# Patient Record
Sex: MALE | Race: WHITE | NOT HISPANIC OR LATINO | Employment: FULL TIME | URBAN - METROPOLITAN AREA
[De-identification: names, ages, dates, MRNs, and addresses within clinical notes are randomized per-mention and may not be internally consistent; named-entity substitution may affect disease eponyms.]

---

## 2017-02-13 ENCOUNTER — GENERIC CONVERSION - ENCOUNTER (OUTPATIENT)
Dept: OTHER | Facility: OTHER | Age: 67
End: 2017-02-13

## 2017-02-20 ENCOUNTER — ALLSCRIPTS OFFICE VISIT (OUTPATIENT)
Dept: OTHER | Facility: OTHER | Age: 67
End: 2017-02-20

## 2017-03-09 ENCOUNTER — GENERIC CONVERSION - ENCOUNTER (OUTPATIENT)
Dept: OTHER | Facility: OTHER | Age: 67
End: 2017-03-09

## 2017-04-04 ENCOUNTER — ALLSCRIPTS OFFICE VISIT (OUTPATIENT)
Dept: OTHER | Facility: OTHER | Age: 67
End: 2017-04-04

## 2017-05-08 ENCOUNTER — GENERIC CONVERSION - ENCOUNTER (OUTPATIENT)
Dept: OTHER | Facility: OTHER | Age: 67
End: 2017-05-08

## 2017-09-11 ENCOUNTER — GENERIC CONVERSION - ENCOUNTER (OUTPATIENT)
Dept: OTHER | Facility: OTHER | Age: 67
End: 2017-09-11

## 2017-09-11 ENCOUNTER — ALLSCRIPTS OFFICE VISIT (OUTPATIENT)
Dept: OTHER | Facility: OTHER | Age: 67
End: 2017-09-11

## 2017-09-12 ENCOUNTER — GENERIC CONVERSION - ENCOUNTER (OUTPATIENT)
Dept: OTHER | Facility: OTHER | Age: 67
End: 2017-09-12

## 2017-09-12 LAB
A/G RATIO (HISTORICAL): 1.6 (ref 1.2–2.2)
ALBUMIN SERPL BCP-MCNC: 4.4 G/DL (ref 3.6–4.8)
ALP SERPL-CCNC: 71 IU/L (ref 39–117)
ALT SERPL W P-5'-P-CCNC: 20 IU/L (ref 0–44)
AST SERPL W P-5'-P-CCNC: 24 IU/L (ref 0–40)
BASOPHILS # BLD AUTO: 0 %
BASOPHILS # BLD AUTO: 0 X10E3/UL (ref 0–0.2)
BILIRUB SERPL-MCNC: 0.8 MG/DL (ref 0–1.2)
BUN SERPL-MCNC: 24 MG/DL (ref 8–27)
BUN/CREA RATIO (HISTORICAL): 21 (ref 10–24)
CALCIUM SERPL-MCNC: 9.8 MG/DL (ref 8.6–10.2)
CHLORIDE SERPL-SCNC: 102 MMOL/L (ref 96–106)
CHOLEST SERPL-MCNC: 191 MG/DL (ref 100–199)
CHOLEST/HDLC SERPL: 4 RATIO UNITS (ref 0–5)
CO2 SERPL-SCNC: 22 MMOL/L (ref 18–29)
CREAT SERPL-MCNC: 1.13 MG/DL (ref 0.76–1.27)
DEPRECATED RDW RBC AUTO: 14.3 % (ref 12.3–15.4)
EGFR AFRICAN AMERICAN (HISTORICAL): 78 ML/MIN/1.73
EGFR-AMERICAN CALC (HISTORICAL): 68 ML/MIN/1.73
EOSINOPHIL # BLD AUTO: 0.2 X10E3/UL (ref 0–0.4)
EOSINOPHIL # BLD AUTO: 4 %
GLUCOSE SERPL-MCNC: 102 MG/DL (ref 65–99)
HCT VFR BLD AUTO: 47 % (ref 37.5–51)
HDLC SERPL-MCNC: 48 MG/DL
HGB BLD-MCNC: 16.3 G/DL (ref 12.6–17.7)
IMM.GRANULOCYTES (CD4/8) (HISTORICAL): 0 %
IMM.GRANULOCYTES (CD4/8) (HISTORICAL): 0 X10E3/UL (ref 0–0.1)
LDLC SERPL CALC-MCNC: 121 MG/DL (ref 0–99)
LYME 18 KD IGG (HISTORICAL): PRESENT
LYME 23 KD IGG (HISTORICAL): PRESENT
LYME 23 KD IGM (HISTORICAL): PRESENT
LYME 28 KD IGG (HISTORICAL): ABNORMAL
LYME 30 KD IGG (HISTORICAL): ABNORMAL
LYME 39 KD IGG (HISTORICAL): ABNORMAL
LYME 39 KD IGM (HISTORICAL): ABNORMAL
LYME 41 KD IGG (HISTORICAL): PRESENT
LYME 41 KD IGM (HISTORICAL): ABNORMAL
LYME 45 KD IGG (HISTORICAL): PRESENT
LYME 58 KD IGG (HISTORICAL): PRESENT
LYME 66 KD IGG (HISTORICAL): ABNORMAL
LYME 93 KD IGG (HISTORICAL): ABNORMAL
LYME IGG WB INTERP. (HISTORICAL): POSITIVE
LYME IGG/IGM AB (HISTORICAL): 1.25 ISR (ref 0–0.9)
LYME IGM (HISTORICAL): 0.82 INDEX (ref 0–0.79)
LYME IGM WB INTERP. (HISTORICAL): NEGATIVE
LYMPHOCYTES # BLD AUTO: 2 X10E3/UL (ref 0.7–3.1)
LYMPHOCYTES # BLD AUTO: 31 %
MCH RBC QN AUTO: 33.4 PG (ref 26.6–33)
MCHC RBC AUTO-ENTMCNC: 34.7 G/DL (ref 31.5–35.7)
MCV RBC AUTO: 96 FL (ref 79–97)
MONOCYTES # BLD AUTO: 0.5 X10E3/UL (ref 0.1–0.9)
MONOCYTES (HISTORICAL): 8 %
NEUTROPHILS # BLD AUTO: 3.6 X10E3/UL (ref 1.4–7)
NEUTROPHILS # BLD AUTO: 57 %
PLATELET # BLD AUTO: 220 X10E3/UL (ref 150–379)
POTASSIUM SERPL-SCNC: 4.6 MMOL/L (ref 3.5–5.2)
PROSTATE SPECIFIC ANTIGEN (HISTORICAL): 0.6 NG/ML (ref 0–4)
PROSTATE SPECIFIC ANTIGEN FREE (HISTORICAL): 0.34 NG/ML
PROSTATE SPECIFIC ANTIGEN PERCENT FREE (HISTORICAL): 56.7 %
RBC (HISTORICAL): 4.88 X10E6/UL (ref 4.14–5.8)
SODIUM SERPL-SCNC: 140 MMOL/L (ref 134–144)
TOT. GLOBULIN, SERUM (HISTORICAL): 2.7 G/DL (ref 1.5–4.5)
TOTAL PROTEIN (HISTORICAL): 7.1 G/DL (ref 6–8.5)
TRIGL SERPL-MCNC: 110 MG/DL (ref 0–149)
TSH SERPL DL<=0.05 MIU/L-ACNC: 1.3 UIU/ML (ref 0.45–4.5)
VLDLC SERPL CALC-MCNC: 22 MG/DL (ref 5–40)
WBC # BLD AUTO: 6.3 X10E3/UL (ref 3.4–10.8)

## 2017-09-13 ENCOUNTER — GENERIC CONVERSION - ENCOUNTER (OUTPATIENT)
Dept: OTHER | Facility: OTHER | Age: 67
End: 2017-09-13

## 2017-09-28 ENCOUNTER — GENERIC CONVERSION - ENCOUNTER (OUTPATIENT)
Dept: OTHER | Facility: OTHER | Age: 67
End: 2017-09-28

## 2017-10-11 ENCOUNTER — ALLSCRIPTS OFFICE VISIT (OUTPATIENT)
Dept: OTHER | Facility: OTHER | Age: 67
End: 2017-10-11

## 2018-01-12 VITALS
HEART RATE: 84 BPM | OXYGEN SATURATION: 97 % | SYSTOLIC BLOOD PRESSURE: 122 MMHG | TEMPERATURE: 98.3 F | HEIGHT: 73 IN | BODY MASS INDEX: 34.06 KG/M2 | WEIGHT: 257 LBS | DIASTOLIC BLOOD PRESSURE: 82 MMHG

## 2018-01-13 VITALS
DIASTOLIC BLOOD PRESSURE: 78 MMHG | SYSTOLIC BLOOD PRESSURE: 120 MMHG | OXYGEN SATURATION: 98 % | BODY MASS INDEX: 31.47 KG/M2 | WEIGHT: 238.5 LBS | HEART RATE: 65 BPM | TEMPERATURE: 97.7 F

## 2018-01-13 VITALS
DIASTOLIC BLOOD PRESSURE: 62 MMHG | SYSTOLIC BLOOD PRESSURE: 130 MMHG | BODY MASS INDEX: 31.48 KG/M2 | HEART RATE: 92 BPM | TEMPERATURE: 98.9 F | WEIGHT: 237.5 LBS | HEIGHT: 73 IN | OXYGEN SATURATION: 98 %

## 2018-01-13 VITALS
OXYGEN SATURATION: 97 % | BODY MASS INDEX: 33.5 KG/M2 | TEMPERATURE: 98.7 F | SYSTOLIC BLOOD PRESSURE: 130 MMHG | DIASTOLIC BLOOD PRESSURE: 68 MMHG | WEIGHT: 252.75 LBS | HEIGHT: 73 IN | HEART RATE: 87 BPM

## 2018-01-13 NOTE — RESULT NOTES
Discussion/Summary   labs so far are stable  Lyme still pending     Verified Results  (1) CBC/PLT/DIFF [id number] 10:30AM Krunal Roberts     Test Name Result Flag Reference   WBC 6 3 x10E3/uL  3 4-10 8   RBC 4 88 x10E6/uL  4 14-5 80   Hemoglobin 16 3 g/dL  12 6-17 7   Hematocrit 47 0 %  37 5-51 0   MCV 96 fL  79-97   MCH 33 4 pg H 26 6-33 0   MCHC 34 7 g/dL  31 5-35 7   RDW 14 3 %  12 3-15 4   Platelets 596 [id number]  150-379   Neutrophils 57 %     Lymphs 31 %     Monocytes 8 %     Eos 4 %     Basos 0 %     Neutrophils (Absolute) 3 6 x10E3/uL  1 4-7 0   Lymphs (Absolute) 2 0 x10E3/uL  0 7-3 1   Monocytes(Absolute) 0 5 x10E3/uL  0 1-0 9   Eos (Absolute) 0 2 x10E3/uL  0 0-0 4   Baso (Absolute) 0 0 x10E3/uL  0 0-0 2   Immature Granulocytes 0 %     Immature Grans (Abs) 0 0 x10E3/uL  0 0-0 1     (1) COMPREHENSIVE METABOLIC PANEL [id number] 34:27BZ Krunal Roberts     Test Name Result Flag Reference   Glucose, Serum 102 mg/dL H 65-99   BUN 24 mg/dL  8-27   Creatinine, Serum 1 13 mg/dL  0 76-1 27   BUN/Creatinine Ratio 21  10-24   Sodium, Serum 140 mmol/L  134-144   Potassium, Serum 4 6 mmol/L  3 5-5 2   Chloride, Serum 102 mmol/L     Carbon Dioxide, Total 22 mmol/L  18-29   Calcium, Serum 9 8 mg/dL  8 6-10 2   Protein, Total, Serum 7 1 g/dL  6 0-8 5   Albumin, Serum 4 4 g/dL  3 6-4 8   Globulin, Total 2 7 g/dL  1 5-4 5   A/G Ratio 1 6  1 2-2 2   Bilirubin, Total 0 8 mg/dL  0 0-1 2   Alkaline Phosphatase, S 71 IU/L     AST (SGOT) 24 IU/L  0-40   ALT (SGPT) 20 IU/L  0-44   eGFR If NonAfricn Am 68 mL/min/1 73  >59   eGFR If Africn Am 78 mL/min/1 73  >59     (1) LIPID PANEL, FASTING [id number] 10:30AM Krunal Roberts     Test Name Result Flag Reference   Cholesterol, Total 191 mg/dL  100-199   Triglycerides 110 mg/dL  0-149   HDL Cholesterol 48 mg/dL  >39   VLDL Cholesterol Remigio 22 mg/dL  5-40   LDL Cholesterol Calc 121 mg/dL H 0-99   T  Chol/HDL Ratio 4 0 ratio units  0 0-5 0   T   Chol/HDL Ratio Men  Women                                               1/2 Avg  Risk  3 4    3 3                                                   Avg Risk  5 0    4 4                                                2X Avg  Risk  9 6    7 1                                                3X Avg  Risk 23 4   11 0     (1) TSH [id number] 10:30AM Kayode Greco     Test Name Result Flag Reference   TSH 1 300 uIU/mL  0 450-4 500     [facility]) PSA Total+% Free [id number] 10:30AM Kayode Greco     Test Name Result Flag Reference   Prostate Specific Ag, Serum 0 6 ng/mL  0 0-4 0   Roche ECLIA methodology  According to the American Urological Association, Serum PSA should  decrease and remain at undetectable levels after radical  prostatectomy  The AUA defines biochemical recurrence as an initial  PSA value 0 2 ng/mL or greater followed by a subsequent confirmatory  PSA value 0 2 ng/mL or greater  Values obtained with different assay methods or kits cannot be used  interchangeably  Results cannot be interpreted as absolute evidence  of the presence or absence of malignant disease  PSA, Free 0 34 ng/mL  N/A   Roche ECLIA methodology  % Free PSA 56 7 %     The table below lists the probability of prostate cancer for  men with non-suspicious ANSELMO results and total PSA between  4 and 10 ng/mL, by patient age Harvey Cord[place], [address] [facility],  [id number])  % Free PSA       50-64 yr        65-75 yr                    0 00-10 00%        56%             55%                   10 01-15 00%        24%             35%                   15 01-20 00%        17%             23%                   20 01-25 00%        10%             20%                        >25 00%         5%              9%  Please note:  Victorina et al did not make specific                recommendations regarding the use of                percent free PSA for any other population                of men

## 2018-01-16 NOTE — RESULT NOTES
Discussion/Summary   lyme antibodies are positive  I will send in an antibiotic  Verified Results  ([facility] [facility]) Lyme Ab/Western Blot Reflex [id number] 10:30AM Vince Rolling     Test Name Result Flag Reference   Lyme IgG/IgM Ab 1 25 ISR H 0 00-0 90   Negative         <0 91                                                 Equivocal  0 91 - 1 09                                                 Positive         >1 09   Lyme Disease Ab, Quant, IgM 0 82 index H 0 00-0 79   Negative         <0 80                                                 Equivocal  0 80 - 1 19                                                 Positive         >1 19                  IgM levels may peak at 3-6 weeks post infection, then                  gradually decline  IgG P93 Ab  Absent     IgG P66 Ab  Absent     IgG P58 Ab  Present A    IgG P45 Ab  Present A    IgG P41 Ab  Present A    IgG P39 Ab  Absent     IgG P30 Ab  Absent     IgG P28 Ab  Absent     IgG P23 Ab  Present A    IgG P18 Ab  Present A    Lyme IgG WB Interp  Positive A    Positive: 5 of the following                                                Borrelia-specific bands:                                                18,23,28,30,39,41,45,58,                                                66, and 93  Negative: No bands or banding                                                patterns which do not                                                meet positive criteria  IgM P41 Ab  Absent     IgM P39 Ab  Absent     IgM P23 Ab  Present A    Lyme IgM WB Interp  Negative     Note: An equivocal or positive EIA result followed by a negative  Western Blot result is considered NEGATIVE  An equivocal or positive  EIA result followed by a positive Western Blot is considered POSITIVE  by the CDC  Positive: 2 of the following bands: 23,39 or 41  Negative: No bands or banding patterns which do not meet positive  criteria    Criteria for positivity are those recommended by CDC/ASTPHLD   p23=Osp C, z98=doibvgjdw  Note:  Sera from individuals with the following may cross react in the  Lyme Western Blot assays: other spirochetal diseases (periodontal  disease, leptospirosis, relapsing fever, yaws, and pinta);  connective autoimmune (Rheumatoid Arthritis and Systemic Lupus  Erythematosus and also individuals with Antinuclear Antibody);  other infections COFFEE [facility] Spotted Fever; Areli-Barr Virus,  and Cytomegalovirus)  Plan  Lyme disease    · Doxycycline Hyclate 100 MG Oral Tablet;  Take 1 tablet twice daily

## 2018-01-18 NOTE — RESULT NOTES
Verified Results  (1) CBC/PLT/DIFF [id number] 12:00AM [facility]     Test Name Result Flag Reference   WBC 5 4 x10E3/uL  3 4-10 8   RBC 4 72 x10E6/uL  4 14-5 80   Hemoglobin 15 4 g/dL  12 6-17 7   Hematocrit 46 0 %  37 5-51 0   MCV 98 fL H 79-97   MCH 32 6 pg  26 6-33 0   MCHC 33 5 g/dL  31 5-35 7   RDW 14 0 %  12 3-15 4   Platelets 152 [id number]  150-379   Neutrophils 48 %     Lymphs 38 %     Monocytes 9 %     Eos 5 %     Basos 0 %     Neutrophils (Absolute) 2 6 x10E3/uL  1 4-7 0   Lymphs (Absolute) 2 0 x10E3/uL  0 7-3 1   Monocytes(Absolute) 0 5 x10E3/uL  0 1-0 9   Eos (Absolute) 0 3 x10E3/uL  0 0-0 4   Baso (Absolute) 0 0 x10E3/uL  0 0-0 2   Immature Granulocytes 0 %     Immature Grans (Abs) 0 0 x10E3/uL  0 0-0 1     (1) COMPREHENSIVE METABOLIC PANEL [id number] 86:13TX [facility]     Test Name Result Flag Reference   Glucose, Serum 103 mg/dL H 65-99   BUN 26 mg/dL  8-27   Creatinine, Serum 0 98 mg/dL  0 76-1 27   eGFR If NonAfricn Am 81 mL/min/1 73  >59   eGFR If Africn Am 94 mL/min/1 73  >59   BUN/Creatinine Ratio 27 H 10-22   Sodium, Serum 141 mmol/L  134-144   Potassium, Serum 4 3 mmol/L  3 5-5 2   Chloride, Serum 104 mmol/L     Carbon Dioxide, Total 23 mmol/L  18-29   Calcium, Serum 9 7 mg/dL  8 6-10 2   Protein, Total, Serum 6 7 g/dL  6 0-8 5   Albumin, Serum 4 4 g/dL  3 6-4 8   Globulin, Total 2 3 g/dL  1 5-4 5   A/G Ratio 1 9  1 1-2 5   Bilirubin, Total 0 8 mg/dL  0 0-1 2   Alkaline Phosphatase, S 66 IU/L     AST (SGOT) 20 IU/L  0-40   ALT (SGPT) 19 IU/L  0-44     (1) LIPID PANEL, FASTING [id number] 12:00AM Buck Montes De Oca     Test Name Result Flag Reference   Cholesterol, Total 234 mg/dL H 100-199   Triglycerides 92 mg/dL  0-149   HDL Cholesterol 45 mg/dL  >39   According to ATP-III Guidelines, HDL-C >59 mg/dL is considered a  negative risk factor for CHD  VLDL Cholesterol Remigio 18 mg/dL  5-40   LDL Cholesterol Calc 171 mg/dL H 0-99   T   Chol/HDL Ratio 5 2 ratio units H 0 0-5 0   T  Chol/HDL Ratio                                                             Men  Women                                               1/2 Avg  Risk  3 4    3 3                                                   Avg Risk  5 0    4 4                                                2X Avg  Risk  9 6    7 1                                                3X Avg  Risk 23 4   11 0     (1) PSA (SCREEN) (Dx V76 44 Screen for Prostate Cancer) [id number] 12:00AM [facility]     Test Name Result Flag Reference   Prostate Specific Ag, Serum 0 5 ng/mL  0 0-4 0   Roche ECLIA methodology  According to the American Urological Association, Serum PSA should  decrease and remain at undetectable levels after radical  prostatectomy  The AUA defines biochemical recurrence as an initial  PSA value 0 2 ng/mL or greater followed by a subsequent confirmatory  PSA value 0 2 ng/mL or greater  Values obtained with different assay methods or kits cannot be used  interchangeably  Results cannot be interpreted as absolute evidence  of the presence or absence of malignant disease  (1) TSH [id number] 12:00AM [facility]     Test Name Result Flag Reference   TSH 1 320 uIU/mL  0 450-4 500       Discussion/Summary   sugar slightly elevated  lipids are way too high   we will discuss treatment at next ov

## 2018-02-11 DIAGNOSIS — E78.2 MIXED HYPERLIPIDEMIA: Primary | ICD-10-CM

## 2018-02-11 RX ORDER — PRAVASTATIN SODIUM 80 MG/1
TABLET ORAL
Qty: 30 TABLET | Refills: 0 | Status: SHIPPED | OUTPATIENT
Start: 2018-02-11 | End: 2018-02-13 | Stop reason: SDUPTHER

## 2018-02-13 ENCOUNTER — TELEPHONE (OUTPATIENT)
Dept: FAMILY MEDICINE CLINIC | Facility: CLINIC | Age: 68
End: 2018-02-13

## 2018-02-13 DIAGNOSIS — R10.9 ABDOMINAL CRAMPING: ICD-10-CM

## 2018-02-13 DIAGNOSIS — E78.2 MIXED HYPERLIPIDEMIA: ICD-10-CM

## 2018-02-13 DIAGNOSIS — M62.838 MUSCLE SPASM: Primary | ICD-10-CM

## 2018-02-13 RX ORDER — HYOSCYAMINE SULFATE 0.12 MG/1
0.12 TABLET SUBLINGUAL 3 TIMES DAILY PRN
Qty: 60 EACH | Refills: 1 | Status: SHIPPED | OUTPATIENT
Start: 2018-02-13 | End: 2019-11-01 | Stop reason: SDUPTHER

## 2018-02-13 RX ORDER — METAXALONE 800 MG/1
1 TABLET ORAL 3 TIMES DAILY PRN
COMMUNITY
Start: 2015-06-24 | End: 2018-02-13 | Stop reason: SDUPTHER

## 2018-02-13 RX ORDER — METAXALONE 800 MG/1
800 TABLET ORAL EVERY 6 HOURS PRN
Qty: 60 TABLET | Refills: 1 | Status: SHIPPED | OUTPATIENT
Start: 2018-02-13 | End: 2019-03-27 | Stop reason: SDUPTHER

## 2018-02-13 RX ORDER — ALPRAZOLAM 0.25 MG/1
1 TABLET ORAL DAILY PRN
COMMUNITY
Start: 2013-08-07 | End: 2018-10-02 | Stop reason: SDUPTHER

## 2018-02-13 RX ORDER — UBIDECARENONE 30 MG
CAPSULE ORAL
COMMUNITY

## 2018-02-13 RX ORDER — PRAVASTATIN SODIUM 80 MG/1
80 TABLET ORAL
Qty: 30 TABLET | Refills: 5 | Status: SHIPPED | OUTPATIENT
Start: 2018-02-13 | End: 2018-09-18 | Stop reason: SDUPTHER

## 2018-02-13 RX ORDER — VENLAFAXINE 75 MG/1
TABLET ORAL
COMMUNITY
Start: 2017-10-11 | End: 2018-02-20

## 2018-02-13 RX ORDER — OMEPRAZOLE 20 MG/1
1 CAPSULE, DELAYED RELEASE ORAL DAILY
COMMUNITY
Start: 2016-09-06 | End: 2019-09-24

## 2018-02-13 RX ORDER — HYOSCYAMINE SULFATE 0.12 MG/1
TABLET SUBLINGUAL
COMMUNITY
Start: 2016-01-25 | End: 2018-02-13 | Stop reason: SDUPTHER

## 2018-02-20 ENCOUNTER — OFFICE VISIT (OUTPATIENT)
Dept: FAMILY MEDICINE CLINIC | Facility: CLINIC | Age: 68
End: 2018-02-20
Payer: COMMERCIAL

## 2018-02-20 VITALS
TEMPERATURE: 98.1 F | WEIGHT: 255.5 LBS | DIASTOLIC BLOOD PRESSURE: 82 MMHG | BODY MASS INDEX: 33.86 KG/M2 | SYSTOLIC BLOOD PRESSURE: 116 MMHG | HEART RATE: 72 BPM | HEIGHT: 73 IN | OXYGEN SATURATION: 95 %

## 2018-02-20 DIAGNOSIS — M54.12 CERVICAL RADICULOPATHY: Primary | ICD-10-CM

## 2018-02-20 DIAGNOSIS — F33.8 SEASONAL AFFECTIVE DISORDER (HCC): ICD-10-CM

## 2018-02-20 DIAGNOSIS — G47.33 OBSTRUCTIVE SLEEP APNEA: ICD-10-CM

## 2018-02-20 PROBLEM — M75.42 IMPINGEMENT SYNDROME OF LEFT SHOULDER: Status: ACTIVE | Noted: 2017-09-11

## 2018-02-20 PROBLEM — C44.41 BASAL CELL CARCINOMA OF SCALP: Status: ACTIVE | Noted: 2017-02-20

## 2018-02-20 PROCEDURE — 99214 OFFICE O/P EST MOD 30 MIN: CPT | Performed by: FAMILY MEDICINE

## 2018-02-20 PROCEDURE — 1101F PT FALLS ASSESS-DOCD LE1/YR: CPT | Performed by: FAMILY MEDICINE

## 2018-02-20 RX ORDER — HYDROCODONE BITARTRATE AND ACETAMINOPHEN 5; 325 MG/1; MG/1
TABLET ORAL
COMMUNITY
Start: 2016-11-29 | End: 2018-02-20 | Stop reason: SDUPTHER

## 2018-02-20 RX ORDER — HYDROCODONE BITARTRATE AND ACETAMINOPHEN 5; 325 MG/1; MG/1
1 TABLET ORAL EVERY 4 HOURS PRN
Qty: 30 TABLET | Refills: 0 | Status: SHIPPED | OUTPATIENT
Start: 2018-02-20 | End: 2018-03-13 | Stop reason: SDUPTHER

## 2018-02-20 RX ORDER — MELATONIN
1000 DAILY
COMMUNITY

## 2018-02-20 RX ORDER — PRAVASTATIN SODIUM 80 MG/1
TABLET ORAL
COMMUNITY
Start: 2017-01-02 | End: 2018-02-20

## 2018-02-20 RX ORDER — ALPRAZOLAM 0.25 MG/1
TABLET ORAL
COMMUNITY
Start: 2016-12-28 | End: 2018-02-20

## 2018-02-20 RX ORDER — CHLORAL HYDRATE 500 MG
1000 CAPSULE ORAL DAILY
COMMUNITY

## 2018-02-20 RX ORDER — PREDNISONE 20 MG/1
60 TABLET ORAL DAILY
Qty: 15 TABLET | Refills: 0 | Status: SHIPPED | OUTPATIENT
Start: 2018-02-20 | End: 2018-04-23 | Stop reason: ALTCHOICE

## 2018-02-20 NOTE — PATIENT INSTRUCTIONS
Cervical Radiculopathy   WHAT YOU NEED TO KNOW:   What is cervical radiculopathy? Cervical radiculopathy is a painful condition that happens when a spinal nerve in your neck is pinched or irritated  What causes cervical radiculopathy? Changes in the vertebrae (bones) and discs in your neck can put pressure on a spinal nerve  Discs are natural, spongy cushions between your vertebrae that allow your spine to move  The following can cause a pinched nerve:  · Disc damage  may occur if a disc flattens, bulges, or moves over time  An injury can also cause disc damage  · Cervical spondylosis  is when the vertebrae in your neck break down  This normally occurs as you age  · Growths , such as tumors or cysts (fluid-filled lumps), may grow and press on the nerve  What are the signs and symptoms of cervical radiculopathy? The most common symptom is sharp pain that travels from your neck all the way down your arm  You may have pain in your shoulder, chest, and hand  The pain may get worse with movement or when you cough or sneeze  You may also have any of the following:  · Burning or tingling sensations in your neck or arm     · Numbness or weakness in your arm or hand that makes it hard for you to  objects    · Headaches  How is cervical radiculopathy diagnosed? Your healthcare provider will ask when and how your symptoms began  He will gently press on your neck to check for tenderness and areas that are not shaped correctly  He will also check your arms and hands for numbness or weakness  You may have any of the following:  · Provocative tests  are done to check your response to certain movements  He will ask you to move your neck, shoulder, and arm in different ways  Some movements will increase your symptoms, while others will make you feel better  · An x-ray  is a picture of the bones and tissues in your neck  · An MRI or a CT scan  may be used to take pictures of your neck   The pictures can show problems and changes in your nerves, discs, and vertebrae  You may be given dye to help the pictures show up better  Tell the healthcare provider if you have ever had an allergic reaction to contrast dye  Do not enter the MRI room with anything metal  Metal can cause serious injury  Tell the healthcare provider if you have any metal in or on your body  · An electromyography  is also called an EMG  An EMG is done to test the function of your muscles and the nerves that control them  Electrodes (wires) are placed on the muscle being tested  Needles may be attached to the electrodes and placed in your skin  The electrical activity of your muscles and nerves is measured by a machine attached to the electrodes  Your muscles are tested at rest and during movement  How is cervical radiculopathy treated? · NSAIDs  decrease swelling and pain  This medicine can be bought without a doctor's order  This medicine can cause stomach bleeding or kidney problems in certain people  If you take blood thinner medicine, always ask your healthcare provider if NSAIDs are safe for you  Always read the medicine label and follow the directions on it before using this medicine  · Prescription pain medicine  may be given to decrease pain  Do not wait until the pain is severe before you take this medicine  · Steroids  help decrease pain and swelling  These may be given as a pill or as an injection in your neck  You may need more than 1 injection if your symptoms do not improve after the first treatment  · Physical therapy  helps stretch and strengthen your muscles  Your physical therapist can teach you how to improve your posture and the way you hold your neck  He may also teach you how to be safely active and avoid further injury  He can also help you develop an exercise program that is safe for your back and neck  · Surgery  may be used to treat a pinched nerve if other treatments have not helped after 6 to 12 weeks    How can I manage my symptoms? · Ice  helps decrease swelling and pain  Ice may also help prevent tissue damage  Use an ice pack, or put crushed ice in a plastic bag  Cover it with a towel and place it on your neck for 15 to 20 minutes every hour or as directed  · Rest  when you feel it is needed  Slowly start to do more each day  Return to your daily activities as directed  · Wear a soft collar  You may be given a soft collar to support your neck while you sleep  Wear the soft collar only as directed  · Do light stretches and regular exercise  Your healthcare provider may suggest light stretches to help decrease stiffness in your neck and arm as you recover  After your pain is controlled, you may benefit from regular exercise  Ask what type of exercise is safe for your back and neck  · Review your work area  A comfortable work area can help prevent neck strain  Ask your employer for an ergonomic review to check the position of your desk, chair, phone, and computer  Make any necessary adjustments for your comfort  When should I contact my healthcare provider? · You are losing weight without trying  · Your pain is worse, even with medicine  · One or both hands feel more numb than before, or you cannot move your fingers well  · You have questions or concerns about your condition or care  CARE AGREEMENT:   You have the right to help plan your care  Learn about your health condition and how it may be treated  Discuss treatment options with your caregivers to decide what care you want to receive  You always have the right to refuse treatment  The above information is an  only  It is not intended as medical advice for individual conditions or treatments  Talk to your doctor, nurse or pharmacist before following any medical regimen to see if it is safe and effective for you    © 2017 [employer] Information is for End User's use only and may not be sold, redistributed or otherwise used for commercial purposes  All illustrations and images included in CareNotes® are the copyrighted property of A D A M , Inc  or Nicholas Gasca

## 2018-02-20 NOTE — ASSESSMENT & PLAN NOTE
This has been extremely well controlled for many years but has now returned  He would like to go back to see pain management to consider injections again since they worked in the past   I advised that he may not get in within the next few days so I am going to start him on an oral burst of 60 mg of prednisone for 5 days  This should help him get through the long car drive he has to do after which she can follow up at pain management  I did provide him with her phone number but advised they may also be calling him as I put the referral in the computer

## 2018-02-20 NOTE — ASSESSMENT & PLAN NOTE
Uncontrolled and untreated  The patient will need a repeat sleep study and he would like to do this at home -I did provide him with a prescription for a home sleep study but advised that since he has a pre established relationship with a provider at [place], Dr Nevin Adkins,  That he call their office and let them know he has an order for the home sleep study  They should be able to set this up for him and to his follow-up

## 2018-02-20 NOTE — PROGRESS NOTES
Assessment/Plan:    Obstructive sleep apnea    Uncontrolled and untreated  The patient will need a repeat sleep study and he would like to do this at home -I did provide him with a prescription for a home sleep study but advised that since he has a pre established relationship with a provider at [place], Dr Brianne Ruano,  That he call their office and let them know he has an order for the home sleep study  They should be able to set this up for him and to his follow-up  Seasonal affective disorder ([address] )    Well controlled on sertraline and now improving with the weather change  We discussed tapering the sertraline by cutting it in half and taking 25 mg daily for a week or more  After that he can take 25 mg every other day for a week or more  This should prevent the serotonin withdrawal syndrome  Cervical radiculopathy    This has been extremely well controlled for many years but has now returned  He would like to go back to see pain management to consider injections again since they worked in the past   I advised that he may not get in within the next few days so I am going to start him on an oral burst of 60 mg of prednisone for 5 days  This should help him get through the long car drive he has to do after which she can follow up at pain management  I did provide him with her phone number but advised they may also be calling him as I put the referral in the computer  Diagnoses and all orders for this visit:    Cervical radiculopathy  -     Ambulatory referral to Pain Management; Future  -     predniSONE 20 mg tablet; Take 3 tablets (60 mg total) by mouth daily  -     HYDROcodone-acetaminophen (NORCO) 5-325 mg per tablet; Take 1 tablet by mouth every 4 (four) hours as needed for pain Max Daily Amount: 6 tablets    Seasonal affective disorder (HCC)    Obstructive sleep apnea  -     Home Study; Future  -     Ambulatory referral to Sleep Medicine;  Future              Subjective:      Patient ID: Yadira Childers is a 78 y o  male  The pt is here today with pain in the left side of the neck  It radiates down the arm and he does get numbness and pain  He has had this before, about 5 years ago, with the same exact sx  Saw Dr Anjana Estrada and had 6 injections over a 2 year period ([facility])  He did have a CT at that time that showed disc disease at C6, he thinks  These finally worked and he's been fine until 2 weeks ago  No known injuries  No known reason why this restarted now  But it has been getting worse and he has a long drive this week  Wants to get this taken care of asap and before it gets worse  He has not had any repeat imaging since the initial episode 5 years ago    He also is now taking sertraline 50mg for his SAD  He had tried cymbalta with Dr Alex Byrne but it gave him side effects  He gets this every year, generally takes meds until March or so  He now wants to stop the sertraline and is asking how he should taper it    Finally he has a dx of JAY  He has not had a sleep study in years  He did not tolerate the CPAP years ago which is why he's not on it   But his wife has d/w him that now the machines are smaller and quieter and he wants to try again  He was seeing a sleep specialist in [place] whom he would like to go back to             The following portions of the patient's history were reviewed and updated as appropriate: allergies, current medications, past family history, past medical history, past social history, past surgical history and problem list     Review of Systems      Objective:  Vitals:    02/20/18 0852   BP: 116/82   Pulse: 72   Temp: 98 1 °F (36 7 °C)   SpO2: 95%      Physical Exam   Constitutional: He is oriented to person, place, and time  He appears well-developed and well-nourished  HENT:   Head: Normocephalic and atraumatic  Eyes: Conjunctivae and EOM are normal    Neck: Muscular tenderness (on the left trapezius) present   No spinous process tenderness present  No neck rigidity  No edema present  + spurlings to the left   Cardiovascular: Normal rate  Pulmonary/Chest: Effort normal  No respiratory distress  Musculoskeletal: Normal range of motion  Other than in his neck   Neurological: He is alert and oriented to person, place, and time  Skin: Skin is warm, dry and intact  Psychiatric: He has a normal mood and affect  His behavior is normal  Judgment and thought content normal    Nursing note and vitals reviewed

## 2018-02-20 NOTE — ASSESSMENT & PLAN NOTE
Well controlled on sertraline and now improving with the weather change  We discussed tapering the sertraline by cutting it in half and taking 25 mg daily for a week or more  After that he can take 25 mg every other day for a week or more  This should prevent the serotonin withdrawal syndrome

## 2018-02-23 ENCOUNTER — TELEPHONE (OUTPATIENT)
Dept: PAIN MEDICINE | Facility: CLINIC | Age: 68
End: 2018-02-23

## 2018-03-13 ENCOUNTER — CONSULT (OUTPATIENT)
Dept: PAIN MEDICINE | Facility: CLINIC | Age: 68
End: 2018-03-13
Payer: COMMERCIAL

## 2018-03-13 VITALS
HEART RATE: 86 BPM | HEIGHT: 73 IN | DIASTOLIC BLOOD PRESSURE: 62 MMHG | TEMPERATURE: 97.6 F | WEIGHT: 262 LBS | BODY MASS INDEX: 34.72 KG/M2 | SYSTOLIC BLOOD PRESSURE: 118 MMHG

## 2018-03-13 DIAGNOSIS — M54.12 CERVICAL RADICULOPATHY: ICD-10-CM

## 2018-03-13 DIAGNOSIS — M48.02 CERVICAL SPINAL STENOSIS: Primary | ICD-10-CM

## 2018-03-13 PROCEDURE — 99244 OFF/OP CNSLTJ NEW/EST MOD 40: CPT | Performed by: ANESTHESIOLOGY

## 2018-03-13 RX ORDER — HYDROCODONE BITARTRATE AND ACETAMINOPHEN 5; 325 MG/1; MG/1
1 TABLET ORAL EVERY 4 HOURS PRN
Qty: 30 TABLET | Refills: 0 | Status: SHIPPED | OUTPATIENT
Start: 2018-03-13 | End: 2018-05-29 | Stop reason: SDUPTHER

## 2018-03-13 NOTE — PROGRESS NOTES
Assessment:  1  Cervical spinal stenosis    2  Cervical radiculopathy        Plan:  The patient's pain persists despite time, relative rest, activity modification and oral steroids  I believe that he would benefit from cervical epidural steroid injection to directly diminish any inflammatory component of his pain as it has helped him in the past niece had short-term relief with oral steroids     I will initially use an translaminar approach  If the patient does not receive significant pain relief following the initial injection, I may need to repeat using a transforaminal approach that may allow for better concentrate of the steroid along the affected nerve root  I did write him a one time refill of his hydrocodone as he is in severe pain  While the patient was in the office today, I did review the patient's report on the [address] [place] [address] web site and found it to be appropriate for what is being prescribed and I reviewed it for any inconsistencies or evidence of multiple prescribers/drug diversion  There was no report under him on the web site  The risks of opioid medications, including dependence, addiction and tolerance were explained to the patient  The patient understands and agrees to use these medications only as prescribed  I have fully discussed the potential side effects of the medication with the patient, which include, but are not limited to, constipation, drowsiness, addiction, impaired judgment and risk of fatal overdose if not taken as prescribed  I warned against driving while taking sedating medications  At this point and time, the patient is showing no signs of addiction, abuse, diversion or suicidal ideation  In the office today, we reviewed the nature of the patient's pathology in depth using  diagrams and models  I discussed the approach I would use for the epidural steroid injection and provided literature for home review    The patient understands the risks associated with the procedure including but not limited to bleeding, infection, tissue injury, exacerbation of symptoms, allergic reaction, spinal headache, and paralysis and provided written and verbal consent  today  This document utilized voice recognition software and errors may have occurred  My impressions and treatment recommendations were discussed in detail with the patient who verbalized understanding and had no further questions  Discharge instructions were provided  I personally saw and examined the patient and I agree with the above discussed plan of care  Orders Placed This Encounter   Procedures    XR spine cervical complete 4 or 5 vw non injury     Standing Status:   Future     Standing Expiration Date:   3/13/2022     Scheduling Instructions:      Bring along any outside films relating to this procedure  Order Specific Question:   Reason for Exam:     Answer:   left neck pain    FL spine and pain procedure     Standing Status:   Future     Standing Expiration Date:   3/13/2022     Order Specific Question:   Reason for Exam:     Answer:   FRAN to the left #1     Order Specific Question:   Anticoagulant hold needed? Answer:   no    FL spine and pain procedure     Standing Status:   Future     Standing Expiration Date:   3/13/2022     Order Specific Question:   Reason for Exam:     Answer:   FRAN to the left #2     Order Specific Question:   Anticoagulant hold needed? Answer:   no     New Medications Ordered This Visit   Medications    HYDROcodone-acetaminophen (NORCO) 5-325 mg per tablet     Sig: Take 1 tablet by mouth every 4 (four) hours as needed for pain Max Daily Amount: 6 tablets     Dispense:  30 tablet     Refill:  0       History of Present Illness:    Sloane Gregory is a 78 y o  male and who was seen back in 2011 for cervical epidural steroid injections by Dr Vinny Mcmillan    He states his pain is similar nature in character his left side of his neck radiating to his scapula at with numbness and tingling down into his left hand  He rates it as moderate to severe and significantly interfering with his daily living activities  His pain is nearly constant worse in the afternoon he has undergone physician supervised home exercises describing the pain is achy with a numbing sensation  Lying down and relaxation decreases symptoms while exercise aggravates it  Traction provides no relief ice provides short-term relief  I have personally reviewed and/or updated the patient's past medical history, past surgical history, family history, social history, current medications, allergies, and vital signs today  Review of Systems:    Review of Systems   Constitutional: Negative for fever and unexpected weight change  HENT: Negative for trouble swallowing  Eyes: Negative for visual disturbance  Respiratory: Negative for shortness of breath and wheezing  Cardiovascular: Negative for chest pain and palpitations  Gastrointestinal: Negative for constipation, diarrhea, nausea and vomiting  Endocrine: Negative for cold intolerance, heat intolerance and polydipsia  Genitourinary: Negative for difficulty urinating and frequency  Musculoskeletal: Negative for arthralgias, gait problem, joint swelling and myalgias  Skin: Negative for rash  Neurological: Negative for dizziness, seizures, syncope, weakness and headaches  Hematological: Does not bruise/bleed easily  Psychiatric/Behavioral: Negative for dysphoric mood  All other systems reviewed and are negative        Patient Active Problem List   Diagnosis    Basal cell carcinoma of scalp    Esophageal motility disorder    GERD (gastroesophageal reflux disease)    Hyperlipidemia    Impingement syndrome of left shoulder    Obstructive sleep apnea    [facility]    Vitamin D deficiency    Cervical radiculopathy       Past Medical History:   Diagnosis Date    Acute medial meniscus tear     Allergic rhinitis     Anxiety     Appendicitis     Biceps tendon tear     Colon polyps     Depression with anxiety     Dyskinesia of esophagus     Fatigue     GERD (gastroesophageal reflux disease)     Herpes     Hyperlipidemia     Joint pain     Kidney stones     Lyme disease     Nephrolithiasis        Past Surgical History:   Procedure Laterality Date    APPENDECTOMY      BICEPS TENDON REPAIR      KNEE CARTILAGE SURGERY      VASECTOMY         Family History   Problem Relation Age of Onset    Diabetes Mother     Coronary artery disease Father     Kidney disease Father        Social History     Occupational History    Not on file       Social History Main Topics    Smoking status: Never Smoker    Smokeless tobacco: Never Used    Alcohol use No    Drug use: No    Sexual activity: Not on file       Current Outpatient Prescriptions on File Prior to Visit   Medication Sig    ALPRAZolam (XANAX) 0 25 mg tablet Take 1 tablet by mouth daily as needed    cholecalciferol (VITAMIN D3) 1,000 units tablet Take 1,000 Units by mouth daily    Coenzyme Q10 (COQ10 PO) Take by mouth daily    Glucosamine-Chondroit-Vit C-Mn (GLUCOSAMINE 1500 COMPLEX PO) Take by mouth    Hyoscyamine Sulfate SL 0 125 MG SUBL Place 0 125 mg under the tongue 3 (three) times a day as needed (as needed)    metaxalone (SKELAXIN) 800 mg tablet Take 1 tablet (800 mg total) by mouth every 6 (six) hours as needed for muscle spasms    Multiple Vitamins-Minerals (MULTI FOR HIM 50+) TABS Take by mouth    Omega-3 Fatty Acids (FISH OIL) 1,000 mg Take 1,000 mg by mouth daily    omeprazole (PriLOSEC) 20 mg delayed release capsule Take 1 capsule by mouth daily    pravastatin (PRAVACHOL) 80 mg tablet Take 1 tablet (80 mg total) by mouth daily at bedtime    predniSONE 20 mg tablet Take 3 tablets (60 mg total) by mouth daily    [DISCONTINUED] HYDROcodone-acetaminophen (NORCO) 5-325 mg per tablet Take 1 tablet by mouth every 4 (four) hours as needed for pain Max Daily Amount: 6 tablets     No current facility-administered medications on file prior to visit  Allergies   Allergen Reactions    Fluoxetine Other (See Comments)     Reaction Date: 16Apr2011;     Pseudoephedrine Hives     Reaction Date: 16Apr2011;     Sulfa Antibiotics Hives       Physical Exam:    /62   Pulse 86   Temp 97 6 °F (36 4 °C) (Oral)   Ht 6' 1" (1 854 m)   Wt 119 kg (262 lb)   BMI 34 57 kg/m²     Constitutional: normal, well developed, well nourished, alert, in no distress and non-toxic and no overt pain behavior  Obese  Eyes: anicteric  HEENT: grossly intact  Neck: supple, symmetric, trachea midline and no masses   Pulmonary:even and unlabored  Cardiovascular:No edema or pitting edema present  Skin:Normal without rashes or lesions and well hydrated  Psychiatric:Mood and affect appropriate  Neurologic:Cranial Nerves II-XII grossly intact  Musculoskeletal:normal   Inspection:  Normal station and gait  Normal cervical curves and head posture  Skin intact without erythema  No sensory loss  There is no atrophy  Palpation:  There is no tenderness to palpation overlying the bilateral cervical paraspinals, cervical facet joints  There is no tenderness over the upper trapezius muscles bilateral  No shoulder tenderness  Motor/Strength:  5/5 strength in the bilateral upper extremities  Reflexes:  equal and symmetric in the upper limbs  Sensation:   Sensation intact to light touch and pinprick in the upper limbs  Maneuvers:  Positive Spurling maneuver on the    Imaging  MRI CERVICAL SPINE 09/18/09  Diffuse changes of degenerative disc disease as described in detail above  Question of herniation and or spurring on the left at C6-7 causing narrowing of the left C7 foramen  Evaluation on axial images is somewhat limited because of motion  If further imaging evaluation is desired, a CT myelogram may be usefule     Normal cervical spinal cord signal intensity  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

## 2018-03-16 ENCOUNTER — APPOINTMENT (OUTPATIENT)
Dept: RADIOLOGY | Facility: CLINIC | Age: 68
End: 2018-03-16
Payer: COMMERCIAL

## 2018-03-16 DIAGNOSIS — M48.02 CERVICAL SPINAL STENOSIS: ICD-10-CM

## 2018-03-16 PROCEDURE — 72050 X-RAY EXAM NECK SPINE 4/5VWS: CPT

## 2018-03-19 ENCOUNTER — HOSPITAL ENCOUNTER (OUTPATIENT)
Dept: RADIOLOGY | Facility: CLINIC | Age: 68
Discharge: HOME | End: 2018-03-19
Attending: ANESTHESIOLOGY
Payer: COMMERCIAL

## 2018-03-19 VITALS
SYSTOLIC BLOOD PRESSURE: 138 MMHG | DIASTOLIC BLOOD PRESSURE: 83 MMHG | TEMPERATURE: 98.2 F | HEART RATE: 73 BPM | RESPIRATION RATE: 18 BRPM | OXYGEN SATURATION: 98 %

## 2018-03-19 DIAGNOSIS — M48.02 CERVICAL SPINAL STENOSIS: ICD-10-CM

## 2018-03-19 PROCEDURE — 62321 NJX INTERLAMINAR CRV/THRC: CPT | Performed by: ANESTHESIOLOGY

## 2018-03-19 RX ORDER — LIDOCAINE HYDROCHLORIDE 10 MG/ML
5 INJECTION, SOLUTION EPIDURAL; INFILTRATION; INTRACAUDAL; PERINEURAL ONCE
Status: COMPLETED | OUTPATIENT
Start: 2018-03-19 | End: 2018-03-19

## 2018-03-19 RX ORDER — METHYLPREDNISOLONE ACETATE 80 MG/ML
80 INJECTION, SUSPENSION INTRA-ARTICULAR; INTRALESIONAL; INTRAMUSCULAR; PARENTERAL; SOFT TISSUE ONCE
Status: COMPLETED | OUTPATIENT
Start: 2018-03-19 | End: 2018-03-19

## 2018-03-19 RX ADMIN — IOHEXOL 1 ML: 300 INJECTION, SOLUTION INTRAVENOUS at 09:30

## 2018-03-19 RX ADMIN — LIDOCAINE HYDROCHLORIDE 3 ML: 10 INJECTION, SOLUTION EPIDURAL; INFILTRATION; INTRACAUDAL; PERINEURAL at 09:18

## 2018-03-19 RX ADMIN — METHYLPREDNISOLONE ACETATE 160 MG: 80 INJECTION, SUSPENSION INTRA-ARTICULAR; INTRALESIONAL; INTRAMUSCULAR; SOFT TISSUE at 09:17

## 2018-03-19 NOTE — H&P
History of Present Illness: The patient is a 78 y o  male who presents with complaints of neck and left arm pain      Patient Active Problem List   Diagnosis    Basal cell carcinoma of scalp    Esophageal motility disorder    GERD (gastroesophageal reflux disease)    Hyperlipidemia    Impingement syndrome of left shoulder    Obstructive sleep apnea    [facility]    Vitamin D deficiency    Cervical radiculopathy       Past Medical History:   Diagnosis Date    Acute medial meniscus tear     Allergic rhinitis     Anxiety     Appendicitis     Biceps tendon tear     Colon polyps     Depression with anxiety     Dyskinesia of esophagus     Fatigue     GERD (gastroesophageal reflux disease)     Herpes     Hyperlipidemia     Joint pain     Kidney stones     Lyme disease     Nephrolithiasis        Past Surgical History:   Procedure Laterality Date    APPENDECTOMY      BICEPS TENDON REPAIR      KNEE CARTILAGE SURGERY      VASECTOMY           Current Outpatient Prescriptions:     ALPRAZolam (XANAX) 0 25 mg tablet, Take 1 tablet by mouth daily as needed, Disp: , Rfl:     cholecalciferol (VITAMIN D3) 1,000 units tablet, Take 1,000 Units by mouth daily, Disp: , Rfl:     Coenzyme Q10 (COQ10 PO), Take by mouth daily, Disp: , Rfl:     Glucosamine-Chondroit-Vit C-Mn (GLUCOSAMINE 1500 COMPLEX PO), Take by mouth, Disp: , Rfl:     HYDROcodone-acetaminophen (NORCO) 5-325 mg per tablet, Take 1 tablet by mouth every 4 (four) hours as needed for pain Max Daily Amount: 6 tablets, Disp: 30 tablet, Rfl: 0    Hyoscyamine Sulfate SL 0 125 MG SUBL, Place 0 125 mg under the tongue 3 (three) times a day as needed (as needed), Disp: 60 each, Rfl: 1    metaxalone (SKELAXIN) 800 mg tablet, Take 1 tablet (800 mg total) by mouth every 6 (six) hours as needed for muscle spasms, Disp: 60 tablet, Rfl: 1    Multiple Vitamins-Minerals (MULTI FOR HIM 50+) TABS, Take by mouth, Disp: , Rfl:     Omega-3 Fatty Acids (FISH OIL) 1,000 mg, Take 1,000 mg by mouth daily, Disp: , Rfl:     omeprazole (PriLOSEC) 20 mg delayed release capsule, Take 1 capsule by mouth daily, Disp: , Rfl:     pravastatin (PRAVACHOL) 80 mg tablet, Take 1 tablet (80 mg total) by mouth daily at bedtime, Disp: 30 tablet, Rfl: 5    predniSONE 20 mg tablet, Take 3 tablets (60 mg total) by mouth daily, Disp: 15 tablet, Rfl: 0    Allergies   Allergen Reactions    Fluoxetine Other (See Comments)     Reaction Date: 16Apr2011;     Pseudoephedrine Hives     Reaction Date: 16Apr2011;     Sulfa Antibiotics Hives       Physical Exam:   Vitals:    03/19/18 0908   BP: 146/84   Pulse: 98   Resp: 18   Temp: 98 2 °F (36 8 °C)   SpO2: 98%     General: Awake, Alert, Oriented x 3, Mood and affect appropriate  Respiratory: Respirations even and unlabored  Cardiovascular: Peripheral pulses intact; no edema  Musculoskeletal Exam:  Decreased range of motion cervical spine    ASA Score: II    Assessment:   1   Cervical spinal stenosis        Plan: FRAN to the left #1

## 2018-03-19 NOTE — DISCHARGE INSTRUCTIONS
Epidural Steroid Injection   WHAT YOU NEED TO KNOW:   An epidural steroid injection (JUAN ANTONIO) is a procedure to inject steroid medicine into the epidural space  The epidural space is between your spinal cord and vertebrae  Steroids reduce inflammation and fluid buildup in your spine that may be causing pain  You may be given pain medicine along with the steroids  ACTIVITY  · Do not drive or operate machinery today  · No strenuous activity today - bending, lifting, etc   · You may resume normal activites starting tomorrow - start slowly and as tolerated  · You may shower today, but no tub baths or hot tubs  · You may have numbness for several hours from the local anesthetic  Please use caution and common sense, especially with weight-bearing activities  CARE OF THE INJECTION SITE  · If you have soreness or pain, apply ice to the area today (20 minutes on/20 minutes off)  · Starting tomorrow, you may use warm, moist heat or ice if needed  · You may have an increase or change in your discomfort for 36-48 hours after your treatment  · Apply ice and continue with any pain medication you have been prescribed  · Notify the [facility] if you have any of the following: redness, drainage, swelling, headache, stiff neck or fever above 100°F     SPECIAL INSTRUCTIONS  · Our office will contact you in approximately 7 days for a progress report  MEDICATIONS  · Continue to take all routine medications  · Our office may have instructed you to hold some medications  If you have a problem specifically related to your procedure, please call our office at [phone]  Problems not related to your procedure should be directed to your primary care physician

## 2018-03-26 ENCOUNTER — TELEPHONE (OUTPATIENT)
Dept: PAIN MEDICINE | Facility: CLINIC | Age: 68
End: 2018-03-26

## 2018-03-26 NOTE — TELEPHONE ENCOUNTER
Patient states he got 85% relief and his pain level is a 2/10  Conf next injection         S/P FRAN TO LT #1 on 03/19/18 w/SL [place]  FRAN TO LT #2 scheduled on 04/02/18

## 2018-03-28 ENCOUNTER — OFFICE VISIT (OUTPATIENT)
Dept: FAMILY MEDICINE CLINIC | Facility: CLINIC | Age: 68
End: 2018-03-28
Payer: COMMERCIAL

## 2018-03-28 VITALS
HEART RATE: 86 BPM | DIASTOLIC BLOOD PRESSURE: 76 MMHG | TEMPERATURE: 97.2 F | OXYGEN SATURATION: 95 % | WEIGHT: 253 LBS | HEIGHT: 73 IN | BODY MASS INDEX: 33.53 KG/M2 | SYSTOLIC BLOOD PRESSURE: 104 MMHG

## 2018-03-28 DIAGNOSIS — J01.00 ACUTE NON-RECURRENT MAXILLARY SINUSITIS: Primary | ICD-10-CM

## 2018-03-28 PROCEDURE — 99213 OFFICE O/P EST LOW 20 MIN: CPT | Performed by: FAMILY MEDICINE

## 2018-03-28 RX ORDER — AMOXICILLIN AND CLAVULANATE POTASSIUM 875; 125 MG/1; MG/1
1 TABLET, FILM COATED ORAL EVERY 12 HOURS SCHEDULED
Qty: 20 TABLET | Refills: 0 | Status: SHIPPED | OUTPATIENT
Start: 2018-03-28 | End: 2018-04-07

## 2018-03-28 RX ORDER — PROMETHAZINE HYDROCHLORIDE AND CODEINE PHOSPHATE 6.25; 1 MG/5ML; MG/5ML
5 SYRUP ORAL EVERY 4 HOURS PRN
Qty: 180 ML | Refills: 0 | Status: SHIPPED | OUTPATIENT
Start: 2018-03-28 | End: 2018-04-23 | Stop reason: ALTCHOICE

## 2018-03-28 NOTE — PROGRESS NOTES
Assessment/Plan:      Diagnoses and all orders for this visit:    Acute non-recurrent maxillary sinusitis  -     amoxicillin-clavulanate (AUGMENTIN) 875-125 mg per tablet; Take 1 tablet by mouth every 12 (twelve) hours for 10 days  -     promethazine-codeine (PHENERGAN WITH CODEINE) 6 25-10 mg/5 mL syrup; Take 5 mL by mouth every 4 (four) hours as needed for cough        I suspect that the patient has a bacterial sinusitis  I prescribed antibiotics and encouraged medication for sx relief  Rest and fluids encouraged as well  Subjective:     Patient ID: Cailin Jett is a 78 y o  male  The pt is here because he thinks he has a sinus infection  he has been sick for 2 days, this came on very suddenly yesterday evening  + sinus pain/pressure mostly on the left  Feels pain in his upper teeth and gums across his cheek  Pain is so bad it kept him up at night  no cough  + nasal congestion, which also came out of the blue and started yesterday  + headaches  + PND  No fevers          Review of Systems      The following portions of the patient's history were reviewed and updated as appropriate: allergies, current medications, past family history, past medical history, past social history, past surgical history and problem list     Objective:  Vitals:    03/28/18 1052   BP: 104/76   Pulse: 86   Temp: (!) 97 2 °F (36 2 °C)   SpO2: 95%   Weight: 115 kg (253 lb)   Height: 6' 1" (1 854 m)      Physical Exam   Constitutional: He is oriented to person, place, and time  Vital signs are normal  He appears well-developed and well-nourished  He appears ill  HENT:   Head: Normocephalic and atraumatic  Right Ear: Tympanic membrane and external ear normal    Left Ear: Tympanic membrane and external ear normal    Nose: Mucosal edema, rhinorrhea and sinus tenderness present  Right sinus exhibits no maxillary sinus tenderness and no frontal sinus tenderness   Left sinus exhibits maxillary sinus tenderness and frontal sinus tenderness  Mouth/Throat: Mucous membranes are normal  Posterior oropharyngeal erythema present  No oropharyngeal exudate, posterior oropharyngeal edema or tonsillar abscesses  Large inflamed turbinate on the left   Eyes: Conjunctivae and lids are normal    Pulmonary/Chest: Effort normal and breath sounds normal    Lymphadenopathy:     He has cervical adenopathy  Neurological: He is alert and oriented to person, place, and time  Skin: Skin is warm, dry and intact  Psychiatric: He has a normal mood and affect   Thought content normal

## 2018-03-28 NOTE — PATIENT INSTRUCTIONS
Rhinosinusitis   WHAT YOU NEED TO KNOW:   Rhinosinusitis (RS) is inflammation of your nose and sinuses  It commonly begins as a virus, often as a common cold  Viruses usually last 7 to 10 days and do not need treatment  When the virus does not get better on its own, you may have bacterial RS  This means that bacteria have begun to grow inside your sinuses  Acute RS lasts less than 4 weeks  Chronic RS lasts 12 weeks or more  Recurrent RS is when you have 4 or more episodes of RS in one year  DISCHARGE INSTRUCTIONS:   Return to the emergency department if:   · Your eye and eyelid are red, swollen, and painful  · You cannot open your eye  · You have double vision or you cannot see  · Your eyeball bulges out or you cannot move your eye  · You are more sleepy than normal or you notice changes in your ability to think, move, or talk  · You have a stiff neck, a fever, or a bad headache  · You have swelling of your forehead or scalp  Contact your healthcare provider if:   · Your symptoms are worse or do not improve after 3 to 5 days of treatment  · You have questions or concerns about your condition or care  Medicines: You may need any of the following:  · Acetaminophen  decreases pain and fever  It is available without a doctor's order  Ask how much to take and how often to take it  Follow directions  Acetaminophen can cause liver damage if not taken correctly  · NSAIDs , such as ibuprofen, help decrease swelling, pain, and fever  This medicine is available with or without a doctor's order  NSAIDs can cause stomach bleeding or kidney problems in certain people  If you take blood thinner medicine, always ask your healthcare provider if NSAIDs are safe for you  Always read the medicine label and follow directions  · Nasal steroid sprays  decrease inflammation in your nose and sinuses  · Decongestants  reduce swelling and drain mucus in the nose and sinuses   They may help you breathe easier  · Antihistamines  dry mucus in the nose and relieve sneezing  · Antibiotics  treat a bacterial infection and may be needed if your symptoms do not improve or they get worse  · Take your medicine as directed  Contact your healthcare provider if you think your medicine is not helping or if you have side effects  Tell him or her if you are allergic to any medicine  Keep a list of the medicines, vitamins, and herbs you take  Include the amounts, and when and why you take them  Bring the list or the pill bottles to follow-up visits  Carry your medicine list with you in case of an emergency  Self-care:   · Rinse your sinuses  Use a sinus rinse device to rinse your nasal passages with a saline (salt water) solution  This will help thin the mucus in your nose and rinse away pollen and dirt  It will also help reduce swelling so you can breathe normally  Ask your healthcare provider how often to do this  · Breathe in steam   Heat a bowl of water until you see steam  Lean over the bowl and make a tent over your head with a large towel  Breathe deeply for about 20 minutes  Be careful not to get too close to the steam or burn yourself  Do this 3 times a day  You can also breathe deeply when you take a hot shower  · Sleep with your head elevated  Place an extra pillow under your head before you go to sleep to help your sinuses drain  · Drink liquids as directed  Ask your healthcare provider how much liquid to drink each day and which liquids are best for you  Liquids will thin the mucus in your nose and help it drain  Avoid drinks that contain alcohol or caffeine  · Do not smoke, and avoid secondhand smoke  Nicotine and other chemicals in cigarettes and cigars can make your symptoms worse  Ask your healthcare provider for information if you currently smoke and need help to quit  E-cigarettes or smokeless tobacco still contain nicotine   Talk to your healthcare provider before you use these products  Follow up with your healthcare provider as directed: Follow up if your symptoms are worse or not better after 3 to 5 days of treatment  Write down your questions so you remember to ask them during your visits  © 2017 [employer] Information is for End User's use only and may not be sold, redistributed or otherwise used for commercial purposes  All illustrations and images included in CareNotes® are the copyrighted property of [employer]  or [employer]  The above information is an  only  It is not intended as medical advice for individual conditions or treatments  Talk to your doctor, nurse or pharmacist before following any medical regimen to see if it is safe and effective for you

## 2018-04-13 ENCOUNTER — HOSPITAL ENCOUNTER (OUTPATIENT)
Dept: SLEEP CENTER | Facility: CLINIC | Age: 68
Discharge: HOME | End: 2018-04-13
Payer: COMMERCIAL

## 2018-04-13 DIAGNOSIS — G47.33 OBSTRUCTIVE SLEEP APNEA: ICD-10-CM

## 2018-04-13 PROCEDURE — G0399 HOME SLEEP TEST/TYPE 3 PORTA: HCPCS

## 2018-04-19 ENCOUNTER — TELEPHONE (OUTPATIENT)
Dept: SLEEP CENTER | Facility: CLINIC | Age: 68
End: 2018-04-19

## 2018-04-23 ENCOUNTER — OFFICE VISIT (OUTPATIENT)
Dept: SLEEP CENTER | Facility: CLINIC | Age: 68
End: 2018-04-23

## 2018-04-23 VITALS
HEIGHT: 73 IN | SYSTOLIC BLOOD PRESSURE: 140 MMHG | WEIGHT: 253.6 LBS | BODY MASS INDEX: 33.61 KG/M2 | HEART RATE: 74 BPM | DIASTOLIC BLOOD PRESSURE: 72 MMHG

## 2018-04-23 DIAGNOSIS — G47.10 HYPERSOMNIA: ICD-10-CM

## 2018-04-23 DIAGNOSIS — G47.33 OBSTRUCTIVE SLEEP APNEA: Primary | ICD-10-CM

## 2018-04-23 DIAGNOSIS — F41.9 ANXIETY: ICD-10-CM

## 2018-04-23 DIAGNOSIS — K21.9 GASTROESOPHAGEAL REFLUX DISEASE, ESOPHAGITIS PRESENCE NOT SPECIFIED: ICD-10-CM

## 2018-04-23 DIAGNOSIS — G47.9 SLEEP DISTURBANCE: ICD-10-CM

## 2018-04-23 DIAGNOSIS — E66.9 OBESITY (BMI 30-39.9): ICD-10-CM

## 2018-04-23 NOTE — PROGRESS NOTES
Consultation - [address]  78 y o  male  :1950  [id number]    Physician Requesting Consult: Shaina Esteves MD     Reason for Consult : Bashir Marley your kind request] I saw this patient for initial sleep evaluation today  [Patient] had a home sleep study and is here to review results and further options  The study demonstrated a CK (respiratory event index of) 20 7 /hour  Minimum oxygen saturation was 81 %            Medications, Past, Family and Social Histories were reviewed  He  has a past medical history of Acute medial meniscus tear; Allergic rhinitis; Anxiety; Appendicitis; Biceps tendon tear; Colon polyps; Depression with anxiety; Dyskinesia of esophagus; Fatigue; GERD (gastroesophageal reflux disease); Herpes; Hyperlipidemia; Joint pain; Kidney stones; Lyme disease; and Nephrolithiasis  He has a current medication list which includes the following prescription(s): alprazolam, cholecalciferol, coenzyme q10, glucosamine-chondroit-vit c-mn, hyoscyamine sulfate sl, metaxalone, multi for him 50+, fish oil, omeprazole, pravastatin, and hydrocodone-acetaminophen  HPI:  He was diagnosed with obstructive sleep apnea in the past he had discontinued CPAP because it was uncomfortable  He reports snoring of more than 10 years duration that is loud enough to disturb his wife  He is not aware of breathing difficulties during sleep or modifying factors  He grinds his teeth during sleep and awakens with headaches as a result  He reported no features of movement disorder of parasomnia  Sleep Routine: [He has a regular schedule ]    TIB (time in bed): [Typically between 10 and 11:00 p m  and 6:30 a m ,]     Sleep Latency (time to fall asleep): < 15 minutes  Interruptions of sleep: He reports  [3 times /night because of nocturia ]   He awakens spontaneously and is not always refreshed   He is tired as the day and week progresses but denies excessive daytime drowsiness and rated himself at Total score: 7 /24 on the [place] Sleepiness Scale  He takes naps on weekends  Habits:  He has 20 pack year history of tobacco use and stopped around 30 years ago, [ reports that he does not drink alcohol ], [ reports that he does not use drugs ], [he uses caffeine until around 9:00 p m  He is active but does not have a regular exercise routine ]     Family History: The father was known to snore  ROS: reviewed & as attached  [Significant for intentional weight loss of around 10 lb in the past 3 months  He reports acid reflux  He had recent steroid injection for cervical radicular symptoms  He has anxiety and episodic panic attacks for which she uses Xanax ]     EXAM: Blood pressure 140/72, pulse 74, height 6' 1" (1 854 m), weight 115 kg (253 lb 9 6 oz)  Body mass index is 33 46 kg/m²  General:This is a [well] groomed male, well appearing [at] their stated age, in no distress  Speech is clear and coherent  Psych: Alert, orientated & cooperative  [  ]Mental state[  ] appears normal Judgement & insight [are good]  Extremities:Skin is warm and dry  Color and hydration are good  There is [no] pedal edema  Head and neck: Craniofacial anatomy [is normal]  TMJ & Sinuses are normal  Neck Circumference: 47 cm,  [appears thick] [& there is extra fatty tissue]  There [are no abnormal masses or] Lymhadenopathy  [  ]  Thyroid is [normal]  Trachea is [central]  Nasal airway: [patent]  Septum is [central ] Mucous membranes appeared [normal]  Turbinates are [normal ]  There is [no] rhinorrhea  Oral airway: [is crowded ] Base of tongue is at Modified Mallampati class [IV]  Hard palate [appears normal]  Soft palate [is redundant]  [   [place] is [no] tonsillar hypertrophy  Carr Salines are normal]  CVS: Heart sounds are [regular]  There [are no] murmur[s]  Resp: Effort is [normal]  Air entry is [good] bilaterally  There are [no] wheezes or rales  Abdomen: obese, soft & non-tender  CNS: is intact   Gait is normal  Rombergs is negative  MSK: Muscle bulk, tone and power are [normal]  IMPRESSION:   1  Obstructive sleep apnea  Ambulatory referral to Sleep Medicine   2  Hypersomnia     3  Anxiety     4  Gastroesophageal reflux disease, esophagitis presence not specified     5  Obesity (BMI 30-39  9)          PLAN:   1  I reviewed results of the Sleep study with the patient  2  With respect to above conditions, I counseled on pathophysiology, diagnosis, treatment options, risks and benefits; inter-relationship and effects on symptoms and comorbidities; risks of no treatment; costs and insurance aspects  3  I also advised limiting use of caffeine and on weight reduction  4  Patient elected positive airway pressure therapy and is to be scheduled for a titration study  5  Follow-up to be scheduled after the study to review results and to initiate therapy             Sincerely,    Justin Navarrete MD  Board Certified Specialist

## 2018-04-23 NOTE — PROGRESS NOTES
Review of Systems      Genitourinary need to urinate more than twice a night   Cardiology none   Gastrointestinal frequent heartburn/acid reflux   Neurology headaches   Constitutional fatigue   Integumentary none   Psychiatry anxiety   Musculoskeletal joint pain and sciatica   Pulmonary snoring   ENT none   Endocrine frequent urination   Hematological none

## 2018-05-29 ENCOUNTER — OFFICE VISIT (OUTPATIENT)
Dept: FAMILY MEDICINE CLINIC | Facility: CLINIC | Age: 68
End: 2018-05-29
Payer: COMMERCIAL

## 2018-05-29 VITALS
HEART RATE: 95 BPM | HEIGHT: 73 IN | SYSTOLIC BLOOD PRESSURE: 118 MMHG | WEIGHT: 255 LBS | OXYGEN SATURATION: 97 % | BODY MASS INDEX: 33.8 KG/M2 | TEMPERATURE: 99.1 F | DIASTOLIC BLOOD PRESSURE: 78 MMHG

## 2018-05-29 DIAGNOSIS — M54.12 CERVICAL RADICULOPATHY: ICD-10-CM

## 2018-05-29 DIAGNOSIS — L98.9 SKIN LESION: ICD-10-CM

## 2018-05-29 DIAGNOSIS — M54.17 LUMBOSACRAL NEURITIS: Primary | ICD-10-CM

## 2018-05-29 PROCEDURE — 99213 OFFICE O/P EST LOW 20 MIN: CPT | Performed by: FAMILY MEDICINE

## 2018-05-29 RX ORDER — PREDNISONE 10 MG/1
TABLET ORAL
Qty: 40 TABLET | Refills: 0 | Status: SHIPPED | OUTPATIENT
Start: 2018-05-29 | End: 2018-06-18

## 2018-05-29 RX ORDER — HYDROCODONE BITARTRATE AND ACETAMINOPHEN 5; 325 MG/1; MG/1
1 TABLET ORAL EVERY 4 HOURS PRN
Qty: 30 TABLET | Refills: 0 | Status: SHIPPED | OUTPATIENT
Start: 2018-05-29 | End: 2018-10-23 | Stop reason: SDUPTHER

## 2018-05-29 RX ORDER — TRIAMCINOLONE ACETONIDE 1 MG/G
CREAM TOPICAL 2 TIMES DAILY
Qty: 30 G | Refills: 0 | Status: SHIPPED | OUTPATIENT
Start: 2018-05-29 | End: 2018-10-01 | Stop reason: CLARIF

## 2018-05-29 NOTE — PROGRESS NOTES
Assessment/Plan:    No problem-specific Assessment & Plan notes found for this encounter  Diagnoses and all orders for this visit:    Lumbosacral neuritis  -     predniSONE 10 mg tablet; 5 tabs daily x3 days, 4 tabs daily x2 days, 3 tabs daily x2 days, 2 tabs daily x 2 days, 1 tab daily x2 days  -     HYDROcodone-acetaminophen (NORCO) 5-325 mg per tablet; Take 1 tablet by mouth every 4 (four) hours as needed for pain Max Daily Amount: 6 tablets    I am going to put the patient on a steroid taper as well as prescribed Vicodin for his pain  He has Skelaxin at home which he will also use as needed  He is going to see the chiropractor and will continue with his exercises  If his symptoms do not improve I will refer him to physical therapy  Skin lesion  -     triamcinolone (KENALOG) 0 1 % cream; Apply topically 2 (two) times a day    I did prescribe some steroid cream for him to use on this lesion but advised that if it is growing rapidly he should call the dermatologist and let them know  Subjective:      Patient ID: Alberto Mliler is a 78 y o  male      The pt is here with severe acute back pain  Started Friday night after cutting the grass  Was in the right leg, now in the left leg  Will be going to a chiropractor, Keara Shipley  His wife used to work for her  He did get some exercises from her that he started doing  He has been taking skelaxin but he is not sure if it is helping at all  Icing and heating  Can't sleep because he can't lie down  600mg of ibuprofen did not help  He has had some chronic back pain intermittently with flares but nothing recent  He had 1 flare that was worse than this and this was so many years ago he can barely remember it    Additionally, he has a lesion on the right hand that he thinks is a skin cancer  It is itching and bothering him  He did call his dermatologist who removed a squamous cell carcinoma from his scalp recently  They can not see him until August  He is wondering if there is a cream or something he can put on it to help with the itching            The following portions of the patient's history were reviewed and updated as appropriate: allergies, current medications, past family history, past medical history, past social history, past surgical history and problem list     Review of Systems      Objective:  Vitals:    05/29/18 1405   BP: 118/78   Pulse: 95   Temp: 99 1 °F (37 3 °C)   SpO2: 97%   Weight: 116 kg (255 lb)   Height: 6' 1" (1 854 m)      Physical Exam   Constitutional: He is oriented to person, place, and time  He appears well-developed and well-nourished  He appears distressed (He can't sit down, looks uncomfortable, stand leaning over the exam table)  Musculoskeletal:        Lumbar back: He exhibits decreased range of motion, tenderness, pain and spasm  He exhibits no bony tenderness, no swelling and no edema  Back:    Neurological: He is alert and oriented to person, place, and time  Skin: Skin is warm and dry  Irregularly shaped hyper pigmented and erythematous lesion on the dorsal aspect of the right hand, about 5 mm in length   Psychiatric: He has a normal mood and affect

## 2018-06-15 ENCOUNTER — HOSPITAL ENCOUNTER (OUTPATIENT)
Dept: SLEEP CENTER | Facility: CLINIC | Age: 68
Discharge: HOME | End: 2018-06-15
Payer: COMMERCIAL

## 2018-06-15 DIAGNOSIS — G47.33 OBSTRUCTIVE SLEEP APNEA: ICD-10-CM

## 2018-06-15 PROCEDURE — 95811 POLYSOM 6/>YRS CPAP 4/> PARM: CPT

## 2018-06-16 NOTE — PROGRESS NOTES
Sleep Study Documentation    Pre-Sleep Study       Sleep testing procedure explained to patient:YES    Patient napped prior to study:NO    Caffeine:Dayshift worker after 12PM   Caffeine use:YES- coffee  6 to 18 ounces, soda  12 ounces and ice tea  12 ounces    Alcohol:Dayshift workers after 5PM: Alcohol use:NO    Typical day for patient:YES       Study Documentation  Treatment   Optimal PAP pressure: 9 0  Leak:Small  Snore:Eliminated  REM Obtained:yes  Supplemental O2: no    Minimum SaO2 89%  Baseline SaO2 95%  PAP mask tried (list all)Resmed Air Fit F20 FFM Size Medium; Resmed Air Fit F10 FFM Size Medium; Resmed Mirage Quattro FFM Size Medium  PAP mask choice (final) resmed Air Fit F20 FFM Size Medium  PAP mask type:full face  PAP pressure at which snoring was eliminated 9 0  Minimum SaO2 at final PAP pressure 92%  Mode of Therapy:CPAP  ETCO2:No  CPAP changed to BiPAP:No    Mode of Therapy:CPAP    EKG abnormalities: no     EEG abnormalities: no    Study Terminated:no    Patient classification: employed       Post-Sleep Study    Medication used at bedtime or during sleep study:YES other prescription medications    Patient reports time it took to fall asleep:less than 20 minutes    Patient reports waking up during study:3 or more times  Patient reports returning to sleep without difficulty  Patient reports sleeping 6 to 8 hours with dreaming  Patient reports sleep during study:typical    Patient rated sleepiness: Somewhat sleepy or tired    PAP treatment:yes: Post PAP treatment patient reports feeling unchanged and would wear PAP mask at home

## 2018-06-18 ENCOUNTER — OFFICE VISIT (OUTPATIENT)
Dept: FAMILY MEDICINE CLINIC | Facility: CLINIC | Age: 68
End: 2018-06-18
Payer: COMMERCIAL

## 2018-06-18 VITALS
WEIGHT: 253 LBS | HEIGHT: 73 IN | DIASTOLIC BLOOD PRESSURE: 88 MMHG | SYSTOLIC BLOOD PRESSURE: 120 MMHG | TEMPERATURE: 97.8 F | HEART RATE: 93 BPM | BODY MASS INDEX: 33.53 KG/M2 | OXYGEN SATURATION: 95 %

## 2018-06-18 DIAGNOSIS — H81.13 BENIGN PAROXYSMAL POSITIONAL VERTIGO DUE TO BILATERAL VESTIBULAR DISORDER: ICD-10-CM

## 2018-06-18 DIAGNOSIS — B36.9 FUNGAL DERMATITIS: Primary | ICD-10-CM

## 2018-06-18 PROCEDURE — 99213 OFFICE O/P EST LOW 20 MIN: CPT | Performed by: FAMILY MEDICINE

## 2018-06-18 RX ORDER — FLUCONAZOLE 150 MG/1
150 TABLET ORAL ONCE
Qty: 2 TABLET | Refills: 0 | Status: SHIPPED | OUTPATIENT
Start: 2018-06-18 | End: 2018-06-18

## 2018-06-18 RX ORDER — KETOCONAZOLE 20 MG/G
CREAM TOPICAL DAILY
Qty: 60 G | Refills: 0 | Status: SHIPPED | OUTPATIENT
Start: 2018-06-18 | End: 2019-03-27 | Stop reason: ALTCHOICE

## 2018-06-18 NOTE — ASSESSMENT & PLAN NOTE
This is chronic and intermittent and he is just finishing up with an episode now  He has meclizine at home to use as needed  I did provide him with the information for the vestibular therapist who he has seen in the past and advised that in the future if this happens he can make an appointment with them the same day and they can help with his symptoms

## 2018-06-18 NOTE — PROGRESS NOTES
Assessment/Plan:    Benign paroxysmal positional vertigo due to bilateral vestibular disorder  This is chronic and intermittent and he is just finishing up with an episode now  He has meclizine at home to use as needed  I did provide him with the information for the vestibular therapist who he has seen in the past and advised that in the future if this happens he can make an appointment with them the same day and they can help with his symptoms  Diagnoses and all orders for this visit:    Fungal dermatitis  -     ketoconazole (NIZORAL) 2 % cream; Apply topically daily  -     fluconazole (DIFLUCAN) 150 mg tablet; Take 1 tablet (150 mg total) by mouth once for 1 dose Can repeat in 5 days if needed    I am going to go ahead and give the patient a fluconazole which he can repeat in 5 days if needed  I am also going to prescribe ketoconazole cream   In the future, he can use the over-the-counter clotrimazole cream as well  I did advise he get an over-the-counter antifungal powder to use in his underwear to prevent this in the future  If, for whatever reason, this does not resolve that he would need to come back for a full exam     Benign paroxysmal positional vertigo due to bilateral vestibular disorder          Subjective:      Patient ID: Prasad Villatoro is a 78 y o  male      The patient is here today because he developed a red rash on his penis over the last few weeks  It is only on the penis  It is very red and itchy  It seems like the skin is almost peeling  He has no urinary symptoms  No dysuria or hematuria or frequency  He has tried hydrocortisone which did not help at all  He tried Neosporin which did not help at all  That he tried the steroid cream that I had prescribed for him which actually made it a little worse  He sweats down there a lot  He has not tried any antifungal creams    Additionally, he has vertigo again  He has chronic intermittent vertigo  He generally will get it out of the blue often for no reason at all  It generally last 2-3 days and then resolved on its own as well  He does have meclizine which he takes as needed  Today is the 3rd day and it is going away  He has seen the vestibular therapist in the past and was given exercises to do when this happens          The following portions of the patient's history were reviewed and updated as appropriate: allergies, current medications, past family history, past medical history, past social history, past surgical history and problem list     Review of Systems      Objective:  Vitals:    06/18/18 1053   BP: 120/88   Pulse: 93   Temp: 97 8 °F (36 6 °C)   SpO2: 95%   Weight: 115 kg (253 lb)   Height: 6' 1" (1 854 m)      Physical Exam   Constitutional: He is oriented to person, place, and time  He appears well-developed and well-nourished  HENT:   Head: Normocephalic and atraumatic  Neurological: He is alert and oriented to person, place, and time  No cranial nerve deficit  Skin: Skin is warm and dry     Penis was not examine, the patient was uncomfortable and by history it sounds as if his rashes fungal

## 2018-06-18 NOTE — PATIENT INSTRUCTIONS
[facility] [address]   [address] [facility], [address] [facility] [address], [place]  Tel: [phone]  Fax: [phone]      Jock Itch   WHAT YOU NEED TO KNOW:   Jock itch is a rash on your groin  The groin is the area between your abdomen and legs  Jock itch is usually easy to treat and prevent  It is caused by a fungus  The fungus also causes athlete's foot  DISCHARGE INSTRUCTIONS:   Medicines:   · Fungus medicine:  Jock itch is usually treated with a cream that kills the fungus  Apply the cream to the rash and the skin around it as directed  You may need to apply the cream 1 to 2 times each day for 2 weeks  You may be given this medicine as a pill if the cream does not help  · Take your medicine as directed  Contact your healthcare provider if you think your medicine is not helping or if you have side effects  Tell him or her if you are allergic to any medicine  Keep a list of the medicines, vitamins, and herbs you take  Include the amounts, and when and why you take them  Bring the list or the pill bottles to follow-up visits  Carry your medicine list with you in case of an emergency  Manage and prevent jock itch:   · Keep the area dry  · Wear light, loose clothes  Do not share clothes  · Do not wear wet clothes for long periods  Wash athletic gear after you play sports  · Bathe daily  Dry your skin completely after you bathe  Apply cream or powder after you bathe as directed if you get jock itch often  Wash your hands often to prevent the spread of the fungus  You may want to wear disposable gloves when you clean your feet  The gloves will keep the fungus from moving from your feet to your hands  · Use separate towels to dry each part of your body  Put your socks on before you put on your underwear so you do not spread the fungus from your feet to your groin  · Lose weight if you weigh more than your healthcare provider suggests    Follow up with your healthcare provider or skin specialist as directed: Your healthcare provider will examine your rash to see how well it is healing  If you take medicine as a pill, he may draw blood to check how your liver and kidneys are working  Write down your questions so you remember to ask them during your visits  Contact your healthcare provider or skin specialist if:   · Your signs and symptoms do not get better within 2 weeks of treatment  · Your signs and symptoms get worse or come back after treatment  · You get a rash on a part of your body other than your groin  · You have a fever  · You have questions or concerns about your condition or care  © 2017 [address]  Information is for End User's use only and may not be sold, redistributed or otherwise used for commercial purposes  All illustrations and images included in CareNotes® are the copyrighted property of [employer]  or [address]  The above information is an  only  It is not intended as medical advice for individual conditions or treatments  Talk to your doctor, nurse or pharmacist before following any medical regimen to see if it is safe and effective for you

## 2018-06-20 DIAGNOSIS — G47.33 OSA (OBSTRUCTIVE SLEEP APNEA): Primary | ICD-10-CM

## 2018-06-21 ENCOUNTER — TELEPHONE (OUTPATIENT)
Dept: SLEEP CENTER | Facility: CLINIC | Age: 68
End: 2018-06-21

## 2018-06-21 NOTE — TELEPHONE ENCOUNTER
Left message to keep follow up appointment on 7/9 as scheduled  Instructed to bring mask    Rx and data faxed to [facility]

## 2018-07-05 ENCOUNTER — TRANSCRIBE ORDERS (OUTPATIENT)
Dept: SLEEP CENTER | Facility: CLINIC | Age: 68
End: 2018-07-05

## 2018-07-05 DIAGNOSIS — G47.33 OBSTRUCTIVE SLEEP APNEA: Primary | ICD-10-CM

## 2018-07-09 ENCOUNTER — OFFICE VISIT (OUTPATIENT)
Dept: SLEEP CENTER | Facility: CLINIC | Age: 68
End: 2018-07-09
Payer: COMMERCIAL

## 2018-07-09 VITALS
HEART RATE: 86 BPM | WEIGHT: 260 LBS | BODY MASS INDEX: 34.46 KG/M2 | HEIGHT: 73 IN | SYSTOLIC BLOOD PRESSURE: 131 MMHG | DIASTOLIC BLOOD PRESSURE: 82 MMHG

## 2018-07-09 DIAGNOSIS — K21.9 GASTROESOPHAGEAL REFLUX DISEASE, ESOPHAGITIS PRESENCE NOT SPECIFIED: ICD-10-CM

## 2018-07-09 DIAGNOSIS — G47.61 PLMD (PERIODIC LIMB MOVEMENT DISORDER): ICD-10-CM

## 2018-07-09 DIAGNOSIS — G47.9 SLEEP DISTURBANCE: ICD-10-CM

## 2018-07-09 DIAGNOSIS — F41.9 ANXIETY: ICD-10-CM

## 2018-07-09 DIAGNOSIS — G47.33 OBSTRUCTIVE SLEEP APNEA: Primary | ICD-10-CM

## 2018-07-09 DIAGNOSIS — E66.9 OBESITY (BMI 30-39.9): ICD-10-CM

## 2018-07-09 DIAGNOSIS — G47.10 HYPERSOMNIA: ICD-10-CM

## 2018-07-09 PROCEDURE — 99214 OFFICE O/P EST MOD 30 MIN: CPT | Performed by: INTERNAL MEDICINE

## 2018-07-09 NOTE — PROGRESS NOTES
Follow-up Note - Sleep Center   Ming Brownlee  78 y o  male  :1950  [facility]:[id number]    I saw Rashida Shukla in sleep clinic today for his sleep complaints & medical conditions  He had Therapeutic study [and is here to review results, treatment options [and to initiate therapy]  During the subsequent therapeutic study, sleep disordered breathing was [successfully] remediated with PAP at 9 cm H2O  There were severe periodic limb movements of sleep [ Index of] 52 / hour  PFSH, Problem List, Medications & Allergies were reviewed in EMR  Interval changes: [none reported ]   He  has a past medical history of Acute medial meniscus tear; Allergic rhinitis; Allergic rhinitis; Anxiety; Appendicitis; Benign essential hypertension; Biceps tendon tear; Colon polyps; Depression with anxiety; Dyskinesia of esophagus; Erythema migrans (Lyme disease); Fatigue; GERD (gastroesophageal reflux disease); Herpes; Herpes zoster; Hyperlipidemia; Hyperplastic colon polyp; Joint pain; Kidney stones; Lyme disease; Nephrolithiasis; and Panic disorder with agoraphobia  He has a current medication list which includes the following prescription(s): alprazolam, cholecalciferol, coenzyme q10, glucosamine-chondroit-vit c-mn, hydrocodone-acetaminophen, hyoscyamine sulfate sl, ketoconazole, metaxalone, multi for him 50+, fish oil, omeprazole, pravastatin, and triamcinolone  ROS: reviewed see attached   HPI:  Patient had [no] difficulty tolerating the mask or the pressure on the study night  Reported [no] nasal congestion, [no] mucosal dryness, [no] chest or abdominal discomfort  Sleep was [not] better than usual [and felt more energetic the next day]  He denied restless leg symptoms and is not aware of jerking movements during sleep  Sleep Routine:  He is getting 6-7 hours of interrupted sleep    [He reports occasional excessive drowsiness [and] rated himself at 8 /24 on the [place] sleepiness scale ]        EXAM: There were no vitals taken for this visit     cm  Patient is alert, orientated, cooperative [and in no distress]  Mental state [appears normal]  Physical findings (Head and Neck, Heart, Lungs, Abdomen, CNS and Musculoskeletal systems) are essentially unchanged and as documented during the previous encounter  IMPRESSION:    1  Obstructive sleep apnea  Ambulatory referral to Sleep Medicine   2  Sleep disturbance     3  PLMD (periodic limb movement disorder)     4  Hypersomnia     5  Anxiety     6  Obesity (BMI 30-39 9)     7  Gastroesophageal reflux disease, esophagitis presence not specified         PLAN:    1  I reviewed results of the Sleep studies with the patient  2  With respect to above conditions, I counseled on pathophysiology, diagnosis, treatment options, risks and benefits; inter-relationship and effects on symptoms and comorbidities; risks of no treatment; costs and insurance aspects  3  Patient elected positive airway pressure therapy and [care coordinated with the DME provider for set up in clinic today]  4  Need for compliance with therapy and weight reduction were emphasized  5  Follow-up to be scheduled in 2 months to monitor compliance, progress and to adjust therapy  Thank you for allowing me to participate in the care of this patient  I will keep you apprised of developments      Sincerely,    Authenticated electronically by Kerwin Hernandez MD on 53/41/86   Board Certified Specialist

## 2018-07-09 NOTE — PROGRESS NOTES
Review of Systems      Genitourinary need to urinate more than twice a night   Cardiology none   Gastrointestinal frequent heartburn/acid reflux   Neurology frequent headaches and awaken with headache   Constitutional fatigue   Integumentary none   Psychiatry anxiety   Musculoskeletal joint pain, back pain and sciatica   Pulmonary snoring   ENT none   Endocrine none   Hematological none

## 2018-07-09 NOTE — PROGRESS NOTES
Follow-up Note - Sleep Center   Rosa Alvarado  78 y o  male  :1950  ZLB:[id number]    I saw Hari Camp in sleep clinic today for his sleep complaints & medical conditions  He had Therapeutic study [and is here to review results, treatment options [and to initiate therapy]  During the subsequent therapeutic study, sleep disordered breathing was [successfully] remediated with PAP at 9 cm H2O  There were severe periodic limb movements of sleep [ Index of] 52/ hour  PFSH, Problem List, Medications & Allergies were reviewed in EMR  Interval changes: [none reported ]   He  has a past medical history of Acute medial meniscus tear; Allergic rhinitis; Allergic rhinitis; Anxiety; Appendicitis; Benign essential hypertension; Biceps tendon tear; Colon polyps; Depression with anxiety; Dyskinesia of esophagus; Erythema migrans (Lyme disease); Fatigue; GERD (gastroesophageal reflux disease); Herpes; Herpes zoster; Hyperlipidemia; Hyperplastic colon polyp; Joint pain; Kidney stones; Lyme disease; Nephrolithiasis; and Panic disorder with agoraphobia  He has a current medication list which includes the following prescription(s): alprazolam, cholecalciferol, coenzyme q10, glucosamine-chondroit-vit c-mn, hydrocodone-acetaminophen, hyoscyamine sulfate sl, metaxalone, multi for him 50+, fish oil, omeprazole, pravastatin, ketoconazole, and triamcinolone  ROS: reviewed see attached       HPI:  Patient had [no] difficulty tolerating the mask or the pressure on the study night  Reported [no] nasal congestion, [no] mucosal dryness, [no] chest or abdominal discomfort  He does not report restless leg symptoms and is not aware of jerking movements during sleep      Sleep Routine:  [No interval changes:  He continues to have frequent interruptions of sleep because of nocturia or hip pain ]  [He reports occasional excessive drowsiness [and] rated himself at Total score: 8 /24 on the [place] sleepiness scale ]  [  ]      EXAM: BP 131/82   Pulse 86   Ht 6' 1" (1 854 m)   Wt 118 kg (260 lb)   BMI 34 30 kg/m²   Neck Circumference: 47 cm  Patient is alert, orientated, cooperative [and in no distress]  Mental state [appears normal]  Physical findings (Head and Neck, Heart, Lungs, Abdomen, CNS and Musculoskeletal systems) are essentially unchanged and as documented during the previous encounter  IMPRESSION:    No diagnosis found  PLAN:    1  I reviewed results of the Sleep studies with the patient  2  With respect to above conditions, I counseled on pathophysiology, diagnosis, treatment options, risks and benefits; inter-relationship and effects on symptoms and comorbidities; risks of no treatment; costs and insurance aspects  3  Patient elected positive airway pressure therapy and [care coordinated with the [facility] provider for set up in clinic today]  4  Need for compliance with therapy and weight reduction were emphasized  5  Follow-up to be scheduled in 2 months to monitor compliance, progress and to adjust therapy  Thank you for allowing me to participate in the care of this patient  I will keep you apprised of developments      Sincerely,    Authenticated electronically by Khurram Goldsmith MD on 13/05/14   Board Certified Specialist

## 2018-07-23 ENCOUNTER — OFFICE VISIT (OUTPATIENT)
Dept: FAMILY MEDICINE CLINIC | Facility: CLINIC | Age: 68
End: 2018-07-23
Payer: COMMERCIAL

## 2018-07-23 VITALS
TEMPERATURE: 98.8 F | OXYGEN SATURATION: 96 % | HEART RATE: 88 BPM | HEIGHT: 73 IN | WEIGHT: 258 LBS | SYSTOLIC BLOOD PRESSURE: 110 MMHG | BODY MASS INDEX: 34.19 KG/M2 | DIASTOLIC BLOOD PRESSURE: 76 MMHG

## 2018-07-23 DIAGNOSIS — R51.9 ACUTE INTRACTABLE HEADACHE, UNSPECIFIED HEADACHE TYPE: Primary | ICD-10-CM

## 2018-07-23 PROCEDURE — 99214 OFFICE O/P EST MOD 30 MIN: CPT | Performed by: FAMILY MEDICINE

## 2018-07-23 PROCEDURE — 3008F BODY MASS INDEX DOCD: CPT | Performed by: FAMILY MEDICINE

## 2018-07-23 RX ORDER — PREDNISONE 10 MG/1
TABLET ORAL
Qty: 20 TABLET | Refills: 0 | Status: SHIPPED | OUTPATIENT
Start: 2018-07-23 | End: 2018-10-01 | Stop reason: CLARIF

## 2018-07-23 NOTE — PROGRESS NOTES
Assessment/Plan:    No problem-specific Assessment & Plan notes found for this encounter  Diagnoses and all orders for this visit:    Acute intractable headache, unspecified headache type  -     predniSONE 10 mg tablet; 4 tabs daily x2 days, 3 tabs daily x2 days, 2 tabs daily x 2 days, 1 tab daily x2 days  -     CT head wo contrast; Future    The patient has no clear reason for this headache  She does not seem like a migraine  He does not have sinusitis  His blood pressure is normal   It is not a classic tension headache though my top 2 diagnoses are tension headache or possibly a headache related to his allergies though his allergies are relatively mild  I am going to go ahead and start him on a prednisone taper  If this is not working within 2-3 days I would like to get a CT of his head  I am going to start the precertification process for this and advised that he not call to schedule it until he hears from us with this certification number  Subjective:   Chief Complaint   Patient presents with    Headache     pt c/o headaches for the last week, pt states he tried OTC meds but they didn't work        Patient ID: Patricia Mcgill is a 78 y o  male      The pt is here today because he has had a headache for a week  It is a dull headache most of the time but can get worse at times  He has used exedrine and advil  He even tried a xanax   Nothing has really helped  Today it is there but more in the background  He has been waking in the am with a headache  He feels this is sometimes from clenching but also has sleep apnea and knows that this can be a part of sleep apnea  He did just see the sleep physician and had a new sleep study  He has a new CPAP  He just started using it though the headache started prior to this  He did get 6 hr in with it the other night  Last night he did not do as well, had an on for last time  He started wondering if this was his blood pressure  /82 at home   It is not sinus pressure, he has some mild allergy symptoms but nothing terrible  He denies any fevers or chills  He denies any cough  He does not feel sick  Feels his head is in a vice    Generally has one migraine a year, if that  No nausea, photo or phonosensitivity with this headache    He has never had a headache like this before  It has been present for every moment of the past week or more  It has not gone away at all          The following portions of the patient's history were reviewed and updated as appropriate: allergies, current medications, past family history, past medical history, past social history, past surgical history and problem list     Review of Systems      Objective:  Vitals:    07/23/18 1459   BP: 110/76   Pulse: 88   Temp: 98 8 °F (37 1 °C)   SpO2: 96%   Weight: 117 kg (258 lb)   Height: 6' 1" (1 854 m)      Physical Exam   Constitutional: He is oriented to person, place, and time  He appears well-developed and well-nourished  No distress  HENT:   Head: Normocephalic and atraumatic  Right Ear: Tympanic membrane, external ear and ear canal normal    Left Ear: Tympanic membrane, external ear and ear canal normal    Nose: Right sinus exhibits no maxillary sinus tenderness and no frontal sinus tenderness  Left sinus exhibits no maxillary sinus tenderness and no frontal sinus tenderness  Mouth/Throat: Oropharynx is clear and moist    Mild mucosal edema and congestion on the right but no erythema   Eyes: Conjunctivae are normal    Neck: Normal range of motion  Neck supple  Pulmonary/Chest: Effort normal    Neurological: He is alert and oriented to person, place, and time  No cranial nerve deficit  Skin: Skin is warm and dry  No rash noted  He is not diaphoretic  Psychiatric: He has a normal mood and affect

## 2018-09-18 DIAGNOSIS — E78.2 MIXED HYPERLIPIDEMIA: ICD-10-CM

## 2018-09-18 RX ORDER — PRAVASTATIN SODIUM 80 MG/1
80 TABLET ORAL
Qty: 30 TABLET | Refills: 0 | Status: SHIPPED | OUTPATIENT
Start: 2018-09-18 | End: 2018-10-27 | Stop reason: SDUPTHER

## 2018-10-01 ENCOUNTER — OFFICE VISIT (OUTPATIENT)
Dept: FAMILY MEDICINE CLINIC | Facility: CLINIC | Age: 68
End: 2018-10-01
Payer: COMMERCIAL

## 2018-10-01 VITALS
HEIGHT: 73 IN | SYSTOLIC BLOOD PRESSURE: 130 MMHG | BODY MASS INDEX: 33.93 KG/M2 | TEMPERATURE: 98.5 F | DIASTOLIC BLOOD PRESSURE: 76 MMHG | WEIGHT: 256 LBS | HEART RATE: 79 BPM | OXYGEN SATURATION: 97 %

## 2018-10-01 DIAGNOSIS — R53.81 MALAISE: ICD-10-CM

## 2018-10-01 DIAGNOSIS — M54.16 LUMBAR RADICULOPATHY, CHRONIC: Primary | ICD-10-CM

## 2018-10-01 DIAGNOSIS — R52 GENERALIZED BODY ACHES: ICD-10-CM

## 2018-10-01 DIAGNOSIS — R53.83 OTHER FATIGUE: ICD-10-CM

## 2018-10-01 DIAGNOSIS — F33.8 SEASONAL AFFECTIVE DISORDER (HCC): ICD-10-CM

## 2018-10-01 PROCEDURE — 99214 OFFICE O/P EST MOD 30 MIN: CPT | Performed by: FAMILY MEDICINE

## 2018-10-01 RX ORDER — CITALOPRAM 20 MG/1
10 TABLET ORAL DAILY
Qty: 30 TABLET | Refills: 4 | Status: SHIPPED | OUTPATIENT
Start: 2018-10-01 | End: 2018-10-23

## 2018-10-01 RX ORDER — MELOXICAM 15 MG/1
15 TABLET ORAL DAILY
Qty: 30 TABLET | Refills: 0 | Status: SHIPPED | OUTPATIENT
Start: 2018-10-01 | End: 2019-05-20 | Stop reason: SDUPTHER

## 2018-10-01 NOTE — PATIENT INSTRUCTIONS
Xray lumbar spine  Blood work for lyme   meloxicam 15mg 1 tab daily with food in am as needed for leg pain  Citalopram 10mg 1 tab daily for 1 week , then increase to 20mg

## 2018-10-02 ENCOUNTER — HOSPITAL ENCOUNTER (OUTPATIENT)
Dept: RADIOLOGY | Facility: HOSPITAL | Age: 68
Discharge: HOME | End: 2018-10-02
Payer: COMMERCIAL

## 2018-10-02 DIAGNOSIS — M54.16 LUMBAR RADICULOPATHY, CHRONIC: ICD-10-CM

## 2018-10-02 DIAGNOSIS — F41.9 ANXIETY: Primary | ICD-10-CM

## 2018-10-02 PROCEDURE — 72110 X-RAY EXAM L-2 SPINE 4/>VWS: CPT

## 2018-10-02 RX ORDER — ALPRAZOLAM 0.25 MG/1
0.25 TABLET ORAL 3 TIMES DAILY PRN
Qty: 30 TABLET | Refills: 0 | Status: SHIPPED | OUTPATIENT
Start: 2018-10-02 | End: 2018-11-19 | Stop reason: SDUPTHER

## 2018-10-02 NOTE — PROGRESS NOTES
Subjective:   Chief Complaint   Patient presents with    Cold Like Symptoms     started about 1 month ago  States he has had Lyme 3 times and is having he same symptoms as when he's had it before  C/o fatigue, headache, and multiple joint pain  Right hip is the worst, 10/10 when driving  Has been bitten by ticks multiple times throughout the season   Follow-up     need xanax refilled  interested in flu shot, but at a later time  Patient ID: Vaughn Rainey is a 78 y o  male  HPI    The following portions of the patient's history were reviewed and updated as appropriate: allergies, current medications, past family history, past medical history, past social history, past surgical history and problem list     Review of Systems   Constitutional: Negative for fatigue and fever  HENT: Negative  Respiratory: Negative  Negative for cough  Cardiovascular: Negative  Gastrointestinal: Negative  Genitourinary: Negative  Musculoskeletal: Negative  Skin: Negative  Neurological: Negative  Psychiatric/Behavioral: Negative  Objective:  Vitals:    10/01/18 1557   BP: 130/76   Pulse: 79   Temp: 98 5 °F (36 9 °C)   SpO2: 97%   Weight: 116 kg (256 lb)   Height: 6' 1" (1 854 m)      Physical Exam   Constitutional: He is oriented to person, place, and time  He appears well-developed and well-nourished  HENT:   Head: Normocephalic and atraumatic  Cardiovascular: Normal rate, regular rhythm and normal heart sounds  Pulmonary/Chest: Effort normal and breath sounds normal    Abdominal: Soft  Bowel sounds are normal    Neurological: He is alert and oriented to person, place, and time  Skin: Skin is warm and dry  Psychiatric: He has a normal mood and affect  His behavior is normal  Judgment and thought content normal    Nursing note and vitals reviewed  Assessment/Plan:    No problem-specific Assessment & Plan notes found for this encounter         Diagnoses and all orders for this visit:    Lumbar radiculopathy, chronic  -     XR spine lumbar minimum 4 views non injury; Future  -     meloxicam (MOBIC) 15 mg tablet; Take 1 tablet (15 mg total) by mouth daily  -     Cancel: Lyme Antibody Profile with reflex to WB; Future  -     Cancel: Lyme Antibody Profile with reflex to WB    Seasonal affective disorder (HCC)  -     citalopram (CeleXA) 20 mg tablet;  Take 0 5 tablets (10 mg total) by mouth daily    Other fatigue  -     Lyme Antibody Profile with reflex to WB    Generalized body aches  -     Lyme Antibody Profile with reflex to WB    Malaise  -     Lyme Antibody Profile with reflex to WB

## 2018-10-03 LAB
B BURGDOR IGG PATRN SER IB-IMP: NEGATIVE
B BURGDOR IGG+IGM SER-ACNC: 1.53 ISR (ref 0–0.9)
B BURGDOR IGM PATRN SER IB-IMP: NEGATIVE
B BURGDOR18KD IGG SER QL IB: PRESENT
B BURGDOR23KD IGG SER QL IB: PRESENT
B BURGDOR23KD IGM SER QL IB: PRESENT
B BURGDOR28KD IGG SER QL IB: ABNORMAL
B BURGDOR30KD IGG SER QL IB: ABNORMAL
B BURGDOR39KD IGG SER QL IB: ABNORMAL
B BURGDOR39KD IGM SER QL IB: ABNORMAL
B BURGDOR41KD IGG SER QL IB: PRESENT
B BURGDOR41KD IGM SER QL IB: ABNORMAL
B BURGDOR45KD IGG SER QL IB: ABNORMAL
B BURGDOR58KD IGG SER QL IB: ABNORMAL
B BURGDOR66KD IGG SER QL IB: ABNORMAL
B BURGDOR93KD IGG SER QL IB: ABNORMAL

## 2018-10-08 ENCOUNTER — TELEPHONE (OUTPATIENT)
Dept: FAMILY MEDICINE CLINIC | Facility: CLINIC | Age: 68
End: 2018-10-08

## 2018-10-23 ENCOUNTER — OFFICE VISIT (OUTPATIENT)
Dept: FAMILY MEDICINE CLINIC | Facility: CLINIC | Age: 68
End: 2018-10-23
Payer: COMMERCIAL

## 2018-10-23 VITALS
DIASTOLIC BLOOD PRESSURE: 72 MMHG | BODY MASS INDEX: 35.62 KG/M2 | HEIGHT: 73 IN | OXYGEN SATURATION: 94 % | WEIGHT: 268.75 LBS | TEMPERATURE: 98.7 F | SYSTOLIC BLOOD PRESSURE: 126 MMHG | HEART RATE: 76 BPM

## 2018-10-23 DIAGNOSIS — Z12.5 PROSTATE CANCER SCREENING INFORMATION GIVEN DURING PATIENT ENCOUNTER: ICD-10-CM

## 2018-10-23 DIAGNOSIS — K63.5 MULTIPLE POLYPS OF SIGMOID COLON: ICD-10-CM

## 2018-10-23 DIAGNOSIS — Z12.11 SCREENING FOR COLON CANCER: ICD-10-CM

## 2018-10-23 DIAGNOSIS — Z13.21 SCREENING FOR ENDOCRINE, NUTRITIONAL, METABOLIC AND IMMUNITY DISORDER: ICD-10-CM

## 2018-10-23 DIAGNOSIS — E78.5 HYPERLIPIDEMIA, UNSPECIFIED HYPERLIPIDEMIA TYPE: ICD-10-CM

## 2018-10-23 DIAGNOSIS — F33.8 SEASONAL AFFECTIVE DISORDER (HCC): ICD-10-CM

## 2018-10-23 DIAGNOSIS — Z13.1 SCREENING FOR DIABETES MELLITUS: ICD-10-CM

## 2018-10-23 DIAGNOSIS — Z13.0 SCREENING, ANEMIA, DEFICIENCY, IRON: ICD-10-CM

## 2018-10-23 DIAGNOSIS — M54.16 LUMBAR RADICULOPATHY: Primary | ICD-10-CM

## 2018-10-23 DIAGNOSIS — Z13.29 SCREENING FOR ENDOCRINE, NUTRITIONAL, METABOLIC AND IMMUNITY DISORDER: ICD-10-CM

## 2018-10-23 DIAGNOSIS — Z13.0 SCREENING FOR ENDOCRINE, NUTRITIONAL, METABOLIC AND IMMUNITY DISORDER: ICD-10-CM

## 2018-10-23 DIAGNOSIS — Z23 NEED FOR INFLUENZA VACCINATION: ICD-10-CM

## 2018-10-23 DIAGNOSIS — Z13.228 SCREENING FOR ENDOCRINE, NUTRITIONAL, METABOLIC AND IMMUNITY DISORDER: ICD-10-CM

## 2018-10-23 PROCEDURE — 90471 IMMUNIZATION ADMIN: CPT

## 2018-10-23 PROCEDURE — 99214 OFFICE O/P EST MOD 30 MIN: CPT | Performed by: FAMILY MEDICINE

## 2018-10-23 PROCEDURE — 3008F BODY MASS INDEX DOCD: CPT | Performed by: FAMILY MEDICINE

## 2018-10-23 PROCEDURE — 90662 IIV NO PRSV INCREASED AG IM: CPT

## 2018-10-23 PROCEDURE — 1160F RVW MEDS BY RX/DR IN RCRD: CPT | Performed by: FAMILY MEDICINE

## 2018-10-23 PROCEDURE — 1160F RVW MEDS BY RX/DR IN RCRD: CPT

## 2018-10-23 PROCEDURE — 1036F TOBACCO NON-USER: CPT | Performed by: FAMILY MEDICINE

## 2018-10-23 RX ORDER — BUPROPION HYDROCHLORIDE 150 MG/1
150 TABLET ORAL DAILY
Qty: 30 TABLET | Refills: 0 | Status: SHIPPED | OUTPATIENT
Start: 2018-10-23 | End: 2019-06-14 | Stop reason: ALTCHOICE

## 2018-10-23 RX ORDER — HYDROCODONE BITARTRATE AND ACETAMINOPHEN 5; 325 MG/1; MG/1
1 TABLET ORAL EVERY 4 HOURS PRN
Qty: 30 TABLET | Refills: 0 | Status: SHIPPED | OUTPATIENT
Start: 2018-10-23 | End: 2018-11-23 | Stop reason: SDUPTHER

## 2018-10-23 NOTE — PROGRESS NOTES
Subjective:   Chief Complaint   Patient presents with    Hip Pain     pt is here for on-going right hip pain radiating into sciatica and down leg  discuss alternative medication to celexa  pt was unable to tolerate  Patient ID: Sloane Solomon is a 78 y o  male      The patient is here today to go over a couple different things  His right low back has been bothering him a lot, much more than usual over the past many months  I have treated him with prednisone over the past year for this after which she had some relief but it was not long lasting  It has been worsening ever since  He has pain radiating into the right leg down the buttocks and into his knee  He also feels he is walking differently and now the knee joint itself is starting to hurt  He denies any bowel or bladder incontinence  He denies any saddle anesthesia  He has never had an MRI  He has seen Dr duarte for cervical radiculopathy-he actually had an injection last winter and has done extremely well  He has no further pain in the neck or radiculopathy  He did have an x-ray, ordered by Dr Miryam Kirk, earlier this month which showed degenerative disease in the lumbar spine  He has not done physical therapy for the low back  He does take Vicodin as needed and does need a refill of this as the pain has been severe  It is considerably worse when he drives 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  If he is in the car for an hour he can barely walk when he gets out of the car  He has tried multiple different seat cushions and lumbar support in the car and nothing has made any difference  Having his leg extended to the pedals significantly exacerbates the pain    Additionally, he has seasonal affective disorder  He has been on Zoloft in the past which made him feel dumb down  He has been on Effexor in the past which he did not feel worked well and he did not like how he felt on it  He most recently, a few weeks ago, was prescribed Celexa which he also did not like  He said that he just did not feel right-she took it for a week and finally had to stop it  He has never tried Wellbutrin or Abilify  He denies any suicidal ideation  He denies severe depression symptoms  But he does have the diagnosis of seasonal affective disorder and definitely has noticed a difference as it is starting to get cold and dark        The following portions of the patient's history were reviewed and updated as appropriate: allergies, current medications, past family history, past medical history, past social history, past surgical history and problem list     Review of Systems      Objective:  Vitals:    10/23/18 1124   BP: 126/72   Pulse: 76   Temp: 98 7 °F (37 1 °C)   SpO2: 94%   Weight: 122 kg (268 lb 12 oz)   Height: 6' 1" (1 854 m)      Physical Exam   Constitutional: He is oriented to person, place, and time  He appears well-developed and well-nourished  No distress  Musculoskeletal: Normal range of motion  Neurological: He is alert and oriented to person, place, and time  No cranial nerve deficit  Coordination (Abnormal gait-limping favoring the right side, especially after standing from being seated for some time) abnormal    Skin: Skin is warm and dry  No rash noted  He is not diaphoretic  No erythema  Psychiatric: He has a normal mood and affect  His behavior is normal  Judgment and thought content normal          Assessment/Plan:    Lumbar radiculopathy  I did order an MRI and advised the patient that we will work to get this preauthorized  I did advised that it is possible the insurance company will deny the MRI until he is done physical therapy  I am also prescribing physical therapy-whether the MRI is approved or not I do think this is an important part of his care  If the MRI is approved we will call him with an authorization number after which he can schedule the MRI    He would then schedule an appointment with Dr Sultana Marx for a week after he has had the MRI to review the results and decide on a treatment plan  In the meantime I will refill his Vicodin-he does not necessarily want to be taking this and would prefer for other options to control his pain but he does need it now  Seasonal affective disorder ([address] )  We discussed Wellbutrin and Abilify and are going to start out with a 75 mg dosage of Wellbutrin for the next week  If he is doing well after week I advised he increase it to the 150 mg dosage  If this is either not working or he is having side effects I think the next thing to try would be Abilify-he is definitely on board with trying different things until we find something that works  Multiple polyps of sigmoid colon  Patient was advised he is due for his routine follow-up colonoscopy  Hyperlipidemia  The patient was also advised he is due for routine labs at this time-it has been just over a year  A full panel of routine lab work was ordered and he will have this done after which I advised he should follow up for a routine physical        Diagnoses and all orders for this visit:    Lumbar radiculopathy  -     MRI lumbar spine wo contrast; Future  -     HYDROcodone-acetaminophen (NORCO) 5-325 mg per tablet; Take 1 tablet by mouth every 4 (four) hours as needed for pain Max Daily Amount: 6 tablets  -     Ambulatory referral to Physical Therapy; Future    Seasonal affective disorder (HCC)  -     buPROPion (WELLBUTRIN XL) 150 mg 24 hr tablet; Take 1 tablet (150 mg total) by mouth daily    Multiple polyps of sigmoid colon  -     Ambulatory referral to Gastroenterology; Future    Hyperlipidemia, unspecified hyperlipidemia type  -     Lipid panel    Screening for colon cancer  -     Ambulatory referral to Gastroenterology;  Future    Screening, anemia, deficiency, iron  -     CBC and differential    Screening for diabetes mellitus  -     Comprehensive metabolic panel    Screening for endocrine, nutritional, metabolic and immunity disorder  -     Comprehensive metabolic panel  -     TSH, 3rd generation with Free T4 reflex    Prostate cancer screening information given during patient encounter  -     PSA, total and free

## 2018-10-23 NOTE — ASSESSMENT & PLAN NOTE
I did order an MRI and advised the patient that we will work to get this preauthorized  I did advised that it is possible the insurance company will deny the MRI until he is done physical therapy  I am also prescribing physical therapy-whether the MRI is approved or not I do think this is an important part of his care  If the MRI is approved we will call him with an authorization number after which he can schedule the MRI  He would then schedule an appointment with Dr Tanika Chauhan for a week after he has had the MRI to review the results and decide on a treatment plan  In the meantime I will refill his Vicodin-he does not necessarily want to be taking this and would prefer for other options to control his pain but he does need it now

## 2018-10-23 NOTE — ASSESSMENT & PLAN NOTE
The patient was also advised he is due for routine labs at this time-it has been just over a year    A full panel of routine lab work was ordered and he will have this done after which I advised he should follow up for a routine physical

## 2018-10-23 NOTE — ASSESSMENT & PLAN NOTE
We discussed Wellbutrin and Abilify and are going to start out with a 75 mg dosage of Wellbutrin for the next week  If he is doing well after week I advised he increase it to the 150 mg dosage  If this is either not working or he is having side effects I think the next thing to try would be Abilify-he is definitely on board with trying different things until we find something that works

## 2018-10-24 ENCOUNTER — TELEPHONE (OUTPATIENT)
Dept: FAMILY MEDICINE CLINIC | Facility: CLINIC | Age: 68
End: 2018-10-24

## 2018-10-27 DIAGNOSIS — E78.2 MIXED HYPERLIPIDEMIA: ICD-10-CM

## 2018-10-28 RX ORDER — PRAVASTATIN SODIUM 80 MG/1
80 TABLET ORAL
Qty: 30 TABLET | Refills: 0 | Status: SHIPPED | OUTPATIENT
Start: 2018-10-28 | End: 2019-01-15 | Stop reason: SDUPTHER

## 2018-11-19 DIAGNOSIS — F41.9 ANXIETY: ICD-10-CM

## 2018-11-19 RX ORDER — ALPRAZOLAM 0.25 MG/1
0.25 TABLET ORAL 3 TIMES DAILY PRN
Qty: 30 TABLET | Refills: 0 | Status: SHIPPED | OUTPATIENT
Start: 2018-11-19 | End: 2019-01-07 | Stop reason: SDUPTHER

## 2018-11-23 DIAGNOSIS — M54.16 LUMBAR RADICULOPATHY: ICD-10-CM

## 2018-11-23 RX ORDER — HYDROCODONE BITARTRATE AND ACETAMINOPHEN 5; 325 MG/1; MG/1
1 TABLET ORAL EVERY 4 HOURS PRN
Qty: 30 TABLET | Refills: 0 | Status: SHIPPED | OUTPATIENT
Start: 2018-11-23 | End: 2019-05-20 | Stop reason: SDUPTHER

## 2018-12-04 ENCOUNTER — EVALUATION (OUTPATIENT)
Dept: PHYSICAL THERAPY | Facility: CLINIC | Age: 68
End: 2018-12-04
Payer: COMMERCIAL

## 2018-12-04 DIAGNOSIS — M54.16 LUMBAR RADICULOPATHY: ICD-10-CM

## 2018-12-04 PROCEDURE — 97162 PT EVAL MOD COMPLEX 30 MIN: CPT | Performed by: PHYSICAL THERAPIST

## 2018-12-04 PROCEDURE — G8990 OTHER PT/OT CURRENT STATUS: HCPCS | Performed by: PHYSICAL THERAPIST

## 2018-12-04 PROCEDURE — G8991 OTHER PT/OT GOAL STATUS: HCPCS | Performed by: PHYSICAL THERAPIST

## 2018-12-04 PROCEDURE — 97110 THERAPEUTIC EXERCISES: CPT | Performed by: PHYSICAL THERAPIST

## 2018-12-04 NOTE — PROGRESS NOTES
PT Evaluation     Today's date: 2018  Patient name: Nell Houston  : 1950  MRN: [id number]  Referring provider: Rolan Devries MD  Dx:   Encounter Diagnosis     ICD-10-CM    1  Lumbar radiculopathy M54 16 Ambulatory referral to Physical Therapy                  Assessment  Assessment details: Pt is a 80year old male presenting to outpatient Physical Therapy with primary complaints of low back pain that radiates down to his R knee  Pt presents with a positive repeated flexion test and symptom relief following repeated L/S lateral flexion indicating a likely derangement in the spine causing radicular symptoms  These physical deficits are preventing the patient from participating in usual activities such as 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 indicating return to functional baseline within eight weeks        Plan  Patient would benefit from: skilled physical therapy  Planned modality interventions: thermotherapy: hydrocollator packs, cryotherapy and traction  Planned therapy interventions: therapeutic activities, therapeutic exercise, home exercise program, manual therapy, joint mobilization, balance, patient education, neuromuscular re-education, massage and postural training  Frequency: 2x week  Duration in weeks: 8  Plan of Care beginning date: 2018  Plan of Care expiration date: 2019  Treatment plan discussed with: patient        Subjective Evaluation    History of Present Illness  Mechanism of injury: Pt reports that 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  He notes a shooting pain down his R buttocks to behind his knee  It has since gotten worse, becoming very painful when driving for 30 minutes or more  He has tried ergonomic changes such as a lumbar roll when trying, all without much relief  Pain  Current pain ratin  At best pain ratin  At worst pain rating: 10  Quality: burning, radiating and dull ache  Relieving factors: medications and change in position (resting leg when driving: using cruise control, laying down )  Aggravating factors: sitting (driving )  Progression: worsening    Social Support    Employment status: working ( )  Treatments  Previous treatment: medication  Patient Goals  Patient goals for therapy: decreased pain  Patient goal: to be able to drive for work        Objective     Neurological Testing     Sensation     Lumbar   Left   Intact: light touch    Right   Intact: light touch    Reflexes   Left   Patellar (L4): normal (2+)  Achilles (S1): trace (1+)    Right   Patellar (L4): normal (2+)  Achilles (S1): trace (1+)    Active Range of Motion     Lumbar   Flexion: WFL  Extension: WFL  Left lateral flexion: WFL  Right lateral flexion: WFL  Left rotation: WFL  Right rotation: [facility]    Strength/Myotome Testing     Left Hip   Planes of Motion   Flexion: 5  Extension: 5  Abduction: 5    Right Hip   Planes of Motion   Flexion: 5  Extension: 5  Abduction: 5    Left Knee   Flexion: 5  Extension: 5    Right Knee   Flexion: 5  Extension: 5    Left Ankle/Foot   Dorsiflexion: 5  Plantar flexion: 5    Right Ankle/Foot   Dorsiflexion: 5  Plantar flexion: 5    Tests     Lumbar   Positive repeated flexion             Precautions: Positional vertigo, history of cervical pain, anxiety    Daily Treatment Diary     Manual                                                                                   Exercise Diary              Treadmill             Standing L/S extension             R sided hip glide at wall             Abdominal bracing with TB                          Seated T/S extension             Seated T/S rotation                          Supine sciatic nerve Glide             Bridging             Abdominal bracing with ball             Prone press-ups             Prone alternating UE/LE lifts             SLR             Supine marching                                                                                   Modalities              MHP             L/S mechanical Traction

## 2018-12-14 ENCOUNTER — OFFICE VISIT (OUTPATIENT)
Dept: PHYSICAL THERAPY | Facility: CLINIC | Age: 68
End: 2018-12-14
Payer: COMMERCIAL

## 2018-12-14 DIAGNOSIS — M54.16 LUMBAR RADICULOPATHY: Primary | ICD-10-CM

## 2018-12-14 PROCEDURE — 97112 NEUROMUSCULAR REEDUCATION: CPT | Performed by: PHYSICAL THERAPIST

## 2018-12-14 PROCEDURE — 97110 THERAPEUTIC EXERCISES: CPT | Performed by: PHYSICAL THERAPIST

## 2018-12-14 PROCEDURE — 97140 MANUAL THERAPY 1/> REGIONS: CPT | Performed by: PHYSICAL THERAPIST

## 2018-12-14 NOTE — PROGRESS NOTES
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 but he is unable to take time off right nowl    Objective: See treatment diary below      Assessment: Tolerated treatment well  During manual therapy pt reported symptom exacerbation at the L4-L5 level, which improved following unilateral PA mobilizations  He also demonstrated significant sciatic neural tension during supine sciatic stretch and significant R hamstring tightness  Pt reported symptom relief following standing hamstring stretches  He would benefit from continued therapy to centralize radicular symptoms, increase trunk and low back mobility, and decrease neural tension      Plan:Continue per POC, add in more abdominal strengthening as appropriate      Precautions: Positional vertigo, history of cervical pain, anxiety    Daily Treatment Diary     Manual              L1-L5 central PA grade IV 4'            L1-L5 R nuilateral PA Grade IV 5'                                       9'                Exercise Diary              Treadmill             Calf Stretch with Block 3 x30" each            Hamstring Stretch with Stair 3 x30" each            Standing L/S extension             R sided hip glide at wall x10            Abdominal bracing with TB MTB  2 x15                         Seated T/S extension x10            Seated T/S rotation                          Supine sciatic nerve Glide x10            Bridging             Abdominal bracing with ball             Prone press-ups x10            Prone alternating UE/LE lifts             SLR             Supine marching Supine T/S rotation x10 each                                                                    Modalities  12/14            MHP 10'            L/S mechanical Traction

## 2018-12-21 ENCOUNTER — APPOINTMENT (OUTPATIENT)
Dept: PHYSICAL THERAPY | Facility: CLINIC | Age: 68
End: 2018-12-21
Payer: COMMERCIAL

## 2018-12-27 ENCOUNTER — OFFICE VISIT (OUTPATIENT)
Dept: PHYSICAL THERAPY | Facility: CLINIC | Age: 68
End: 2018-12-27
Payer: COMMERCIAL

## 2018-12-27 DIAGNOSIS — M54.16 LUMBAR RADICULOPATHY: Primary | ICD-10-CM

## 2018-12-27 PROCEDURE — 97112 NEUROMUSCULAR REEDUCATION: CPT

## 2018-12-27 PROCEDURE — 97110 THERAPEUTIC EXERCISES: CPT

## 2018-12-27 NOTE — PROGRESS NOTES
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 [id number]  Objective: See treatment diary below      Assessment: No complain of pain or discomfort, solely tingling sensation that radiated down RLE during mobs      Plan:Continue per POC, add in more abdominal strengthening as appropriate      Precautions: Positional vertigo, history of cervical pain, anxiety    Daily Treatment Diary     Manual             L1-L5 central PA grade IV 4' 5'           L1-L5 R nuilateral PA Grade IV 5' 5'                                      9' 10'               Exercise Diary             Treadmill  5'           Calf Stretch with Block 3 x30" each nv           Hamstring Stretch with Stair 3 x30" each 3x30"           Standing L/S extension  x10           R sided hip glide at wall x10 x10           Abdominal bracing with TB MTB  2 x15 MTB  2 x15                        Seated T/S extension x10 x10           Seated T/S rotation  x10                        Supine sciatic nerve Glide x10 x10  ea           Bridging             Abdominal bracing with ball             Prone press-ups x10 x10           Prone alternating UE/LE lifts             SLR             Supine marching   2x10           S/L  T/S rotation x10 each x10  ea                                                                   Modalities             MHP 10' 10'           L/S mechanical Traction

## 2018-12-28 ENCOUNTER — TELEPHONE (OUTPATIENT)
Dept: FAMILY MEDICINE CLINIC | Facility: CLINIC | Age: 68
End: 2018-12-28

## 2018-12-28 NOTE — TELEPHONE ENCOUNTER
Pt called in need of a referral for an appointment at [facility] this afternoon at 1:30 PM  Completed and faxed     Referral Number: [id number]

## 2019-01-02 ENCOUNTER — OFFICE VISIT (OUTPATIENT)
Dept: PHYSICAL THERAPY | Facility: CLINIC | Age: 69
End: 2019-01-02
Payer: COMMERCIAL

## 2019-01-02 DIAGNOSIS — M54.16 LUMBAR RADICULOPATHY: Primary | ICD-10-CM

## 2019-01-02 PROCEDURE — 97140 MANUAL THERAPY 1/> REGIONS: CPT

## 2019-01-02 PROCEDURE — 97110 THERAPEUTIC EXERCISES: CPT

## 2019-01-07 DIAGNOSIS — F41.9 ANXIETY: ICD-10-CM

## 2019-01-07 RX ORDER — ALPRAZOLAM 0.25 MG/1
0.25 TABLET ORAL 3 TIMES DAILY PRN
Qty: 30 TABLET | Refills: 0 | Status: SHIPPED | OUTPATIENT
Start: 2019-01-07 | End: 2019-01-07 | Stop reason: SDUPTHER

## 2019-01-07 RX ORDER — ALPRAZOLAM 0.25 MG/1
0.25 TABLET ORAL 3 TIMES DAILY PRN
Qty: 30 TABLET | Refills: 0 | Status: SHIPPED | OUTPATIENT
Start: 2019-01-07 | End: 2019-02-21 | Stop reason: SDUPTHER

## 2019-01-14 ENCOUNTER — OFFICE VISIT (OUTPATIENT)
Dept: PHYSICAL THERAPY | Facility: CLINIC | Age: 69
End: 2019-01-14
Payer: COMMERCIAL

## 2019-01-14 DIAGNOSIS — M54.16 LUMBAR RADICULOPATHY: Primary | ICD-10-CM

## 2019-01-14 PROCEDURE — 97110 THERAPEUTIC EXERCISES: CPT | Performed by: PHYSICAL THERAPIST

## 2019-01-14 NOTE — PROGRESS NOTES
Daily Note/Discharge Summary  *Addendum 19: Pt is being discharged at this time as he has not been to physical therapy in over two weeks and has not called to make any more appointments despite PT calling and inquiring  Today's date: 2019  Patient name: Nabor Hirsch  : 1950  MRN: [id number]  Referring provider: Feng Holguin MD  Dx:   Encounter Diagnosis     ICD-10-CM    1  Lumbar radiculopathy M54 [id number]                   Subjective: Pt reports that he has purchased a seat for his car that is specially designed for sciatica based on the recommendations of a friend who is a Chiropractor  He reports that he continues to have symptoms running down his L leg when driving that require him to put on cruise control so he can take his nerve off of stretch  He admits that he has not been performing HEP  Objective: See treatment diary below      Assessment: Pt continues to demonstrate a hypomobile T5-L5 vertebrae which improves following manual mobilizations  Pt demonstrated less symptom exacerbation of his radiculopathy today compared to previous visits during PA mobilizations, suggesting mild resolution of symptoms  He continues to demonstrate significant L sciatic nerve tension during Sciatic Nerve glides  Pt was given updated HEP and educated on the importance of compliance, especially with the nerve glides and the L/S extension in order to resolve symptoms  Plan: Pt will be put on hold for two weeks as he will be out of town           Precautions: Positional vertigo, history of cervical pain, anxiety  EPOC: 19    Daily Treatment Diary     Manual           L1-L5 central PA grade IV 4' 5' 5' 10'         L1-L5 R nuilateral PA Grade IV 5' 5' 5'                                     9' 10' 10'              Exercise Diary           Treadmill  5' 8'          Calf Stretch with Block 3 x30" each nv  3 x30" each HEP        Hamstring Stretch with Stair 3 x30" each 3x30"  3 x30" each HEP        Lat Stretch    3 x30" each HEP        Standing L/S extension  x10  HEP         R sided hip glide at wall x10 x10           Abdominal bracing with TB MTB  2 x15 MTB  2 x15                        Seated T/S extension x10 x10 x10          Seated T/S rotation  x10 x10                       Supine sciatic nerve Glide x10 x10  ea x10  ea x10 each         Bridging   Held   p!           Abdominal bracing with ball   10"x  10          Prone press-ups x10 x10 x10 x15         Prone alternating UE/LE lifts   x10          SLR             Supine marching   2x10 2x10          S/L  T/S rotation x10 each x10  ea x10  ea x15 each         Supine  Overhead ball   7#  x10                                                     Modalities  12/14 12/27 1/2          MHP 10' 10' 10'          L/S mechanical Traction                           HEP: L/S extension, sciatic nerve glides, hamstring stretch, calf stretch, lat stretch

## 2019-01-15 DIAGNOSIS — E78.2 MIXED HYPERLIPIDEMIA: ICD-10-CM

## 2019-01-15 RX ORDER — PRAVASTATIN SODIUM 80 MG/1
TABLET ORAL
Qty: 30 TABLET | Refills: 5 | Status: SHIPPED | OUTPATIENT
Start: 2019-01-15 | End: 2019-06-16 | Stop reason: SDUPTHER

## 2019-02-21 DIAGNOSIS — F41.9 ANXIETY: ICD-10-CM

## 2019-02-21 RX ORDER — ALPRAZOLAM 0.25 MG/1
0.25 TABLET ORAL 3 TIMES DAILY PRN
Qty: 30 TABLET | Refills: 0 | Status: SHIPPED | OUTPATIENT
Start: 2019-02-21 | End: 2019-05-06 | Stop reason: SDUPTHER

## 2019-03-27 ENCOUNTER — OFFICE VISIT (OUTPATIENT)
Dept: FAMILY MEDICINE CLINIC | Facility: CLINIC | Age: 69
End: 2019-03-27
Payer: COMMERCIAL

## 2019-03-27 VITALS
DIASTOLIC BLOOD PRESSURE: 76 MMHG | OXYGEN SATURATION: 95 % | BODY MASS INDEX: 34.26 KG/M2 | WEIGHT: 258.5 LBS | TEMPERATURE: 97.6 F | SYSTOLIC BLOOD PRESSURE: 110 MMHG | HEART RATE: 80 BPM | HEIGHT: 73 IN

## 2019-03-27 DIAGNOSIS — M54.16 LUMBAR RADICULOPATHY: ICD-10-CM

## 2019-03-27 DIAGNOSIS — M62.838 MUSCLE SPASM: ICD-10-CM

## 2019-03-27 DIAGNOSIS — R59.0 LYMPHADENOPATHY OF LEFT CERVICAL REGION: Primary | ICD-10-CM

## 2019-03-27 PROCEDURE — 1160F RVW MEDS BY RX/DR IN RCRD: CPT | Performed by: FAMILY MEDICINE

## 2019-03-27 PROCEDURE — 1036F TOBACCO NON-USER: CPT | Performed by: FAMILY MEDICINE

## 2019-03-27 PROCEDURE — 99214 OFFICE O/P EST MOD 30 MIN: CPT | Performed by: FAMILY MEDICINE

## 2019-03-27 PROCEDURE — 3008F BODY MASS INDEX DOCD: CPT | Performed by: FAMILY MEDICINE

## 2019-03-27 RX ORDER — AMOXICILLIN 500 MG/1
500 CAPSULE ORAL EVERY 8 HOURS SCHEDULED
Qty: 21 CAPSULE | Refills: 0 | Status: SHIPPED | OUTPATIENT
Start: 2019-03-27 | End: 2019-04-03

## 2019-03-27 RX ORDER — METAXALONE 800 MG/1
800 TABLET ORAL EVERY 6 HOURS PRN
Qty: 60 TABLET | Refills: 1 | Status: SHIPPED | OUTPATIENT
Start: 2019-03-27 | End: 2020-03-21

## 2019-03-27 NOTE — PATIENT INSTRUCTIONS
Amoxicillin 1 tab 3 times a day   Prednisone 10mg 4 tabs for 2 days, 3 tabs for 2 days, 2 tabs for 2 days, 1 tabs for 2 days

## 2019-03-28 NOTE — PROGRESS NOTES
Subjective:   Chief Complaint   Patient presents with    Lump on Jaw     pt is here today for a lump on the left side of his jaw  pt states it is not moveable and it is painful to touch  Pt noticed it a few days ago   Medication Refill     pt in need of refill of metaxalone  Pt is due for FBW  Orders in system and pt advised that he can schedule appt here  Pt given colonoscopy order from October  Pt due for BMI: Followup Plan  Patient ID: Ann-Marie Diaz is a 80 y o  male  Pt noticed gland on the left side of his neck over the past 3-4 days that is painful  Had dental work about 2 weeks ago  He broke a tooth  He needs to schedule with a periodontist  He denies a fever  No more tooth pain  Complains of right sided sciatica ongoing  Pt had physical therapy with some relief  Pain worse when driving  The following portions of the patient's history were reviewed and updated as appropriate: allergies, current medications, past family history, past medical history, past social history, past surgical history and problem list     Review of Systems   Constitutional: Negative  Negative for fatigue and fever  HENT: Negative  Eyes: Negative  Respiratory: Negative  Negative for cough  Cardiovascular: Negative  Gastrointestinal: Negative  Endocrine: Negative  Genitourinary: Negative  Musculoskeletal: Negative  Right sided sciatica   Skin: Negative  Allergic/Immunologic: Negative  Neurological: Negative  Hematological: Positive for adenopathy  Psychiatric/Behavioral: Negative  Objective:  Vitals:    03/27/19 1142   BP: 110/76   Pulse: 80   Temp: 97 6 °F (36 4 °C)   SpO2: 95%   Weight: 117 kg (258 lb 8 oz)   Height: 6' 1" (1 854 m)      Physical Exam   Constitutional: He is oriented to person, place, and time  He appears well-developed and well-nourished  HENT:   Head: Normocephalic and atraumatic     Right Ear: External ear normal    Left Ear: External ear normal    Nose: Nose normal    Mouth/Throat: Oropharynx is clear and moist    Neck:   Left anterior cervical lymphadenopathy  tender   Cardiovascular: Normal rate, regular rhythm and normal heart sounds  Pulmonary/Chest: Effort normal and breath sounds normal    Abdominal: Soft  Bowel sounds are normal    Lymphadenopathy:     He has cervical adenopathy  Neurological: He is alert and oriented to person, place, and time  Skin: Skin is warm and dry  Psychiatric: He has a normal mood and affect  His behavior is normal  Judgment and thought content normal    Nursing note and vitals reviewed  Assessment/Plan:    No problem-specific Assessment & Plan notes found for this encounter  Diagnoses and all orders for this visit:    Lymphadenopathy of left cervical region  Comments:  amoxicillin 1 tab 3 times a day, continue with evaluation with peridontist  Orders:  -     amoxicillin (AMOXIL) 500 mg capsule; Take 1 capsule (500 mg total) by mouth every 8 (eight) hours for 7 days    Lumbar radiculopathy  Comments:  right sided sciatica  pred taper dose  consider evaluation by pain management  Muscle spasm  Comments:  renewal of skelaxin as needed  Orders:  -     metaxalone (SKELAXIN) 800 mg tablet;  Take 1 tablet (800 mg total) by mouth every 6 (six) hours as needed for muscle spasms

## 2019-04-02 ENCOUNTER — CLINICAL SUPPORT (OUTPATIENT)
Dept: FAMILY MEDICINE CLINIC | Facility: CLINIC | Age: 69
End: 2019-04-02
Payer: COMMERCIAL

## 2019-04-02 DIAGNOSIS — Z11.59 ENCOUNTER FOR HEPATITIS C SCREENING TEST FOR LOW RISK PATIENT: ICD-10-CM

## 2019-04-02 DIAGNOSIS — Z13.0 SCREENING FOR ENDOCRINE, NUTRITIONAL, METABOLIC AND IMMUNITY DISORDER: Primary | ICD-10-CM

## 2019-04-02 DIAGNOSIS — E78.5 HYPERLIPIDEMIA, UNSPECIFIED HYPERLIPIDEMIA TYPE: ICD-10-CM

## 2019-04-02 DIAGNOSIS — Z13.228 SCREENING FOR ENDOCRINE, NUTRITIONAL, METABOLIC AND IMMUNITY DISORDER: Primary | ICD-10-CM

## 2019-04-02 DIAGNOSIS — Z12.5 PROSTATE CANCER SCREENING INFORMATION GIVEN DURING PATIENT ENCOUNTER: ICD-10-CM

## 2019-04-02 DIAGNOSIS — Z13.21 SCREENING FOR ENDOCRINE, NUTRITIONAL, METABOLIC AND IMMUNITY DISORDER: Primary | ICD-10-CM

## 2019-04-02 DIAGNOSIS — Z13.0 SCREENING, ANEMIA, DEFICIENCY, IRON: ICD-10-CM

## 2019-04-02 DIAGNOSIS — Z13.29 SCREENING FOR ENDOCRINE, NUTRITIONAL, METABOLIC AND IMMUNITY DISORDER: Primary | ICD-10-CM

## 2019-04-02 DIAGNOSIS — Z13.1 SCREENING FOR DIABETES MELLITUS: ICD-10-CM

## 2019-04-02 PROCEDURE — 36415 COLL VENOUS BLD VENIPUNCTURE: CPT

## 2019-04-03 LAB
ALBUMIN SERPL-MCNC: 4.6 G/DL (ref 3.6–4.8)
ALBUMIN/GLOB SERPL: 1.8 {RATIO} (ref 1.2–2.2)
ALP SERPL-CCNC: 63 IU/L (ref 39–117)
ALT SERPL-CCNC: 25 IU/L (ref 0–44)
AST SERPL-CCNC: 24 IU/L (ref 0–40)
BASOPHILS # BLD AUTO: 0 X10E3/UL (ref 0–0.2)
BASOPHILS NFR BLD AUTO: 0 %
BILIRUB SERPL-MCNC: 0.7 MG/DL (ref 0–1.2)
BUN SERPL-MCNC: 23 MG/DL (ref 8–27)
BUN/CREAT SERPL: 23 (ref 10–24)
CALCIUM SERPL-MCNC: 10.3 MG/DL (ref 8.6–10.2)
CHLORIDE SERPL-SCNC: 101 MMOL/L (ref 96–106)
CHOLEST SERPL-MCNC: 187 MG/DL (ref 100–199)
CHOLEST/HDLC SERPL: 3.4 RATIO (ref 0–5)
CO2 SERPL-SCNC: 24 MMOL/L (ref 20–29)
CREAT SERPL-MCNC: 0.99 MG/DL (ref 0.76–1.27)
EOSINOPHIL # BLD AUTO: 0.2 X10E3/UL (ref 0–0.4)
EOSINOPHIL NFR BLD AUTO: 3 %
ERYTHROCYTE [DISTWIDTH] IN BLOOD BY AUTOMATED COUNT: 14 % (ref 12.3–15.4)
GLOBULIN SER-MCNC: 2.5 G/DL (ref 1.5–4.5)
GLUCOSE SERPL-MCNC: 99 MG/DL (ref 65–99)
HCT VFR BLD AUTO: 47.5 % (ref 37.5–51)
HCV AB S/CO SERPL IA: 0.1 S/CO RATIO (ref 0–0.9)
HDLC SERPL-MCNC: 55 MG/DL
HGB BLD-MCNC: 16.1 G/DL (ref 13–17.7)
IMM GRANULOCYTES # BLD: 0 X10E3/UL (ref 0–0.1)
IMM GRANULOCYTES NFR BLD: 0 %
LDLC SERPL CALC-MCNC: 111 MG/DL (ref 0–99)
LYMPHOCYTES # BLD AUTO: 2.8 X10E3/UL (ref 0.7–3.1)
LYMPHOCYTES NFR BLD AUTO: 36 %
MCH RBC QN AUTO: 33 PG (ref 26.6–33)
MCHC RBC AUTO-ENTMCNC: 33.9 G/DL (ref 31.5–35.7)
MCV RBC AUTO: 97 FL (ref 79–97)
MONOCYTES # BLD AUTO: 0.8 X10E3/UL (ref 0.1–0.9)
MONOCYTES NFR BLD AUTO: 11 %
NEUTROPHILS # BLD AUTO: 3.8 X10E3/UL (ref 1.4–7)
NEUTROPHILS NFR BLD AUTO: 50 %
PLATELET # BLD AUTO: 194 X10E3/UL (ref 150–379)
POTASSIUM SERPL-SCNC: 4.7 MMOL/L (ref 3.5–5.2)
PROT SERPL-MCNC: 7.1 G/DL (ref 6–8.5)
PSA FREE MFR SERPL: 50 %
PSA FREE SERPL-MCNC: 0.25 NG/ML
PSA SERPL-MCNC: 0.5 NG/ML (ref 0–4)
RBC # BLD AUTO: 4.88 X10E6/UL (ref 4.14–5.8)
SL AMB EGFR AFRICAN AMERICAN: 91 ML/MIN/1.73
SL AMB EGFR NON AFRICAN AMERICAN: 78 ML/MIN/1.73
SL AMB VLDL CHOLESTEROL CALC: 21 MG/DL (ref 5–40)
SODIUM SERPL-SCNC: 141 MMOL/L (ref 134–144)
TRIGL SERPL-MCNC: 106 MG/DL (ref 0–149)
TSH SERPL DL<=0.005 MIU/L-ACNC: 1.26 UIU/ML (ref 0.45–4.5)
WBC # BLD AUTO: 7.7 X10E3/UL (ref 3.4–10.8)

## 2019-05-06 DIAGNOSIS — F41.9 ANXIETY: ICD-10-CM

## 2019-05-07 RX ORDER — ALPRAZOLAM 0.25 MG/1
0.25 TABLET ORAL 3 TIMES DAILY PRN
Qty: 30 TABLET | Refills: 0 | Status: SHIPPED | OUTPATIENT
Start: 2019-05-07 | End: 2019-06-17 | Stop reason: SDUPTHER

## 2019-05-20 ENCOUNTER — OFFICE VISIT (OUTPATIENT)
Dept: FAMILY MEDICINE CLINIC | Facility: CLINIC | Age: 69
End: 2019-05-20
Payer: COMMERCIAL

## 2019-05-20 VITALS
TEMPERATURE: 98.2 F | SYSTOLIC BLOOD PRESSURE: 118 MMHG | DIASTOLIC BLOOD PRESSURE: 76 MMHG | BODY MASS INDEX: 32.93 KG/M2 | OXYGEN SATURATION: 96 % | HEIGHT: 73 IN | WEIGHT: 248.5 LBS | HEART RATE: 83 BPM

## 2019-05-20 DIAGNOSIS — M25.561 CHRONIC PAIN OF RIGHT KNEE: ICD-10-CM

## 2019-05-20 DIAGNOSIS — E66.9 OBESITY (BMI 30.0-34.9): ICD-10-CM

## 2019-05-20 DIAGNOSIS — M54.16 LUMBAR RADICULOPATHY, CHRONIC: ICD-10-CM

## 2019-05-20 DIAGNOSIS — G89.29 CHRONIC PAIN OF RIGHT KNEE: ICD-10-CM

## 2019-05-20 DIAGNOSIS — M25.551 CHRONIC HIP PAIN, BILATERAL: ICD-10-CM

## 2019-05-20 DIAGNOSIS — M54.12 CERVICAL RADICULOPATHY: Primary | ICD-10-CM

## 2019-05-20 DIAGNOSIS — M54.16 LUMBAR RADICULOPATHY: ICD-10-CM

## 2019-05-20 DIAGNOSIS — G89.29 CHRONIC HIP PAIN, BILATERAL: ICD-10-CM

## 2019-05-20 DIAGNOSIS — M25.552 CHRONIC HIP PAIN, BILATERAL: ICD-10-CM

## 2019-05-20 PROCEDURE — 99214 OFFICE O/P EST MOD 30 MIN: CPT | Performed by: FAMILY MEDICINE

## 2019-05-20 RX ORDER — HYDROCODONE BITARTRATE AND ACETAMINOPHEN 5; 325 MG/1; MG/1
1 TABLET ORAL EVERY 4 HOURS PRN
Qty: 30 TABLET | Refills: 0 | Status: SHIPPED | OUTPATIENT
Start: 2019-05-20 | End: 2019-06-24 | Stop reason: SDUPTHER

## 2019-05-20 RX ORDER — MELOXICAM 15 MG/1
15 TABLET ORAL DAILY
Qty: 30 TABLET | Refills: 0 | Status: SHIPPED | OUTPATIENT
Start: 2019-05-20 | End: 2019-06-14 | Stop reason: ALTCHOICE

## 2019-06-14 ENCOUNTER — OFFICE VISIT (OUTPATIENT)
Dept: FAMILY MEDICINE CLINIC | Facility: CLINIC | Age: 69
End: 2019-06-14
Payer: COMMERCIAL

## 2019-06-14 VITALS
TEMPERATURE: 98.9 F | SYSTOLIC BLOOD PRESSURE: 122 MMHG | WEIGHT: 245 LBS | BODY MASS INDEX: 32.47 KG/M2 | HEIGHT: 73 IN | DIASTOLIC BLOOD PRESSURE: 78 MMHG | OXYGEN SATURATION: 97 % | HEART RATE: 66 BPM

## 2019-06-14 DIAGNOSIS — Z12.11 SCREENING FOR COLON CANCER: ICD-10-CM

## 2019-06-14 DIAGNOSIS — K63.5 MULTIPLE POLYPS OF SIGMOID COLON: ICD-10-CM

## 2019-06-14 DIAGNOSIS — J01.00 ACUTE NON-RECURRENT MAXILLARY SINUSITIS: Primary | ICD-10-CM

## 2019-06-14 PROCEDURE — 99214 OFFICE O/P EST MOD 30 MIN: CPT | Performed by: FAMILY MEDICINE

## 2019-06-14 RX ORDER — PREDNISONE 10 MG/1
TABLET ORAL
Qty: 20 TABLET | Refills: 0
Start: 2019-06-14 | End: 2019-07-05 | Stop reason: ALTCHOICE

## 2019-06-14 RX ORDER — CEFUROXIME AXETIL 500 MG/1
500 TABLET ORAL EVERY 12 HOURS SCHEDULED
Qty: 20 TABLET | Refills: 0 | Status: SHIPPED | OUTPATIENT
Start: 2019-06-14 | End: 2019-06-24

## 2019-06-16 DIAGNOSIS — E78.2 MIXED HYPERLIPIDEMIA: ICD-10-CM

## 2019-06-17 DIAGNOSIS — F41.9 ANXIETY: ICD-10-CM

## 2019-06-17 RX ORDER — PRAVASTATIN SODIUM 80 MG/1
TABLET ORAL
Qty: 30 TABLET | Refills: 5 | Status: SHIPPED | OUTPATIENT
Start: 2019-06-17 | End: 2019-12-20 | Stop reason: SDUPTHER

## 2019-06-18 RX ORDER — ALPRAZOLAM 0.25 MG/1
0.25 TABLET ORAL 3 TIMES DAILY PRN
Qty: 30 TABLET | Refills: 0 | Status: SHIPPED | OUTPATIENT
Start: 2019-06-18 | End: 2019-07-24 | Stop reason: SDUPTHER

## 2019-06-24 ENCOUNTER — HOSPITAL ENCOUNTER (OUTPATIENT)
Dept: RADIOLOGY | Facility: HOSPITAL | Age: 69
Discharge: HOME | End: 2019-06-24
Attending: FAMILY MEDICINE
Payer: COMMERCIAL

## 2019-06-24 DIAGNOSIS — G89.29 CHRONIC HIP PAIN, BILATERAL: ICD-10-CM

## 2019-06-24 DIAGNOSIS — M25.561 CHRONIC PAIN OF RIGHT KNEE: ICD-10-CM

## 2019-06-24 DIAGNOSIS — M25.552 CHRONIC HIP PAIN, BILATERAL: ICD-10-CM

## 2019-06-24 DIAGNOSIS — M25.551 CHRONIC HIP PAIN, BILATERAL: ICD-10-CM

## 2019-06-24 DIAGNOSIS — G89.29 CHRONIC PAIN OF RIGHT KNEE: ICD-10-CM

## 2019-06-24 DIAGNOSIS — M54.16 LUMBAR RADICULOPATHY: ICD-10-CM

## 2019-06-24 PROCEDURE — 73562 X-RAY EXAM OF KNEE 3: CPT

## 2019-06-24 PROCEDURE — 73502 X-RAY EXAM HIP UNI 2-3 VIEWS: CPT

## 2019-06-24 RX ORDER — HYDROCODONE BITARTRATE AND ACETAMINOPHEN 5; 325 MG/1; MG/1
1 TABLET ORAL EVERY 4 HOURS PRN
Qty: 40 TABLET | Refills: 0 | Status: SHIPPED | OUTPATIENT
Start: 2019-06-24 | End: 2019-07-08 | Stop reason: SDUPTHER

## 2019-07-05 ENCOUNTER — OFFICE VISIT (OUTPATIENT)
Dept: FAMILY MEDICINE CLINIC | Facility: CLINIC | Age: 69
End: 2019-07-05
Payer: COMMERCIAL

## 2019-07-05 ENCOUNTER — DOCUMENTATION (OUTPATIENT)
Dept: FAMILY MEDICINE CLINIC | Facility: CLINIC | Age: 69
End: 2019-07-05

## 2019-07-05 VITALS
WEIGHT: 256 LBS | BODY MASS INDEX: 33.93 KG/M2 | TEMPERATURE: 97.4 F | OXYGEN SATURATION: 95 % | HEIGHT: 73 IN | SYSTOLIC BLOOD PRESSURE: 130 MMHG | HEART RATE: 69 BPM | DIASTOLIC BLOOD PRESSURE: 72 MMHG

## 2019-07-05 DIAGNOSIS — M25.561 ACUTE PAIN OF RIGHT KNEE: Primary | ICD-10-CM

## 2019-07-05 DIAGNOSIS — M25.361 INSTABILITY OF RIGHT KNEE JOINT: ICD-10-CM

## 2019-07-05 PROCEDURE — 99214 OFFICE O/P EST MOD 30 MIN: CPT | Performed by: FAMILY MEDICINE

## 2019-07-05 NOTE — PROGRESS NOTES
Subjective:   Chief Complaint   Patient presents with    Follow-up     pt here for f/u on right knee        Patient ID: Nadine Green is a 80 y o  male  The patient is here because the pain in his right knee is getting considerably worse  This has been going on for months if not longer though has been relatively mild until recently  Now it is so severe that he has pain all the time and it is waking him from sleep especially if he moves the knee in any way while sleeping  He is extremely stiff, if he sits still or does things like ride in the car when he gets up he needs to move very slowly to get things to loosen up  He feels like the leg is weak  He does not feel like he is going to fall or that the knee is going to give out on him but it is extremely painful all the time  Can't put his knees together because it hurts on the medial aspect of the right knee  Can't fully extend the leg or he get a shooting pain so bad he is nearly in tears  He has had a meniscal tear on the left and had this surgically repaired and did extremely well  He did have an x-ray of the right knee not long ago that I ordered which does show arthritis  He has Vicodin at home as well as Skelaxin  He has tried taking the Vicodin at night though still wakes up if he moves the leg in a certain way  He has not tried the Skelaxin      The following portions of the patient's history were reviewed and updated as appropriate: allergies, current medications, past family history, past medical history, past social history, past surgical history and problem list     Review of Systems      Objective:  Vitals:    07/05/19 1102   BP: 130/72   Pulse: 69   Temp: (!) 97 4 °F (36 3 °C)   SpO2: 95%   Weight: 116 kg (256 lb)   Height: 6' 1" (1 854 m)      Physical Exam   Constitutional: He is oriented to person, place, and time  He appears well-developed and well-nourished  No distress     Musculoskeletal:        Right knee: He exhibits decreased range of motion, swelling and MCL laxity  He exhibits no effusion, no ecchymosis, no laceration, no erythema, normal alignment and no LCL laxity  Tenderness found  MCL tenderness noted  No medial joint line, no lateral joint line and no LCL tenderness noted  Neurological: He is alert and oriented to person, place, and time  No cranial nerve deficit  Coordination normal    Skin: Skin is warm and dry  No rash noted  He is not diaphoretic  No erythema  Psychiatric: He has a normal mood and affect  His behavior is normal  Judgment and thought content normal          Assessment/Plan:    No problem-specific Assessment & Plan notes found for this encounter  Diagnoses and all orders for this visit:    Acute pain of right knee  Instability of right knee joint  -     MRI knee right  wo contrast; Future  -     Ambulatory referral to Orthopedic Surgery; Future    I advised the patient to continue his Vicodin at night and he can add the Skelaxin to this  He currently has both medications at home and does not need a refill but will call if he does  I am going to order the MRI and refer him to Orthopedic surgery  We discussed that the MRI will need to be preauthorized through his insurance and we will call him when this is done so that he can schedule the MRI  He should see Ortho after the MRI

## 2019-07-08 DIAGNOSIS — M54.16 LUMBAR RADICULOPATHY: ICD-10-CM

## 2019-07-09 RX ORDER — HYDROCODONE BITARTRATE AND ACETAMINOPHEN 5; 325 MG/1; MG/1
1 TABLET ORAL EVERY 4 HOURS PRN
Qty: 40 TABLET | Refills: 0 | Status: SHIPPED | OUTPATIENT
Start: 2019-07-09 | End: 2019-07-24 | Stop reason: SDUPTHER

## 2019-07-19 ENCOUNTER — HOSPITAL ENCOUNTER (OUTPATIENT)
Dept: RADIOLOGY | Facility: HOSPITAL | Age: 69
Discharge: HOME | End: 2019-07-19
Attending: FAMILY MEDICINE
Payer: COMMERCIAL

## 2019-07-19 ENCOUNTER — HOSPITAL ENCOUNTER (OUTPATIENT)
Dept: MRI IMAGING | Facility: HOSPITAL | Age: 69
Discharge: HOME | End: 2019-07-19
Attending: FAMILY MEDICINE
Payer: COMMERCIAL

## 2019-07-19 DIAGNOSIS — M25.561 ACUTE PAIN OF RIGHT KNEE: ICD-10-CM

## 2019-07-19 DIAGNOSIS — M25.361 INSTABILITY OF RIGHT KNEE JOINT: ICD-10-CM

## 2019-07-19 DIAGNOSIS — T15.90XA FB ENTERING EYE: ICD-10-CM

## 2019-07-19 PROCEDURE — 73721 MRI JNT OF LWR EXTRE W/O DYE: CPT

## 2019-07-24 ENCOUNTER — CONSULT (OUTPATIENT)
Dept: OBGYN CLINIC | Facility: CLINIC | Age: 69
End: 2019-07-24
Payer: COMMERCIAL

## 2019-07-24 VITALS
WEIGHT: 253 LBS | DIASTOLIC BLOOD PRESSURE: 82 MMHG | HEIGHT: 73 IN | HEART RATE: 81 BPM | BODY MASS INDEX: 33.53 KG/M2 | SYSTOLIC BLOOD PRESSURE: 143 MMHG

## 2019-07-24 DIAGNOSIS — S83.241A OTHER TEAR OF MEDIAL MENISCUS, CURRENT INJURY, RIGHT KNEE, INITIAL ENCOUNTER: Primary | ICD-10-CM

## 2019-07-24 DIAGNOSIS — M25.361 INSTABILITY OF RIGHT KNEE JOINT: ICD-10-CM

## 2019-07-24 DIAGNOSIS — M54.16 LUMBAR RADICULOPATHY: ICD-10-CM

## 2019-07-24 DIAGNOSIS — M25.561 ACUTE PAIN OF RIGHT KNEE: ICD-10-CM

## 2019-07-24 DIAGNOSIS — F41.9 ANXIETY: ICD-10-CM

## 2019-07-24 PROCEDURE — 99203 OFFICE O/P NEW LOW 30 MIN: CPT | Performed by: ORTHOPAEDIC SURGERY

## 2019-07-24 RX ORDER — HYDROCODONE BITARTRATE AND ACETAMINOPHEN 5; 325 MG/1; MG/1
1 TABLET ORAL EVERY 4 HOURS PRN
Qty: 40 TABLET | Refills: 0 | Status: SHIPPED | OUTPATIENT
Start: 2019-07-24 | End: 2019-08-12 | Stop reason: SDUPTHER

## 2019-07-24 RX ORDER — ACETAMINOPHEN 325 MG/1
650 TABLET ORAL EVERY 6 HOURS PRN
Status: CANCELLED | OUTPATIENT
Start: 2019-07-24

## 2019-07-24 RX ORDER — ALPRAZOLAM 0.25 MG/1
0.25 TABLET ORAL 3 TIMES DAILY PRN
Qty: 30 TABLET | Refills: 0 | Status: SHIPPED | OUTPATIENT
Start: 2019-07-24 | End: 2019-09-11 | Stop reason: SDUPTHER

## 2019-07-24 RX ORDER — TRAMADOL HYDROCHLORIDE 50 MG/1
50 TABLET ORAL EVERY 6 HOURS SCHEDULED
Status: CANCELLED | OUTPATIENT
Start: 2019-07-24

## 2019-07-24 NOTE — H&P (VIEW-ONLY)
Ortho Sports Medicine Knee New Patient Visit     Assesment:     80year old Male Right knee posterior horn and body medial meniscus tear    Plan:    Conservative treatment:    Ice to knee for 20 minutes at least 1-2 times daily  Crutches ordered  Post-op PT written    Imaging: All imaging from today was reviewed by myself and explained to the patient  Injection:    No Injection planned at this time  He declined trying injections and PT at this time  He stated that when he tore the meniscus in the other knee that he tried those options for 6 months and never experienced relief and would just like surgery  Surgery: All of the risks and benefits of operative treatment were explained to the patient, as well as the risks and benefits of any alternative treatment options, including nonoperative care  The risks of surgical treatement include, but are not limited to, infection, bleeding, blood clot, neurovascular damage, need for further surgery, continued pain, cardiovascular risk, and anesthesia risk  The patient understood this and elects to proceed forward with surgical intervention  We will proceed forward with surgical arthroscopy of the knee with medial menisectomy       Follow up:    Return for 1 week post-op  Chief Complaint   Patient presents with    Right Knee - Pain       History of Present Illness: The patient is a 80 y o  male whose occupation is Independent , referred to me by their primary care physician, seen in clinic for consultation of right knee pain  Pain is located medial   The patient rates the pain as a 10/10  The pain has been present for 2 months  The patient sustained an injury about 2 months ago  He is unsure of the exact moment that he torn the meniscus but stated that his daughter was  last month and the next day after dancing he was unable to [facility] due to severe pain in the medial aspect of the knee   After experiencing the pain he followed up with his PCP who obtained an MRI that demonstartaed a torn medial meniscus of the right knee  He stated that he has difficultly sleeping at night having the knees lay against each other  He is unable to walk long periods of time without experiencing severe pain causing him to limp, additionally stairs and squatting  If he extends the knee completely straight he feels catching  Getting up after sitting long periods of time creates pain and him having to work out the knee to loosen it up  The pain is characterized as sharp, stabbing, dull, achy  The pain is present at all times  Pain is improved by rest and ice  Pain is aggravated by stairs, squatting, weight bearing, walking, standing and pivoting on a planted foot  Symptoms include catching and swelling  The patient has tried rest, ice, NSAIDS and bracing            Knee Surgical History:  Medial menisectomy of the Left knee 10 years ago in [employer]     Past Medical, Social and Family History:  Past Medical History:   Diagnosis Date    Acute medial meniscus tear     Allergic rhinitis     Allergic rhinitis     last assessed 5/8/14    Anxiety     Appendicitis     Benign essential hypertension     last assessed 1/6/14    Biceps tendon tear     Colon polyps     Depression with anxiety     Dyskinesia of esophagus     Erythema migrans (Lyme disease)     last assessed 10/16/12    Fatigue     GERD (gastroesophageal reflux disease)     Herpes     Herpes zoster     Hyperlipidemia     Hyperplastic colon polyp     last assessed 11/14/14    Joint pain     Kidney stones     Lyme disease     Nephrolithiasis     Panic disorder with agoraphobia     last assessed 8/7/13     Past Surgical History:   Procedure Laterality Date    APPENDECTOMY  08/01/2003    BICEPS TENDON REPAIR      KNEE ARTHROSCOPY      with medial meniscus repair, resolved 01/01/05    KNEE CARTILAGE SURGERY      OTHER SURGICAL HISTORY      distal reinsertion of ruptured biceps tendon    VASECTOMY       Allergies   Allergen Reactions    Pseudoephedrine Hives     Reaction Date: [id number];     Sulfa Antibiotics Hives     Current Outpatient Medications on File Prior to Visit   Medication Sig Dispense Refill    ALPRAZolam (XANAX) 0 25 mg tablet Take 1 tablet (0 25 mg total) by mouth 3 (three) times a day as needed for anxiety 30 tablet 0    cholecalciferol (VITAMIN D3) 1,000 units tablet Take 1,000 Units by mouth daily      Coenzyme Q10 (COQ10 PO) Take by mouth daily      Glucosamine-Chondroit-Vit C-Mn (GLUCOSAMINE 1500 COMPLEX PO) Take by mouth      HYDROcodone-acetaminophen (NORCO) 5-325 mg per tablet Take 1 tablet by mouth every 4 (four) hours as needed for painMax Daily Amount: 6 tablets 40 tablet 0    Hyoscyamine Sulfate SL 0 125 MG SUBL Place 0 125 mg under the tongue 3 (three) times a day as needed (as needed) 60 each 1    metaxalone (SKELAXIN) 800 mg tablet Take 1 tablet (800 mg total) by mouth every 6 (six) hours as needed for muscle spasms 60 tablet 1    Multiple Vitamins-Minerals (MULTI FOR HIM 50+) TABS Take by mouth      Omega-3 Fatty Acids (FISH OIL) 1,000 mg Take 1,000 mg by mouth daily      omeprazole (PriLOSEC) 20 mg delayed release capsule Take 1 capsule by mouth daily      pravastatin (PRAVACHOL) 80 mg tablet TAKE ONE TABLET BY MOUTH EVERY DAY AT BEDTIME 30 tablet 5     No current facility-administered medications on file prior to visit        Social History     Socioeconomic History    Marital status: /Civil Union     Spouse name: Not on file    Number of children: Not on file    Years of education: Not on file    Highest education level: Not on file   Occupational History    Not on file   Social Needs    Financial resource strain: Not on file    Food insecurity:     Worry: Not on file     Inability: Not on file    Transportation needs:     Medical: Not on file     Non-medical: Not on file   Tobacco Use    Smoking status: Former Smoker     Types: Cigars    Smokeless tobacco: Never Used   Substance and Sexual Activity    Alcohol use: No    Drug use: No    Sexual activity: Not on file   Lifestyle    Physical activity:     Days per week: Not on file     Minutes per session: Not on file    Stress: Not on file   Relationships    Social connections:     Talks on phone: Not on file     Gets together: Not on file     Attends Church service: Not on file     Active member of club or organization: Not on file     Attends meetings of clubs or organizations: Not on file     Relationship status: Not on file    Intimate partner violence:     Fear of current or ex partner: Not on file     Emotionally abused: Not on file     Physically abused: Not on file     Forced sexual activity: Not on file   Other Topics Concern    Not on file   Social History Narrative    Not on file         I have reviewed the past medical, surgical, social and family history, medications and allergies as documented in the EMR  Review of systems: ROS is negative other than that noted in the HPI  Constitutional: Negative for fatigue and fever  HENT: Negative for sore throat  Respiratory: Negative for shortness of breath  Cardiovascular: Negative for chest pain  Gastrointestinal: Negative for abdominal pain  Endocrine: Negative for cold intolerance and heat intolerance  Genitourinary: Negative for flank pain  Musculoskeletal: Negative for back pain  Skin: Negative for rash  Allergic/Immunologic: Negative for immunocompromised state  Neurological: Negative for dizziness  Psychiatric/Behavioral: Negative for agitation  Physical Exam:    Blood pressure 143/82, pulse 81, height 6' 1" (1 854 m), weight 115 kg (253 lb)      General/Constitutional: NAD, well developed, well nourished  HENT: Normocephalic, atraumatic  CV: Intact distal pulses, regular rate  Resp: No respiratory distress or labored breathing  Lymphatic: No lymphadenopathy palpated  Neuro: Alert and Oriented x 3, no focal deficits  Psych: Normal mood, normal affect, normal judgement, normal behavior  Skin: Warm, dry, no rashes, no erythema      Knee Exam (focused):                RIGHT LEFT   ROM:   0-130 0-130   Palpation: Effusion small negative     MJL tenderness Positive Negative     LJL tenderness Negative Negative   Meniscus: Radha Positive medial Negative    Apley's Compression Positive medial Negative   Instability: Varus Stable pain medially with varus stress stable     Valgus Stable  stable   Special Tests: Lachman Negative Negative     Posterior drawer Negative Negative     Anterior drawer Negative Negative     Pivot shift not tested not tested     Dial not tested not tested   Patella: Palpation no tenderness no tenderness     Mobility 1/4 1/4     Apprehension Negative Negative   Other: Single leg 1/4 squat not tested not tested      LE NV Exam: +2 DP/PT pulses bilaterally  Sensation intact to light touch L2-S1 bilaterally     Bilateral hip ROM demonstrates no pain actively or passively    No calf tenderness to palpation bilaterally    Knee Imaging    X-rays of the right knee were reviewed, which demonstrate mild medial and patellofemoral OA  I have reviewed the radiology report and agree with their impression  MRI of the right knee were reviewed, which demonstrate complex tear through the posterior horn and body of the medial meniscus  I have reviewed the radiology report and agree with their impression

## 2019-07-24 NOTE — PROGRESS NOTES
Ortho Sports Medicine Knee New Patient Visit     Assesment:     80year old Male Right knee posterior horn and body medial meniscus tear    Plan:    Conservative treatment:    Ice to knee for 20 minutes at least 1-2 times daily  Crutches ordered  Post-op PT written    Imaging: All imaging from today was reviewed by myself and explained to the patient  Injection:    No Injection planned at this time  He declined trying injections and PT at this time  He stated that when he tore the meniscus in the other knee that he tried those options for 6 months and never experienced relief and would just like surgery  Surgery: All of the risks and benefits of operative treatment were explained to the patient, as well as the risks and benefits of any alternative treatment options, including nonoperative care  The risks of surgical treatement include, but are not limited to, infection, bleeding, blood clot, neurovascular damage, need for further surgery, continued pain, cardiovascular risk, and anesthesia risk  The patient understood this and elects to proceed forward with surgical intervention  We will proceed forward with surgical arthroscopy of the knee with medial menisectomy       Follow up:    Return for 1 week post-op  Chief Complaint   Patient presents with    Right Knee - Pain       History of Present Illness: The patient is a 80 y o  male whose occupation is Independent , referred to me by their primary care physician, seen in clinic for consultation of right knee pain  Pain is located medial   The patient rates the pain as a 10/10  The pain has been present for 2 months  The patient sustained an injury about 2 months ago  He is unsure of the exact moment that he torn the meniscus but stated that his daughter was  last month and the next day after dancing he was unable to TRI [facility] due to severe pain in the medial aspect of the knee   After experiencing the pain he followed up with his PCP who obtained an MRI that demonstartaed a torn medial meniscus of the right knee  He stated that he has difficultly sleeping at night having the knees lay against each other  He is unable to walk long periods of time without experiencing severe pain causing him to limp, additionally stairs and squatting  If he extends the knee completely straight he feels catching  Getting up after sitting long periods of time creates pain and him having to work out the knee to loosen it up  The pain is characterized as sharp, stabbing, dull, achy  The pain is present at all times  Pain is improved by rest and ice  Pain is aggravated by stairs, squatting, weight bearing, walking, standing and pivoting on a planted foot  Symptoms include catching and swelling  The patient has tried rest, ice, NSAIDS and bracing            Knee Surgical History:  Medial menisectomy of the Left knee 10 years ago in [place]     Past Medical, Social and Family History:  Past Medical History:   Diagnosis Date    Acute medial meniscus tear     Allergic rhinitis     Allergic rhinitis     last assessed 5/8/14    Anxiety     Appendicitis     Benign essential hypertension     last assessed 1/6/14    Biceps tendon tear     Colon polyps     Depression with anxiety     Dyskinesia of esophagus     Erythema migrans (Lyme disease)     last assessed 10/16/12    Fatigue     GERD (gastroesophageal reflux disease)     Herpes     Herpes zoster     Hyperlipidemia     Hyperplastic colon polyp     last assessed 11/14/14    Joint pain     Kidney stones     Lyme disease     Nephrolithiasis     Panic disorder with agoraphobia     last assessed 8/7/13     Past Surgical History:   Procedure Laterality Date    APPENDECTOMY  08/01/2003    BICEPS TENDON REPAIR      KNEE ARTHROSCOPY      with medial meniscus repair, resolved 01/01/05    KNEE CARTILAGE SURGERY      OTHER SURGICAL HISTORY      distal reinsertion of ruptured biceps tendon    VASECTOMY       Allergies   Allergen Reactions    Pseudoephedrine Hives     Reaction Date: 16Apr2011;     Sulfa Antibiotics Hives     Current Outpatient Medications on File Prior to Visit   Medication Sig Dispense Refill    ALPRAZolam (XANAX) 0 25 mg tablet Take 1 tablet (0 25 mg total) by mouth 3 (three) times a day as needed for anxiety 30 tablet 0    cholecalciferol (VITAMIN D3) 1,000 units tablet Take 1,000 Units by mouth daily      Coenzyme Q10 (COQ10 PO) Take by mouth daily      Glucosamine-Chondroit-Vit C-Mn (GLUCOSAMINE 1500 COMPLEX PO) Take by mouth      HYDROcodone-acetaminophen (NORCO) 5-325 mg per tablet Take 1 tablet by mouth every 4 (four) hours as needed for painMax Daily Amount: 6 tablets 40 tablet 0    Hyoscyamine Sulfate SL 0 125 MG SUBL Place 0 125 mg under the tongue 3 (three) times a day as needed (as needed) 60 each 1    metaxalone (SKELAXIN) 800 mg tablet Take 1 tablet (800 mg total) by mouth every 6 (six) hours as needed for muscle spasms 60 tablet 1    Multiple Vitamins-Minerals (MULTI FOR HIM 50+) TABS Take by mouth      Omega-3 Fatty Acids (FISH OIL) 1,000 mg Take 1,000 mg by mouth daily      omeprazole (PriLOSEC) 20 mg delayed release capsule Take 1 capsule by mouth daily      pravastatin (PRAVACHOL) 80 mg tablet TAKE ONE TABLET BY MOUTH EVERY DAY AT BEDTIME 30 tablet 5     No current facility-administered medications on file prior to visit        Social History     Socioeconomic History    Marital status: /Civil Union     Spouse name: Not on file    Number of children: Not on file    Years of education: Not on file    Highest education level: Not on file   Occupational History    Not on file   Social Needs    Financial resource strain: Not on file    Food insecurity:     Worry: Not on file     Inability: Not on file    Transportation needs:     Medical: Not on file     Non-medical: Not on file   Tobacco Use    Smoking status: Former Smoker     Types: Cigars    Smokeless tobacco: Never Used   Substance and Sexual Activity    Alcohol use: No    Drug use: No    Sexual activity: Not on file   Lifestyle    Physical activity:     Days per week: Not on file     Minutes per session: Not on file    Stress: Not on file   Relationships    Social connections:     Talks on phone: Not on file     Gets together: Not on file     Attends [employer] service: Not on file     Active member of club or organization: Not on file     Attends meetings of clubs or organizations: Not on file     Relationship status: Not on file    Intimate partner violence:     Fear of current or ex partner: Not on file     Emotionally abused: Not on file     Physically abused: Not on file     Forced sexual activity: Not on file   Other Topics Concern    Not on file   Social History Narrative    Not on file         I have reviewed the past medical, surgical, social and family history, medications and allergies as documented in the EMR  Review of systems: ROS is negative other than that noted in the HPI  Constitutional: Negative for fatigue and fever  HENT: Negative for sore throat  Respiratory: Negative for shortness of breath  Cardiovascular: Negative for chest pain  Gastrointestinal: Negative for abdominal pain  Endocrine: Negative for cold intolerance and heat intolerance  Genitourinary: Negative for flank pain  Musculoskeletal: Negative for back pain  Skin: Negative for rash  Allergic/Immunologic: Negative for immunocompromised state  Neurological: Negative for dizziness  Psychiatric/Behavioral: Negative for agitation  Physical Exam:    Blood pressure 143/82, pulse 81, height 6' 1" (1 854 m), weight 115 kg (253 lb)      General/Constitutional: NAD, well developed, well nourished  HENT: Normocephalic, atraumatic  CV: Intact distal pulses, regular rate  Resp: No respiratory distress or labored breathing  Lymphatic: No lymphadenopathy palpated  Neuro: Alert and Oriented x 3, no focal deficits  Psych: Normal mood, normal affect, normal judgement, normal behavior  Skin: Warm, dry, no rashes, no erythema      Knee Exam (focused):                RIGHT LEFT   ROM:   0-130 0-130   Palpation: Effusion small negative     MJL tenderness Positive Negative     LJL tenderness Negative Negative   Meniscus: Radha Positive medial Negative    Apley's Compression Positive medial Negative   Instability: Varus Stable pain medially with varus stress stable     Valgus Stable  stable   Special Tests: Lachman Negative Negative     Posterior drawer Negative Negative     Anterior drawer Negative Negative     Pivot shift not tested not tested     Dial not tested not tested   Patella: Palpation no tenderness no tenderness     Mobility 1/4 1/4     Apprehension Negative Negative   Other: Single leg 1/4 squat not tested not tested      LE NV Exam: +2 DP/PT pulses bilaterally  Sensation intact to light touch L2-S1 bilaterally     Bilateral hip ROM demonstrates no pain actively or passively    No calf tenderness to palpation bilaterally    Knee Imaging    X-rays of the right knee were reviewed, which demonstrate mild medial and patellofemoral OA  I have reviewed the radiology report and agree with their impression  MRI of the right knee were reviewed, which demonstrate complex tear through the posterior horn and body of the medial meniscus  I have reviewed the radiology report and agree with their impression

## 2019-07-25 ENCOUNTER — APPOINTMENT (OUTPATIENT)
Dept: PREADMISSION TESTING | Facility: HOSPITAL | Age: 69
End: 2019-07-25
Payer: COMMERCIAL

## 2019-07-25 ENCOUNTER — HOSPITAL ENCOUNTER (OUTPATIENT)
Dept: NON INVASIVE DIAGNOSTICS | Facility: HOSPITAL | Age: 69
Discharge: HOME | End: 2019-07-25
Attending: ORTHOPAEDIC SURGERY
Payer: COMMERCIAL

## 2019-07-25 ENCOUNTER — HOSPITAL ENCOUNTER (OUTPATIENT)
Dept: MRI IMAGING | Facility: HOSPITAL | Age: 69
Discharge: HOME | End: 2019-07-25
Attending: FAMILY MEDICINE
Payer: COMMERCIAL

## 2019-07-25 DIAGNOSIS — S83.241A OTHER TEAR OF MEDIAL MENISCUS, CURRENT INJURY, RIGHT KNEE, INITIAL ENCOUNTER: ICD-10-CM

## 2019-07-25 DIAGNOSIS — M54.12 CERVICAL RADICULOPATHY: ICD-10-CM

## 2019-07-25 PROCEDURE — 93005 ELECTROCARDIOGRAM TRACING: CPT

## 2019-07-25 PROCEDURE — 72141 MRI NECK SPINE W/O DYE: CPT

## 2019-07-26 ENCOUNTER — HOSPITAL ENCOUNTER (OUTPATIENT)
Dept: MRI IMAGING | Facility: HOSPITAL | Age: 69
Discharge: HOME | End: 2019-07-26
Attending: FAMILY MEDICINE
Payer: COMMERCIAL

## 2019-07-26 DIAGNOSIS — M54.16 LUMBAR RADICULOPATHY: ICD-10-CM

## 2019-07-26 LAB
ATRIAL RATE: 63 BPM
BASOPHILS # BLD AUTO: 0 X10E3/UL (ref 0–0.2)
BASOPHILS NFR BLD AUTO: 0 %
BUN SERPL-MCNC: 20 MG/DL (ref 8–27)
BUN/CREAT SERPL: 21 (ref 10–24)
CALCIUM SERPL-MCNC: 9.7 MG/DL (ref 8.6–10.2)
CHLORIDE SERPL-SCNC: 102 MMOL/L (ref 96–106)
CO2 SERPL-SCNC: 23 MMOL/L (ref 20–29)
CREAT SERPL-MCNC: 0.95 MG/DL (ref 0.76–1.27)
EOSINOPHIL # BLD AUTO: 0.2 X10E3/UL (ref 0–0.4)
EOSINOPHIL NFR BLD AUTO: 3 %
ERYTHROCYTE [DISTWIDTH] IN BLOOD BY AUTOMATED COUNT: 13.8 % (ref 12.3–15.4)
GLUCOSE SERPL-MCNC: 94 MG/DL (ref 65–99)
HCT VFR BLD AUTO: 42.9 % (ref 37.5–51)
HGB BLD-MCNC: 15 G/DL (ref 13–17.7)
IMM GRANULOCYTES # BLD: 0 X10E3/UL (ref 0–0.1)
IMM GRANULOCYTES NFR BLD: 0 %
LYMPHOCYTES # BLD AUTO: 2 X10E3/UL (ref 0.7–3.1)
LYMPHOCYTES NFR BLD AUTO: 30 %
MCH RBC QN AUTO: 33.7 PG (ref 26.6–33)
MCHC RBC AUTO-ENTMCNC: 35 G/DL (ref 31.5–35.7)
MCV RBC AUTO: 96 FL (ref 79–97)
MONOCYTES # BLD AUTO: 0.7 X10E3/UL (ref 0.1–0.9)
MONOCYTES NFR BLD AUTO: 10 %
NEUTROPHILS # BLD AUTO: 3.8 X10E3/UL (ref 1.4–7)
NEUTROPHILS NFR BLD AUTO: 57 %
P AXIS: 57 DEGREES
PLATELET # BLD AUTO: 185 X10E3/UL (ref 150–450)
POTASSIUM SERPL-SCNC: 4.9 MMOL/L (ref 3.5–5.2)
PR INTERVAL: 182 MS
QRS AXIS: 29 DEGREES
QRSD INTERVAL: 88 MS
QT INTERVAL: 406 MS
QTC INTERVAL: 415 MS
RBC # BLD AUTO: 4.45 X10E6/UL (ref 4.14–5.8)
SL AMB EGFR AFRICAN AMERICAN: 95 ML/MIN/1.73
SL AMB EGFR NON AFRICAN AMERICAN: 82 ML/MIN/1.73
SODIUM SERPL-SCNC: 138 MMOL/L (ref 134–144)
T WAVE AXIS: 37 DEGREES
VENTRICULAR RATE: 63 BPM
WBC # BLD AUTO: 6.6 X10E3/UL (ref 3.4–10.8)

## 2019-07-26 PROCEDURE — 93010 ELECTROCARDIOGRAM REPORT: CPT | Performed by: INTERNAL MEDICINE

## 2019-07-26 PROCEDURE — 72148 MRI LUMBAR SPINE W/O DYE: CPT

## 2019-07-26 NOTE — PRE-PROCEDURE INSTRUCTIONS
Pre-Surgery Instructions:   Medication Instructions    ALPRAZolam (XANAX) 0 25 mg tablet Instructed patient per Anesthesia Guidelines   HYDROcodone-acetaminophen (NORCO) 5-325 mg per tablet Instructed patient per Anesthesia Guidelines   Hyoscyamine Sulfate SL 0 125 MG SUBL Instructed patient per Anesthesia Guidelines   omeprazole (PriLOSEC) 20 mg delayed release capsule Instructed patient per Anesthesia Guidelines  Pre-op Showering Instructions for Surgery Patients    Before your operation, you play an important role in decreasing your risk for infection by washing with special antiseptic soap  This is an effective way to reduce bacteria on the skin which may help to prevent infections at the surgical site  Please read the following directions in advance  1  In the week before your operation, purchase a 4 ounce bottle of antiseptic soap containing chlorhexidine gluconate (CHG)  4%  Some brand names include: Aplicare®, Endure, and Hibiclens®  The cost is usually less than $5 00   For your convenience, the [employer] carries the soap   It may also be available at your doctors office or pre-admission testing center, and at most retail pharmacies   If you are allergic or sensitive to soaps containing CHG, please let your doctor know so another antiseptic can be suggested   CHG antiseptic soap is for external use only  2   The day before your operation, follow these instructions carefully to get ready   Please clean linens (sheets) on your bed; you should sleep on clean sheets after your evening shower   Get clean towels and washcloth ready - you need enough for 2 showers   Set aside clean underwear, pajamas, and clothing to wear after the showers     Reminders:   DO NOT use any other soap or body rinse on your skin during or after the antiseptic showers   DO NOT use lotion, powder, deodorant, or perfume/aftershave of any kind on your skin after your antiseptic shower   DO NOT shave any body parts in the 24 hours/day before your operation   DO NOT get the antiseptic soap in your eyes, ears, nose, mouth, or vaginal area    3  You will need to shower the night before AND the morning of your surgery  Shower 1:   The first evening before the operation, take the first shower   First, shampoo your hair with regular shampoo and rinse it completely before you use the antiseptic soap  After washing and rinsing your hair, rinse your body   Next, use a clean washcloth to apply the antiseptic soap and wash your body from the neck down to your toes using ½ bottle of the antiseptic soap   You will use the other ½ bottle for the second shower   Clean the area where your incision will be; lather this area well for about 2 minutes   If you are having head or neck surgery, wash areas with the antiseptic soap   Rinse yourself completely with running water   Use a clean towel to dry off   Wear clean underwear and clothing/pajamas  Shower 2   The morning of your operation, take the second shower following the same steps as Shower 1 using the second ½ of the bottle of antiseptic soap   Use clean cloths and towels to wash and dry yourself   Wear clean underwear and clothing

## 2019-07-29 ENCOUNTER — ANESTHESIA EVENT (OUTPATIENT)
Dept: PERIOP | Facility: HOSPITAL | Age: 69
End: 2019-07-29
Payer: COMMERCIAL

## 2019-07-29 PROCEDURE — NC001 PR NO CHARGE: Performed by: ORTHOPAEDIC SURGERY

## 2019-07-29 RX ORDER — OXYCODONE HYDROCHLORIDE 5 MG/1
5 TABLET ORAL EVERY 4 HOURS PRN
Qty: 15 TABLET | Refills: 0 | Status: SHIPPED | OUTPATIENT
Start: 2019-07-29 | End: 2019-08-08

## 2019-07-29 NOTE — ANESTHESIA PREPROCEDURE EVALUATION
Review of Systems/Medical History      No history of anesthetic complications     Cardiovascular  Exercise tolerance (METS): >4,  Hyperlipidemia, No hypertension ,    Pulmonary  Negative pulmonary ROS Sleep apnea CPAP,        GI/Hepatic  Negative GI/hepatic ROS   GERD ,        Kidney stones,        Endo/Other  Negative endo/other ROS      GYN       Hematology  Negative hematology ROS      Musculoskeletal  Negative musculoskeletal ROS   Arthritis     Neurology  Negative neurology ROS     Comment: H/o herpes zoster  Psychology   Negative psychology ROS Anxiety, Depression ,              Physical Exam    Airway    Mallampati score: II  TM Distance: >3 FB  Neck ROM: full     Dental       Cardiovascular  Cardiovascular exam normal    Pulmonary  Pulmonary exam normal     Other Findings        Anesthesia Plan  ASA Score- 2     Anesthesia Type- general with ASA Monitors  Additional Monitors:   Airway Plan: ETT  Plan Factors-Patient not instructed to abstain from smoking on day of procedure  Patient did not smoke on day of surgery  Induction- intravenous  Postoperative Plan- Plan for postoperative opioid use  Informed Consent- Anesthetic plan and risks discussed with patient and son  I personally reviewed this patient with the CRNA  Discussed and agreed on the Anesthesia Plan with the CRNA  Deborah Mir           No results found for: HGBA1C    Lab Results   Component Value Date     09/11/2017    K 4 9 07/25/2019     07/25/2019    CO2 23 07/25/2019    BUN 20 07/25/2019    CREATININE 0 95 07/25/2019    GLUCOSE 102 (H) 09/11/2017    CALCIUM 9 8 09/11/2017    AST 24 04/02/2019    ALT 25 04/02/2019    ALKPHOS 71 09/11/2017    PROT 7 1 09/11/2017    BILITOT 0 8 09/11/2017       Lab Results   Component Value Date    WBC 6 6 07/25/2019    HGB 15 0 07/25/2019    HCT 42 9 07/25/2019    MCV 96 07/25/2019     07/25/2019   Normal sinus rhythm with sinus arrhythmia  Normal ECG  No previous ECGs available  Confirmed by Donavan Clarke ([id number]) on 7/26/2019 11:13:03 AM

## 2019-07-30 ENCOUNTER — ANESTHESIA (OUTPATIENT)
Dept: PERIOP | Facility: HOSPITAL | Age: 69
End: 2019-07-30
Payer: COMMERCIAL

## 2019-07-30 ENCOUNTER — HOSPITAL ENCOUNTER (OUTPATIENT)
Facility: HOSPITAL | Age: 69
Setting detail: OUTPATIENT SURGERY
Discharge: HOME | End: 2019-07-30
Attending: ORTHOPAEDIC SURGERY | Admitting: ORTHOPAEDIC SURGERY
Payer: COMMERCIAL

## 2019-07-30 VITALS
OXYGEN SATURATION: 98 % | HEART RATE: 74 BPM | SYSTOLIC BLOOD PRESSURE: 136 MMHG | RESPIRATION RATE: 20 BRPM | DIASTOLIC BLOOD PRESSURE: 75 MMHG | TEMPERATURE: 97.9 F

## 2019-07-30 DIAGNOSIS — S83.241A OTHER TEAR OF MEDIAL MENISCUS, CURRENT INJURY, RIGHT KNEE, INITIAL ENCOUNTER: Primary | ICD-10-CM

## 2019-07-30 PROCEDURE — 29881 ARTHRS KNE SRG MNISECTMY M/L: CPT | Performed by: ORTHOPAEDIC SURGERY

## 2019-07-30 RX ORDER — HYDROMORPHONE HCL/PF 1 MG/ML
0.5 SYRINGE (ML) INJECTION
Status: DISCONTINUED | OUTPATIENT
Start: 2019-07-30 | End: 2019-07-30 | Stop reason: HOSPADM

## 2019-07-30 RX ORDER — MEPERIDINE HYDROCHLORIDE 25 MG/ML
12.5 INJECTION INTRAMUSCULAR; INTRAVENOUS; SUBCUTANEOUS
Status: DISCONTINUED | OUTPATIENT
Start: 2019-07-30 | End: 2019-07-30 | Stop reason: HOSPADM

## 2019-07-30 RX ORDER — MIDAZOLAM HYDROCHLORIDE 1 MG/ML
INJECTION INTRAMUSCULAR; INTRAVENOUS AS NEEDED
Status: DISCONTINUED | OUTPATIENT
Start: 2019-07-30 | End: 2019-07-30 | Stop reason: SURG

## 2019-07-30 RX ORDER — HYDROMORPHONE HYDROCHLORIDE 2 MG/ML
INJECTION, SOLUTION INTRAMUSCULAR; INTRAVENOUS; SUBCUTANEOUS AS NEEDED
Status: DISCONTINUED | OUTPATIENT
Start: 2019-07-30 | End: 2019-07-30 | Stop reason: SURG

## 2019-07-30 RX ORDER — PROMETHAZINE HYDROCHLORIDE 25 MG/ML
12.5 INJECTION, SOLUTION INTRAMUSCULAR; INTRAVENOUS ONCE AS NEEDED
Status: DISCONTINUED | OUTPATIENT
Start: 2019-07-30 | End: 2019-07-30 | Stop reason: HOSPADM

## 2019-07-30 RX ORDER — TRAMADOL HYDROCHLORIDE 50 MG/1
50 TABLET ORAL EVERY 6 HOURS SCHEDULED
Status: DISCONTINUED | OUTPATIENT
Start: 2019-07-30 | End: 2019-07-30 | Stop reason: HOSPADM

## 2019-07-30 RX ORDER — EPHEDRINE SULFATE 50 MG/ML
INJECTION INTRAVENOUS AS NEEDED
Status: DISCONTINUED | OUTPATIENT
Start: 2019-07-30 | End: 2019-07-30 | Stop reason: SURG

## 2019-07-30 RX ORDER — CEFAZOLIN SODIUM 2 G/50ML
2000 SOLUTION INTRAVENOUS ONCE
Status: COMPLETED | OUTPATIENT
Start: 2019-07-30 | End: 2019-07-30

## 2019-07-30 RX ORDER — GLYCOPYRROLATE 0.2 MG/ML
INJECTION INTRAMUSCULAR; INTRAVENOUS AS NEEDED
Status: DISCONTINUED | OUTPATIENT
Start: 2019-07-30 | End: 2019-07-30 | Stop reason: SURG

## 2019-07-30 RX ORDER — MAGNESIUM HYDROXIDE 1200 MG/15ML
LIQUID ORAL AS NEEDED
Status: DISCONTINUED | OUTPATIENT
Start: 2019-07-30 | End: 2019-07-30 | Stop reason: HOSPADM

## 2019-07-30 RX ORDER — SODIUM CHLORIDE, SODIUM LACTATE, POTASSIUM CHLORIDE, CALCIUM CHLORIDE 600; 310; 30; 20 MG/100ML; MG/100ML; MG/100ML; MG/100ML
50 INJECTION, SOLUTION INTRAVENOUS CONTINUOUS
Status: DISCONTINUED | OUTPATIENT
Start: 2019-07-30 | End: 2019-07-30 | Stop reason: HOSPADM

## 2019-07-30 RX ORDER — LIDOCAINE HYDROCHLORIDE 10 MG/ML
INJECTION, SOLUTION INFILTRATION; PERINEURAL AS NEEDED
Status: DISCONTINUED | OUTPATIENT
Start: 2019-07-30 | End: 2019-07-30 | Stop reason: SURG

## 2019-07-30 RX ORDER — ONDANSETRON 2 MG/ML
INJECTION INTRAMUSCULAR; INTRAVENOUS AS NEEDED
Status: DISCONTINUED | OUTPATIENT
Start: 2019-07-30 | End: 2019-07-30 | Stop reason: SURG

## 2019-07-30 RX ORDER — PROPOFOL 10 MG/ML
INJECTION, EMULSION INTRAVENOUS AS NEEDED
Status: DISCONTINUED | OUTPATIENT
Start: 2019-07-30 | End: 2019-07-30 | Stop reason: SURG

## 2019-07-30 RX ORDER — ACETAMINOPHEN 325 MG/1
650 TABLET ORAL EVERY 6 HOURS PRN
Status: DISCONTINUED | OUTPATIENT
Start: 2019-07-30 | End: 2019-07-30 | Stop reason: HOSPADM

## 2019-07-30 RX ORDER — FENTANYL CITRATE 50 UG/ML
INJECTION, SOLUTION INTRAMUSCULAR; INTRAVENOUS AS NEEDED
Status: DISCONTINUED | OUTPATIENT
Start: 2019-07-30 | End: 2019-07-30 | Stop reason: SURG

## 2019-07-30 RX ADMIN — HYDROMORPHONE HYDROCHLORIDE 0.5 MG: 1 INJECTION, SOLUTION INTRAMUSCULAR; INTRAVENOUS; SUBCUTANEOUS at 12:36

## 2019-07-30 RX ADMIN — TRAMADOL HYDROCHLORIDE 50 MG: 50 TABLET, COATED ORAL at 13:23

## 2019-07-30 RX ADMIN — SODIUM CHLORIDE, SODIUM LACTATE, POTASSIUM CHLORIDE, AND CALCIUM CHLORIDE: .6; .31; .03; .02 INJECTION, SOLUTION INTRAVENOUS at 11:15

## 2019-07-30 RX ADMIN — PROPOFOL 200 MG: 10 INJECTION, EMULSION INTRAVENOUS at 11:18

## 2019-07-30 RX ADMIN — HYDROMORPHONE HYDROCHLORIDE 0.5 MG: 1 INJECTION, SOLUTION INTRAMUSCULAR; INTRAVENOUS; SUBCUTANEOUS at 12:47

## 2019-07-30 RX ADMIN — FENTANYL CITRATE 50 MCG: 50 INJECTION INTRAMUSCULAR; INTRAVENOUS at 11:18

## 2019-07-30 RX ADMIN — FENTANYL CITRATE 50 MCG: 50 INJECTION INTRAMUSCULAR; INTRAVENOUS at 11:38

## 2019-07-30 RX ADMIN — EPHEDRINE SULFATE 20 MG: 50 INJECTION, SOLUTION INTRAVENOUS at 11:36

## 2019-07-30 RX ADMIN — HYDROMORPHONE HYDROCHLORIDE 0.5 MG: 2 INJECTION, SOLUTION INTRAMUSCULAR; INTRAVENOUS; SUBCUTANEOUS at 11:44

## 2019-07-30 RX ADMIN — ONDANSETRON HYDROCHLORIDE 4 MG: 2 INJECTION, SOLUTION INTRAMUSCULAR; INTRAVENOUS at 11:34

## 2019-07-30 RX ADMIN — LIDOCAINE HYDROCHLORIDE 50 MG: 10 INJECTION, SOLUTION INFILTRATION; PERINEURAL at 11:18

## 2019-07-30 RX ADMIN — CEFAZOLIN SODIUM 2000 MG: 2 SOLUTION INTRAVENOUS at 11:21

## 2019-07-30 RX ADMIN — MIDAZOLAM HYDROCHLORIDE 2 MG: 1 INJECTION, SOLUTION INTRAMUSCULAR; INTRAVENOUS at 11:16

## 2019-07-30 RX ADMIN — GLYCOPYRROLATE 0.2 MG: 0.2 INJECTION, SOLUTION INTRAMUSCULAR; INTRAVENOUS at 11:26

## 2019-07-30 NOTE — NURSING NOTE
Tramadol 50mg 1 tab given for 1/10 r-knee pain, post surgical   Denies n/v  VSS  Strong pedal pulse  No swelling noted  Dressing dry/intact

## 2019-07-30 NOTE — INTERIM OP NOTE
ARTHROSCOPIC MENISCECTOMY LATERAL /MEDIAL  Postoperative Note  PATIENT NAME: Pillo Cross  : 1950  MRN: [id number]  [facility] OR ROOM 12    Surgery Date: 2019    Preop Diagnosis:  Other tear of medial meniscus, current injury, right knee, initial encounter [S83 921A]    Post-Op Diagnosis Codes:     * Other tear of medial meniscus, current injury, right knee, initial encounter [S83 089C]    Procedure(s) (LRB):  ARTHROSCOPIC MENISCECTOMY LATERAL /MEDIAL (Right) medial meniscectomy    Surgeon(s) and Role:     * Rick Farris,  - Primary     * Florentino Powers PA-C - Assisting    Specimens:  * No specimens in log *    Estimated Blood Loss:   Minimal    Anesthesia Type:   Choice     Findings:    see op note  Complications:   None    SIGNATURE: Niurka Haro DO   DATE: 2019   TIME: 11:02 AM

## 2019-07-30 NOTE — OP NOTE
OPERATIVE REPORT  PATIENT NAME: Nimesh Thompson    :  1950  MRN: [id number]  Pt Location:  OR ROOM 12    SURGERY DATE: 2019    Surgeon(s) and Role:     * Rick Farris,  - Primary     * Vickie Haywood PA-C - Assisting    Preop Diagnosis:  Other tear of medial meniscus, current injury, right knee, initial encounter [S83 241A]    Post-Op Diagnosis Codes:     * Other tear of medial meniscus, current injury, right knee, initial encounter [S83 483A]    Procedure(s) (LRB):  ARTHROSCOPIC MENISCECTOMY LATERAL /MEDIAL (Right)    Specimen(s):  * No specimens in log *    Estimated Blood Loss:   Minimal    Drains:  * No LDAs found *    Anesthesia Type:   Choice    Operative Indications: Other tear of medial meniscus, current injury, right knee, initial encounter [I00 017J]    Complications:   None    Procedure and Technique:       Indications: Mr Sergio Mahoney is a 80 y o  male who had continued medial joint line pain after failing non operative treatment  An MRI was obtained a revealed a tear of the Right medial meniscus  Due to the patient's MRI findings, active lifestyle, and lack of improvement with a conservative approach, it was recommended that they proceed forward with arthroscopic surgical management of this problem  We reviewed risks and benefits of surgery and a decision was made to proceed with surgery to address the torn medial meniscus  Findings:       Examination under anesthesia of the operative Right knee revealed a range of motion of 0-130 degrees  Posterior drawer testing was negative  Lachman testing was negative  Pivot shift testing was negative,  Collateral ligament stability testing revealed no laxity with valgus or varus stresses  With respect to posterolateral corner testing, dial testing at 30 and at 90 degrees was symmetric to the contralateral knee  Arthroscopic evaluation of the knee revealed the following:     Medial meniscus:  There was a complex, multidirectional tear of the medial meniscus      Medial femoral condyle:Grade II chondral defects  Medial tibial plateau: Grade  I chondral defects  Anterior cruciate ligament: Normal appearance  Posterior cruciate ligament: Normal appearance  Lateral meniscus: No tears  Lateral femoral condyle: Grade II chondral defects  Lateral tibial plateau: GradeI chondral defects  Medial and lateral gutters: No loose bodies  Patella: Grade II chondral defects  Trochlea: Grade I chondral defects  Medial plica: No significant plica was present             Procedure: In the pre-operative holding area, the patient identified the correct operative extremity and I marked that extremity with my initials, using a permanent marker  The patient was brought to the operating room and positioned supine  Following satisfactory induction of anesthesia, the Right knee was prepped and draped in the usual sterile fashion for surgical arthroscopy of the Right knee  Before any surgical instrumentation was passed to me by the surgical technician, a formalized time-out occurred, which involves the surgeon, circulating nurse, and anesthesia staff all verifying the correct operative extremity  My initials were visible on the prepped and draped operative field  The anatomic landmarks of the anteromedial and anterolateral portals were marked and these portal sites were injected with 1% lidocaine with epinephrine  Subsequently, 40 mL of 1% lidocaine with epinephrine was injected into the knee through a superolateral puncture  The anterolateral portal was established with a scalpel  The arthroscope was introduced through this portal  Under direct visualization, the anteromedial portal was established with a localizing needle followed by a scalpel   A probe was then introduced into the anteromedial portal  A systematic diagnostic arthroscopy evaluated the following:  medial compartment, notch, lateral compartment, patellofemoral compartment, medial gutter, and lateral gutter  A complex medial meniscus tear was noted  This tear was associated with meniscal fragments that were grossly unstable to probing  The medial meniscus tear was debrided to a stable base, using the arthroscopic biters and motorized shaver  This completed the partial medial meniscectomy  There was no additional pathology  All particulate debris was removed  The knee was copiously rinsed and then drained  The portals were closed with an interrupted 4-0 Vicryl absorbable suture  The skin was cleansed with sterile saline and dried before Steri-Strips were applied  Finally, a sterile dressing was secured by Webril and an Ace wrap        I was present for the entire procedure, A qualified resident physician was not available and A physician assistant was required during the procedure for retraction tissue handling,dissection and suturing    Patient Disposition:  PACU     SIGNATURE: Caity Piedra DO  DATE: July 30, 2019  TIME: 11:02 AM

## 2019-07-30 NOTE — DISCHARGE SUMMARY
[facility] Discharge Note  Boom Laguerre, 80 y o  male  MRN: [id number]      Preoperative diagnosis: right knee medial meniscus tear    Postoperative diagnosis: same    Procedure: surgical arthroscopy of the right knee with partial medial meniscectomy    After procedure, patient was brought to PACU  Pain was controlled with IV and oral pain medication  Patient was discharged to home after cleared by anesthesia and when criteria was met  Condition: good    Discharge medications:   See after visit summary for reconciled discharge medications provided to patient and family  Please refer to discharge instructions for further information

## 2019-07-30 NOTE — NURSING NOTE
Pt states he has crutches in the car  He has adjusted them appropriately for his height and can ambulate with them comfortably  He does not plan on using them on stairs and will be staying downstairs

## 2019-07-30 NOTE — INTERVAL H&P NOTE
H&P reviewed  After examining the patient I find no changes in the patients condition since the H&P had been written      /67   Pulse 59   Temp 98 °F (36 7 °C) (Temporal)   Resp 20   SpO2 98%

## 2019-07-30 NOTE — ANESTHESIA POSTPROCEDURE EVALUATION
Post-Op Assessment Note    CV Status:  Stable  Pain Score: 2    Pain management: adequate     Mental Status:  Alert   Hydration Status:  Stable   PONV Controlled:  Controlled   Airway Patency:  Patent   Post Op Vitals Reviewed: Yes      Staff: CRNA           /67 (07/30/19 1210)    Temp 98 2 °F (36 8 °C) (07/30/19 1210)    Pulse 82 (07/30/19 1210)   Resp 16 (07/30/19 1210)    SpO2 99 % (07/30/19 1210)

## 2019-07-30 NOTE — DISCHARGE INSTRUCTIONS
INSTRUCTIONS FOLLOWING YOUR KNEE ARTHROSCOPY    FIRST FOLLOW-UP VISIT AFTER SURGERY      Call the office the day after your surgery & make an appointment for 1 week to see Dr AGGIE MORFIN [facility]  CANE OR CRUTCHES      You may put as much weight on your leg as tolerated when using the crutches/cane  When you feel that the crutches/cane is not necessary, you may discontinue its use  Use common sense, let pain be your guide for your activities  Climbing stairs, walking and sitting are all permitted as you tolerate them  EXERCISE PROGRAM      At your 1st follow-up visit you will be given a prescription for physical therapy if you have not already been given one  SHOWERING      Keep original bandage on for 4 days after surgery  After 4 days, it is okay to get your incision wet & you may shower  Leave the steri strips in place over the incisions  Do not take any baths or soak in a hot tub or pool until cleared by the physician  INCISION SITE      You may notice a small amount of blood on your bandage, about the size of a quarter, this is normal and there is no need to worry  If you notice uncontrollable or excessive bleeding, oozing, or redness of the wound please call the office  SWELLING AND DISCOMFORT   You will experience some pain and discomfort after surgery  Please take Advil 400 mg orally every 6 hours, as well as Tylenol 650 mg orally every 6 hours  You can alternate these medications, taking either Advil or Tylenol every 3 hours for 4 days  This will give you a baseline level of pain control and lessen the need for narcotics  You have been given a narcotic pain medicine, which can be used in addition to the Advil and Tylenol on an as needed basis  You will notice some swelling of your knee after surgery, this is normal      To help reduce the swelling, elevate your leg as much as possible the first two days  Ice the knee at least 4-5 times a day if possible   Do not keep ice on for more than 15-20 minutes  BLOOD CLOT PREVENTION    Ambulate throughout the day, and perform ankle pumps every hour while awake  Take one aspirin 325 mg orally every day for 3 weeks after surgery to prevent a blood clot  If at any time you have discomfort, swelling, or redness in the calf (behind the leg between the knee and the ankle)  or difficulty breathing or heaviness in the chest, please call the doctor immediately  TEMPERATURE      A temperature of less than 101 degrees is common for the first 1-2 days after surgery  If your temperature is elevated above 101 degrees, call the office  REMEMBER - these are only guidelines for what to expect   If you have any questions or concerns, please do not hesitate to call the office  [phone]

## 2019-07-31 ENCOUNTER — TELEPHONE (OUTPATIENT)
Dept: OBGYN CLINIC | Facility: HOSPITAL | Age: 69
End: 2019-07-31

## 2019-07-31 NOTE — TELEPHONE ENCOUNTER
Called patient back and left him a msg to check in with us with how he is doing with above remedies

## 2019-07-31 NOTE — TELEPHONE ENCOUNTER
Patient had surgery yesterday with Dr Bruno Khan  He is stating that he is very uncomfortable and was not able to sleep last night  He is having swelling as well in the knee  He is asking for a call back from a nurse

## 2019-07-31 NOTE — TELEPHONE ENCOUNTER
Patient doing better  He was able to take a nap  He will continue with above remedies and call office should increased pain return

## 2019-07-31 NOTE — TELEPHONE ENCOUNTER
Dr Justen Pablo patient  - knee A/ yesterday MM    I spoke with patient and advised that he should loosen his outer ace wrap, ice 20 min on 20 m in off, elevation  Okay to take oxycodone 5mg 2 tabs  Every 4 hrs x 2 doses, then go back to the 1 every 4 hrs  Tylenol 1000mg TID, Advil 600mg TID with food  Patient verbalized understanding and will call if above remedies do not help    I will check with patient at 4pm

## 2019-08-08 ENCOUNTER — EVALUATION (OUTPATIENT)
Dept: PHYSICAL THERAPY | Facility: CLINIC | Age: 69
End: 2019-08-08
Payer: COMMERCIAL

## 2019-08-08 DIAGNOSIS — S83.241A OTHER TEAR OF MEDIAL MENISCUS, CURRENT INJURY, RIGHT KNEE, INITIAL ENCOUNTER: ICD-10-CM

## 2019-08-08 PROCEDURE — 97161 PT EVAL LOW COMPLEX 20 MIN: CPT

## 2019-08-08 PROCEDURE — 97110 THERAPEUTIC EXERCISES: CPT

## 2019-08-08 PROCEDURE — 97010 HOT OR COLD PACKS THERAPY: CPT

## 2019-08-08 NOTE — PROGRESS NOTES
PT Evaluation     Today's date: 2019  Patient name: Nimesh Thompson  : 1950  MRN: [id number]  Referring provider: Sneha Schuster  Dx:   Encounter Diagnosis     ICD-10-CM    1  Other tear of medial meniscus, current injury, right knee, initial encounter S83 241A Ambulatory referral to Physical Therapy                  Assessment  Assessment details: Nimesh Thompson is a 80 y o  male who presents s/p R medial menisectomy 19  Pt presents with pain, decreased ROM, decreased strength and functional impairments  Due to these impairments, patient has difficulty performing ADLs/IADLs including strenuous job requirements  Pt would benefit from skilled PT to address the above impairments and to improve functional mobility and overall quality of life  Pt is eager to begin PT secondary to favorable outcomes for conrtallateral knee 10 yrs ago  Pt has favorable prognosis  Impairments: abnormal muscle firing, abnormal or restricted ROM, activity intolerance, impaired physical strength, lacks appropriate home exercise program, pain with function and weight-bearing intolerance    Symptom irritability: moderateUnderstanding of Dx/Px/POC: good   Prognosis: good    Goals  Impairment Goals  - Decrease pain to less than 2/10 at worst in 6 weeks  - Improve R knee ROM by atleast 10 degrees in 6 weeks      Functional Goals  - Pt will demonstrate squat with proper technique as well as ability to kneel pain free in 8-10 weeks    -Return to Prior Level of Function in 8-10 weeks  - Increase Functional Status Measure to atleast 50 in 8-10 weeks  - Patient will be independent with HEP in 8-10 weeks    Plan  Patient would benefit from: skilled PT  Planned modality interventions: thermotherapy: hydrocollator packs and cryotherapy  Planned therapy interventions: joint mobilization, manual therapy, patient education, activity modification, body mechanics training, flexibility, functional ROM exercises, graded exercise, home exercise program, neuromuscular re-education, strengthening, stretching, therapeutic activities, therapeutic exercise and balance  Frequency: 2x week  Duration in weeks: 8  Treatment plan discussed with: patient        Subjective Evaluation    History of Present Illness  Date of onset: 2019  Date of surgery: 2019  Mechanism of injury: Pt presents s/p R medial menisectomy (19)  Pt reports chronic R knee pain for about 3-4 months, however, exacerbated by dancing at   Pt has PMH of sciatica with radiating symptoms to RLE and L menisectomy (~10 yrs ago)  PT completed PT for L menisectomy with reported functional improvement and decreased pain  Pt D/C from PT in February at [facility] location for LBP, states lumbar manipulations decreased back pain  Pt denies catching/buckling  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, requiring prolonged car rides, pt to carry loads up to 20lbs, kneelin, and squatting  Pt states he thought his pain would decline more quickly, however, notices improvements daily            Recurrent probem    Pain  Current pain rating: 3  At best pain ratin  Location: Medial jt line     Patient Goals  Patient goal: Return to work duties pain free        Objective    (*=pain)    A/PROM   R   L  Knee flex sup   125   135  Knee ext   10/8   0    Strength   R   L  Knee flex   *5/5   5/5  knee ext   5/5   5/5  Hip ER    5/5   5/5  Hip IR    5/5   5/5  Hip flex    5/5   5/5  Hip Abd   5/5   5/5  Hip ext    5/5   5/5  Hip add   /5   /5  Ankle PF                                 5/5                                5/5  Ankle DF                                *5/5                              5/5    Flexibility:    Observation:   - Gait (ambulating without AD on noncompliant suface) equal stride and step length, decreased L heel strike, decreased L knee extension mid to terminal stance    Tenderness/Palpation: Medial jt line    Special Tests: N/T  Valgus Stress  Varus Stress  McMurrary's  Anterior Drawer     Balance:   Tandem R posterior: N/T  Tandem L posterior: N/T  SLS R: 30"  SLS L: 30"    Jt mobility:  -Hypomobile medial and inferior L patellar glides      Lumbar AROM limitations:  (*=  Pain)  Lumbar flexion: mod  Lumbar extension: mod  R side glide:  N/T  L side glide:  N/T    Mechanical Asessment: pre-test symtpoms include: no lumbar pain, no radiating pain  Repeated flexion in standing: No effect  Repeated Extension in Lying (REIL): No effect                     Precautions: None      Manual  8/8            A-P Femorotibial mobs, A-P tibiofemoral mobs             PROM knee flex/ext             Patellar mobs all directions             Tibiofemoral distraction + flex                              Exercise Diary  8/8            HEP: L SAQ, SAQ VMO  2x10 each            HEP: L Quad set 10" x5            HEP: L heel slides 10" x5            Ball squeeze             clamshells             bridges             LAQs                          *Progress to loaded closed chain and job requirements                                                                                                                                                                Modalities  8/8            ICE (post treat L knee) x10 min

## 2019-08-12 ENCOUNTER — OFFICE VISIT (OUTPATIENT)
Dept: PHYSICAL THERAPY | Facility: CLINIC | Age: 69
End: 2019-08-12
Payer: COMMERCIAL

## 2019-08-12 DIAGNOSIS — M54.16 LUMBAR RADICULOPATHY: ICD-10-CM

## 2019-08-12 DIAGNOSIS — S83.241A OTHER TEAR OF MEDIAL MENISCUS, CURRENT INJURY, RIGHT KNEE, INITIAL ENCOUNTER: Primary | ICD-10-CM

## 2019-08-12 PROCEDURE — 97010 HOT OR COLD PACKS THERAPY: CPT

## 2019-08-12 PROCEDURE — 97110 THERAPEUTIC EXERCISES: CPT

## 2019-08-12 NOTE — PROGRESS NOTES
Daily Note     Today's date: 2019  Patient name: Carlee Chapman  : 1950  MRN: [id number]  Referring provider: Geneva Levi  Dx:   Encounter Diagnosis     ICD-10-CM    1  Other tear of medial meniscus, current injury, right knee, initial encounter Z69 [id number]                   Subjective: I did a lot over the weekend, yardwork and carrying heavy loads  4/10 pain  Objective: See treatment diary below      Assessment: Tolerated treatment well without exacerbation of symptoms  Pt exhibited good technique throughout program, with cues to avoid valsalva  Pt educated on activity modification as pt is eager to return to prior level of function despite tissue healing time frames  Pt reports decreased pain from initial 10 to 2/10 R medial knee following treatment prior to application of ice  Plan: Continue per plan of care, progress as tolerated        Precautions: None      Manual             A-P Femorotibial mobs, A-P tibiofemoral mobs  BMG    Grade I posterior tibiofemoral d/t pain    Grade III posterior femortibial           PROM knee flex/ext  BMG           Patellar mobs all directions  BMG           Tibiofemoral distraction + flex  BMG             x10min                Exercise Diary             Stationary bike             HEP: R SAQ, SAQ VMO  2x10 each 2 x 10 each           HEP: R Quad set 10" x5 2 sets 10" x 5           HEP: R heel slides 5" x10 2 sets 5" x10           Ball squeeze  2 sets 5" x 10           Clamshells supine orange tband  2x10           bridges  1x10           LAQs             Sciatic n  glides  X20           Piriformis stretch  R 30" x 3                        *Progress to loaded closed chain and job requirements                                                                                                                                      Modalities             ICE (post treat L knee) x10 min x10min

## 2019-08-12 NOTE — TELEPHONE ENCOUNTER
Patient needing refills on his hydrocodone to be sent to [facility]  Patient still in bad pain from his surgery  Please advise, thanks

## 2019-08-13 RX ORDER — HYDROCODONE BITARTRATE AND ACETAMINOPHEN 5; 325 MG/1; MG/1
1 TABLET ORAL EVERY 4 HOURS PRN
Qty: 40 TABLET | Refills: 0 | Status: SHIPPED | OUTPATIENT
Start: 2019-08-13 | End: 2019-08-27 | Stop reason: SDUPTHER

## 2019-08-16 ENCOUNTER — OFFICE VISIT (OUTPATIENT)
Dept: PHYSICAL THERAPY | Facility: CLINIC | Age: 69
End: 2019-08-16
Payer: COMMERCIAL

## 2019-08-16 ENCOUNTER — OFFICE VISIT (OUTPATIENT)
Dept: OBGYN CLINIC | Facility: CLINIC | Age: 69
End: 2019-08-16

## 2019-08-16 VITALS
SYSTOLIC BLOOD PRESSURE: 120 MMHG | DIASTOLIC BLOOD PRESSURE: 70 MMHG | HEIGHT: 73 IN | BODY MASS INDEX: 33.53 KG/M2 | WEIGHT: 253 LBS

## 2019-08-16 DIAGNOSIS — S83.241A OTHER TEAR OF MEDIAL MENISCUS, CURRENT INJURY, RIGHT KNEE, INITIAL ENCOUNTER: Primary | ICD-10-CM

## 2019-08-16 DIAGNOSIS — Z98.890 S/P LATERAL MENISCECTOMY OF RIGHT KNEE: Primary | ICD-10-CM

## 2019-08-16 PROCEDURE — 97140 MANUAL THERAPY 1/> REGIONS: CPT

## 2019-08-16 PROCEDURE — 97110 THERAPEUTIC EXERCISES: CPT

## 2019-08-16 PROCEDURE — 99024 POSTOP FOLLOW-UP VISIT: CPT | Performed by: ORTHOPAEDIC SURGERY

## 2019-08-16 NOTE — PROGRESS NOTES
Daily Note     Today's date: 2019  Patient name: Nimesh Thompson  : 1950  MRN: [id number]  Referring provider: Sneha Schuster  Dx:   Encounter Diagnosis     ICD-10-CM    1  Other tear of medial meniscus, current injury, right knee, initial encounter S83 421A                   Subjective: My knee felt great after therapy last time, but I kneeled yesterday and that irritated it a bit  I also keep getting twinges in my L back  Pain 3/10 medial jt line  Objective: See treatment diary below      Assessment: Tolerated treatment well  Pt experienced pain with terminal knee extension including standing TR's at the bar  Pt reports decreased pain with patellar mobs and medial jt line gapping  Overall, pt consistently reports decreased pain post treatment  Progress to CKC exercises as tolerated  Plan: Continue per plan of care        Precautions: None      Manual            A-P Femorotibial mobs, A-P tibiofemoral mobs  BMG    Grade I posterior tibiofemoral d/t pain    Grade III posterior femortibial BMG          PROM knee flex/ext  BMG BMG          Patellar mobs all directions  BMG BMG          Tibiofemoral distraction + flex  BMG BMG          Medial tibiofemoral jt gapping (valgus)    BMG            x10min  x12 min              Exercise Diary            Stationary bike   x7'          HEP: R SAQ, SAQ VMO  2x10 each 2 x 10 each NP          HEP: R Quad set 10" x5 2 sets 10" x 5 NP          HEP: R heel slides 5" x10 2 sets 5" x10 5" hold x 20          Ball squeeze  2 sets 5" x 10 3x10          Clamshells supine orange tband  2x10 3x10          bridges  1x10 NP          ADDED LAQs toe straight/toe out   3x10 each          Sciatic n  glides  X20 x30          Piriformis stretch  R 30" x 3 3 x 20" bilat          ADDED Standing TRs/*HRs   x20 each          ADDED Prone on elbows knee extension stretch    x2' Modalities  8/8 8/12 8/16          ICE (post treat L knee) x10 min x10min NP

## 2019-08-16 NOTE — PROGRESS NOTES
Knee Post Operative Visit     Assesment:     Surgical Arthroscopy of the right knee with arthroscopic medial meniscectomy  7/30/19 doing well with some minimal soreness as expected 2 weeks postoperative  Plan:    Post-Operative treatment:    Ice to knee for 20 minutes at least 1-2 times daily  PT for ROM/strengthening to knee, hip and core  OTC NSAIDS prn for pain  Let pain guide gradual return activities  Discussed his pain will continue to improve, rest, activity modification to avoid aggravating his knee  Imaging:    No imaging was available for review today  Weight bearing:  as tolerated     ROM:  Full    Brace:  No brace needed    DVT Prophylaxis:  Ambulation    Follow up:   6 weeks    Patient was advised that if they have any fevers, chills, chest pain, shortness of breath, redness or drainage from the incision, please let our office know immediately  History of Present Illness: The patient is a 80 y o  male who is being evaluated post operatively a/medial meniscectomy 7/30/19  Since the prior visit, He reports significant improvement  Still has discomfort sleeping  Pain is well controlled  The patient is using ice to control swelling  They have started physical therapy  The patient taking aspirin for DVT ppx  The patient has been ambulating without crutches  The patient has been ambulating without a brace  The patient denies any fevers, chills, calf pain, chest pain/shortness of breath, redness or drainage from the incision  I have reviewed the past medical, surgical, social and family history, medications and allergies as documented in the EMR  Review of systems: ROS is negative other than that noted in the HPI  Constitutional: Negative for fatigue and fever  Physical Exam:    There were no vitals taken for this visit      General/Constitutional: NAD, well developed, well nourished  HENT: Normocephalic, atraumatic  CV: Intact distal pulses, regular rate  Resp: No respiratory distress or labored breathing  Lymphatic: No lymphadenopathy palpated  Neuro: Alert and Oriented x 3, no focal deficits  Psych: Normal mood, normal affect, normal judgement, normal behavior  Skin: Warm, dry, no rashes, no erythema       Knee Exam (focused):                  RIGHT LEFT   ROM:   0-130 0-130   Palpation: Effusion minimal negative     MJL tenderness Medial minimal surgical tenderness along joint line Negative     LJL tenderness Negative Negative   Instability: Varus stable stable     Valgus stable stable   Special Tests: Lachman Negative Negative     Posterior drawer Negative Negative     Anterior drawer Negative Negative     Pivot shift not tested not tested     Dial not tested not tested   Patella: Palpation no tenderness no tenderness     Mobility 1/4 1/4     Apprehension Negative Negative   Other: Single leg 1/4 squat not tested not tested      Incisions show minimal erythema, no drainage    LE NV Exam: +2 DP/PT pulses bilaterally  Sensation intact to light touch L2-S1 bilaterally     No calf tenderness to palpation bilaterally      Scribe Attestation    I,:   Carolin Bueno am acting as a scribe while in the presence of the attending physician :        I,:   Rick Farris DO personally performed the services described in this documentation    as scribed in my presence :

## 2019-08-19 ENCOUNTER — OFFICE VISIT (OUTPATIENT)
Dept: PHYSICAL THERAPY | Facility: CLINIC | Age: 69
End: 2019-08-19
Payer: COMMERCIAL

## 2019-08-19 DIAGNOSIS — S83.241A OTHER TEAR OF MEDIAL MENISCUS, CURRENT INJURY, RIGHT KNEE, INITIAL ENCOUNTER: Primary | ICD-10-CM

## 2019-08-19 PROCEDURE — 97140 MANUAL THERAPY 1/> REGIONS: CPT

## 2019-08-19 PROCEDURE — 97110 THERAPEUTIC EXERCISES: CPT

## 2019-08-19 NOTE — PROGRESS NOTES
Daily Note     Today's date: 2019  Patient name: Nimesh Thompson  : 1950  MRN: [id number]  Referring provider: Sneha Schuster  Dx:   Encounter Diagnosis     ICD-10-CM    1  Other tear of medial meniscus, current injury, right knee, initial encounter S83 608A                   Subjective: "I felt great over the weekend, I felt like I was finally making progress  But last night my knee (medial jt line) really interfered with my sleep " Pt denies over-doing activities secondary to noticing improvement  Baseline pain 3/10      Objective: See treatment diary below      Assessment: Tolerated treatment well without exacerbation of pain  LE neurotension tests assessed today secondary to pt's report of intermittent "burning," all tests negative  Pt also performed REIL with self overpressure, no effect  Patient would benefit from continued PT  Pt on vacation next week, please see scanned updated HEP  Plan: Continue per plan of care        Precautions: None      Manual           A-P Femorotibial mobs, A-P tibiofemoral mobs  BMG    Grade I posterior tibiofemoral d/t pain    Grade III posterior femortibial BMG BMG         PROM knee flex/ext  BMG BMG BMG         Patellar mobs all directions  BMG BMG BMG         Tibiofemoral distraction + flex  BMG BMG BMG         Medial tibiofemoral jt gapping (valgus) knee bent    BMG BMG           x10min  x12 min x10'             Exercise Diary           Stationary bike   x7' x8'          HEP: R SAQ, SAQ VMO  2x10 each 2 x 10 each NP NP         HEP: R Quad set 10" x5 2 sets 10" x 5 NP NP         HEP: R heel slides 5" x10 2 sets 5" x10 5" hold x 20 NP         Ball squeeze  2 sets 5" x 10 3x10 3x10         Clamshells supine orange tband  2x10 3x10 3x10         bridges  1x10 NP 2x10          LAQs toe straight/toe out   3x10 each          Sciatic n  glides  X20 x30 x30         Piriformis stretch  R 30" x 3 3 x 20" bilat          ADDED Standing TRs/*HRs   x20 each x20 HR only         ADDED Prone on elbows knee extension stretch    x2' HELD         ADDED supine ITB stretch with strap    30"x3         ADDED REIL    3x10         ADDED Rhomberg on foam    1' x 2                                                                                           Modalities  8/8 8/12 8/16          ICE (post treat L knee) x10 min x10min NP

## 2019-08-22 ENCOUNTER — APPOINTMENT (OUTPATIENT)
Dept: PHYSICAL THERAPY | Facility: CLINIC | Age: 69
End: 2019-08-22
Payer: COMMERCIAL

## 2019-08-26 ENCOUNTER — OFFICE VISIT (OUTPATIENT)
Dept: PHYSICAL THERAPY | Facility: CLINIC | Age: 69
End: 2019-08-26
Payer: COMMERCIAL

## 2019-08-26 DIAGNOSIS — S83.241A OTHER TEAR OF MEDIAL MENISCUS, CURRENT INJURY, RIGHT KNEE, INITIAL ENCOUNTER: Primary | ICD-10-CM

## 2019-08-26 PROCEDURE — 97110 THERAPEUTIC EXERCISES: CPT

## 2019-08-26 PROCEDURE — 97010 HOT OR COLD PACKS THERAPY: CPT

## 2019-08-26 PROCEDURE — 97140 MANUAL THERAPY 1/> REGIONS: CPT

## 2019-08-26 NOTE — PROGRESS NOTES
Daily Note     Today's date: 2019  Patient name: Simi Mishra  : 1950  MRN: [id number]  Referring provider: Albina Schaumann  Dx:   Encounter Diagnosis     ICD-10-CM    1  Other tear of medial meniscus, current injury, right knee, initial encounter S83 125Q                   Subjective: "I"ve had a rough couple of days  It hurts just as bad as after surgery now  If I make a pivoting movement on accident it really hurts "      Objective: See treatment diary below      Assessment: Tolerated treatment well  Pt experienced episode of sharp pain in medial R knee on adduction with OTB at bar secondary to his heel getting caught on rug  Relieved with rest  Pt advised to use compression brace to assist with unwanted twisting motion, however, pt reports pressure on medial jt is intolerable  Medial jt gapping kinesiotape added today, assess response next session  Pt educated on tissue healing time frame secondary to frustration with pain with function  Pt educated on adjusting ergonomics with driving to lumbar roll alone  Patient would benefit from continued PT      Plan: Continue per plan of care        Precautions: None      Manual          A-P Femorotibial mobs, A-P tibiofemoral mobs  BMG    Grade I posterior tibiofemoral d/t pain    Grade III posterior femortibial BMG BMG NP        PROM knee flex/ext  BMG BMG BMG BMG        Patellar mobs all directions  BMG BMG BMG BMG        Tibiofemoral distraction + flex  BMG BMG BMG BMG        Medial tibiofemoral jt gapping (valgus) knee bent    BMG BMG NP        Kinesiotape R knee medial jt gapping     BMG          x10min  x12 min x10' x15'            Exercise Diary          Stationary bike   x7' x8'  x10'        HEP: R SAQ, SAQ VMO  2x10 each 2 x 10 each NP NP NP        HEP: R Quad set 10" x5 2 sets 10" x 5 NP NP NP        HEP: R heel slides 5" x10 2 sets 5" x10 5" hold x 20 NP NP        Ball squeeze  2 sets 5" x 10 3x10 3x10 NP        Clamshells supine orange tband  2x10 3x10 3x10 NP        bridges  1x10 NP 2x10 NP         LAQs toe straight/toe out   3x10 each  3x10 each        Sciatic n  glides  X20 x30 x30 x30        Piriformis stretch  R 30" x 3 3 x 20" bilat          ADDED Standing TRs/*HRs   x20 each x20 HR only x20 each        ADDED Prone on elbows knee extension stretch    x2' HELD NP        Supine ITB stretch with strap    30"x3 30" x 3        REIL    3x10 3x10         Rhomberg on foam/tandem on foam    1' x 2 x1'/x30" each leg        Side stepping with OTB     Length of bar x10                                                                             Modalities  8/8 8/12 8/16 8/26         ICE (post treat L knee) x10 min x10min NP x10min

## 2019-08-27 DIAGNOSIS — M54.16 LUMBAR RADICULOPATHY: ICD-10-CM

## 2019-08-27 RX ORDER — HYDROCODONE BITARTRATE AND ACETAMINOPHEN 5; 325 MG/1; MG/1
1 TABLET ORAL EVERY 4 HOURS PRN
Qty: 40 TABLET | Refills: 0 | Status: SHIPPED | OUTPATIENT
Start: 2019-08-27 | End: 2019-09-11 | Stop reason: SDUPTHER

## 2019-08-29 ENCOUNTER — OFFICE VISIT (OUTPATIENT)
Dept: PHYSICAL THERAPY | Facility: CLINIC | Age: 69
End: 2019-08-29
Payer: COMMERCIAL

## 2019-08-29 DIAGNOSIS — S83.241A OTHER TEAR OF MEDIAL MENISCUS, CURRENT INJURY, RIGHT KNEE, INITIAL ENCOUNTER: Primary | ICD-10-CM

## 2019-08-29 PROCEDURE — 97140 MANUAL THERAPY 1/> REGIONS: CPT

## 2019-08-29 PROCEDURE — 97110 THERAPEUTIC EXERCISES: CPT

## 2019-09-10 ENCOUNTER — OFFICE VISIT (OUTPATIENT)
Dept: PHYSICAL THERAPY | Facility: CLINIC | Age: 69
End: 2019-09-10
Payer: COMMERCIAL

## 2019-09-10 DIAGNOSIS — S83.241A OTHER TEAR OF MEDIAL MENISCUS, CURRENT INJURY, RIGHT KNEE, INITIAL ENCOUNTER: Primary | ICD-10-CM

## 2019-09-10 PROCEDURE — 97110 THERAPEUTIC EXERCISES: CPT

## 2019-09-10 PROCEDURE — 97140 MANUAL THERAPY 1/> REGIONS: CPT

## 2019-09-10 NOTE — PROGRESS NOTES
Daily Note     Today's date: 9/10/2019  Patient name: Mary Ramos  : 1950  MRN: [id number]  Referring provider: Therisa Dandy  Dx:   Encounter Diagnosis     ICD-10-CM    1  Other tear of medial meniscus, current injury, right knee, initial encounter [id number]                   Subjective: Pt reports difficulty walking on sand over vacation  Reports pain is slowly improving, including decreased pain with sleep  Objective: See treatment diary below      Assessment: Tolerated treatment well  Demonstrated increased stability with side stepping with t-band  Pt requires VC's for squatting mechanics, avoiding knee over toe  Patient would benefit from continued PT  Pt continues to report pain with CKC exercises, progress as able  Pt demonstrated adequate stability w/ balance exercises today, no LOB noted  Plan: Continue per plan of care        Precautions: None      Manual  8/8 8/12 8/16 8/19 8/26 8/29 9/10      A-P Femorotibial mobs, A-P tibiofemoral mobs  BMG    Grade I posterior tibiofemoral d/t pain    Grade III posterior femortibial BMG BMG NP BMG BMG      PROM knee flex/ext  BMG BMG BMG BMG BMG       Patellar mobs all directions  BMG BMG BMG BMG BMG       Tibiofemoral distraction + flex  BMG BMG BMG BMG BMG BMG      Medial tibiofemoral jt gapping (valgus) knee bent    BMG BMG NP NP       Kinesiotape R knee medial jt gapping     BMG NP       Posterior tibiofemoral mobs with ER, repeated with quad sets      BMG         x10min  x12 min x10' x15' x10' x8'          Exercise Diary  8/8 8/12 8/16 8/19 8/26 8/29 9/10      Stationary bike   x7' x8'  x10' x10' x10'      HEP: R SAQ, SAQ VMO  2x10 each 2 x 10 each NP NP NP NP       HEP: R Quad set 10" x5 2 sets 10" x 5 NP NP NP NP       HEP: R heel slides 5" x10 2 sets 5" x10 5" hold x 20 NP NP 10" x10       Ball squeeze  2 sets 5" x 10 3x10 3x10 NP 3x10       Clamshells supine orange tband  2x10 3x10 3x10 NP NP       bridges  1x10 NP 2x10 NP RLE bias 2x10        LAQs toe straight/toe out   3x10 each  3x10 each 3x10 each eccentric control 2x10 ea  Sciatic n  glides  X20 x30 x30 x30 x45       Piriformis stretch  R 30" x 3 3 x 20" bilat   NP       ADDED Standing TRs/*HRs   x20 each x20 HR only x20 each x20 HR        ADDED Prone on elbows knee extension stretch    x2' HELD NP NP       Supine ITB stretch with strap    30"x3 30" x 3 NP       REIL    3x10 3x10 NP        Rhomberg on foam/tandem on foam    1' x 2 x1'/x30" each leg On Ground  X30" each leg x1'/ 2x30" each leg       Side stepping with OTB     Length of bar x10 1/0 band 4 laps      Seated extension with self OP      3x10       ADDED mini squats @ bar       RLE bias 2x10      Prolonged knee ext stretch       x2'      ADDED: SLR 3 way       2x10 ea        ADDED: quad stretch w/strap       10"x10          Modalities  8/8 8/12 8/16 8/26         ICE (post treat L knee) x10 min x10min NP x10min

## 2019-09-11 DIAGNOSIS — F41.9 ANXIETY: ICD-10-CM

## 2019-09-11 DIAGNOSIS — M54.16 LUMBAR RADICULOPATHY: ICD-10-CM

## 2019-09-11 RX ORDER — ALPRAZOLAM 0.25 MG/1
0.25 TABLET ORAL 3 TIMES DAILY PRN
Qty: 30 TABLET | Refills: 0 | Status: SHIPPED | OUTPATIENT
Start: 2019-09-11 | End: 2019-11-01 | Stop reason: SDUPTHER

## 2019-09-11 RX ORDER — HYDROCODONE BITARTRATE AND ACETAMINOPHEN 5; 325 MG/1; MG/1
1 TABLET ORAL EVERY 4 HOURS PRN
Qty: 40 TABLET | Refills: 0 | Status: SHIPPED | OUTPATIENT
Start: 2019-09-11 | End: 2019-09-24 | Stop reason: SDUPTHER

## 2019-09-16 ENCOUNTER — OFFICE VISIT (OUTPATIENT)
Dept: PHYSICAL THERAPY | Facility: CLINIC | Age: 69
End: 2019-09-16
Payer: COMMERCIAL

## 2019-09-16 DIAGNOSIS — S83.241A OTHER TEAR OF MEDIAL MENISCUS, CURRENT INJURY, RIGHT KNEE, INITIAL ENCOUNTER: Primary | ICD-10-CM

## 2019-09-16 PROCEDURE — 97110 THERAPEUTIC EXERCISES: CPT

## 2019-09-16 PROCEDURE — 97140 MANUAL THERAPY 1/> REGIONS: CPT

## 2019-09-16 NOTE — PROGRESS NOTES
Daily Note     Today's date: 2019  Patient name: Abraham Mak  : 1950  MRN: [id number]  Referring provider: Arnaldo Jamil  Dx:   Encounter Diagnosis     ICD-10-CM    1  Other tear of medial meniscus, current injury, right knee, initial encounter [id number]                   Subjective: Pt states he is noticing less pain is his knee in the morning and throughout the day  Objective: See treatment diary below      Assessment: Tolerated treatment well  Pt demonstrated increased tolerance to CKC exercises today including TKE and cone taps  Pt inquired about golfing and deeper squats, advised to avoid at this time secondary to pivoting motion increased stress through tibiofemoral jt  Progress as able to meet these functional goals  Patient would benefit from continued PT      Plan: Continue per plan of care        Precautions: None      Manual  8/8 8/12 8/16 8/19 8/26 8/29 9/10 9/16       A-P Femorotibial mobs, A-P tibiofemoral mobs  BMG    Grade I posterior tibiofemoral d/t pain    Grade III posterior femortibial BMG BMG NP BMG BMG      PROM knee flex/ext  BMG BMG BMG BMG BMG       Patellar mobs all directions  BMG BMG BMG BMG BMG       Tibiofemoral distraction + flex  BMG BMG BMG BMG BMG BMG BMG     Medial tibiofemoral jt gapping (valgus) knee bent    BMG BMG NP NP       Kinesiotape R knee medial jt gapping     BMG NP       Posterior tibiofemoral mobs with ER, repeated with quad sets      BMG         x10min  x12 min x10' x15' x10' x8' x8'         Exercise Diary  8/8 8/12 8/16 8/19 8/26 8/29 9/10 9/16     Stationary bike   x7' x8'  x10' x10' x10' x10'     HEP: R SAQ, SAQ VMO  2x10 each 2 x 10 each NP NP NP NP       HEP: R Quad set 10" x5 2 sets 10" x 5 NP NP NP NP       HEP: R heel slides 5" x10 2 sets 5" x10 5" hold x 20 NP NP 10" x10       Ball squeeze  2 sets 5" x 10 3x10 3x10 NP 3x10       Clamshells supine orange tband  2x10 3x10 3x10 NP NP       bridges  1x10 NP 2x10 NP RLE bias 2x10  RLE bias 3x10      LAQs toe straight/toe out   3x10 each  3x10 each 3x10 each eccentric control 2x10 ea  eccentric control 3x10     Sciatic n  glides  X20 x30 x30 x30 x45       Piriformis stretch  R 30" x 3 3 x 20" bilat   NP       ADDED Standing TRs/*HRs   x20 each x20 HR only x20 each x20 HR        ADDED Prone on elbows knee extension stretch    x2' HELD NP NP  x2'     Supine ITB stretch with strap    30"x3 30" x 3 NP       REIL    3x10 3x10 NP        Rhomberg on foam/tandem on foam    1' x 2 x1'/x30" each leg On Ground  X30" each leg x1'/ 2x30" each leg       Side stepping with OTB     Length of bar x10 1/0 band 4 laps 5 laps     Seated extension with self OP      3x10       ADDED mini squats @ bar       RLE bias 2x10 2x10     Prolonged knee ext stretch       x2'      ADDED: SLR 3 way       2x10 ea  2x10 ea       ADDED: TKE OTB        2x10     ADDED: cone taps R SLS        CW/CCW x5 ea      quad stretch w/strap       10"x10          Modalities  8/8 8/12 8/16 8/26         ICE (post treat L knee) x10 min x10min NP x10min

## 2019-09-24 ENCOUNTER — OFFICE VISIT (OUTPATIENT)
Dept: FAMILY MEDICINE CLINIC | Facility: CLINIC | Age: 69
End: 2019-09-24
Payer: COMMERCIAL

## 2019-09-24 VITALS
HEIGHT: 73 IN | OXYGEN SATURATION: 97 % | BODY MASS INDEX: 33.96 KG/M2 | TEMPERATURE: 98.3 F | DIASTOLIC BLOOD PRESSURE: 72 MMHG | HEART RATE: 74 BPM | SYSTOLIC BLOOD PRESSURE: 140 MMHG | WEIGHT: 256.25 LBS

## 2019-09-24 DIAGNOSIS — K21.9 GASTROESOPHAGEAL REFLUX DISEASE, ESOPHAGITIS PRESENCE NOT SPECIFIED: ICD-10-CM

## 2019-09-24 DIAGNOSIS — Z23 NEED FOR IMMUNIZATION AGAINST INFLUENZA: ICD-10-CM

## 2019-09-24 DIAGNOSIS — S83.241S ACUTE MEDIAL MENISCUS TEAR, RIGHT, SEQUELA: Primary | ICD-10-CM

## 2019-09-24 DIAGNOSIS — M54.16 LUMBAR RADICULOPATHY: ICD-10-CM

## 2019-09-24 PROCEDURE — 90662 IIV NO PRSV INCREASED AG IM: CPT

## 2019-09-24 PROCEDURE — 99214 OFFICE O/P EST MOD 30 MIN: CPT | Performed by: FAMILY MEDICINE

## 2019-09-24 PROCEDURE — 90471 IMMUNIZATION ADMIN: CPT

## 2019-09-24 RX ORDER — HYDROCODONE BITARTRATE AND ACETAMINOPHEN 5; 325 MG/1; MG/1
1 TABLET ORAL EVERY 4 HOURS PRN
Qty: 40 TABLET | Refills: 0 | Status: SHIPPED | OUTPATIENT
Start: 2019-09-24 | End: 2019-10-08 | Stop reason: SDUPTHER

## 2019-09-24 RX ORDER — EFINACONAZOLE 100 MG/ML
SOLUTION TOPICAL
COMMUNITY
Start: 2019-08-06 | End: 2020-08-10

## 2019-09-24 RX ORDER — ESOMEPRAZOLE MAGNESIUM 40 MG/1
40 CAPSULE, DELAYED RELEASE ORAL
COMMUNITY
End: 2022-02-14

## 2019-09-24 NOTE — ASSESSMENT & PLAN NOTE
I have advised the patient to make an appointment with pain management  It may very well be that this is also contributing to his persistent knee pain  Hopefully they will be able to do injections otherwise they will refer him to Neurosurgery  I did order physical therapy specifically for his back as well as he has really been doing mostly for his knee

## 2019-09-24 NOTE — PATIENT INSTRUCTIONS
Lower Back Exercises   WHAT YOU NEED TO KNOW:   Lower back exercises help heal and strengthen your back muscles to prevent another injury  Ask your healthcare provider if you need to see a physical therapist for more advanced exercises  DISCHARGE INSTRUCTIONS:   Return to the emergency department if:   · You have severe pain that prevents you from moving  Contact your healthcare provider if:   · Your pain becomes worse  · You have new pain  · You have questions or concerns about your condition or care  Do lower back exercises safely:   · Do the exercises on a mat or firm surface  (not on a bed) to support your spine and prevent low back pain  · Move slowly and smoothly  Avoid fast or jerky motions  · Breathe normally  Do not hold your breath  · Stop if you feel pain  It is normal to feel some discomfort at first  Regular exercise will help decrease your discomfort over time  Lower back exercises: Your healthcare provider may recommend that you do back exercises 10 to 30 minutes each day  He may also recommend that you do exercises 1 to 3 times each day  Ask your healthcare provider which exercises are best for you and how often to do them  · Ankle pumps:  Lie on your back  Move your foot up (with your toes pointing toward your head)  Then, move your foot down (with your toes pointing away from you)  Repeat this exercise 10 times on each side  · Heel slides:  Lie on your back  Slowly bend one leg and then straighten it  Next, bend the other leg and then straighten it  Repeat 10 times on each side  · Pelvic tilt:  Lie on your back with your knees bent and feet flat on the floor  Place your arms in a relaxed position beside your body  Tighten the muscles of your abdomen and flatten your back against the floor  Hold for 5 seconds  Repeat 5 times  · Back stretch:  Lie on your back with your hands behind your head   Bend your knees and turn the lower half of your body to one side  Hold this position for 10 seconds  Repeat 3 times on each side  · Straight leg raises:  Lie on your back with one leg straight  Bend the other knee  Tighten your abdomen and then slowly lift the straight leg up about 6 to 12 inches off the floor  Hold for 1 to 5 seconds  Lower your leg slowly  Repeat 10 times on each leg  · Knee-to-chest:  Lie on your back with your knees bent and feet flat on the floor  Pull one of your knees toward your chest and hold it there for 5 seconds  Return your leg to the starting position  Lift the other knee toward your chest and hold for 5 seconds  Do this 5 times on each side  · Cat and camel:  Place your hands and knees on the floor  Arch your back upward toward the ceiling and lower your head  Round out your spine as much as you can  Hold for 5 seconds  Lift your head upward and push your chest downward toward the floor  Hold for 5 seconds  Do 3 sets or as directed  · Wall squats:  Stand with your back against a wall  Tighten the muscles of your abdomen  Slowly lower your body until your knees are bent at a 45 degree angle  Hold this position for 5 seconds  Slowly move back up to a standing position  Repeat 10 times  · Curl up:  Lie on your back with your knees bent and feet flat on the floor  Place your hands, palms down, underneath the curve in your lower back  Next, with your elbows on the floor, lift your shoulders and chest 2 to 3 inches  Keep your head in line with your shoulders  Hold this position for 5 seconds  When you can do this exercise without pain for 10 to 15 seconds, you may add a rotation  While your shoulders and chest are lifted off the ground, turn slightly to the left and hold  Repeat on the other side  · Bird dog:  Place your hands and knees on the floor  Keep your wrists directly below your shoulders and your knees directly below your hips   Pull your belly button in toward your spine  Do not flatten or arch your back  Tighten your abdominal muscles  Raise one arm straight out so that it is aligned with your head  Next, raise the leg opposite your arm  Hold this position for 15 seconds  Lower your arm and leg slowly and change sides  Do 5 sets  © 2017 [facility] Information is for End User's use only and may not be sold, redistributed or otherwise used for commercial purposes  All illustrations and images included in CareNotes® are the copyrighted property of [employer]  or Nicholas Gasca  The above information is an  only  It is not intended as medical advice for individual conditions or treatments  Talk to your doctor, nurse or pharmacist before following any medical regimen to see if it is safe and effective for you

## 2019-09-24 NOTE — ASSESSMENT & PLAN NOTE
Significant flare last night enough to take him to the emergency room  He has definitely had some improvement overnight since then  He will get back on his Nexium and take it regularly  I did advise he for avoid Advil for a little bit

## 2019-09-24 NOTE — ASSESSMENT & PLAN NOTE
I did refill the patient's Norco and advised he avoid Advil if possible  He will follow up with Orthopedics as scheduled and continue his physical therapy

## 2019-09-24 NOTE — PROGRESS NOTES
Subjective:   Chief Complaint   Patient presents with    Knee Pain     PT COMES IN TODAY FOR RIGHT KNEE PAIN  PT WILL SEE DR Cuellar [facility] [address]  PT DOES P T  TWICE WEEKLY  FLU VACCINE ADMINISTERED TODAY IN OFFICE  Patient ID: Finesse Hendricks is a 80 y o  male      The patient is here today to follow-up on his right knee pain  He had surgery for a torn meniscus in July  For the 1st 5 weeks he says there was no change in his pain  Started getting a little better  Had a really busy week last week, drove back and forth to [facility], [facility] , moved his daughter and son-in-law  He did avoid lifting anything or walking up and down the stairs a lot as there were move hers but he was still very active  Everything swelled a lot over the weekend and he could barely walk  He tried to get off of it and ice it starting Sunday in yesterday  The swelling has come down a bit but not completely  He still has significant pain  He has been taking Advil, Excedrin, and Norco for his pain depending on the time of the day  He does need a refill of his Norco and was advised to come in for an appointment if he did need a refill  He does continue with physical therapy as well  He has an appt with Dr Justen Pablo for f/u late this week, Friday    In addition to this he had a gerd attack last night and he went to the ER  He got the GERD cocktail (donnatol) then got a 2nd dose with lidocaine  With all of that moving everything he had for gotten to take a couple of his Nexium    Finally, he did get the MRI of his lumbar spine  It does correlate with L4-5 radiculopathy which is exactly consistent with his symptoms  He has seen pain management in the past but has not made an appointment to see them for this  He was previously seeing them for his cervical spine issues      The following portions of the patient's history were reviewed and updated as appropriate: allergies, current medications, past family history, past medical history, past social history, past surgical history and problem list     Review of Systems      Objective:  Vitals:    09/24/19 1014   BP: 140/72   Pulse: 74   Temp: 98 3 °F (36 8 °C)   SpO2: 97%   Weight: 116 kg (256 lb 4 oz)   Height: 6' 1" (1 854 m)      Physical Exam   Constitutional: He is oriented to person, place, and time  He appears well-developed and well-nourished  No distress  Neurological: He is alert and oriented to person, place, and time  No cranial nerve deficit  Gait abnormal    Skin: Skin is warm and dry  No rash noted  He is not diaphoretic  No erythema  Psychiatric: He has a normal mood and affect  His behavior is normal  Judgment and thought content normal          Assessment/Plan:    Acute medial meniscus tear, right, sequela  I did refill the patient's Norco and advised he avoid Advil if possible  He will follow up with Orthopedics as scheduled and continue his physical therapy  Lumbar radiculopathy  I have advised the patient to make an appointment with pain management  It may very well be that this is also contributing to his persistent knee pain  Hopefully they will be able to do injections otherwise they will refer him to Neurosurgery  I did order physical therapy specifically for his back as well as he has really been doing mostly for his knee  Gastroesophageal reflux disease  Significant flare last night enough to take him to the emergency room  He has definitely had some improvement overnight since then  He will get back on his Nexium and take it regularly  I did advise he for avoid Advil for a little bit  Diagnoses and all orders for this visit:    Acute medial meniscus tear, right, sequela    Lumbar radiculopathy  -     Ambulatory referral to Pain Management; Future  -     Ambulatory referral to Physical Therapy; Future  -     HYDROcodone-acetaminophen (NORCO) 5-325 mg per tablet;  Take 1 tablet by mouth every 4 (four) hours as needed for painMax Daily Amount: 6 tablets    Gastroesophageal reflux disease, esophagitis presence not specified  -     esomeprazole (NexIUM) 40 MG capsule;  Take 40 mg by mouth every morning before breakfast    Need for immunization against influenza  -     influenza vaccine, [id number], high-dose, PF 0 5 mL (FLUZONE HIGH-DOSE)

## 2019-09-27 ENCOUNTER — TELEPHONE (OUTPATIENT)
Dept: FAMILY MEDICINE CLINIC | Facility: CLINIC | Age: 69
End: 2019-09-27

## 2019-09-27 ENCOUNTER — OFFICE VISIT (OUTPATIENT)
Dept: OBGYN CLINIC | Facility: CLINIC | Age: 69
End: 2019-09-27
Payer: COMMERCIAL

## 2019-09-27 ENCOUNTER — OFFICE VISIT (OUTPATIENT)
Dept: PHYSICAL THERAPY | Facility: CLINIC | Age: 69
End: 2019-09-27
Payer: COMMERCIAL

## 2019-09-27 VITALS
BODY MASS INDEX: 35.12 KG/M2 | DIASTOLIC BLOOD PRESSURE: 72 MMHG | HEIGHT: 73 IN | SYSTOLIC BLOOD PRESSURE: 130 MMHG | WEIGHT: 265 LBS

## 2019-09-27 DIAGNOSIS — S83.241A OTHER TEAR OF MEDIAL MENISCUS, CURRENT INJURY, RIGHT KNEE, INITIAL ENCOUNTER: Primary | ICD-10-CM

## 2019-09-27 DIAGNOSIS — Z98.890 S/P ARTHROSCOPIC PARTIAL MEDIAL MENISCECTOMY: Primary | ICD-10-CM

## 2019-09-27 PROBLEM — S83.241S ACUTE MEDIAL MENISCUS TEAR, RIGHT, SEQUELA: Status: RESOLVED | Noted: 2019-07-24 | Resolved: 2019-09-27

## 2019-09-27 PROCEDURE — 20610 DRAIN/INJ JOINT/BURSA W/O US: CPT | Performed by: ORTHOPAEDIC SURGERY

## 2019-09-27 PROCEDURE — 97110 THERAPEUTIC EXERCISES: CPT

## 2019-09-27 PROCEDURE — 99024 POSTOP FOLLOW-UP VISIT: CPT | Performed by: ORTHOPAEDIC SURGERY

## 2019-09-27 PROCEDURE — 97140 MANUAL THERAPY 1/> REGIONS: CPT

## 2019-09-27 RX ORDER — LIDOCAINE HYDROCHLORIDE 10 MG/ML
3 INJECTION, SOLUTION INFILTRATION; PERINEURAL
Status: COMPLETED | OUTPATIENT
Start: 2019-09-27 | End: 2019-09-27

## 2019-09-27 RX ORDER — METHYLPREDNISOLONE ACETATE 40 MG/ML
1 INJECTION, SUSPENSION INTRA-ARTICULAR; INTRALESIONAL; INTRAMUSCULAR; SOFT TISSUE
Status: COMPLETED | OUTPATIENT
Start: 2019-09-27 | End: 2019-09-27

## 2019-09-27 RX ADMIN — METHYLPREDNISOLONE ACETATE 1 ML: 40 INJECTION, SUSPENSION INTRA-ARTICULAR; INTRALESIONAL; INTRAMUSCULAR; SOFT TISSUE at 14:18

## 2019-09-27 RX ADMIN — LIDOCAINE HYDROCHLORIDE 3 ML: 10 INJECTION, SOLUTION INFILTRATION; PERINEURAL at 14:18

## 2019-09-27 NOTE — PROGRESS NOTES
Knee Post Operative Visit     Assesment:     80year old Male 2 months s/p Surgical Arthroscopy of the right knee with medial meniscectomy, DOS: 7/30/19    Plan:    Post-Operative treatment:    Ice to knee for 20 minutes at least 1-2 times daily  PT for ROM/strengthening to knee, hip and core  OTC NSAIDS prn for pain  Imaging: All imaging from today was reviewed by myself and explained to the patient  Weight bearing:  as tolerated     ROM:  Full    Brace:  No brace needed    DVT Prophylaxis:  Ambulation    Follow up:   6 weeks     Patient was advised that if they have any fevers, chills, chest pain, shortness of breath, redness or drainage from the incision, please let our office know immediately  No chief complaint on file  History of Present Illness: The patient is a 80 y o  male who is being evaluated post operatively 2 months  status post Surgical Arthroscopy of the right knee with medial meniscectomy, DOS: 7/30/19  Since the prior visit, He reports some improvement  He stated that he had a long car ride on Friday and had increased pain in the right knee  He does have a history of back pain and believe some of this may be because of that but he does have localized pain to the medial aspect of the knee  Pain is well controlled  The patient is using ice to control swelling  They have started physical therapy  The patient ambulating for DVT ppx  The patient has been ambulating without crutches  The patient has been ambulating without a brace  The patient denies any fevers, chills, calf pain, chest pain/shortness of breath, redness or drainage from the incision  I have reviewed the past medical, surgical, social and family history, medications and allergies as documented in the EMR  Review of systems: ROS is negative other than that noted in the HPI  Constitutional: Negative for fatigue and fever          Physical Exam:    There were no vitals taken for this visit      General/Constitutional: NAD, well developed, well nourished  HENT: Normocephalic, atraumatic  CV: Intact distal pulses, regular rate  Resp: No respiratory distress or labored breathing  Lymphatic: No lymphadenopathy palpated  Neuro: Alert and Oriented x 3, no focal deficits  Psych: Normal mood, normal affect, normal judgement, normal behavior  Skin: Warm, dry, no rashes, no erythema      Knee Exam (focused):                  RIGHT LEFT   ROM:   0-130 0-130   Palpation: Effusion negative negative     MJL tenderness Positive Negative     LJL tenderness Negative Negative   Instability: Varus stable stable     Valgus stable stable   Special Tests: Lachman Negative Negative     Posterior drawer Negative Negative     Anterior drawer Negative Negative     Pivot shift not tested not tested     Dial not tested not tested   Patella: Palpation no tenderness no tenderness     Mobility 1/4 1/4     Apprehension Negative Negative   Other: Single leg 1/4 squat not tested not tested      Incisions show no erythema, no drainage    LE NV Exam: +2 DP/PT pulses bilaterally  Sensation intact to light touch L2-S1 bilaterally     Bilateral hip ROM demonstrates no pain actively or passively    No calf tenderness to palpation bilaterally    Large joint arthrocentesis: R knee  Date/Time: 9/27/2019 2:18 PM  Consent given by: patient  Site marked: site marked  Timeout: Immediately prior to procedure a time out was called to verify the correct patient, procedure, equipment, support staff and site/side marked as required   Supporting Documentation  Indications: pain   Procedure Details  Location: knee - R knee  Preparation: Patient was prepped and draped in the usual sterile fashion  Needle size: 22 G  Ultrasound guidance: no  Approach: anterolateral  Medications administered: 3 mL lidocaine 1 %; 1 mL methylPREDNISolone acetate 40 mg/mL    Patient tolerance: patient tolerated the procedure well with no immediate complications  Dressing:  Sterile dressing applied

## 2019-09-27 NOTE — LETTER
2019    Milton Escobedo PA-C  [facility]    Patient: Kevin Fall   YOB: 1950   Date of Visit: 2019     Encounter Diagnosis     ICD-10-CM    1  Other tear of medial meniscus, current injury, right knee, initial encounter S83 241A PT plan of care cert/re-cert       Dear Dr Didi Terrell: Thank you for your recent referral of Kevin Fall  Please review the attached evaluation summary from Nii's recent visit  Please verify that you agree with the plan of care by signing the attached order  If you have any questions or concerns, please do not hesitate to call  I sincerely appreciate the opportunity to share in the care of one of your patients and hope to have another opportunity to work with you in the near future  Sincerely,    Nicholas Nevarez, PT      Referring Provider:      I certify that I have read the below Plan of Care and certify the need for these services furnished under this plan of treatment while under my care  Milton Escobedo PA-C  [address] [place] [address]  VIA [facility]          PT PROGRESS NOTE    Today's date: 2019  Patient name: Kevin Fall  : 1950  MRN: [id number]  Referring provider: Sixto Vasquez  Dx:   Encounter Diagnosis     ICD-10-CM    1  Other tear of medial meniscus, current injury, right knee, initial encounter S89 989A                   Assessment  Assessment details: Mai Barone has been consistent w/ participation in therapy  Pt continues to have decreased ROM and pain with function  To date pt has reported minimal improvement in symptoms and function  Although noncompliant with activity modification   However, pt's R knee pain w/ gait was abolished today following repeated mechanical movements stating "I haven't walked this good in months, I do not have any pain " Therefore, continue with repeated movements to address possible underlying lumbar derangement w/ extension responder and address remaining limitations at knee if necessary  Pt advised to perform REIL w/ self OP for HEP  Pt would benefit from continued therapy to address remaining limitations and to improve functional mobility and overall QOL  Impairments: activity intolerance, lacks appropriate home exercise program and pain with function  Barriers to therapy: None  Understanding of Dx/Px/POC: good   Prognosis: good    Goals  ST  The patient will be fully compliant with HEP within two weeks (PROGRESSING)   2  Pt will report a decrease in pain on VAS by at least two points with two weeks (MET 19)    LT  The patient will increase FOTO score to 73 within 8 weeks (PROGRESSING)  2  The patient will report 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 indicating return to functional baseline within eight weeks  (PROGRESSING)      Plan  Patient would benefit from: skilled physical therapy  Planned modality interventions: thermotherapy: hydrocollator packs, cryotherapy and traction  Planned therapy interventions: therapeutic activities, therapeutic exercise, home exercise program, manual therapy, joint mobilization, balance, patient education, neuromuscular re-education, massage and postural training  Frequency: 2x week  Duration in weeks: 6  Plan of Care beginning date: 2019  Plan of Care expiration date: 2019  Treatment plan discussed with: patient        Subjective Evaluation    History of Present Illness  Mechanism of injury: Pt states his RLE from knee to ankle swelled up after prolonged driving ~1446 miles last week  States baseline pain 2/10, however, projects that it will get worse as the day goes on     Pain  Current pain ratin  At best pain ratin  At worst pain ratin  Alleviating factors: resting leg when driving: using cruise control, laying down   Exacerbated by: driving     Social Support    Employment status: working ( )        Objective     Neurological Testing     Sensation     Lumbar   Left   Intact: light touch    Right   Intact: light touch    Reflexes   Left   Patellar (L4): normal (2+)  Achilles (S1): trace (1+)    Right   Patellar (L4): normal (2+)  Achilles (S1): trace (1+)    Active Range of Motion     Lumbar   Flexion:  WFL  Extension:  WFL  Left lateral flexion:  WFL  Right lateral flexion:  WFL  Left rotation:  WFL  Right rotation:  [facility]    Strength/Myotome Testing     Left Hip   Planes of Motion   Flexion: 5  Extension: 5  Abduction: 5    Right Hip   Planes of Motion   Flexion: 5  Extension: 5  Abduction: 5    Left Knee   Flexion: 5  Extension: 5    Right Knee   Flexion: 5  Extension: 5    Left Ankle/Foot   Dorsiflexion: 5  Plantar flexion: 5    Right Ankle/Foot   Dorsiflexion: 5  Plantar flexion: 5      Knee ROM:      R           L  Flex:                  135        145  Ext:                      5          10    Mechanical Assessment: (baseline 1/10 pain medial R knee)  Repeated flexion in standing: decreased better  Repeated extension in standing: increased worse  REIL: decreased better  REIL: w/ self OP: decreased better  REIL w/ PT OP: abolished      Precautions: Positional vertigo, history of cervical pain, anxiety    Daily Treatment Diary       Manual  8/8 8/12 8/16 8/19 8/26 8/29 9/10 9/16   9/27    A-P Femorotibial mobs, A-P tibiofemoral mobs  BMG    Grade I posterior tibiofemoral d/t pain    Grade III posterior femortibial BMG BMG NP BMG BMG  --    PROM knee flex/ext  BMG BMG BMG BMG BMG   --    Patellar mobs all directions  BMG BMG BMG BMG BMG   --    Tibiofemoral distraction + flex  BMG BMG BMG BMG BMG BMG BMG BMG    Medial tibiofemoral jt gapping (valgus) knee bent    BMG BMG NP NP   --    Kinesiotape R knee medial jt gapping     BMG NP   --    ADDED: REIL w/ PT OP         4x10    Posterior tibiofemoral mobs with ER, repeated with quad sets      BMG   -- x10min  x12 min x10' x15' x10' x8' x8' x15'        Exercise Diary  8/8 8/12 8/16 8/19 8/26 8/29 9/10 9/16 9/27    Stationary bike   x7' x8'  x10' x10' x10' x10' x10'    HEP: R SAQ, SAQ VMO  2x10 each 2 x 10 each NP NP NP NP   --    HEP: R Quad set 10" x5 2 sets 10" x 5 NP NP NP NP   --    HEP: R heel slides 5" x10 2 sets 5" x10 5" hold x 20 NP NP 10" x10   --    Ball squeeze  2 sets 5" x 10 3x10 3x10 NP 3x10   --    Clamshells supine orange tband  2x10 3x10 3x10 NP NP   --    bridges  1x10 NP 2x10 NP RLE bias 2x10  RLE bias 3x10 --     LAQs toe straight/toe out   3x10 each  3x10 each 3x10 each eccentric control 2x10 ea  eccentric control 3x10 --    Sciatic n  glides  X20 x30 x30 x30 x45   --    Piriformis stretch  R 30" x 3 3 x 20" bilat   NP   --    ADDED Standing TRs/*HRs   x20 each x20 HR only x20 each x20 HR    --    ADDED Prone on elbows knee extension stretch    x2' HELD NP NP  x2' --    Supine ITB stretch with strap    30"x3 30" x 3 NP   --    REIL followed by REIL w/self OP    3x10 3x10 NP   3x10 ea  Rhomberg on foam/tandem on foam    1' x 2 x1'/x30" each leg On Ground  X30" each leg x1'/ 2x30" each leg   --    Side stepping with OTB     Length of bar x10 1/0 band 4 laps 5 laps --    Seated extension with self OP      3x10   --    ADDED mini squats @ bar       RLE bias 2x10 2x10 --    Prolonged knee ext stretch       x2'  --    ADDED: SLR 3 way       2x10 ea  2x10 ea   --    ADDED: TKE OTB        2x10 --    ADDED: cone taps R SLS        CW/CCW x5 ea  --    quad stretch w/strap       10"x10  --        Modalities  8/8 8/12 8/16 8/26         ICE (post treat L knee) x10 min x10min NP x10min

## 2019-09-27 NOTE — PROGRESS NOTES
PT PROGRESS NOTE    Today's date: 2019  Patient name: Nadine Green  : 1950  MRN: [id number]  Referring provider: Sherri Ruggiero  Dx:   Encounter Diagnosis     ICD-10-CM    1  Other tear of medial meniscus, current injury, right knee, initial encounter S83 939E                   Assessment  Assessment details: Thresa Abarca has been consistent w/ participation in therapy  Pt continues to have decreased ROM and pain with function  To date pt has reported minimal improvement in symptoms and function  Although noncompliant with activity modification  However, pt's R knee pain w/ gait was abolished today following repeated mechanical movements stating "I haven't walked this good in months, I do not have any pain " Therefore, continue with repeated movements to address possible underlying lumbar derangement w/ extension responder and address remaining limitations at knee if necessary  Pt advised to perform REIL w/ self OP for HEP  Pt would benefit from continued therapy to address remaining limitations and to improve functional mobility and overall QOL  Impairments: activity intolerance, lacks appropriate home exercise program and pain with function  Barriers to therapy: None  Understanding of Dx/Px/POC: good   Prognosis: good    Goals  ST  The patient will be fully compliant with HEP within two weeks (PROGRESSING)   2  Pt will report a decrease in pain on VAS by at least two points with two weeks (MET 19)    LT  The patient will increase FOTO score to 73 within 8 weeks (PROGRESSING)  2  The patient will report 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 indicating return to functional baseline within eight weeks   (PROGRESSING)      Plan  Patient would benefit from: skilled physical therapy  Planned modality interventions: thermotherapy: hydrocollator packs, cryotherapy and traction  Planned therapy interventions: therapeutic activities, therapeutic exercise, home exercise program, manual therapy, joint mobilization, balance, patient education, neuromuscular re-education, massage and postural training  Frequency: 2x week  Duration in weeks: 6  Plan of Care beginning date: 2019  Plan of Care expiration date: 2019  Treatment plan discussed with: patient        Subjective Evaluation    History of Present Illness  Mechanism of injury: Pt states his RLE from knee to ankle swelled up after prolonged driving ~5502 miles last week  States baseline pain 2/10, however, projects that it will get worse as the day goes on     Pain  Current pain ratin  At best pain ratin  At worst pain ratin  Alleviating factors: resting leg when driving: using cruise control, laying down   Exacerbated by: driving     Social Support    Employment status: working ( )        Objective     Neurological Testing     Sensation     Lumbar   Left   Intact: light touch    Right   Intact: light touch    Reflexes   Left   Patellar (L4): normal (2+)  Achilles (S1): trace (1+)    Right   Patellar (L4): normal (2+)  Achilles (S1): trace (1+)    Active Range of Motion     Lumbar   Flexion:  WFL  Extension:  WFL  Left lateral flexion:  WFL  Right lateral flexion:  WFL  Left rotation:  WFL  Right rotation:  [facility]    Strength/Myotome Testing     Left Hip   Planes of Motion   Flexion: 5  Extension: 5  Abduction: 5    Right Hip   Planes of Motion   Flexion: 5  Extension: 5  Abduction: 5    Left Knee   Flexion: 5  Extension: 5    Right Knee   Flexion: 5  Extension: 5    Left Ankle/Foot   Dorsiflexion: 5  Plantar flexion: 5    Right Ankle/Foot   Dorsiflexion: 5  Plantar flexion: 5      Knee ROM:      R           L  Flex:                  135        145  Ext:                      5          10    Mechanical Assessment: (baseline 1/10 pain medial R knee)  Repeated flexion in standing: decreased better  Repeated extension in standing: increased worse  REIL: decreased better  REIL: w/ self OP: decreased better  REIL w/ PT OP: abolished      Precautions: Positional vertigo, history of cervical pain, anxiety    Daily Treatment Diary       Manual  8/8 8/12 8/16 8/19 8/26 8/29 9/10 9/16   9/27    A-P Femorotibial mobs, A-P tibiofemoral mobs  BMG    Grade I posterior tibiofemoral d/t pain    Grade III posterior femortibial BMG BMG NP BMG BMG  --    PROM knee flex/ext  BMG BMG BMG BMG BMG   --    Patellar mobs all directions  BMG BMG BMG BMG BMG   --    Tibiofemoral distraction + flex  BMG BMG BMG BMG BMG BMG BMG BMG    Medial tibiofemoral jt gapping (valgus) knee bent    BMG BMG NP NP   --    Kinesiotape R knee medial jt gapping     BMG NP   --    ADDED: REIL w/ PT OP         4x10    Posterior tibiofemoral mobs with ER, repeated with quad sets      BMG   --      x10min  x12 min x10' x15' x10' x8' x8' x15'        Exercise Diary  8/8 8/12 8/16 8/19 8/26 8/29 9/10 9/16 9/27    Stationary bike   x7' x8'  x10' x10' x10' x10' x10'    HEP: R SAQ, SAQ VMO  2x10 each 2 x 10 each NP NP NP NP   --    HEP: R Quad set 10" x5 2 sets 10" x 5 NP NP NP NP   --    HEP: R heel slides 5" x10 2 sets 5" x10 5" hold x 20 NP NP 10" x10   --    Ball squeeze  2 sets 5" x 10 3x10 3x10 NP 3x10   --    Clamshells supine orange tband  2x10 3x10 3x10 NP NP   --    bridges  1x10 NP 2x10 NP RLE bias 2x10  RLE bias 3x10 --     LAQs toe straight/toe out   3x10 each  3x10 each 3x10 each eccentric control 2x10 ea  eccentric control 3x10 --    Sciatic n  glides  X20 x30 x30 x30 x45   --    Piriformis stretch  R 30" x 3 3 x 20" bilat   NP   --    ADDED Standing TRs/*HRs   x20 each x20 HR only x20 each x20 HR    --    ADDED Prone on elbows knee extension stretch    x2' HELD NP NP  x2' --    Supine ITB stretch with strap    30"x3 30" x 3 NP   --    REIL followed by REIL w/self OP    3x10 3x10 NP   3x10 ea       Rhomberg on foam/tandem on foam    1' x 2 x1'/x30" each leg On Ground  X30" each leg x1'/ 2x30" each leg   --    Side stepping with OTB Length of bar x10 1/0 band 4 laps 5 laps --    Seated extension with self OP      3x10   --    ADDED mini squats @ bar       RLE bias 2x10 2x10 --    Prolonged knee ext stretch       x2'  --    ADDED: SLR 3 way       2x10 ea  2x10 ea   --    ADDED: TKE OTB        2x10 --    ADDED: cone taps R SLS        CW/CCW x5 ea  --    quad stretch w/strap       10"x10  --        Modalities  8/8 8/12 8/16 8/26         ICE (post treat L knee) x10 min x10min NP x10min

## 2019-09-30 ENCOUNTER — OFFICE VISIT (OUTPATIENT)
Dept: PHYSICAL THERAPY | Facility: CLINIC | Age: 69
End: 2019-09-30
Payer: COMMERCIAL

## 2019-09-30 DIAGNOSIS — S83.241A OTHER TEAR OF MEDIAL MENISCUS, CURRENT INJURY, RIGHT KNEE, INITIAL ENCOUNTER: Primary | ICD-10-CM

## 2019-09-30 PROCEDURE — 97110 THERAPEUTIC EXERCISES: CPT

## 2019-09-30 PROCEDURE — 97140 MANUAL THERAPY 1/> REGIONS: CPT

## 2019-09-30 NOTE — PROGRESS NOTES
Daily Note     Today's date: 2019  Patient name: Simi Mishra  : 1950  MRN: [id number]  Referring provider: Albina Schaumann  Dx:   Encounter Diagnosis     ICD-10-CM    1  Other tear of medial meniscus, current injury, right knee, initial encounter S83 911A                   Subjective: Pt states he received steroid injection in R knee at follow up appt following PT session on Friday  Pt states he felt discomfort in R knee after prolonged standing, however, did not complete HEP including REIL w/ pt OP  Objective: See treatment diary below      Assessment: Tolerated treatment well  Stated slight discomfort in medial knee during step downs, resolved after seizing exercise  Requires VC's to lift RLE during side stepping with band in order to prevent foot from getting caught under him  Pt continues to report abolishment of R knee pain during gait following REIL w/ PT OP  Patient would benefit from continued PT      Plan: Continue per plan of care        Precautions: None      Manual  8/8 8/12 8/16 8/19 8/26 8/29 9/10 9/16   9/30    A-P Femorotibial mobs, A-P tibiofemoral mobs  BMG    Grade I posterior tibiofemoral d/t pain    Grade III posterior femortibial BMG BMG NP BMG BMG      PROM knee flex/ext  BMG BMG BMG BMG BMG       Patellar mobs all directions  BMG BMG BMG BMG BMG       Tibiofemoral distraction + ER  BMG BMG BMG BMG BMG BMG BMG BMG    Medial tibiofemoral jt gapping (valgus) knee bent    BMG BMG NP NP       Kinesiotape R knee medial jt gapping     BMG NP       Posterior tibiofemoral mobs with ER, repeated with quad sets      BMG       REIL w/ PT OP         3x10      x10min  x12 min x10' x15' x10' x8' x8' x8'        Exercise Diary  8/8 8/12 8/16 8/19 8/26 8/29 9/10 9/16 9/30    Stationary bike   x7' x8'  x10' x10' x10' x10' x10'    HEP: R SAQ, SAQ VMO  2x10 each 2 x 10 each NP NP NP NP   --    HEP: R Quad set 10" x5 2 sets 10" x 5 NP NP NP NP   --    HEP: R heel slides 5" x10 2 sets 5" x10 5" hold x 20 NP NP 10" x10   --    Ball squeeze  2 sets 5" x 10 3x10 3x10 NP 3x10   --    Clamshells supine orange tband  2x10 3x10 3x10 NP NP   --    bridges  1x10 NP 2x10 NP RLE bias 2x10  RLE bias 3x10 --     LAQs toe straight/toe out   3x10 each  3x10 each 3x10 each eccentric control 2x10 ea  eccentric control 3x10 --    Sciatic n  Glides R  X20 x30 x30 x30 x45   x30    Piriformis stretch  R 30" x 3 3 x 20" bilat   NP   --    ADDED Standing TRs/*HRs   x20 each x20 HR only x20 each x20 HR    --    ADDED Prone on elbows knee extension stretch    x2' HELD NP NP  x2' --    Supine ITB stretch with strap    30"x3 30" x 3 NP   --    REIL    3x10 3x10 NP   --     Rhomberg on foam/tandem on foam    1' x 2 x1'/x30" each leg On Ground  X30" each leg x1'/ 2x30" each leg   --    Eccentric step downs forw 6"         2x10    Side stepping with OTB     Length of bar x10 1/0 band 4 laps 5 laps 5 laps    Seated extension with self OP      3x10   --    ADDED mini squats @ bar       RLE bias 2x10 2x10 --    Prolonged knee ext stretch       x2'  --    ADDED: SLR 3 way       2x10 ea  2x10 ea  3x10 ea      ADDED: TKE OTB        2x10 --    ADDED: cone taps R SLS        CW/CCW x5 ea  --    quad stretch w/strap       10"x10  30"x3        Modalities  8/8 8/12 8/16 8/26         ICE (post treat L knee) x10 min x10min NP x10min

## 2019-10-04 ENCOUNTER — APPOINTMENT (OUTPATIENT)
Dept: PHYSICAL THERAPY | Facility: CLINIC | Age: 69
End: 2019-10-04
Payer: COMMERCIAL

## 2019-10-07 ENCOUNTER — OFFICE VISIT (OUTPATIENT)
Dept: PHYSICAL THERAPY | Facility: CLINIC | Age: 69
End: 2019-10-07
Payer: COMMERCIAL

## 2019-10-07 DIAGNOSIS — S83.241A OTHER TEAR OF MEDIAL MENISCUS, CURRENT INJURY, RIGHT KNEE, INITIAL ENCOUNTER: Primary | ICD-10-CM

## 2019-10-07 PROCEDURE — 97140 MANUAL THERAPY 1/> REGIONS: CPT

## 2019-10-07 PROCEDURE — 97110 THERAPEUTIC EXERCISES: CPT

## 2019-10-07 NOTE — PROGRESS NOTES
Daily Note     Today's date: 10/7/2019  Patient name: Rafi Alves  : 1950  MRN: [id number]  Referring provider: Zeb Fry  Dx:   Encounter Diagnosis     ICD-10-CM    1  Other tear of medial meniscus, current injury, right knee, initial encounter S83 863A                   Subjective: Pt states his knee felt pretty good all of last week, however, pain increased over the weekend after prolonged walking at [facility]  Baseline R infrapatellar knee pain 3/10  Post session pain 1/10      Objective: See treatment diary below      Assessment: Tolerated treatment well  Pt restless between mat exercises in all positions, sitting up between exercises to relieve "tightness " Although injection was completed after REIL abolished pain, pt contributes decreased knee pain to injection  Despite again abolishing knee pain with REIL the following session, pt seems to underestimate the impact of this exercise and states relative noncompliance with HEP  Educated pt on integration of this phenomenon on symptoms, pt agreed "they do seem to help a lot " Patient would benefit from continued PT      Plan: Continue per plan of care        Precautions: None      Manual  8/8 8/12 8/16 8/19 8/26 8/29 9/10 9/16   9/30 10/7   A-P Femorotibial mobs, A-P tibiofemoral mobs  BMG    Grade I posterior tibiofemoral d/t pain    Grade III posterior femortibial BMG BMG NP BMG BMG      PROM knee flex/ext  BMG BMG BMG BMG BMG       Patellar mobs all directions  BMG BMG BMG BMG BMG       Tibiofemoral distraction + ER  BMG BMG BMG BMG BMG BMG BMG BMG BMG   Medial tibiofemoral jt gapping (valgus) knee bent    BMG BMG NP NP       Kinesiotape R knee medial jt gapping     BMG NP       Posterior tibiofemoral mobs with ER, repeated with quad sets      BMG       REIL w/ PT OP         3x10 3x10  BMG     x10min  x12 min x10' x15' x10' x8' x8' x8' x8'       Exercise Diary  8/8 8/12 8/16 8/19 8/26 8/29 9/10 9/16 9/30 10/7   Stationary bike   x7' x8'  x10' x10' x10' x10' x10' x10'   HEP: R SAQ, SAQ VMO  2x10 each 2 x 10 each NP NP NP NP   -- --   HEP: R Quad set 10" x5 2 sets 10" x 5 NP NP NP NP   -- --   HEP: R heel slides 5" x10 2 sets 5" x10 5" hold x 20 NP NP 10" x10   -- --   Ball squeeze  2 sets 5" x 10 3x10 3x10 NP 3x10   -- --   Clamshells supine orange tband  2x10 3x10 3x10 NP NP   -- --   bridges  1x10 NP 2x10 NP RLE bias 2x10  RLE bias 3x10 -- --    LAQs toe straight/toe out   3x10 each  3x10 each 3x10 each eccentric control 2x10 ea  eccentric control 3x10 -- 1 5# 2x10 ea  Sciatic n  Glides R  X20 x30 x30 x30 x45   x30 x30   Piriformis stretch  R 30" x 3 3 x 20" bilat   NP   -- --   Standing TRs/*HRs   x20 each x20 HR only x20 each x20 HR    -- --   Prone on elbows knee extension stretch    x2' HELD NP NP  x2' -- --   Supine ITB stretch with strap    30"x3 30" x 3 NP   -- --   REIL    3x10 3x10 NP   -- --    Rhomberg on foam/tandem on foam    1' x 2 x1'/x30" each leg On Ground  X30" each leg x1'/ 2x30" each leg   -- --   Eccentric step downs forw 6"         2x10 --   Side stepping with OTB     Length of bar x10 1/0 band 4 laps 5 laps 5 laps 5 laps   Seated extension with self OP      3x10   -- 3x10   ADDED mini squats @ bar       RLE bias 2x10 2x10 -- --   Prolonged knee ext stretch       x2'  -- --   ADDED: SLR 3 way       2x10 ea  2x10 ea  3x10 ea  3x10 ea  ADDED: TKE OTB        2x10 -- 2x10   ADDED: cone taps R SLS        CW/CCW x5 ea  -- CW/CCW x 5 ea      quad stretch w/strap       10"x10  30"x3 30"x3       Modalities  8/8 8/12 8/16 8/26         ICE (post treat L knee) x10 min x10min NP x10min

## 2019-10-08 DIAGNOSIS — M54.16 LUMBAR RADICULOPATHY: ICD-10-CM

## 2019-10-08 RX ORDER — HYDROCODONE BITARTRATE AND ACETAMINOPHEN 5; 325 MG/1; MG/1
1 TABLET ORAL EVERY 4 HOURS PRN
Qty: 40 TABLET | Refills: 0 | Status: SHIPPED | OUTPATIENT
Start: 2019-10-08 | End: 2019-10-22 | Stop reason: SDUPTHER

## 2019-10-11 ENCOUNTER — APPOINTMENT (OUTPATIENT)
Dept: PHYSICAL THERAPY | Facility: CLINIC | Age: 69
End: 2019-10-11
Payer: COMMERCIAL

## 2019-10-14 ENCOUNTER — OFFICE VISIT (OUTPATIENT)
Dept: PHYSICAL THERAPY | Facility: CLINIC | Age: 69
End: 2019-10-14
Payer: COMMERCIAL

## 2019-10-14 DIAGNOSIS — S83.241A OTHER TEAR OF MEDIAL MENISCUS, CURRENT INJURY, RIGHT KNEE, INITIAL ENCOUNTER: Primary | ICD-10-CM

## 2019-10-14 PROCEDURE — 97110 THERAPEUTIC EXERCISES: CPT

## 2019-10-14 PROCEDURE — 97140 MANUAL THERAPY 1/> REGIONS: CPT

## 2019-10-14 NOTE — PROGRESS NOTES
Daily Note     Today's date: 10/14/2019  Patient name: Jef Delvalle  : 1950  MRN: [id number]  Referring provider: Avi Dailey  Dx:   Encounter Diagnosis     ICD-10-CM    1  Other tear of medial meniscus, current injury, right knee, initial encounter S83 335A                   Subjective: PT states his knee "feels terrible" today with 3/10 pain in R medial jt  States he was hunting over the weekend and woke up with insidious "internal" R heel pain  States he is performing REIL 1x/day  Objective: See treatment diary below      Assessment: Tolerated treatment well  R heel observed today, no external injuries present  Achilles tendon involvement unlikely d/t negative TTP and special tests  Pt continues to have fluctuating presentation and is making slow progress toward goals  Pt educated on significance of activity modification and completing HEP especially stretches after prolonged activity  Lumbar spine involvement suspected d/t abolishment of knee pain following REIL in previous sessions, however, did not occur today  Pt struggled with piriformis stretch today secondary to tightness, pt also had reproduction of anterior LE radiating symptoms with slump test today, reinforced sciatic nerve glides  Patient would benefit from continued PT      Plan: Continue per plan of care        Precautions: None      Manual  10/14  x8'            A-P Femorotibial mobs, A-P tibiofemoral mobs --            PROM knee flex/ext --            Patellar mobs all directions --            Tibiofemoral distraction + ER BMG            Medial tibiofemoral jt gapping (valgus) knee bent  --            Kinesiotape R knee medial jt gapping --            Posterior tibiofemoral mobs with ER, repeated with quad sets --            REIL w/ PT OP 3x10 BMG                Exercise Diary  10/14        9/30 10/7   Stationary bike w/ lumbar roll x8'        x10' x10'   HEP: R SAQ, SAQ VMO  --        -- --   HEP: R Quad set --        -- --   HEP: R heel slides --        -- --   Ball squeeze --        -- --   Clamshells supine orange tband --        -- --   Bridges w/ball squeeze 3x10        -- --    LAQs toe straight/toe out --        -- 1 5# 2x10 ea  Sciatic n  Glides R x30        x30 x30   Piriformis stretch 30"x3        -- --   Standing TRs/*HRs --        -- --   Prone on elbows knee extension stretch  --        -- --   Supine ITB stretch with strap --        -- --   REIL --        -- --    Rhomberg on foam/tandem on foam --        -- --   Eccentric step downs forw 6" --        2x10 --   Side stepping with OTB --        5 laps 5 laps   Seated extension with self OP 3x10        -- 3x10   ADDED mini squats @ bar --        -- --   Prolonged knee ext stretch --        -- --   ADDED: SLR 3 way 3x10 ea  3x10 ea  3x10 ea  ADDED: TKE OTB --        -- 2x10   ADDED: cone taps R SLS --        -- CW/CCW x 5 ea      quad stretch w/strap 30"x3        30"x3 30"x3       Modalities  8/8 8/12 8/16 8/26         ICE (post treat L knee) x10 min x10min NP x10min

## 2019-10-18 ENCOUNTER — OFFICE VISIT (OUTPATIENT)
Dept: PHYSICAL THERAPY | Facility: CLINIC | Age: 69
End: 2019-10-18
Payer: COMMERCIAL

## 2019-10-18 ENCOUNTER — TRANSCRIBE ORDERS (OUTPATIENT)
Dept: PHYSICAL THERAPY | Facility: CLINIC | Age: 69
End: 2019-10-18

## 2019-10-18 DIAGNOSIS — S83.241A OTHER TEAR OF MEDIAL MENISCUS, CURRENT INJURY, RIGHT KNEE, INITIAL ENCOUNTER: Primary | ICD-10-CM

## 2019-10-18 PROCEDURE — 97140 MANUAL THERAPY 1/> REGIONS: CPT

## 2019-10-18 PROCEDURE — 97110 THERAPEUTIC EXERCISES: CPT

## 2019-10-18 NOTE — PROGRESS NOTES
Daily Note     Today's date: 10/18/2019  Patient name: Jaime Umanzor  : 1950  MRN: [id number]  Referring provider: Milagros Bain  Dx:   Encounter Diagnosis     ICD-10-CM    1  Other tear of medial meniscus, current injury, right knee, initial encounter S83 212A                   Subjective: Pt states R knee and heel feel much better than our last session, however, pain woke him up this morning  He states baseline pain R knee pain 1-2/10  Objective: See treatment diary below      Assessment: Tolerated treatment well  Pt favored LE stretching exercises, especially standing gastroc stretch with less antalgic gait and reported "opening of knee jt" following  Emphasized stretches for HEP especially d/t pt's high activity level, however, pt denied printed handouts as reminder  Patient would benefit from continued PT       Plan: Continue per plan of care  Precautions: None      Manual  10/14  x8' 10/18  x8'           A-P Femorotibial mobs, A-P tibiofemoral mobs --            PROM knee flex/ext --            Patellar mobs all directions --            Tibiofemoral distraction + ER BMG BMG           Medial tibiofemoral jt gapping (valgus) knee bent  --            Kinesiotape R knee medial jt gapping --            Posterior tibiofemoral mobs with ER, repeated with quad sets --            REIL w/ PT OP 3x10 BMG 3x10  BMG               Exercise Diary  10/14 10/18       9/30 10/7   Stationary bike w/ lumbar roll x8' x8'       x10' x10'   HEP: R SAQ, SAQ VMO  -- --       -- --   HEP: R Quad set -- --       -- --   HEP: R heel slides -- --       -- --   Ball squeeze -- --       -- --   Clamshells supine orange tband -- --       -- --   Bridges w/ball squeeze 3x10 3x10       -- --    LAQs toe straight/toe out -- 2x10 ea         -- 1 5# 2x10 ea     Sciatic n  Glides R x30 --       x30 x30   Piriformis stretch 30"x3 30"x3       -- --   Standing TRs/*HRs -- --       -- --   Prone on elbows knee extension stretch  -- --       -- --   Supine ITB stretch with strap -- --       -- --   REIL -- --       -- --    Rhomberg on foam/tandem on foam -- --       -- --   Eccentric step downs forw 6" -- --       2x10 --   Side stepping with OTB -- 5 laps       5 laps 5 laps   Seated extension with self OP 3x10 3x10       -- 3x10   ADDED mini squats @ bar -- --       -- --   Prolonged knee ext stretch -- --       -- --   SLR 4 way 3x10 ea  3x10       3x10 ea  3x10 ea  TKE OTB -- --       -- 2x10   R gastroc stretch off strap  3x30"           cone taps R SLS -- --       -- CW/CCW x 5 ea      quad stretch w/strap 30"x3 30"x3       30"x3 30"x3       Modalities  8/8 8/12 8/16 8/26         ICE (post treat L knee) x10 min x10min NP x10min

## 2019-10-21 ENCOUNTER — OFFICE VISIT (OUTPATIENT)
Dept: PHYSICAL THERAPY | Facility: CLINIC | Age: 69
End: 2019-10-21
Payer: COMMERCIAL

## 2019-10-21 DIAGNOSIS — S83.241A OTHER TEAR OF MEDIAL MENISCUS, CURRENT INJURY, RIGHT KNEE, INITIAL ENCOUNTER: Primary | ICD-10-CM

## 2019-10-21 PROCEDURE — 97140 MANUAL THERAPY 1/> REGIONS: CPT

## 2019-10-21 PROCEDURE — 97110 THERAPEUTIC EXERCISES: CPT

## 2019-10-21 NOTE — PROGRESS NOTES
Daily Note     Today's date: 10/21/2019  Patient name: Anabel Villaseñor  : 1950  MRN: [id number]  Referring provider: Yordy Cardona  Dx:   Encounter Diagnosis     ICD-10-CM    1  Other tear of medial meniscus, current injury, right knee, initial encounter S83 786A                   Subjective: Pt states his R heel pain is main limiting factor in functional mobility  States R knee and R heel are painful, however, he "really worked in on Saturday" through jj  Objective: See treatment diary below      Assessment: Tolerated treatment well  Tolerated progression of CKC exercises well without exacerbation of pain  Pt demonstrated adequate stability throughout tandem foam exercise  Pt consistently reports decreased medial R knee pain following stretching and manual therapy, however, reports noncompliance with HEP  Emphasized significance of compliance with HEP especially d/t overactive lifestyle  Patient would benefit from continued PT      Plan: Continue per plan of care  Precautions: None      Manual  10/14  x8' 10/18  x8' 10/21  x8'          A-P Femorotibial mobs, A-P tibiofemoral mobs --  --          PROM knee flex/ext --  --          Patellar mobs all directions --  --          Tibiofemoral distraction + ER BMG BMG BMG          Medial tibiofemoral jt gapping (valgus) knee bent  --  --          Kinesiotape R knee medial jt gapping --  --          Posterior tibiofemoral mobs with ER, repeated with quad sets --  --          REIL w/ PT OP 3x10 BMG 3x10  BMG 3x10 BMG              Exercise Diary  10/14 10/18 10/21      9/30 10/7   Stationary bike w/ lumbar roll x8' x8' x8'      x10' x10'   HEP: R SAQ, SAQ VMO  -- -- --      -- --   HEP: R Quad set -- -- --      -- --   HEP: R heel slides -- -- --      -- --   Ball squeeze -- -- --      -- --   Clamshells supine orange tband -- -- --      -- --   Bridges w/ball squeeze 3x10 3x10 --      -- --    LAQs toe straight/toe out -- 2x10 ea    -- -- 1 5# 2x10 ea  Sciatic n  Glides R x30 -- --      x30 x30   Piriformis stretch 30"x3 30"x3 30"x3      -- --   Standing TRs/*HRs -- -- --      -- --   Prone on elbows knee extension stretch  -- -- --      -- --   Supine ITB stretch with strap -- -- --      -- --   REIL -- -- --      -- --   Tandem on foam -- -- 2x30" bilat      -- --   Eccentric step downs forw/lat 4" -- -- 2x10 ea  2x10 --   Side stepping with OTB -- 5 laps 5 laps      5 laps 5 laps   Seated extension with self OP 3x10 3x10 --      -- 3x10   ADDED mini squats @ bar -- -- --      -- --   Prolonged knee ext stretch -- -- --      -- --   SLR 4 way 3x10 ea  3x10 3x10 ea  3x10 ea  3x10 ea  TKE OTB -- -- --      -- 2x10   R gastroc stretch off strap  3x30" 3x30"          cone taps R SLS -- -- --      -- CW/CCW x 5 ea      quad stretch w/strap 30"x3 30"x3 30"x3      30"x3 30"x3   Seated hamstring stretch   3x30"          ADDED: sharkets OTB   x20 bilat              Modalities  8/8 8/12 8/16 8/26         ICE (post treat L knee) x10 min x10min NP x10min

## 2019-10-22 DIAGNOSIS — M54.16 LUMBAR RADICULOPATHY: ICD-10-CM

## 2019-10-23 RX ORDER — HYDROCODONE BITARTRATE AND ACETAMINOPHEN 5; 325 MG/1; MG/1
1 TABLET ORAL EVERY 4 HOURS PRN
Qty: 40 TABLET | Refills: 0 | Status: SHIPPED | OUTPATIENT
Start: 2019-10-23 | End: 2019-11-01 | Stop reason: SDUPTHER

## 2019-10-25 ENCOUNTER — OFFICE VISIT (OUTPATIENT)
Dept: PHYSICAL THERAPY | Facility: CLINIC | Age: 69
End: 2019-10-25
Payer: COMMERCIAL

## 2019-10-25 DIAGNOSIS — S83.241A OTHER TEAR OF MEDIAL MENISCUS, CURRENT INJURY, RIGHT KNEE, INITIAL ENCOUNTER: Primary | ICD-10-CM

## 2019-10-25 PROCEDURE — 97140 MANUAL THERAPY 1/> REGIONS: CPT

## 2019-10-25 PROCEDURE — 97110 THERAPEUTIC EXERCISES: CPT

## 2019-10-25 NOTE — PROGRESS NOTES
Daily Note     Today's date: 10/25/2019  Patient name: Hanna Alegria  : 1950  MRN: [id number]  Referring provider: Nora Parekh  Dx:   Encounter Diagnosis     ICD-10-CM    1  Other tear of medial meniscus, current injury, right knee, initial encounter S83 771A                   Subjective: Pt states R knee is not in pain today, however, R heel continues to bother him  States the heel pain is constant and is worse with first step in the morning  Objective: See treatment diary below      Assessment: Tolerated treatment well  Added IASTM to plantar aspect of R foot d/t pt's c/c and resulting altered gait mechanics  Pt felt slight relief with IASTM and calf/plantar fascia stretching  Also trialed 3/4" L heel lift as pain modulation to offload R heel  Pt responded favorably to this adjustment with significant reduction in pain  Pt educated on heel lift as temporary relief, however, can alter knee mechanics  Therefore, pt advised if he were to replicate this at home that tapering wearing schedule as well as completing gastroc stretch and quadriceps stretch would be paramount to preserving integrity of R knee  Pt advised to follow up w/ physician if heel pain does not subside to r/o possible bone spur  Patient completed program w/o onset of pain  Patient would benefit from continued PT  Plan: Continue per plan of care        Precautions: None      Manual  10/14  x8' 10/18  x8' 10/21  x8' 10/25  x10'         A-P Femorotibial mobs, A-P tibiofemoral mobs --  -- --         PROM knee flex/ext --  -- --         Patellar mobs all directions --  -- --         Tibiofemoral distraction + ER BMG BMG BMG BMG         Medial tibiofemoral jt gapping (valgus) knee bent  --  -- --         Kinesiotape R knee medial jt gapping --  -- --         Posterior tibiofemoral mobs with ER, repeated with quad sets --  -- --         REIL w/ PT OP 3x10 BMG 3x10  BMG 3x10 BMG --         IASTM R plantar fascia emphasis @ heel BMG             Exercise Diary  10/14 10/18 10/21 10/25     9/30 10/7   Stationary bike w/ lumbar roll x8' x8' x8' x8'     x10' x10'   HEP: R SAQ, SAQ VMO  -- -- -- --     -- --   HEP: R Quad set -- -- -- --     -- --   HEP: R heel slides -- -- -- --     -- --   Ball squeeze -- -- -- --     -- --   Clamshells supine orange tband -- -- -- --     -- --   Bridges w/ball squeeze 3x10 3x10 -- --     -- --    LAQs toe straight/toe out -- 2x10 ea   -- --     -- 1 5# 2x10 ea  Sciatic n  Glides R x30 -- -- --     x30 x30   Piriformis stretch 30"x3 30"x3 30"x3 30"x3 bilat      -- --   Standing TRs/*HRs -- -- -- --     -- --   Prone on elbows knee extension stretch  -- -- -- --     -- --   Supine ITB stretch with strap -- -- -- --     -- --   REIL w/self OP -- -- -- 3x10     -- --   Tandem on foam -- -- 2x30" bilat      -- --   Eccentric step downs forw/lat 4" -- -- 2x10 ea  2x10 ea  2x10 --   Side stepping with OTB -- 5 laps 5 laps 5 laps     5 laps 5 laps   Seated extension with self OP 3x10 3x10 -- --     -- 3x10   ADDED mini squats @ bar -- -- -- --     -- --   Prolonged knee ext stretch -- -- -- --     -- --   SLR 4 way 3x10 ea  3x10 3x10 ea  3x10 ea  3x10 ea  3x10 ea  TKE OTB -- -- -- --     -- 2x10   R gastroc stretch off strap  3x30" 3x30" 2x30"         cone taps R SLS -- -- -- --     -- CW/CCW x 5 ea      quad stretch w/strap 30"x3 30"x3 30"x3 30"x3     30"x3 30"x3   Seated hamstring stretch   3x30" 3x30"         Sharkeys OTB   x20 bilat x20 bilat         ADDED: windlass stretch    2x30"         ADDED: ambulation with L heel raises    x50'             Modalities  8/8 8/12 8/16 8/26         ICE (post treat L knee) x10 min x10min NP x10min

## 2019-10-30 ENCOUNTER — OFFICE VISIT (OUTPATIENT)
Dept: PHYSICAL THERAPY | Facility: CLINIC | Age: 69
End: 2019-10-30
Payer: COMMERCIAL

## 2019-10-30 DIAGNOSIS — S83.241A OTHER TEAR OF MEDIAL MENISCUS, CURRENT INJURY, RIGHT KNEE, INITIAL ENCOUNTER: Primary | ICD-10-CM

## 2019-10-30 PROCEDURE — 97110 THERAPEUTIC EXERCISES: CPT

## 2019-10-30 PROCEDURE — 97140 MANUAL THERAPY 1/> REGIONS: CPT

## 2019-10-30 NOTE — PROGRESS NOTES
Daily Note     Today's date: 10/30/2019  Patient name: Lanny Willard  : 1950  MRN: [id number]  Referring provider: Andre Jacob  Dx:   Encounter Diagnosis     ICD-10-CM    1  Other tear of medial meniscus, current injury, right knee, initial encounter S83 605A                   Subjective: Pt states R knee "feels terrible" today following prolonged walking and flight during travel  Denies completing stretching following prolonged walking  Reports he trialed L heel lift similar to last session, however, discontinued secondary to lumbar discomfort  Pt continues to report R heel pain, states IASTM did not effect pain last session  Pt reports he will follow up with physician regarding heel pain  Objective: See treatment diary below      Assessment: Tolerated treatment well  Pt appropriately challenged with current exercise program  Verbal cueing required for proper form throughout exercises  Pt noted shooting pain from knee to ankle with RB and eccentric lowering from step, resolved with rest  No LOB noted with tandem stance balance  Fatigue preset during and post session  Pt was educated on importance of HEP  Patient would benefit from continued PT  Plan: Continue per plan of care      Pt 1:1 with PTA JW 9:00-9:40  IEP 9:40-10:00     Precautions: None      Manual  10/14  x8' 10/18  x8' 10/21  x8' 10/25  x10' 10/30        A-P Femorotibial mobs, A-P tibiofemoral mobs --  -- -- --        PROM knee flex/ext --  -- -- --        Patellar mobs all directions --  -- -- --        Tibiofemoral distraction + ER BMG BMG BMG BMG BMG         Medial tibiofemoral jt gapping (valgus) knee bent  --  -- -- --        Kinesiotape R knee medial jt gapping --  -- -- --        Posterior tibiofemoral mobs with ER, repeated with quad sets --  -- -- --        REIL w/ PT OP 3x10 BMG 3x10  BMG 3x10 BMG -- --        IASTM R plantar fascia emphasis @ heel    BMG --            Exercise Diary  10/14 10/18 10/21 10/25 10/30    9/30 10/7   Stationary bike w/ lumbar roll x8' x8' x8' x8' x8'    x10' x10'   HEP: R SAQ, SAQ VMO  -- -- -- -- --    -- --   HEP: R Quad set -- -- -- -- --    -- --   HEP: R heel slides -- -- -- -- --    -- --   Ball squeeze -- -- -- -- --    -- --   Clamshells supine orange tband -- -- -- -- --    -- --   Bridges w/ball squeeze 3x10 3x10 -- -- --    -- --    LAQs toe straight/toe out -- 2x10 ea   -- -- --    -- 1 5# 2x10 ea  Sciatic n  Glides R x30 -- -- -- --    x30 x30   Piriformis stretch 30"x3 30"x3 30"x3 30"x3 bilat  30"x3  bilat    -- --   Standing TRs/*HRs -- -- -- -- --    -- --   Prone on elbows knee extension stretch  -- -- -- -- --    -- --   Supine ITB stretch with strap -- -- -- -- --    -- --   REIL w/self OP -- -- -- 3x10 3x10    -- --   Tandem on foam -- -- 2x30" bilat  2x30"  bilat     -- --   Eccentric step downs forw/lat 4" -- -- 2x10 ea  2x10 ea  2x10 ea  2x10 --   Side stepping with OTB -- 5 laps 5 laps 5 laps 5 laps    5 laps 5 laps   Seated extension with self OP 3x10 3x10 -- -- --    -- 3x10   ADDED mini squats @ bar -- -- -- -- --    -- --   Prolonged knee ext stretch -- -- -- -- --    -- --   SLR 4 way 3x10 ea  3x10 3x10 ea  3x10 ea  3x10 ea  3x10 ea  3x10 ea  TKE OTB -- -- -- -- --    -- 2x10   R gastroc stretch off strap  3x30" 3x30" 2x30" 2x30"         cone taps R SLS -- -- -- -- --    -- CW/CCW x 5 ea      quad stretch w/strap 30"x3 30"x3 30"x3 30"x3 30"x3    30"x3 30"x3   Seated hamstring stretch   3x30" 3x30" 3x30"        Sharkeys OTB   x20 bilat x20 bilat x20 bilat        ADDED: windlass stretch    2x30" 2x30"        ADDED: ambulation with L heel raises    x50' NP            Modalities  8/8 8/12 8/16 8/26         ICE (post treat L knee) x10 min x10min NP x10min

## 2019-11-01 ENCOUNTER — OFFICE VISIT (OUTPATIENT)
Dept: PHYSICAL THERAPY | Facility: CLINIC | Age: 69
End: 2019-11-01
Payer: COMMERCIAL

## 2019-11-01 ENCOUNTER — OFFICE VISIT (OUTPATIENT)
Dept: FAMILY MEDICINE CLINIC | Facility: CLINIC | Age: 69
End: 2019-11-01
Payer: COMMERCIAL

## 2019-11-01 ENCOUNTER — HOSPITAL ENCOUNTER (OUTPATIENT)
Dept: RADIOLOGY | Facility: HOSPITAL | Age: 69
Discharge: HOME | End: 2019-11-01
Attending: FAMILY MEDICINE
Payer: COMMERCIAL

## 2019-11-01 VITALS
BODY MASS INDEX: 34 KG/M2 | HEIGHT: 73 IN | SYSTOLIC BLOOD PRESSURE: 142 MMHG | TEMPERATURE: 97.7 F | OXYGEN SATURATION: 98 % | HEART RATE: 63 BPM | WEIGHT: 256.5 LBS | DIASTOLIC BLOOD PRESSURE: 88 MMHG

## 2019-11-01 DIAGNOSIS — M79.671 INTRACTABLE RIGHT HEEL PAIN: ICD-10-CM

## 2019-11-01 DIAGNOSIS — F41.9 ANXIETY: ICD-10-CM

## 2019-11-01 DIAGNOSIS — S83.241A OTHER TEAR OF MEDIAL MENISCUS, CURRENT INJURY, RIGHT KNEE, INITIAL ENCOUNTER: Primary | ICD-10-CM

## 2019-11-01 DIAGNOSIS — M79.671 INTRACTABLE RIGHT HEEL PAIN: Primary | ICD-10-CM

## 2019-11-01 DIAGNOSIS — F33.8 SEASONAL AFFECTIVE DISORDER (HCC): ICD-10-CM

## 2019-11-01 DIAGNOSIS — R68.82 DECREASED LIBIDO: ICD-10-CM

## 2019-11-01 DIAGNOSIS — M54.16 LUMBAR RADICULOPATHY: ICD-10-CM

## 2019-11-01 DIAGNOSIS — R10.9 ABDOMINAL CRAMPING: ICD-10-CM

## 2019-11-01 PROCEDURE — 3008F BODY MASS INDEX DOCD: CPT | Performed by: FAMILY MEDICINE

## 2019-11-01 PROCEDURE — 97140 MANUAL THERAPY 1/> REGIONS: CPT

## 2019-11-01 PROCEDURE — 73630 X-RAY EXAM OF FOOT: CPT

## 2019-11-01 PROCEDURE — 97110 THERAPEUTIC EXERCISES: CPT

## 2019-11-01 PROCEDURE — 99214 OFFICE O/P EST MOD 30 MIN: CPT | Performed by: FAMILY MEDICINE

## 2019-11-01 RX ORDER — HYDROCODONE BITARTRATE AND ACETAMINOPHEN 5; 325 MG/1; MG/1
1 TABLET ORAL EVERY 4 HOURS PRN
Qty: 40 TABLET | Refills: 0 | Status: SHIPPED | OUTPATIENT
Start: 2019-11-01 | End: 2019-11-19 | Stop reason: SDUPTHER

## 2019-11-01 RX ORDER — HYOSCYAMINE SULFATE 0.12 MG/1
0.12 TABLET SUBLINGUAL 3 TIMES DAILY PRN
Qty: 60 EACH | Refills: 1 | Status: SHIPPED | OUTPATIENT
Start: 2019-11-01 | End: 2022-01-20 | Stop reason: SDUPTHER

## 2019-11-01 RX ORDER — ALPRAZOLAM 0.25 MG/1
0.25 TABLET ORAL 3 TIMES DAILY PRN
Qty: 30 TABLET | Refills: 0 | Status: SHIPPED | OUTPATIENT
Start: 2019-11-01 | End: 2020-01-06 | Stop reason: SDUPTHER

## 2019-11-01 RX ORDER — ESCITALOPRAM OXALATE 10 MG/1
10 TABLET ORAL DAILY
Qty: 30 TABLET | Refills: 0 | Status: SHIPPED | OUTPATIENT
Start: 2019-11-01 | End: 2019-12-01 | Stop reason: SDUPTHER

## 2019-11-01 NOTE — PROGRESS NOTES
Daily Note     Today's date: 2019  Patient name: Mendel Fry  : 1950  MRN: [id number]  Referring provider: Dillan Abbasi  Dx:   Encounter Diagnosis     ICD-10-CM    1  Other tear of medial meniscus, current injury, right knee, initial encounter S83 646A                   Subjective: Pt states continues to have radiating pain from across infrapatellar pole to lateral shin/malleolus  States pain is worse with driving  States he is going to PCP for R heel pain following this appt  Objective: See treatment diary below      Assessment: Tolerated treatment well  Pt continues to complete program in slow and labored fashion, despite report of no pain w/ exercises  D/t pt's report of anterior radiating pain, performed n  Glides as indicated in grid  Pt responded well to peroneal nerve glides, given for HEP  Again, emphasized importance of HEP including lumbar and LE exercises to address impairments  Pt is making slow progress toward goals, however, continues to be noncompliant with HEP  Patient would benefit from continued PT      Plan: Continue per plan of care        Precautions: None      Manual  10/14  x8' 10/18  x8' 10/21  x8' 10/25  x10' 10/30 11/1  x10'       A-P Femorotibial mobs, A-P tibiofemoral mobs --  -- -- -- --       PROM knee flex/ext --  -- -- -- --       Patellar mobs all directions --  -- -- -- --       Tibiofemoral distraction + ER BMG BMG BMG BMG BMG  BMG       Medial tibiofemoral jt gapping (valgus) knee bent  --  -- -- -- -       Kinesiotape R knee medial jt gapping --  -- -- -- --       Posterior tibiofemoral mobs with ER, repeated with quad sets --  -- -- -- --       REIL w/ PT OP 3x10 BMG 3x10  BMG 3x10 BMG -- -- --       ADDED: hamstring stretch      BMG       ADDED: peroneal n  Glides, tibial n  glides      BMG       IASTM R plantar fascia emphasis @ heel    BMG -- --           Exercise Diary  10/14 10/18 10/21 10/25 10/30 11/1   9/30 10/7   Stationary bike w/ lumbar roll x8' x8' x8' x8' x8' lvl 3 x8'   x10' x10'   HEP: R SAQ, SAQ VMO  -- -- -- -- -- --   -- --   HEP: R Quad set -- -- -- -- -- --   -- --   HEP: R heel slides -- -- -- -- -- --   -- --   Ball squeeze -- -- -- -- -- --   -- --   Clamshells supine orange tband -- -- -- -- -- --   -- --   Bridges w/ball squeeze 3x10 3x10 -- -- -- --   -- --    LAQs toe straight/toe out -- 2x10 ea   -- -- -- --   -- 1 5# 2x10 ea  Sciatic n  Phyllis R x30 -- -- -- -- X30   x30 x30   Piriformis stretch 30"x3 30"x3 30"x3 30"x3 bilat  30"x3  bilat 30"x3 bilat   -- --   Standing TRs/*HRs -- -- -- -- -- --   -- --   Prone on elbows knee extension stretch  -- -- -- -- -- --   -- --   Supine ITB stretch with strap -- -- -- -- -- --   -- --   REIL w/self OP -- -- -- 3x10 3x10 3x10   -- --   Tandem on foam -- -- 2x30" bilat  2x30"  bilat  --   -- --   Eccentric step downs forw/lat 4" -- -- 2x10 ea  2x10 ea  2x10 ea  3x10 ea  2x10 --   Side stepping with OTB -- 5 laps 5 laps 5 laps 5 laps --   5 laps 5 laps   Seated extension with self OP 3x10 3x10 -- -- -- --   -- 3x10   ADDED mini squats @ bar -- -- -- -- -- --   -- --   Prolonged knee ext stretch -- -- -- -- -- --   -- --   SLR 4 way 3x10 ea  3x10 3x10 ea  3x10 ea  3x10 ea  3x10 ea  3x10 ea  3x10 ea  TKE OTB -- -- -- -- -- --   -- 2x10   R gastroc stretch off strap  3x30" 3x30" 2x30" 2x30"  3x30"       cone taps R SLS -- -- -- -- -- --   -- CW/CCW x 5 ea      quad stretch w/strap 30"x3 30"x3 30"x3 30"x3 30"x3 30"x3   30"x3 30"x3   Seated hamstring stretch   3x30" 3x30" 3x30" --       ADDED: self peoneal n  glides      x30       Sharkeys OTB   x20 bilat x20 bilat x20 bilat --       ADDED: STS RLE bias      x20       ambulation with L heel lift    x50' NP --           Modalities  8/8 8/12 8/16 8/26         ICE (post treat L knee) x10 min x10min NP x10min

## 2019-11-01 NOTE — PROGRESS NOTES
Subjective:   Chief Complaint   Patient presents with    Heel Pain     Pt here today with right heel pain  Pt is taking Norco and 400-600 mg of ibuprofen for the pain  Pt going to therapy 2 times a week  He is also using a massager at home  Pt is aware that he is due for colonoscopy  FBW complete 4/2/19  Annual exam due  Patient ID: Romaine Sawant is a 77 y o  male      The patient is here today for a couple of reasons  He is having severe pain in the right heel  This is been going on for at least a week if not more  He is going to therapy for his knees  They have been treating him for plantar fasciitis but he has pain all day when walking, it is worse when walking  He definitely has pain when getting up the bed in the morning but this is related everything else going on with him  He has a plantar fasciitis wedge in his shoe and it is not helping at all  The pain is really severe when he squeezes the heel at the back of the heel as well as when he walks  He is taking Vicodin to help with the pain  He is working diligently on his knee in physical therapy  He has an appointment with Orthopedics at the end of the month to follow up with Dr Axel Mcneal for his knee    He does need a refill of his Xanax  He definitely has significant anxiety and depression  His depression is much worse during the winter months and he is starting to get to that point  He has been on sertraline, Wellbutrin, and Effexor in the past none of which have helped him  Some he had side effects or felt dulled down, some did not work  He has never been on Lexapro  He has never been on the atypical antipsychotics or mood stabilizers  He does have a seasonal depression light which does not help him    He continues with abdominal cramping and says it is time to get back in to [facility] for a follow-up endoscopic ultrasound 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  He is requesting a refill on his hyoscyamine today  He does feel lump at the bottom of his throat/upper chest  He was in the ER for this not long ago but ruled out for anything cardiac  He used to see Dr Sabrina Snyder in Gastroenterology at [place] but he has left the practice  He is due for colonoscopy    Finally he does wonder about low testosterone  He does not necessarily have generalized weakness but he does have the issues with depression as well as decreased libido      The following porti  ons of the patient's history were reviewed and updated as appropriate: allergies, current medications, past family history, past medical history, past social history, past surgical history and problem list     Review of Systems      Objective:  Vitals:    11/01/19 1153   BP: 142/88   Pulse: 63   Temp: 97 7 °F (36 5 °C)   SpO2: 98%   Weight: 116 kg (256 lb 8 oz)   Height: 6' 1" (1 854 m)      Physical Exam   Constitutional: He is oriented to person, place, and time  He appears well-developed and well-nourished  He appears distressed (significant pain when walking)  Musculoskeletal:        Feet:    No tenderness to palpation of the plantar fascia   Neurological: He is alert and oriented to person, place, and time  No cranial nerve deficit  Gait (Limping favoring the right foot) abnormal    Skin: Skin is warm and dry  No rash noted  He is not diaphoretic  No erythema  Psychiatric: He has a normal mood and affect  His behavior is normal  Judgment and thought content normal          Assessment/Plan:    Seasonal affective disorder (HCC)  I did refill the patient's Xanax for his anxiety  We are going to try Lexapro at 10 mg  I did advise this this may make absolutely no difference for the 1st few weeks and that as long as he is not having side effects he should continue it  We can always increase the dosage in 3-4 weeks if necessary  If he is having intolerable side effects he should stop it and let me know  We could consider trying something like Abilify if we need to try something else    I will also check a testosterone level at his request        Diagnoses and all orders for this visit:    Intractable right heel pain  -     XR foot 3+ vw right; Future  -     Ambulatory referral to Orthopedic Surgery; Future    I am going to order an x-ray and refer him to Dr Pradeep Lopes in the orthopedic office for further evaluation  He does request more of the pain medication at this time  I understand he has had issues with addiction in the past so this is something I will follow closely  He sees both Dr Hernandez Menjivar and I so we need to just be cautious of this  Lumbar radiculopathy  -     HYDROcodone-acetaminophen (NORCO) 5-325 mg per tablet; Take 1 tablet by mouth every 4 (four) hours as needed for painMax Daily Amount: 6 tablets    Abdominal cramping  -     Hyoscyamine Sulfate SL 0 125 MG SUBL; Place 0 125 mg under the tongue 3 (three) times a day as needed (as needed)    The patient was advised to call [facility] about scheduling his endoscopic ultrasound  I did advise he could certainly ask if they would do colonoscopy at the same time especially since his gastroenterologist is now not in the area anymore  Anxiety  -     ALPRAZolam (XANAX) 0 25 mg tablet; Take 1 tablet (0 25 mg total) by mouth 3 (three) times a day as needed for anxiety  -     escitalopram (LEXAPRO) 10 mg tablet; Take 1 tablet (10 mg total) by mouth daily    Seasonal affective disorder (HCC)  -     escitalopram (LEXAPRO) 10 mg tablet; Take 1 tablet (10 mg total) by mouth daily  -     Testosterone; Future  -     Testosterone    Decreased libido  -     Testosterone;  Future  -     Testosterone

## 2019-11-01 NOTE — ASSESSMENT & PLAN NOTE
I did refill the patient's Xanax for his anxiety  We are going to try Lexapro at 10 mg  I did advise this this may make absolutely no difference for the 1st few weeks and that as long as he is not having side effects he should continue it  We can always increase the dosage in 3-4 weeks if necessary  If he is having intolerable side effects he should stop it and let me know  We could consider trying something like Abilify if we need to try something else    I will also check a testosterone level at his request

## 2019-11-04 ENCOUNTER — OFFICE VISIT (OUTPATIENT)
Dept: PHYSICAL THERAPY | Facility: CLINIC | Age: 69
End: 2019-11-04
Payer: COMMERCIAL

## 2019-11-04 DIAGNOSIS — S83.241A OTHER TEAR OF MEDIAL MENISCUS, CURRENT INJURY, RIGHT KNEE, INITIAL ENCOUNTER: Primary | ICD-10-CM

## 2019-11-04 PROCEDURE — 97140 MANUAL THERAPY 1/> REGIONS: CPT

## 2019-11-04 PROCEDURE — 97110 THERAPEUTIC EXERCISES: CPT

## 2019-11-04 NOTE — PROGRESS NOTES
Daily Note     Today's date: 2019  Patient name: Lucina Carrel  : 1950  MRN: [id number]  Referring provider: Margarita Shen  Dx:   Encounter Diagnosis     ICD-10-CM    1  Other tear of medial meniscus, current injury, right knee, initial encounter [id number] [id number]                   Subjective: Pt states R lateral LE radiating pain (along L5 dermatome) remains unchanged  States he still has pain in medial R knee and R heel  Reports compliance with HEP  Objective: See treatment diary below      Assessment: Tolerated treatment well  Pt continues to demonstrate antalgic gait with decreased stance time RLE, despite relief of pain following therapy  Pt favored ITB stretch  Continues to complete exercises in slow manner and with visible increased effort, although denies increased pain  Pt unable to progress from 4" to 6" eccentric heel taps without increase in pain  Unsure how much of impairment is stemming from arthroscopic repair as lumbar spine may have some contribution to pain  Re-emphasized significance of HEP including lumbar roll and REIL  Patient would benefit from continued PT      Plan: Continue per plan of care        Precautions: None      Manual  10/14  x8' 10/18  x8' 10/21  x8' 10/25  x10' 10/30 11/1  x10' 11/4  x8'      A-P Femorotibial mobs, A-P tibiofemoral mobs --  -- -- -- --       PROM knee flex/ext --  -- -- -- --       Patellar mobs all directions --  -- -- -- --       Tibiofemoral distraction + ER BMG BMG BMG BMG BMG  BMG BMG      Medial tibiofemoral jt gapping (valgus) knee bent  --  -- -- -- -       Kinesiotape R knee medial jt gapping --  -- -- -- --       Posterior tibiofemoral mobs with ER, repeated with quad sets --  -- -- -- --       REIL w/ PT OP 3x10 BMG 3x10  BMG 3x10 BMG -- -- --       ADDED: hamstring stretch      BMG BMG      ADDED: peroneal n  Glides, tibial n  glides      BMG BMG      IASTM R plantar fascia emphasis @ heel    BMG -- --           Exercise Diary  10/14 10/18 10/21 10/25 10/30 11/1 11/4  9/30 10/7   Stationary bike w/ lumbar roll x8' x8' x8' x8' x8' lvl 3 x8' lvl 3 x8'  x10' x10'   HEP: R SAQ, SAQ VMO  -- -- -- -- -- -- --  -- --   HEP: R Quad set -- -- -- -- -- -- --  -- --   HEP: R heel slides -- -- -- -- -- -- --  -- --   Ball squeeze -- -- -- -- -- -- --  -- --   Clamshells supine orange tband -- -- -- -- -- -- --  -- --   Bridges w/ball squeeze 3x10 3x10 -- -- -- -- --  -- --    LAQs toe straight/toe out -- 2x10 ea   -- -- -- -- --  -- 1 5# 2x10 ea  Sciatic n  Phyllis R x30 -- -- -- -- X30 --  x30 x30   Piriformis stretch 30"x3 30"x3 30"x3 30"x3 bilat  30"x3  bilat 30"x3 bilat 30"x3  -- --   Standing TRs/*HRs -- -- -- -- -- -- --  -- --   Prone on elbows knee extension stretch  -- -- -- -- -- -- --  -- --   Supine ITB stretch with strap -- -- -- -- -- -- --  -- --   REIL w/self OP -- -- -- 3x10 3x10 3x10 3x10  -- --   Tandem on foam -- -- 2x30" bilat  2x30"  bilat  -- --  -- --   Eccentric step downs forw/lat 4" -- -- 2x10 ea  2x10 ea  2x10 ea  3x10 ea  3x10 ea  2x10 --   Side stepping with OTB -- 5 laps 5 laps 5 laps 5 laps -- --  5 laps 5 laps   Seated extension with self OP 3x10 3x10 -- -- -- -- 3x10  -- 3x10   ADDED mini squats @ bar -- -- -- -- -- -- --  -- --   Prolonged knee ext stretch -- -- -- -- -- -- --  -- --   SLR 4 way  3x10 ea  3x10 3x10 ea  3x10 ea  3x10 ea  3x10 ea  1# 3x10 ea  3x10 ea  3x10 ea  TKE OTB -- -- -- -- -- -- --  -- 2x10   R gastroc stretch off strap  3x30" 3x30" 2x30" 2x30"  3x30" 3x30"      cone taps R SLS -- -- -- -- -- -- --  -- CW/CCW x 5 ea      quad stretch w/strap 30"x3 30"x3 30"x3 30"x3 30"x3 30"x3 30"x3  30"x3 30"x3   Seated hamstring stretch   3x30" 3x30" 3x30" -- --      ADDED: self peoneal n  glides      x30 --      Rosalinda OTB   x20 bilat x20 bilat x20 bilat -- --      ADDED: ITB stretch w/ strap        30"x3            x20 x20      ambulation with L heel lift    x50' NP -- --          Modalities  8/8 8/12 8/16 8/26         ICE (post treat L knee) x10 min x10min NP x10min

## 2019-11-08 ENCOUNTER — OFFICE VISIT (OUTPATIENT)
Dept: PAIN MEDICINE | Facility: CLINIC | Age: 69
End: 2019-11-08
Payer: COMMERCIAL

## 2019-11-08 ENCOUNTER — OFFICE VISIT (OUTPATIENT)
Dept: FAMILY MEDICINE CLINIC | Facility: CLINIC | Age: 69
End: 2019-11-08
Payer: COMMERCIAL

## 2019-11-08 ENCOUNTER — OFFICE VISIT (OUTPATIENT)
Dept: PHYSICAL THERAPY | Facility: CLINIC | Age: 69
End: 2019-11-08
Payer: COMMERCIAL

## 2019-11-08 VITALS
HEART RATE: 72 BPM | BODY MASS INDEX: 33.4 KG/M2 | SYSTOLIC BLOOD PRESSURE: 140 MMHG | HEIGHT: 73 IN | WEIGHT: 252 LBS | DIASTOLIC BLOOD PRESSURE: 82 MMHG

## 2019-11-08 VITALS
DIASTOLIC BLOOD PRESSURE: 82 MMHG | HEIGHT: 73 IN | BODY MASS INDEX: 33.46 KG/M2 | SYSTOLIC BLOOD PRESSURE: 142 MMHG | OXYGEN SATURATION: 97 % | TEMPERATURE: 98 F | WEIGHT: 252.5 LBS | HEART RATE: 85 BPM

## 2019-11-08 DIAGNOSIS — M47.816 LUMBAR SPONDYLOSIS: ICD-10-CM

## 2019-11-08 DIAGNOSIS — M54.16 LUMBAR RADICULOPATHY: ICD-10-CM

## 2019-11-08 DIAGNOSIS — K22.4 ESOPHAGEAL MOTILITY DISORDER: ICD-10-CM

## 2019-11-08 DIAGNOSIS — M51.26 LUMBAR DISC HERNIATION: Primary | ICD-10-CM

## 2019-11-08 DIAGNOSIS — M77.32 HEEL SPUR, LEFT: ICD-10-CM

## 2019-11-08 DIAGNOSIS — K21.9 GASTROESOPHAGEAL REFLUX DISEASE, ESOPHAGITIS PRESENCE NOT SPECIFIED: Primary | ICD-10-CM

## 2019-11-08 DIAGNOSIS — S83.241A OTHER TEAR OF MEDIAL MENISCUS, CURRENT INJURY, RIGHT KNEE, INITIAL ENCOUNTER: Primary | ICD-10-CM

## 2019-11-08 PROCEDURE — 1160F RVW MEDS BY RX/DR IN RCRD: CPT | Performed by: FAMILY MEDICINE

## 2019-11-08 PROCEDURE — 99214 OFFICE O/P EST MOD 30 MIN: CPT | Performed by: FAMILY MEDICINE

## 2019-11-08 PROCEDURE — 99214 OFFICE O/P EST MOD 30 MIN: CPT | Performed by: ANESTHESIOLOGY

## 2019-11-08 PROCEDURE — 97110 THERAPEUTIC EXERCISES: CPT

## 2019-11-08 RX ORDER — LIDOCAINE HYDROCHLORIDE 20 MG/ML
15 SOLUTION OROPHARYNGEAL 4 TIMES DAILY PRN
Qty: 100 ML | Refills: 1 | Status: SHIPPED | OUTPATIENT
Start: 2019-11-08 | End: 2020-03-21

## 2019-11-08 NOTE — PROGRESS NOTES
Assessment  1  Lumbar disc herniation    2  Lumbar radiculopathy    3  Lumbar spondylosis        Plan  The patient's pain persists despite time, relative rest, activity modification and therapy  I believe that Candy Schneider would benefit from a lumbar epidural steroid injection to diminish any inflammatory component of his pain  I will initially use a transforaminal approach to better concentrate the steroid along the affected nerve root  The injection may need to be repeated based on the degree of pain relief following the initial injection  In the office today, we reviewed the nature of the patient's pathology in depth using  diagrams and models  I discussed the approach I would use for the epidural steroid injection and provided literature for home review  The patient understands the risks associated with the procedure including but not limited to bleeding, infection, tissue injury, exacerbation of symptoms, allergic reaction, spinal headache, and paralysis and provided verbal consent  If his symptoms persist in his low back than one would consider diagnostic medial branch blocks but will re-evaluate if needed  My impressions and treatment recommendations were discussed in detail with the patient who verbalized understanding and had no further questions  Discharge instructions were provided  I personally saw and examined the patient and I agree with the above discussed plan of care  This note is created using dictation transcription  It may contain typographical errors, grammatical errors, improperly dictated words, background noise and other errors  Orders Placed This Encounter   Procedures    FL spine and pain procedure     Standing Status:   Future     Standing Expiration Date:   11/8/2023     Order Specific Question:   Reason for Exam:     Answer:   Right L4 and right L5 TFESI 1  Order Specific Question:   Anticoagulant hold needed?      Answer:   No    FL spine and pain procedure     No Standing Status:   Future     Standing Expiration Date:   11/8/2023     Order Specific Question:   Reason for Exam:     Answer:   LESI to the left 2  Order Specific Question:   Anticoagulant hold needed? Answer:   no     No orders of the defined types were placed in this encounter  History of Present Illness    Hanna Alegria is a 77 y o  male who I saw approximately 1/2 years ago for neck and arm pain  He presents today with different issues of low back right leg pain with left-sided low back pain  He rates his pain as 5/10 on the visual analog scale  He has been dealing with meniscal tear of his right knee with a slow recovery  He has been compensating developed a foot spur not developed low back pain radiates down the postal aspect of his right leg when driving and during the day mostly left-sided low back  Describes intermittent and sharp 5/10 on the visual analog scale worse with standing sitting and walking unrelieved and his pain interferes with daily living activities  He denies bowel or bladder dysfunction  I have personally reviewed and/or updated the patient's past medical history, past surgical history, family history, social history, current medications, allergies, and vital signs today  Review of Systems   Respiratory: Negative for shortness of breath  Cardiovascular: Negative for chest pain  Gastrointestinal: Negative for constipation, diarrhea, nausea and vomiting  Musculoskeletal: Positive for gait problem (difficulty walking decreased rom ) and joint swelling (joint stiffness)  Negative for arthralgias and myalgias  Skin: Negative for rash  Neurological: Negative for dizziness, seizures and weakness  All other systems reviewed and are negative        Patient Active Problem List   Diagnosis    Basal cell carcinoma of scalp    Esophageal motility disorder    Gastroesophageal reflux disease    Hyperlipidemia    Impingement syndrome of left shoulder    Obstructive sleep apnea    Seasonal affective disorder (HCC)    Vitamin D deficiency    Cervical radiculopathy    Hypersomnia    Anxiety    Obesity (BMI 30-39  9)    Sleep disturbance    Benign paroxysmal positional vertigo due to bilateral vestibular disorder    PLMD (periodic limb movement disorder)    Lumbar radiculopathy    Multiple polyps of sigmoid colon    Chronic pain of right knee    Chronic hip pain, bilateral    S/P arthroscopic partial medial meniscectomy       Past Medical History:   Diagnosis Date    Acute medial meniscus tear     Allergic rhinitis     Allergic rhinitis     last assessed 5/8/14    Anxiety     Appendicitis     Arthritis     Benign essential hypertension     last assessed 1/6/14    Biceps tendon tear     Cancer (HCC)     skin    Cervical radiculopathy     and lumbar    Chronic pain disorder     knees and hips     Colon polyps     CPAP (continuous positive airway pressure) dependence     Depression     Depression with anxiety     Dyskinesia of esophagus     Erythema migrans (Lyme disease)     last assessed 10/16/12    Fatigue     GERD (gastroesophageal reflux disease)     Herpes     Herpes zoster     Hyperlipidemia     Hyperplastic colon polyp     last assessed 11/14/14    Joint pain     Kidney stones     Lyme disease     Nephrolithiasis     Panic disorder with agoraphobia     last assessed 8/7/13    Seasonal affective disorder ([facility] [address] )     Sleep apnea     Vertigo     Vitamin D deficiency        Past Surgical History:   Procedure Laterality Date    APPENDECTOMY  08/01/2003    BICEPS TENDON REPAIR      KNEE ARTHROSCOPY      with medial meniscus repair, resolved 01/01/05    KNEE CARTILAGE SURGERY      OTHER SURGICAL HISTORY      distal reinsertion of ruptured biceps tendon    MO KNEE SCOPE,MED/LAT MENISECTOMY Right 7/30/2019    Procedure: RIGHT KNEE ARTHROSCOPY, MEDIAL MENISCECTOMY;  Surgeon: Ron Gupta DO;  Location:  MAIN OR; Service: Orthopedics    TOOTH EXTRACTION      Middle of October 2019    VASECTOMY         Family History   Problem Relation Age of Onset    Diabetes Mother     Coronary artery disease Father     Kidney disease Father     Heart disease Father        Social History     Occupational History    Not on file   Tobacco Use    Smoking status: Former Smoker     Types: Cigars    Smokeless tobacco: Never Used   Substance and Sexual Activity    Alcohol use: No    Drug use: No    Sexual activity: Not on file       Current Outpatient Medications on File Prior to Visit   Medication Sig    ALPRAZolam (XANAX) 0 25 mg tablet Take 1 tablet (0 25 mg total) by mouth 3 (three) times a day as needed for anxiety    cholecalciferol (VITAMIN D3) 1,000 units tablet Take 1,000 Units by mouth daily    ciclopirox (LOPROX) 0 77 % cream apply twice a day to fungal rash on feet til cleared    Coenzyme Q10 (COQ10 PO) Take by mouth daily    escitalopram (LEXAPRO) 10 mg tablet Take 1 tablet (10 mg total) by mouth daily    esomeprazole (NexIUM) 40 MG capsule Take 40 mg by mouth every morning before breakfast    Glucosamine-Chondroit-Vit C-Mn (GLUCOSAMINE 1500 COMPLEX PO) Take by mouth    HYDROcodone-acetaminophen (NORCO) 5-325 mg per tablet Take 1 tablet by mouth every 4 (four) hours as needed for painMax Daily Amount: 6 tablets    Hyoscyamine Sulfate SL 0 125 MG SUBL Place 0 125 mg under the tongue 3 (three) times a day as needed (as needed)    JUBLIA 10 % SOLN     metaxalone (SKELAXIN) 800 mg tablet Take 1 tablet (800 mg total) by mouth every 6 (six) hours as needed for muscle spasms    Multiple Vitamins-Minerals (MULTI FOR HIM 50+) TABS Take by mouth    Omega-3 Fatty Acids (FISH OIL) 1,000 mg Take 1,000 mg by mouth daily    pravastatin (PRAVACHOL) 80 mg tablet TAKE ONE TABLET BY MOUTH EVERY DAY AT BEDTIME     No current facility-administered medications on file prior to visit          Allergies   Allergen Reactions    Pseudoephedrine Hives     Reaction Date: 16Apr2011;     Sulfa Antibiotics Dizziness       Physical Exam    /82   Pulse 72   Ht 6' 1" (1 854 m)   Wt 114 kg (252 lb)   BMI 33 25 kg/m²     Constitutional: normal, well developed, well nourished, alert, in no distress and non-toxic and no overt pain behavior  and obese  Eyes: anicteric  HEENT: grossly intact  Neck: supple, symmetric, trachea midline and no masses   Pulmonary:even and unlabored  Cardiovascular:No edema or pitting edema present  Skin:Normal without rashes or lesions and well hydrated  Psychiatric:Mood and affect appropriate  Neurologic:Cranial Nerves II-XII grossly intact  Musculoskeletal:normal, difficulty going from sitting to standing to sitting position no obvious skin lesions or erythema lumbar sacral spine no tenderness to palpation lumbar sacral spine spinous process sacroiliac joint or greater trochanter bilateral deep tendon reflexes symmetrical bilateral patella Achilles negative bilateral straight leg raising no focal motor deficit appreciated lower limbs though he does have some weakness with right knee extension most likely secondary to his current knee orthopedic issues  Imaging  MRI LUMBAR SPINE WITHOUT CONTRAST     INDICATION: [id number]: Radiculopathy, lumbar region      COMPARISON:  None      TECHNIQUE:  Sagittal T1, sagittal T2, sagittal inversion recovery, axial T1 and axial T2, coronal T2     IMAGE QUALITY:  Diagnostic     FINDINGS:     VERTEBRAL BODIES:  Normal alignment of the lumbar spine  No spondylolysis or spondylolisthesis  No scoliosis  No compression fracture  Heterogeneous marrow signal with scattered hemangiomas and/or focal fat  No marrow edema      SACRUM:  Normal signal within the sacrum   No evidence of insufficiency or stress fracture      DISTAL CORD AND CONUS:  Normal size and signal within the distal cord and conus        PARASPINAL SOFT TISSUES:  Paraspinal soft tissues are unremarkable      LOWER THORACIC DISC SPACES:  Normal disc height and signal   No disc herniation, canal stenosis or foraminal narrowing      LUMBAR DISC SPACES:     L1-L2:  Normal      L2-L3:  There is a very small left foraminal disc protrusion with associated annular fissure abutting but not compressing or displacing the exiting nerve  Slight left facet hypertrophic degenerative change  No canal stenosis or nerve compression      L3-L4:  Mild annular bulging  Left foraminal and lateral annular fissure  Mild canal stenosis and left foraminal narrowing with disc material abutting the exiting nerve      L4-L5:  Mild diffuse annular bulging  Broad-based right foraminal disc protrusion with right greater than left facet hypertrophic degenerative change  Mild canal stenosis with distortion and compression of the anterolateral aspect of the thecal sac on   the right  Moderate right foraminal narrowing  Correlate for right L4 and L5 radiculopathy      L5-S1:  Mild annular bulging  No focal disc herniation or canal stenosis  Mild left greater than right foraminal narrowing without nerve compression      IMPRESSION:     Mild lumbar spondylitic degenerative change  Annular bulging with small foraminal disc protrusions and mild facet hypertrophic degenerative changes      Distortion of the right anterolateral aspect of the thecal sac at the L4-5 level with mass effect upon the L5 nerve  There is right foraminal narrowing at L4-5 as well  Correlate for right L4 and L5 radiculopathy       I have personally reviewed pertinent films in PACS

## 2019-11-08 NOTE — ASSESSMENT & PLAN NOTE
The patient will proceed with his injections and continue follow-up with spine and pain  He has pain medication for now

## 2019-11-08 NOTE — ASSESSMENT & PLAN NOTE
The patient seems to have found a good podiatrist   I encouraged he follow his instructions, continue with therapy and definitely discuss this all with his physical therapist, and follow up as scheduled

## 2019-11-08 NOTE — PROGRESS NOTES
Subjective:   Chief Complaint   Patient presents with    THROAT FULLNESS     PT COMES IN TODAY WITH COMPLAINT OF FULLNESS IN THROAT  NOT A SORE THROAT  NOT SCRATCHY  FEELS LIKE A LUMP IN THROAT   PT REMINDED TO SCHEDULE COLONOSCOPY  Patient ID: Aden Mcnamara is a 77 y o  male      The patient is here today because he continues to have a feeling of fullness in his throat  Sometimes he has trouble swallowing  This is been an ongoing issue for him  He was seeing Dr Quan Macias in [place] who had referred him to [facility] for EUS  He just called them, Dr Zara Zuñiga office, and they told him that everything was benign and he does not need to go back to [facility] - no longer with [place]  Has had episodes of severe GERD in the past year intermittently and has had a couple of ER visits regarding this  They give him a cocktail with lidocaine and generally gets his symptoms to resolve  He has been advised that he might have a Schatski ring  His GI had ordered a barium test but he ended up not having it done because his symptoms seemed to get better right after he had gotten that prescription  6 weeks ago was in the ER for the same thing  He got the cocktail and was advised to f/u with GI  He now needs to find a new gastroenterologist and is looking for someone affiliated with [facility]    As far as his back, he is seeing Dr Mery Nixon  They are going to start injections  He is continue his physical therapy    As far as his foot, he had a normal x-ray as far as we could tell  I had referred him to see Dr Nicole villa but when he called for the appointment they advised that he needed to see a podiatrist because of the area in which he had pain  He did end up going to a podiatrist in [place]  That podiatrist did repeat x-rays with him standing and did find a couple of bone spurs  He is treating him with exercises and other conservative methods  He is going to discuss this with his physical therapist as well  He will see the podiatrist back in a couple of months      The following portions of the patient's history were reviewed and updated as appropriate: allergies, current medications, past family history, past medical history, past social history, past surgical history and problem list     Review of Systems           Objective:  Vitals:    11/08/19 1007   BP: 142/82   Pulse: 85   Temp: 98 °F (36 7 °C)   SpO2: 97%   Weight: 115 kg (252 lb 8 oz)   Height: 6' 1" (1 854 m)      Physical Exam   Constitutional: He is oriented to person, place, and time  He appears well-developed and well-nourished  No distress  Neurological: He is alert and oriented to person, place, and time  No cranial nerve deficit  Coordination normal    Skin: Skin is warm and dry  No rash noted  He is not diaphoretic  No erythema  Psychiatric: He has a normal mood and affect  His behavior is normal  Judgment and thought content normal          Assessment/Plan:    Esophageal motility disorder  The patient will continue all of his current medications and I did give him a prescription for lidocaine viscous solution to use when he needs it  I have referred him to [facility]  They should be able to see most of what he has had done as they are able to see the studies from [facility] and his studies from [place] are scanned in the chart  I did reassure him that if they need anything else they will certainly reach out to the GI in [place]  If he has trouble getting within the next couple of weeks he will let me know  Heel spur, left  The patient seems to have found a good podiatrist   I encouraged he follow his instructions, continue with therapy and definitely discuss this all with his physical therapist, and follow up as scheduled  Lumbar radiculopathy  The patient will proceed with his injections and continue follow-up with spine and pain  He has pain medication for now         Diagnoses and all orders for this visit:    Gastroesophageal reflux disease, esophagitis presence not specified  -     Ambulatory referral to Gastroenterology; Future  -     Lidocaine Viscous HCl (XYLOCAINE) 2 % mucosal solution; Swish and spit 15 mL 4 (four) times a day as needed for mild pain    Esophageal motility disorder  -     Ambulatory referral to Gastroenterology;  Future  -     Lidocaine Viscous HCl (XYLOCAINE) 2 % mucosal solution; Swish and spit 15 mL 4 (four) times a day as needed for mild pain    Heel spur, left    Lumbar radiculopathy

## 2019-11-08 NOTE — PROGRESS NOTES
Daily Note     Today's date: 2019  Patient name: Hanna Alegria  : 1950  MRN: [id number]  Referring provider: Nora Parekh  Dx:   Encounter Diagnosis     ICD-10-CM    1  Other tear of medial meniscus, current injury, right knee, initial encounter S83 241A                   Subjective: Pt states he xray confirmed R heel spur  Physician gave shoe inserts and will f/u on  for possible cortizone injections  Pt has appt orthopedist for lumbar pain   States he was able to sleep better than usual last night, however, R knee and heel pain continue to limit activity  Objective: See treatment diary below      Assessment: Tolerated treatment well  Continues to demonstrate plateau in progress  Appropriately challenged by current POC and able to complete without exacerbation of pain  Although pt has demonstrated normal gait post REIL in past sessions, pt demonstrates antalgic gait w/ lateral trunk lean to L throughout session  Unable to determine at this time if remaining limitations are d/t knee or lumbar origin  Patient would benefit from continued PT      Plan: Progress note next visit       Precautions: None      Manual  10/14  x8' 10/18  x8' 10/21  x8' 10/25  x10' 10/30 11/1  x10' 11/4  x8' 11/8  x5'     A-P Femorotibial mobs, A-P tibiofemoral mobs --  -- -- -- --       PROM knee flex/ext --  -- -- -- --       Patellar mobs all directions --  -- -- -- --       Tibiofemoral distraction + ER BMG BMG BMG BMG BMG  BMG BMG BMG     Medial tibiofemoral jt gapping (valgus) knee bent  --  -- -- -- -       Kinesiotape R knee medial jt gapping --  -- -- -- --       Posterior tibiofemoral mobs with ER, repeated with quad sets --  -- -- -- --       REIL w/ PT OP 3x10 BMG 3x10  BMG 3x10 BMG -- -- --       ADDED: hamstring stretch      BMG BMG BMG     ADDED: peroneal n  Glides, tibial n  glides      BMG BMG BMG     IASTM R plantar fascia emphasis @ heel    BMG -- --           Exercise Diary  10/14 10/18 10/21 10/25 10/30 11/1 11/4 11/8     Stationary bike w/ lumbar roll x8' x8' x8' x8' x8' lvl 3 x8' lvl 3 x8' --     HEP: R SAQ, SAQ VMO  -- -- -- -- -- -- -- --     HEP: R Quad set -- -- -- -- -- -- -- --     HEP: R heel slides -- -- -- -- -- -- -- --     Ball squeeze -- -- -- -- -- -- -- --     Clamshells supine orange tband -- -- -- -- -- -- -- --     Bridges w/ball squeeze 3x10 3x10 -- -- -- -- -- --      LAQs toe straight/toe out -- 2x10 ea   -- -- -- -- -- --     Sciatic n  Phyllis R x30 -- -- -- -- X30 -- x30     Piriformis stretch 30"x3 30"x3 30"x3 30"x3 bilat  30"x3  bilat 30"x3 bilat 30"x3 --     Standing TRs/*HRs -- -- -- -- -- -- -- --     Prone on elbows knee extension stretch  -- -- -- -- -- -- -- --     Supine ITB stretch with strap -- -- -- -- -- -- -- --     REIL w/self OP -- -- -- 3x10 3x10 3x10 3x10 + mob belt 3x10     Tandem on foam -- -- 2x30" bilat  2x30"  bilat  -- -- --     Eccentric step downs forw/lat 4" -- -- 2x10 ea  2x10 ea  2x10 ea  3x10 ea  3x10 ea  3x15 ea  Side stepping with GTB -- 5 laps 5 laps 5 laps 5 laps -- -- --     ADDED: monster walks GTB        3 laps     Seated extension with self OP 3x10 3x10 -- -- -- -- 3x10 --     ADDED mini squats @ bar -- -- -- -- -- -- -- --     Prolonged knee ext stretch -- -- -- -- -- -- -- --     SLR 4 way  3x10 ea  3x10 3x10 ea  3x10 ea  3x10 ea  3x10 ea  1# 3x10 ea  1# 3x10 ea       TKE OTB -- -- -- -- -- -- -- --     R gastroc stretch off step  3x30" 3x30" 2x30" 2x30"  3x30" 3x30" 3x30"     cone taps R SLS -- -- -- -- -- -- -- --     quad stretch w/strap 30"x3 30"x3 30"x3 30"x3 30"x3 30"x3 30"x3 30"x3     Seated hamstring stretch   3x30" 3x30" 3x30" -- -- --     ADDED: self peoneal n  glides      x30 -- --     Rosalinda OTB   x20 bilat x20 bilat x20 bilat -- -- --     ADDED: ITB stretch w/ strap        30"x3 30"x3           x20 x20      ambulation with L heel lift    x50' NP -- --          Modalities  8/8 8/12 8/16 8/26         ICE (post treat L knee) x10 min x10min NP x10min

## 2019-11-08 NOTE — ASSESSMENT & PLAN NOTE
The patient will continue all of his current medications and I did give him a prescription for lidocaine viscous solution to use when he needs it  I have referred him to [facility]  They should be able to see most of what he has had done as [facility] are [facility] and his studies from [place] are scanned in the chart  I did reassure him that if they need anything else they will certainly reach out to the GI in [place]  If he has trouble getting within the next couple of weeks he will let me know

## 2019-11-11 ENCOUNTER — OFFICE VISIT (OUTPATIENT)
Dept: PHYSICAL THERAPY | Facility: CLINIC | Age: 69
End: 2019-11-11
Payer: COMMERCIAL

## 2019-11-11 DIAGNOSIS — S83.241A OTHER TEAR OF MEDIAL MENISCUS, CURRENT INJURY, RIGHT KNEE, INITIAL ENCOUNTER: Primary | ICD-10-CM

## 2019-11-11 PROCEDURE — 97110 THERAPEUTIC EXERCISES: CPT

## 2019-11-11 NOTE — PROGRESS NOTES
Daily Note     Today's date: 2019  Patient name: Agusto Durbin  : 1950  MRN: [id number]  Referring provider: Yong Ruiz  Dx:   Encounter Diagnosis     ICD-10-CM    1  Other tear of medial meniscus, current injury, right knee, initial encounter S83 728A                   Subjective: Pt states his R knee is beginning to feel better, especially during sleep  States R heel is doing better as well  "Back is tired from doing a lot of work this weekend "      Objective: See treatment diary below      Assessment: Tolerated treatment well  Pt able to progress to 6" eccentric heel taps without onset of symptoms  Pt continues to demonstrate R trunk lean during gait, however, states R heel pain is the limiting factor rather than R knee pain  Pt unable to complete second session this week due to work schedule  Therefore, pt educated on progressions made today and urged to complete HEP including use of lumbar roll w/ driving in order to sustain progress  Patient would benefit from continued PT      Plan: Continue per plan of care        Precautions: None      Manual  10/14  x8' 10/18  x8' 10/21  x8' 10/25  x10' 10/30 11/1  x10' 11/4  x8' 11/8  x5' 11/11  x5'    A-P Femorotibial mobs, A-P tibiofemoral mobs --  -- -- -- --       PROM knee flex/ext --  -- -- -- --       Patellar mobs all directions --  -- -- -- --       Tibiofemoral distraction + ER BMG BMG BMG BMG BMG  BMG BMG BMG BMG    Medial tibiofemoral jt gapping (valgus) knee bent  --  -- -- -- -       Kinesiotape R knee medial jt gapping --  -- -- -- --       Posterior tibiofemoral mobs with ER, repeated with quad sets --  -- -- -- --       REIL w/ PT OP 3x10 BMG 3x10  BMG 3x10 BMG -- -- --       ADDED: hamstring stretch      BMG BMG BMG     ADDED: peroneal n  Glides, tibial n  glides      BMG BMG BMG     IASTM R plantar fascia emphasis @ heel    BMG -- --           Exercise Diary  10/14 10/18 10/21 10/25 10/30 11/1 11/4 11/8 11/11    Stationary bike w/ lumbar roll x8' x8' x8' x8' x8' lvl 3 x8' lvl 3 x8' -- lvl 3 x8'    HEP: R SAQ, SAQ VMO  -- -- -- -- -- -- -- -- --    HEP: R Quad set -- -- -- -- -- -- -- -- --    HEP: R heel slides -- -- -- -- -- -- -- -- --    Ball squeeze -- -- -- -- -- -- -- -- --    Clamshells supine orange tband -- -- -- -- -- -- -- -- --    Bridges w/ball squeeze 3x10 3x10 -- -- -- -- -- -- --     LAQs toe straight/toe out -- 2x10 ea   -- -- -- -- -- -- --    Sciatic n  Phyllis R x30 -- -- -- -- X30 -- x30 --    Piriformis stretch 30"x3 30"x3 30"x3 30"x3 bilat  30"x3  bilat 30"x3 bilat 30"x3 -- --    Standing TRs/*HRs -- -- -- -- -- -- -- -- --    Prone on elbows knee extension stretch  -- -- -- -- -- -- -- -- --    REIL w/self OP -- -- -- 3x10 3x10 3x10 3x10 + mob belt 3x10 + mob belt 3x10    Tandem on foam -- -- 2x30" bilat  2x30"  bilat  -- -- -- --    Eccentric step downs forw/lat 6" -- -- 2x10 ea  2x10 ea  2x10 ea  3x10 ea  3x10 ea  3x15 ea  2x10 ea  Side stepping with GTB -- 5 laps 5 laps 5 laps 5 laps -- -- -- 5 laps    ADDED: monster walks GTB        3 laps 3 laps    Seated extension with self OP 3x10 3x10 -- -- -- -- 3x10 -- --    ADDED mini squats @ bar -- -- -- -- -- -- -- -- --    Prolonged knee ext stretch -- -- -- -- -- -- -- -- --    SLR 4 way  3x10 ea  3x10 3x10 ea  3x10 ea  3x10 ea  3x10 ea  1# 3x10 ea  1# 3x10 ea  1 5# 3x10 ea      TKE OTB -- -- -- -- -- -- -- -- --    R gastroc stretch off step  3x30" 3x30" 2x30" 2x30"  3x30" 3x30" 3x30" 3x30"    cone taps R SLS -- -- -- -- -- -- -- -- --    quad stretch w/strap 30"x3 30"x3 30"x3 30"x3 30"x3 30"x3 30"x3 30"x3 30"x3    Seated hamstring stretch   3x30" 3x30" 3x30" -- -- -- 30"x3    ADDED: self peoneal n  glides      x30 -- --     Rosalinda OTB   x20 bilat x20 bilat x20 bilat -- -- -- --    ADDED: ITB stretch w/ strap        30"x3 30"x3 30"x3    STS LLE bias      x20 x20 x20 x20    ambulation with L heel lift    x50' NP -- --          Modalities  8/8 8/12 8/16 8/26 ICE (post treat L knee) x10 min x10min NP x10min

## 2019-11-18 ENCOUNTER — APPOINTMENT (OUTPATIENT)
Dept: PHYSICAL THERAPY | Facility: CLINIC | Age: 69
End: 2019-11-18
Payer: COMMERCIAL

## 2019-11-18 ENCOUNTER — HOSPITAL ENCOUNTER (OUTPATIENT)
Dept: RADIOLOGY | Facility: CLINIC | Age: 69
Discharge: HOME | End: 2019-11-18
Payer: COMMERCIAL

## 2019-11-18 VITALS
SYSTOLIC BLOOD PRESSURE: 149 MMHG | OXYGEN SATURATION: 94 % | TEMPERATURE: 98.3 F | HEART RATE: 83 BPM | RESPIRATION RATE: 18 BRPM | DIASTOLIC BLOOD PRESSURE: 87 MMHG

## 2019-11-18 DIAGNOSIS — M51.26 LUMBAR DISC HERNIATION: ICD-10-CM

## 2019-11-18 DIAGNOSIS — M54.16 LUMBAR RADICULOPATHY: ICD-10-CM

## 2019-11-18 PROCEDURE — 64484 NJX AA&/STRD TFRM EPI L/S EA: CPT | Performed by: ANESTHESIOLOGY

## 2019-11-18 PROCEDURE — 64483 NJX AA&/STRD TFRM EPI L/S 1: CPT | Performed by: ANESTHESIOLOGY

## 2019-11-18 RX ORDER — LIDOCAINE HYDROCHLORIDE 10 MG/ML
5 INJECTION, SOLUTION EPIDURAL; INFILTRATION; INTRACAUDAL; PERINEURAL ONCE
Status: COMPLETED | OUTPATIENT
Start: 2019-11-18 | End: 2019-11-18

## 2019-11-18 RX ORDER — PAPAVERINE HCL 150 MG
20 CAPSULE, EXTENDED RELEASE ORAL ONCE
Status: COMPLETED | OUTPATIENT
Start: 2019-11-18 | End: 2019-11-18

## 2019-11-18 RX ADMIN — DEXAMETHASONE SODIUM PHOSPHATE 20 MG: 10 INJECTION, SOLUTION INTRAMUSCULAR; INTRAVENOUS at 14:11

## 2019-11-18 RX ADMIN — LIDOCAINE HYDROCHLORIDE 2 ML: 20 INJECTION, SOLUTION EPIDURAL; INFILTRATION; INTRACAUDAL at 14:14

## 2019-11-18 RX ADMIN — IOHEXOL 1 ML: 300 INJECTION, SOLUTION INTRAVENOUS at 14:12

## 2019-11-18 RX ADMIN — LIDOCAINE HYDROCHLORIDE 4 ML: 10 INJECTION, SOLUTION EPIDURAL; INFILTRATION; INTRACAUDAL; PERINEURAL at 14:11

## 2019-11-18 NOTE — H&P
History of Present Illness: The patient is a 77 y o  male who presents with complaints of low back and leg pain  Patient Active Problem List   Diagnosis    Basal cell carcinoma of scalp    Esophageal motility disorder    Gastroesophageal reflux disease    Hyperlipidemia    Impingement syndrome of left shoulder    Obstructive sleep apnea    Seasonal affective disorder (HCC)    Vitamin D deficiency    Cervical radiculopathy    Hypersomnia    Anxiety    Obesity (BMI 30-39  9)    Sleep disturbance    Benign paroxysmal positional vertigo due to bilateral vestibular disorder    PLMD (periodic limb movement disorder)    Lumbar radiculopathy    Multiple polyps of sigmoid colon    Chronic pain of right knee    Chronic hip pain, bilateral    S/P arthroscopic partial medial meniscectomy    Heel spur, left       Past Medical History:   Diagnosis Date    Acute medial meniscus tear     Allergic rhinitis     Allergic rhinitis     last assessed 5/8/14    Anxiety     Appendicitis     Arthritis     Benign essential hypertension     last assessed 1/6/14    Biceps tendon tear     Cancer (HCC)     skin    Cervical radiculopathy     and lumbar    Chronic pain disorder     knees and hips     Colon polyps     CPAP (continuous positive airway pressure) dependence     Depression     Depression with anxiety     Dyskinesia of esophagus     Erythema migrans (Lyme disease)     last assessed 10/16/12    Fatigue     GERD (gastroesophageal reflux disease)     Herpes     Herpes zoster     Hyperlipidemia     Hyperplastic colon polyp     last assessed 11/14/14    Joint pain     Kidney stones     Lyme disease     Nephrolithiasis     Panic disorder with agoraphobia     last assessed 8/7/13    Seasonal affective disorder ([address] )     Sleep apnea     Vertigo     Vitamin D deficiency        Past Surgical History:   Procedure Laterality Date    APPENDECTOMY  08/01/2003    BICEPS TENDON REPAIR      KNEE ARTHROSCOPY      with medial meniscus repair, resolved 01/01/05    KNEE CARTILAGE SURGERY      OTHER SURGICAL HISTORY      distal reinsertion of ruptured biceps tendon    MT KNEE SCOPE,MED/LAT MENISECTOMY Right 7/30/2019    Procedure: RIGHT KNEE ARTHROSCOPY, MEDIAL MENISCECTOMY;  Surgeon: Rosendo Cates DO;  Location:  MAIN OR;  Service: Orthopedics    TOOTH EXTRACTION      Middle of October 2019    VASECTOMY           Current Outpatient Medications:     ALPRAZolam (XANAX) 0 25 mg tablet, Take 1 tablet (0 25 mg total) by mouth 3 (three) times a day as needed for anxiety, Disp: 30 tablet, Rfl: 0    cholecalciferol (VITAMIN D3) 1,000 units tablet, Take 1,000 Units by mouth daily, Disp: , Rfl:     ciclopirox (LOPROX) 0 77 % cream, apply twice a day to fungal rash on feet til cleared, Disp: , Rfl: 3    Coenzyme Q10 (COQ10 PO), Take by mouth daily, Disp: , Rfl:     escitalopram (LEXAPRO) 10 mg tablet, Take 1 tablet (10 mg total) by mouth daily, Disp: 30 tablet, Rfl: 0    esomeprazole (NexIUM) 40 MG capsule, Take 40 mg by mouth every morning before breakfast, Disp: , Rfl:     Glucosamine-Chondroit-Vit C-Mn (GLUCOSAMINE 1500 COMPLEX PO), Take by mouth, Disp: , Rfl:     HYDROcodone-acetaminophen (NORCO) 5-325 mg per tablet, Take 1 tablet by mouth every 4 (four) hours as needed for painMax Daily Amount: 6 tablets, Disp: 40 tablet, Rfl: 0    Hyoscyamine Sulfate SL 0 125 MG SUBL, Place 0 125 mg under the tongue 3 (three) times a day as needed (as needed), Disp: 60 each, Rfl: 1    JUBLIA 10 % SOLN, , Disp: , Rfl:     Lidocaine Viscous HCl (XYLOCAINE) 2 % mucosal solution, Swish and spit 15 mL 4 (four) times a day as needed for mild pain, Disp: 100 mL, Rfl: 1    metaxalone (SKELAXIN) 800 mg tablet, Take 1 tablet (800 mg total) by mouth every 6 (six) hours as needed for muscle spasms, Disp: 60 tablet, Rfl: 1    Multiple Vitamins-Minerals (MULTI FOR HIM 50+) TABS, Take by mouth, Disp: , Rfl:     Omega-3 Fatty Acids (FISH OIL) 1,000 mg, Take 1,000 mg by mouth daily, Disp: , Rfl:     pravastatin (PRAVACHOL) 80 mg tablet, TAKE ONE TABLET BY MOUTH EVERY DAY AT BEDTIME, Disp: 30 tablet, Rfl: 5    Current Facility-Administered Medications:     dexamethasone (PF) (DECADRON) injection 20 mg, 20 mg, Epidural, Once, Rick Long DO    iohexol (OMNIPAQUE) 300 mg/mL injection 50 mL, 50 mL, Epidural, Once, Rick Long DO    lidocaine (PF) (XYLOCAINE-MPF) 1 % injection 5 mL, 5 mL, Other, Once, Rick Long DO    lidocaine (PF) (XYLOCAINE-MPF) 2 % injection 5 mL, 5 mL, Epidural, Once, Rick Long DO    Allergies   Allergen Reactions    Pseudoephedrine Hives     Reaction Date: [id number];     Sulfa Antibiotics Dizziness       Physical Exam:   General: Awake, Alert, Oriented x 3, Mood and affect appropriate  Respiratory: Respirations even and unlabored  Cardiovascular: Peripheral pulses intact; no edema  Musculoskeletal Exam:  Decreased range of motion lumbar spine    ASA Score: II         Assessment:   1  Lumbar radiculopathy    2  Lumbar disc herniation        Plan: Right L4 and right L5 TFESI 1

## 2019-11-18 NOTE — DISCHARGE INSTR - LAB
Epidural Steroid Injection   WHAT YOU NEED TO KNOW:   An epidural steroid injection (JUAN ANTONIO) is a procedure to inject steroid medicine into the epidural space  The epidural space is between your spinal cord and vertebrae  Steroids reduce inflammation and fluid buildup in your spine that may be causing pain  You may be given pain medicine along with the steroids  ACTIVITY  · Do not drive or operate machinery today  · No strenuous activity today - bending, lifting, etc   · You may resume normal activites starting tomorrow - start slowly and as tolerated  · You may shower today, but no tub baths or hot tubs  · You may have numbness for several hours from the local anesthetic  Please use caution and common sense, especially with weight-bearing activities  CARE OF THE INJECTION SITE  · If you have soreness or pain, apply ice to the area today (20 minutes on/20 minutes off)  · Starting tomorrow, you may use warm, moist heat or ice if needed  · You may have an increase or change in your discomfort for 36-48 hours after your treatment  · Apply ice and continue with any pain medication you have been prescribed  · Notify [facility] if you have any of the following: redness, drainage, swelling, headache, stiff neck or fever above 100°F     SPECIAL INSTRUCTIONS  · Our office will contact you in approximately 7 days for a progress report  MEDICATIONS  · Continue to take all routine medications  · Our office may have instructed you to hold some medications  If you have a problem specifically related to your procedure, please call our office at [phone]  Problems not related to your procedure should be directed to your primary care physician

## 2019-11-19 DIAGNOSIS — M54.16 LUMBAR RADICULOPATHY: ICD-10-CM

## 2019-11-19 RX ORDER — HYDROCODONE BITARTRATE AND ACETAMINOPHEN 5; 325 MG/1; MG/1
1 TABLET ORAL EVERY 4 HOURS PRN
Qty: 40 TABLET | Refills: 0 | Status: SHIPPED | OUTPATIENT
Start: 2019-11-19 | End: 2020-03-21

## 2019-11-20 ENCOUNTER — OFFICE VISIT (OUTPATIENT)
Dept: OBGYN CLINIC | Facility: CLINIC | Age: 69
End: 2019-11-20
Payer: COMMERCIAL

## 2019-11-20 VITALS
SYSTOLIC BLOOD PRESSURE: 142 MMHG | BODY MASS INDEX: 32.47 KG/M2 | DIASTOLIC BLOOD PRESSURE: 96 MMHG | WEIGHT: 245 LBS | HEIGHT: 73 IN | HEART RATE: 88 BPM

## 2019-11-20 DIAGNOSIS — Z98.890 S/P ARTHROSCOPIC PARTIAL MEDIAL MENISCECTOMY: Primary | ICD-10-CM

## 2019-11-20 PROCEDURE — 99213 OFFICE O/P EST LOW 20 MIN: CPT | Performed by: ORTHOPAEDIC SURGERY

## 2019-11-20 NOTE — PROGRESS NOTES
Knee Post Operative Visit     Assesment:     77year old Male Surgical Arthroscopy of the right knee with medial meniscectomy, DOS: 7/30/19    Plan:    Post-Operative treatment:    Ice to knee for 20 minutes at least 1-2 times daily  PT for ROM/strengthening to knee, hip and core  OTC NSAIDS prn for pain  Imaging:    No imaging was available for review today  Weight bearing:  as tolerated     ROM:  Full    Brace:  No brace needed    DVT Prophylaxis:  Ambulation    Follow up:   As needed     Patient was advised that if they have any fevers, chills, chest pain, shortness of breath, redness or drainage from the incision, please let our office know immediately  Chief Complaint   Patient presents with    Right Knee - Follow-up       History of Present Illness: The patient is a 77 y o  male who is being evaluated post operatively 3 5 months  status post Surgical Arthroscopy of the right knee with medial meniscectomy, DOS: 7/30/19  Since the prior visit, He reports significant improvement  He stated that 2 weeks ago he finally turned the corner in relief of pain in the medial aspect of the knee  He stated that the quality of pain and intensity has greatly reduced, at night and with weight bearing throughout the day  Additionally he stated that 2 weeks ago he went to his Podiatrist who is treating him for a heel spur on the right heel  He was given heel cups and a brace to wear at night which has been relieving his knee pain at night as well  He also reports that he has been making improvement in PT with strengthening  Pain is well controlled  The patient is using ice to control swelling  They have started physical therapy  The patient ambulating for DVT ppx  The patient has been ambulating without crutches  The patient has been ambulating without a brace  The patient denies any fevers, chills, calf pain, chest pain/shortness of breath, redness or drainage from the incision  I have reviewed the past medical, surgical, social and family history, medications and allergies as documented in the EMR  Review of systems: ROS is negative other than that noted in the HPI  Constitutional: Negative for fatigue and fever  Physical Exam:    Blood pressure 142/96, pulse 88, height 6' 1" (1 854 m), weight 111 kg (245 lb)      General/Constitutional: NAD, well developed, well nourished  HENT: Normocephalic, atraumatic  CV: Intact distal pulses, regular rate  Resp: No respiratory distress or labored breathing  Lymphatic: No lymphadenopathy palpated  Neuro: Alert and Oriented x 3, no focal deficits  Psych: Normal mood, normal affect, normal judgement, normal behavior  Skin: Warm, dry, no rashes, no erythema      Knee Exam (focused):                  RIGHT LEFT   ROM:   0-130 0-130   Palpation: Effusion negative negative     MJL tenderness Negative Negative     LJL tenderness Negative Negative   Instability: Varus stable stable     Valgus stable stable   Special Tests: Lachman Negative Negative     Posterior drawer Negative Negative     Anterior drawer Negative Negative     Pivot shift not tested not tested     Dial not tested not tested   Patella: Palpation no tenderness no tenderness     Mobility 1/4 1/4     Apprehension Negative Negative   Other: Single leg 1/4 squat not tested not tested      Incisions show no erythema, no drainage    LE NV Exam: +2 DP/PT pulses bilaterally  Sensation intact to light touch L2-S1 bilaterally     Bilateral hip ROM demonstrates no pain actively or passively    No calf tenderness to palpation bilaterally

## 2019-11-21 ENCOUNTER — CLINICAL SUPPORT (OUTPATIENT)
Dept: FAMILY MEDICINE CLINIC | Facility: CLINIC | Age: 69
End: 2019-11-21
Payer: COMMERCIAL

## 2019-11-21 DIAGNOSIS — R68.82 DECREASED LIBIDO: ICD-10-CM

## 2019-11-21 DIAGNOSIS — F33.8 SEASONAL AFFECTIVE DISORDER (HCC): Primary | ICD-10-CM

## 2019-11-21 PROCEDURE — 36415 COLL VENOUS BLD VENIPUNCTURE: CPT | Performed by: FAMILY MEDICINE

## 2019-11-22 ENCOUNTER — OFFICE VISIT (OUTPATIENT)
Dept: PHYSICAL THERAPY | Facility: CLINIC | Age: 69
End: 2019-11-22
Payer: COMMERCIAL

## 2019-11-22 DIAGNOSIS — S83.241A OTHER TEAR OF MEDIAL MENISCUS, CURRENT INJURY, RIGHT KNEE, INITIAL ENCOUNTER: Primary | ICD-10-CM

## 2019-11-22 LAB — TESTOST SERPL-MCNC: 468 NG/DL (ref 264–916)

## 2019-11-22 PROCEDURE — 97140 MANUAL THERAPY 1/> REGIONS: CPT

## 2019-11-22 PROCEDURE — 97110 THERAPEUTIC EXERCISES: CPT

## 2019-11-22 NOTE — PROGRESS NOTES
PT D/C SUMMARY    Today's date: 2019  Patient name: Concha Alfaro  : 1950  MRN: [id number]  Referring provider: Preston Coleman  Dx:   Encounter Diagnosis     ICD-10-CM    1  Other tear of medial meniscus, current injury, right knee, initial encounter [id number]                   Assessment  Assessment details: Bernice Traore has made significant improvements in strength, functional mobility, and range of motion  Pt continues to demonstrate abnormal gait, however, this is d/t unrelated R foot condition  Pt has demonstrated independence with HEP, maintaining proper form throughout  Pt reports [place] of 80%  Pt D/C with updated HEP at this time d/t goals met and ability to manage condition with HEP  Pt may return for LBP pending success of injections  Understanding of Dx/Px/POC: good   Prognosis: good    Goals  ST  The patient will be fully compliant with HEP within two weeks (MET 19)   2  Pt will report a decrease in pain on VAS by at least two points with two weeks (MET 19)    LT  The patient will increase FOTO score to 73 within 8 weeks (NOT MET)  2  The patient will report 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 indicating return to functional baseline within eight weeks  (MET 19)          Subjective Evaluation    History of Present Illness  Mechanism of injury: Pt states he has finally noticed improvements in function and decreased pain over the last two weeks  States he was able to golf last week without onset of pain  States he continues to have mild pain over medial jt line, however, is vastly improved  Pt states main limiting component is bone spur in heel despite using heel inserts and brace while sleeping  Also reports he received cortizone injection in l/s last week, with scheduled second injection in near future     Pain  Alleviating factors: resting leg when driving: using cruise control, laying down   Exacerbated by: driving     Social Support    Employment status: working ( )        Objective     Neurological Testing     Sensation     Lumbar   Left   Intact: light touch    Right   Intact: light touch    Reflexes   Left   Patellar (L4): normal (2+)  Achilles (S1): trace (1+)    Right   Patellar (L4): normal (2+)  Achilles (S1): trace (1+)    Active Range of Motion     Lumbar   Flexion:  WFL  Extension:  WFL  Left lateral flexion:  WFL  Right lateral flexion:  WFL  Left rotation:  WFL  Right rotation:  [facility]    Strength/Myotome Testing     Left Hip   Planes of Motion   Flexion: 5  Extension: 5  Abduction: 5    Right Hip   Planes of Motion   Flexion: 5  Extension: 5  Abduction: 5    Left Knee   Flexion: 5  Extension: 5    Right Knee   Flexion: 5  Extension: 5    Left Ankle/Foot   Dorsiflexion: 5  Plantar flexion: 5    Right Ankle/Foot   Dorsiflexion: 5  Plantar flexion: 5      Knee ROM:      R           L  Flex:                  135        145  Ext:                      0          10          Precautions: Positional vertigo, history of cervical pain, anxiety      Manual  10/14  x8' 10/18  x8' 10/21  x8' 10/25  x10' 10/30 11/1  x10' 11/4  x8' 11/8  x5' 11/11  x5' 11/22  x8'   A-P Femorotibial mobs, A-P tibiofemoral mobs --  -- -- -- --       PROM knee flex/ext --  -- -- -- --       Patellar mobs all directions --  -- -- -- --       Tibiofemoral distraction + ER BMG BMG BMG BMG BMG  BMG BMG BMG BMG BMG   Medial tibiofemoral jt gapping (valgus) knee bent  --  -- -- -- -       Kinesiotape R knee medial jt gapping --  -- -- -- --       Posterior tibiofemoral mobs with ER, repeated with quad sets --  -- -- -- --       REIL w/ PT OP 3x10 BMG 3x10  BMG 3x10 BMG -- -- --       ADDED: hamstring stretch      BMG BMG BMG     ADDED: peroneal n  Glides, tibial n  glides      BMG BMG BMG     IASTM R plantar fascia emphasis @ heel    BMG -- --       Reassessment          x3'       Exercise Diary  10/14 10/18 10/21 10/25 10/30 11/1 11/4 11/8 11/11 11/22 Stationary bike w/ lumbar roll x8' x8' x8' x8' x8' lvl 3 x8' lvl 3 x8' -- lvl 3 x8' lvl 3 x5'   HEP: R SAQ, SAQ VMO  -- -- -- -- -- -- -- -- -- --   HEP: R Quad set -- -- -- -- -- -- -- -- -- --   HEP: R heel slides -- -- -- -- -- -- -- -- -- --   Ball squeeze -- -- -- -- -- -- -- -- -- --   Clamshells supine orange tband -- -- -- -- -- -- -- -- -- --   Bridges w/ball squeeze 3x10 3x10 -- -- -- -- -- -- -- --    LAQs toe straight/toe out -- 2x10 ea   -- -- -- -- -- -- -- --   Sciatic otis Ferguson R x30 -- -- -- -- X30 -- x30 -- --   Piriformis stretch 30"x3 30"x3 30"x3 30"x3 bilat  30"x3  bilat 30"x3 bilat 30"x3 -- -- 30"x3   Standing TRs/*HRs -- -- -- -- -- -- -- -- -- --   Prone on elbows knee extension stretch  -- -- -- -- -- -- -- -- -- --   REIL w/self OP -- -- -- 3x10 3x10 3x10 3x10 + mob belt 3x10 + mob belt 3x10 + mob belt 3x10   Tandem on foam -- -- 2x30" bilat  2x30"  bilat  -- -- -- --    Eccentric step downs forw/lat 6" -- -- 2x10 ea  2x10 ea  2x10 ea  3x10 ea  3x10 ea  3x15 ea  2x10 ea  3x10 ea  Side stepping with GTB -- 5 laps 5 laps 5 laps 5 laps -- -- -- 5 laps --   ADDED: monster walks GTB        3 laps 3 laps --   Seated extension with self OP 3x10 3x10 -- -- -- -- 3x10 -- -- 3x10   ADDED mini squats @ bar -- -- -- -- -- -- -- -- -- --   Prolonged knee ext stretch -- -- -- -- -- -- -- -- -- --   SLR 4 way  3x10 ea  3x10 3x10 ea  3x10 ea  3x10 ea  3x10 ea  1# 3x10 ea  1# 3x10 ea  1 5# 3x10 ea  0# 3x10 ea     TKE OTB -- -- -- -- -- -- -- -- --    R gastroc stretch off step  3x30" 3x30" 2x30" 2x30"  3x30" 3x30" 3x30" 3x30" 3x30"   cone taps R SLS -- -- -- -- -- -- -- -- --    quad stretch w/strap 30"x3 30"x3 30"x3 30"x3 30"x3 30"x3 30"x3 30"x3 30"x3 30"x3   Seated hamstring stretch   3x30" 3x30" 3x30" -- -- -- 30"x3 30"x3   ADDED: self peoneal n  glides      x30 -- --  --   Rosalinda OTB   x20 bilat x20 bilat x20 bilat -- -- -- -- --   ADDED: ITB stretch w/ strap        30"x3 30"x3 30"x3 30"x3   STS LLE bias      x20 x20 x20 x20 --   ambulation with L heel lift    x50' NP -- --   --       Modalities  8/8 8/12 8/16 8/26         ICE (post treat L knee) x10 min x10min NP x10min

## 2019-11-25 ENCOUNTER — TELEPHONE (OUTPATIENT)
Dept: PAIN MEDICINE | Facility: CLINIC | Age: 69
End: 2019-11-25

## 2019-11-25 NOTE — TELEPHONE ENCOUNTER
Dr Johnathan Cain Procedure F/u (S/p TFESI R L4-L5   F/u 12/9 LESI to Left)       Spoke with pt states that he has no pain with 100% relief     F/u12/9 LESI to Left

## 2019-11-26 ENCOUNTER — APPOINTMENT (OUTPATIENT)
Dept: PHYSICAL THERAPY | Facility: CLINIC | Age: 69
End: 2019-11-26
Payer: COMMERCIAL

## 2019-11-26 NOTE — TELEPHONE ENCOUNTER
S/w pt, advised that steroid injections do not prevent pain - only treat the pain that is there  Also, steroid load is monitored - at some point, injections may need to be put off for a period of time in order to avoid excess steroid and complications  Advised pt, if pain is not interfering with ADL's, steroid injeciton may not be appropriate at this time  Pt stated that he scheduled two injections, the second is for his left sided pain which is intermittant  Pt stated that at the time of the call yesterday - he did not have pain  Advised pt, he may want to take some time to see how he does with the first injection and cb after the holiday weekend to discuss and confirm / cancel the procedure on 12/9  Pt verbalized understanding  Stated that he will cb to discuss

## 2019-11-26 NOTE — TELEPHONE ENCOUNTER
Patient called back and says, yes, he would like to have the injection that is scheduled on 12/9/19 that is for the left side  He says he is having intermittent pain

## 2019-11-29 ENCOUNTER — APPOINTMENT (OUTPATIENT)
Dept: PHYSICAL THERAPY | Facility: CLINIC | Age: 69
End: 2019-11-29
Payer: COMMERCIAL

## 2019-12-01 DIAGNOSIS — F41.9 ANXIETY: ICD-10-CM

## 2019-12-01 DIAGNOSIS — F33.8 SEASONAL AFFECTIVE DISORDER (HCC): ICD-10-CM

## 2019-12-01 RX ORDER — ESCITALOPRAM OXALATE 10 MG/1
10 TABLET ORAL DAILY
Qty: 30 TABLET | Refills: 2 | Status: SHIPPED | OUTPATIENT
Start: 2019-12-01 | End: 2020-01-06 | Stop reason: SDUPTHER

## 2019-12-13 ENCOUNTER — HOSPITAL ENCOUNTER (OUTPATIENT)
Dept: RADIOLOGY | Facility: CLINIC | Age: 69
Discharge: HOME | End: 2019-12-13
Attending: ANESTHESIOLOGY | Admitting: ANESTHESIOLOGY
Payer: COMMERCIAL

## 2019-12-13 VITALS
OXYGEN SATURATION: 97 % | DIASTOLIC BLOOD PRESSURE: 79 MMHG | RESPIRATION RATE: 20 BRPM | HEART RATE: 56 BPM | TEMPERATURE: 98.2 F | SYSTOLIC BLOOD PRESSURE: 145 MMHG

## 2019-12-13 DIAGNOSIS — M54.16 LUMBAR RADICULOPATHY: ICD-10-CM

## 2019-12-13 PROCEDURE — 62323 NJX INTERLAMINAR LMBR/SAC: CPT | Performed by: ANESTHESIOLOGY

## 2019-12-13 RX ORDER — LIDOCAINE HYDROCHLORIDE 10 MG/ML
5 INJECTION, SOLUTION EPIDURAL; INFILTRATION; INTRACAUDAL; PERINEURAL ONCE
Status: COMPLETED | OUTPATIENT
Start: 2019-12-13 | End: 2019-12-13

## 2019-12-13 RX ORDER — METHYLPREDNISOLONE ACETATE 80 MG/ML
160 INJECTION, SUSPENSION INTRA-ARTICULAR; INTRALESIONAL; INTRAMUSCULAR; PARENTERAL; SOFT TISSUE ONCE
Status: COMPLETED | OUTPATIENT
Start: 2019-12-13 | End: 2019-12-13

## 2019-12-13 RX ADMIN — LIDOCAINE HYDROCHLORIDE 3 ML: 10 INJECTION, SOLUTION EPIDURAL; INFILTRATION; INTRACAUDAL; PERINEURAL at 08:44

## 2019-12-13 RX ADMIN — IOHEXOL 1 ML: 300 INJECTION, SOLUTION INTRAVENOUS at 08:44

## 2019-12-13 RX ADMIN — METHYLPREDNISOLONE ACETATE 160 MG: 80 INJECTION, SUSPENSION INTRA-ARTICULAR; INTRALESIONAL; INTRAMUSCULAR; SOFT TISSUE at 08:44

## 2019-12-13 NOTE — H&P
History of Present Illness: The patient is a 77 y o  male who presents with complaints of low back and leg pain  Patient Active Problem List   Diagnosis    Basal cell carcinoma of scalp    Esophageal motility disorder    Gastroesophageal reflux disease    Hyperlipidemia    Impingement syndrome of left shoulder    Obstructive sleep apnea    Seasonal affective disorder (HCC)    Vitamin D deficiency    Cervical radiculopathy    Hypersomnia    Anxiety    Obesity (BMI 30-39  9)    Sleep disturbance    Benign paroxysmal positional vertigo due to bilateral vestibular disorder    PLMD (periodic limb movement disorder)    Lumbar radiculopathy    Multiple polyps of sigmoid colon    Chronic pain of right knee    Chronic hip pain, bilateral    S/P arthroscopic partial medial meniscectomy    Heel spur, left    Lumbar disc herniation       Past Medical History:   Diagnosis Date    Acute medial meniscus tear     Allergic rhinitis     Allergic rhinitis     last assessed 5/8/14    Anxiety     Appendicitis     Arthritis     Benign essential hypertension     last assessed 1/6/14    Biceps tendon tear     Cancer (HCC)     skin    Cervical radiculopathy     and lumbar    Chronic pain disorder     knees and hips     Colon polyps     CPAP (continuous positive airway pressure) dependence     Depression     Depression with anxiety     Dyskinesia of esophagus     Erythema migrans (Lyme disease)     last assessed 10/16/12    Fatigue     GERD (gastroesophageal reflux disease)     Herpes     Herpes zoster     Hyperlipidemia     Hyperplastic colon polyp     last assessed 11/14/14    Joint pain     Kidney stones     Lyme disease     Nephrolithiasis     Panic disorder with agoraphobia     last assessed 8/7/13    Seasonal affective disorder ([facility] [address] )     Sleep apnea     Vertigo     Vitamin D deficiency        Past Surgical History:   Procedure Laterality Date    APPENDECTOMY  08/01/2003    BICEPS TENDON REPAIR      KNEE ARTHROSCOPY      with medial meniscus repair, resolved 01/01/05    KNEE CARTILAGE SURGERY      OTHER SURGICAL HISTORY      distal reinsertion of ruptured biceps tendon    IN KNEE SCOPE,MED/LAT MENISECTOMY Right 7/30/2019    Procedure: RIGHT KNEE ARTHROSCOPY, MEDIAL MENISCECTOMY;  Surgeon: Weston Foster DO;  Location: [facility] MAIN OR;  Service: Orthopedics    TOOTH EXTRACTION      Middle of October 2019    VASECTOMY           Current Outpatient Medications:     ALPRAZolam (XANAX) 0 25 mg tablet, Take 1 tablet (0 25 mg total) by mouth 3 (three) times a day as needed for anxiety, Disp: 30 tablet, Rfl: 0    cholecalciferol (VITAMIN D3) 1,000 units tablet, Take 1,000 Units by mouth daily, Disp: , Rfl:     ciclopirox (LOPROX) 0 77 % cream, apply twice a day to fungal rash on feet til cleared, Disp: , Rfl: 3    Coenzyme Q10 (COQ10 PO), Take by mouth daily, Disp: , Rfl:     escitalopram (LEXAPRO) 10 mg tablet, Take 1 tablet (10 mg total) by mouth daily, Disp: 30 tablet, Rfl: 2    esomeprazole (NexIUM) 40 MG capsule, Take 40 mg by mouth every morning before breakfast, Disp: , Rfl:     Glucosamine-Chondroit-Vit C-Mn (GLUCOSAMINE 1500 COMPLEX PO), Take by mouth, Disp: , Rfl:     HYDROcodone-acetaminophen (NORCO) 5-325 mg per tablet, Take 1 tablet by mouth every 4 (four) hours as needed for painMax Daily Amount: 6 tablets, Disp: 40 tablet, Rfl: 0    Hyoscyamine Sulfate SL 0 125 MG SUBL, Place 0 125 mg under the tongue 3 (three) times a day as needed (as needed), Disp: 60 each, Rfl: 1    JUBLIA 10 % SOLN, , Disp: , Rfl:     Lidocaine Viscous HCl (XYLOCAINE) 2 % mucosal solution, Swish and spit 15 mL 4 (four) times a day as needed for mild pain, Disp: 100 mL, Rfl: 1    metaxalone (SKELAXIN) 800 mg tablet, Take 1 tablet (800 mg total) by mouth every 6 (six) hours as needed for muscle spasms, Disp: 60 tablet, Rfl: 1    Multiple Vitamins-Minerals (MULTI FOR HIM 50+) TABS, Take by mouth, Disp: , Rfl:     Omega-3 Fatty Acids (FISH OIL) 1,000 mg, Take 1,000 mg by mouth daily, Disp: , Rfl:     pravastatin (PRAVACHOL) 80 mg tablet, TAKE ONE TABLET BY MOUTH EVERY DAY AT BEDTIME, Disp: 30 tablet, Rfl: 5    Current Facility-Administered Medications:     iohexol (OMNIPAQUE) 300 mg/mL injection 50 mL, 50 mL, Epidural, Once, Rick Long DO    lidocaine (PF) (XYLOCAINE-MPF) 1 % injection 5 mL, 5 mL, Other, Once, Rick Long DO    methylPREDNISolone acetate (DEPO-MEDROL) injection 160 mg, 160 mg, Epidural, Once, Priscila Jimenes DO    Allergies   Allergen Reactions    Pseudoephedrine Hives     Reaction Date: 16Apr2011;     Sulfa Antibiotics Dizziness       Physical Exam:   Vitals:    12/13/19 0834   BP: 143/77   Pulse: 63   Resp: 20   Temp: 98 2 °F (36 8 °C)   SpO2: 97%     General: Awake, Alert, Oriented x 3, Mood and affect appropriate  Respiratory: Respirations even and unlabored  Cardiovascular: Peripheral pulses intact; no edema  Musculoskeletal Exam:  Decreased range of motion lumbar spine    ASA Score: II    Patient/Chart Verification  Patient ID Verified: Verbal  ID Band Applied: No  Consents Confirmed: Procedural  H&P( within 30 days) Verified: To be obtained in the Pre-Procedure area  Interval H&P(within 24 hr) Complete (required for Outpatients and Surgery Admit only): To be obtained in the Pre-Procedure area  Allergies Reviewed: Yes  Anticoag/NSAID held?: NA  Pre-op Lab/Test Results Available: N/A    Assessment:   1   Lumbar radiculopathy        Plan: LESI to left #2

## 2019-12-13 NOTE — DISCHARGE INSTRUCTIONS
Epidural Steroid Injection   WHAT YOU NEED TO KNOW:   An epidural steroid injection (JUAN ANTONIO) is a procedure to inject steroid medicine into the epidural space  The epidural space is between your spinal cord and vertebrae  Steroids reduce inflammation and fluid buildup in your spine that may be causing pain  You may be given pain medicine along with the steroids  ACTIVITY  · Do not drive or operate machinery today  · No strenuous activity today - bending, lifting, etc   · You may resume normal activites starting tomorrow - start slowly and as tolerated  · You may shower today, but no tub baths or hot tubs  · You may have numbness for several hours from the local anesthetic  Please use caution and common sense, especially with weight-bearing activities  CARE OF THE INJECTION SITE  · If you have soreness or pain, apply ice to the area today (20 minutes on/20 minutes off)  · Starting tomorrow, you may use warm, moist heat or ice if needed  · You may have an increase or change in your discomfort for 36-48 hours after your treatment  · Apply ice and continue with any pain medication you have been prescribed  · Notify the [facility] if you have any of the following: redness, drainage, swelling, headache, stiff neck or fever above 100°F     SPECIAL INSTRUCTIONS  · Our office will contact you in approximately 7 days for a progress report  MEDICATIONS  · Continue to take all routine medications  · Our office may have instructed you to hold some medications  If you have a problem specifically related to your procedure, please call our office at [phone]  Problems not related to your procedure should be directed to your primary care physician

## 2019-12-20 ENCOUNTER — TELEPHONE (OUTPATIENT)
Dept: PAIN MEDICINE | Facility: CLINIC | Age: 69
End: 2019-12-20

## 2019-12-20 DIAGNOSIS — E78.2 MIXED HYPERLIPIDEMIA: ICD-10-CM

## 2019-12-20 RX ORDER — PRAVASTATIN SODIUM 80 MG/1
80 TABLET ORAL
Qty: 30 TABLET | Refills: 5 | Status: SHIPPED | OUTPATIENT
Start: 2019-12-20 | End: 2020-09-15 | Stop reason: SDUPTHER

## 2019-12-20 NOTE — TELEPHONE ENCOUNTER
Dr Karlie Najera Procedure F/u (S/p  F/u 2/4 w/ DG )       1st attempt  Lm to cb with % improvement and pain level        Follow up on 2/4 with DG       Please obtain this information

## 2020-01-06 ENCOUNTER — OFFICE VISIT (OUTPATIENT)
Dept: FAMILY MEDICINE CLINIC | Facility: CLINIC | Age: 70
End: 2020-01-06
Payer: COMMERCIAL

## 2020-01-06 VITALS
HEIGHT: 73 IN | HEART RATE: 94 BPM | TEMPERATURE: 98.8 F | BODY MASS INDEX: 33.3 KG/M2 | WEIGHT: 251.25 LBS | OXYGEN SATURATION: 96 % | DIASTOLIC BLOOD PRESSURE: 72 MMHG | SYSTOLIC BLOOD PRESSURE: 138 MMHG

## 2020-01-06 DIAGNOSIS — Z13.0 SCREENING, ANEMIA, DEFICIENCY, IRON: ICD-10-CM

## 2020-01-06 DIAGNOSIS — F41.9 ANXIETY: ICD-10-CM

## 2020-01-06 DIAGNOSIS — Z13.1 SCREENING FOR DIABETES MELLITUS: ICD-10-CM

## 2020-01-06 DIAGNOSIS — E78.5 HYPERLIPIDEMIA, UNSPECIFIED HYPERLIPIDEMIA TYPE: ICD-10-CM

## 2020-01-06 DIAGNOSIS — F33.8 SEASONAL AFFECTIVE DISORDER (HCC): ICD-10-CM

## 2020-01-06 DIAGNOSIS — J01.10 ACUTE NON-RECURRENT FRONTAL SINUSITIS: Primary | ICD-10-CM

## 2020-01-06 DIAGNOSIS — Z12.5 SCREENING FOR PROSTATE CANCER: ICD-10-CM

## 2020-01-06 DIAGNOSIS — Z13.29 SCREENING FOR THYROID DISORDER: ICD-10-CM

## 2020-01-06 PROCEDURE — 1160F RVW MEDS BY RX/DR IN RCRD: CPT | Performed by: FAMILY MEDICINE

## 2020-01-06 PROCEDURE — 99214 OFFICE O/P EST MOD 30 MIN: CPT | Performed by: FAMILY MEDICINE

## 2020-01-06 PROCEDURE — 3008F BODY MASS INDEX DOCD: CPT | Performed by: FAMILY MEDICINE

## 2020-01-06 RX ORDER — PREDNISONE 20 MG/1
40 TABLET ORAL DAILY
Qty: 10 TABLET | Refills: 0 | Status: SHIPPED | OUTPATIENT
Start: 2020-01-06 | End: 2020-01-11

## 2020-01-06 RX ORDER — ALPRAZOLAM 0.25 MG/1
0.25 TABLET ORAL 3 TIMES DAILY PRN
Qty: 30 TABLET | Refills: 0 | OUTPATIENT
Start: 2020-01-06 | End: 2020-02-18 | Stop reason: SDUPTHER

## 2020-01-06 RX ORDER — ESCITALOPRAM OXALATE 10 MG/1
15 TABLET ORAL DAILY
Qty: 135 TABLET | Refills: 1 | Status: SHIPPED | OUTPATIENT
Start: 2020-01-06 | End: 2020-03-21

## 2020-01-06 RX ORDER — AMOXICILLIN AND CLAVULANATE POTASSIUM 875; 125 MG/1; MG/1
1 TABLET, FILM COATED ORAL EVERY 12 HOURS SCHEDULED
Qty: 20 TABLET | Refills: 0 | Status: SHIPPED | OUTPATIENT
Start: 2020-01-06 | End: 2020-01-16

## 2020-01-06 NOTE — PROGRESS NOTES
Subjective:   Chief Complaint   Patient presents with    Headache     Pt has had a headache since Thursday and some nausea  He has light and noise sensitivity  Pt did have a gum issue going on prior  He's not sure if he now has a sinus infection because he has also felt dizzy at times  Pt took excedrin, advil, and nasal spray and nothing has helped  Pt reminded to schedule colonoscopy  FBW due after 4-2-2020  Patient ID: Julio C Wilde is a 77 y o  male  The pt is here because he has been sick for 5 days - started with a headache like a migraine with light and noise sensitivity  + sinus pain/pressure  + ear pain and pressure  no cough  no nasal congestion  No PND  no sore throat  No fevers  Some nausea  He has had symptoms like this in the past and has been a sinus infection, he feels like he might have a sinus infection    Additionally, he has been taking Lexapro for his seasonal depression  He was on 10 mg would seem to be doing quite well but seems to have lost some of its affect  He is wondering about going up to 15 mg  He is taking Xanax on an as-needed basis and does need a refill  The following portions of the patient's history were reviewed and updated as appropriate: allergies, current medications, past family history, past medical history, past social history, past surgical history and problem list     Review of Systems          Objective:  Vitals:    01/06/20 1348   BP: 138/72   Pulse: 94   Temp: 98 8 °F (37 1 °C)   SpO2: 96%   Weight: 114 kg (251 lb 4 oz)   Height: 6' 1" (1 854 m)      Physical Exam   Constitutional: He is oriented to person, place, and time  Vital signs are normal  He appears well-developed and well-nourished  He appears ill  HENT:   Head: Normocephalic and atraumatic  Right Ear: Tympanic membrane and external ear normal    Left Ear: Tympanic membrane and external ear normal    Nose: Mucosal edema present  No rhinorrhea or sinus tenderness   Right sinus exhibits maxillary sinus tenderness  Right sinus exhibits no frontal sinus tenderness  Left sinus exhibits maxillary sinus tenderness  Left sinus exhibits no frontal sinus tenderness  Mouth/Throat: Mucous membranes are normal  Posterior oropharyngeal erythema present  No oropharyngeal exudate, posterior oropharyngeal edema or tonsillar abscesses  Eyes: Conjunctivae and lids are normal    Pulmonary/Chest: Effort normal and breath sounds normal    Lymphadenopathy:     He has cervical adenopathy  Neurological: He is alert and oriented to person, place, and time  Skin: Skin is warm, dry and intact  Psychiatric: He has a normal mood and affect  Thought content normal          Assessment/Plan:    Seasonal affective disorder (HCC)  Worsening symptoms, will increase Lexapro to 15 mg  Anxiety  Stable, refilled Xanax  Diagnoses and all orders for this visit:    Acute non-recurrent frontal sinusitis  -     amoxicillin-clavulanate (AUGMENTIN) 875-125 mg per tablet; Take 1 tablet by mouth every 12 (twelve) hours for 10 days  -     predniSONE 20 mg tablet; Take 2 tablets (40 mg total) by mouth daily for 5 days    I suspect that the patient has a bacterial sinusitis  I prescribed antibiotics and encouraged medication for sx relief  Rest and fluids encouraged as well  I am going to give him a few days of prednisone as he is really suffering with the headache and I think this will help faster than the antibiotic  Seasonal affective disorder ([address] )  -     escitalopram (LEXAPRO) 10 mg tablet; Take 1 5 tablets (15 mg total) by mouth daily    Anxiety  -     escitalopram (LEXAPRO) 10 mg tablet; Take 1 5 tablets (15 mg total) by mouth daily  -     ALPRAZolam (XANAX) 0 25 mg tablet; Take 1 tablet (0 25 mg total) by mouth 3 (three) times a day as needed for anxiety    Screening for thyroid disorder  -     TSH, 3rd generation with Free T4 reflex;  Future  -     TSH, 3rd generation with Free T4 reflex    Screening for prostate cancer  -     PSA Total (Reflex To Free); Future  -     PSA Total (Reflex To Free)    Hyperlipidemia, unspecified hyperlipidemia type  -     Lipid panel; Future  -     Lipid panel    Screening for diabetes mellitus  -     Comprehensive metabolic panel;  Future  -     Comprehensive metabolic panel    Screening, anemia, deficiency, iron  -     CBC and differential; Future  -     CBC and differential

## 2020-02-04 ENCOUNTER — OFFICE VISIT (OUTPATIENT)
Dept: PAIN MEDICINE | Facility: CLINIC | Age: 70
End: 2020-02-04
Payer: COMMERCIAL

## 2020-02-04 VITALS
DIASTOLIC BLOOD PRESSURE: 70 MMHG | BODY MASS INDEX: 33.66 KG/M2 | SYSTOLIC BLOOD PRESSURE: 136 MMHG | WEIGHT: 254 LBS | HEART RATE: 68 BPM | HEIGHT: 73 IN

## 2020-02-04 DIAGNOSIS — M51.26 LUMBAR DISC HERNIATION: ICD-10-CM

## 2020-02-04 DIAGNOSIS — G89.29 CHRONIC BILATERAL LOW BACK PAIN WITHOUT SCIATICA: Primary | ICD-10-CM

## 2020-02-04 DIAGNOSIS — M54.16 LUMBAR RADICULOPATHY: ICD-10-CM

## 2020-02-04 DIAGNOSIS — M54.50 CHRONIC BILATERAL LOW BACK PAIN WITHOUT SCIATICA: Primary | ICD-10-CM

## 2020-02-04 PROCEDURE — 1036F TOBACCO NON-USER: CPT | Performed by: NURSE PRACTITIONER

## 2020-02-04 PROCEDURE — 1160F RVW MEDS BY RX/DR IN RCRD: CPT | Performed by: NURSE PRACTITIONER

## 2020-02-04 PROCEDURE — 3008F BODY MASS INDEX DOCD: CPT | Performed by: NURSE PRACTITIONER

## 2020-02-04 PROCEDURE — 99214 OFFICE O/P EST MOD 30 MIN: CPT | Performed by: NURSE PRACTITIONER

## 2020-02-04 PROCEDURE — 4040F PNEUMOC VAC/ADMIN/RCVD: CPT | Performed by: NURSE PRACTITIONER

## 2020-02-04 NOTE — PATIENT INSTRUCTIONS
Epidural Steroid Injection   AMBULATORY CARE:   What you need to know about an epidural steroid injection (JUAN ANTONIO):  An JUAN ANTONIO is a procedure to inject steroid medicine into the epidural space  The epidural space is between your spinal cord and vertebrae  Steroids reduce inflammation and fluid buildup in your spine that may be causing pain  You may be given pain medicine along with the steroids  How to prepare for an JUAN ANTONIO:  Your healthcare provider will talk to you about how to prepare for your procedure  He will tell you what medicines to take or not take on the day of your procedure  You may need to stop taking blood thinners or other medicines several days before your procedure  You may need to adjust any diabetes medicine you take on the day of your procedure  Steroid medicine can increase your blood sugar level  What will happen during an JUAN ANTONIO:   · You will be given medicine to numb the procedure area  You will be awake for the procedure, but you will not feel pain  You may also be given medicine to help you relax during the procedure  Contrast liquid will be used to help your healthcare provider see the area better  Tell the healthcare provider if you have ever had an allergic reaction to contrast liquid  · Your healthcare provider may place the needle into your neck area, middle of your back, or tailbone area  He may inject the medicine next to the nerves that are causing your pain  He may instead inject the medicine into a larger area of the epidural space  This helps the medicine spread to more nerves  Your healthcare provider will use a fluoroscope to help guide the needle to the right place  A fluoroscope is a type of x-ray  After the procedure, a bandage will be placed over the injection site to prevent infection  Risks of an JUAN ANTONIO:  You may have temporary or permanent nerve damage or paralysis  You may have bleeding or develop a serious infection, such as meningitis (swelling of the brain coverings)  An abscess may also develop  You may need surgery to fix the abscess  You may have a seizure, anxiety, or trouble sleeping  If you are a man, you may have temporary erectile dysfunction (not able to have an erection)  Care for your wound as directed: You may remove the bandage before you go to bed the day of your procedure  You may take a shower, but do not take a bath for at least 24 hours  Self-care:   · Do not drive,  use machines, or do strenuous activity for 24 hours after your procedure or as directed  · Continue other treatments  as directed  Steroid injections alone will not control your pain  The injections are meant to be used with other treatments, such as physical therapy  Seek care immediately if:   · Blood soaks through your bandage  · Your wound is red, swollen, or draining pus  · You have a fever or chills, severe back pain, and the procedure area is sensitive to the touch  · You have a seizure  · You have trouble moving your legs  · You have weakness or numbness in your legs  · You cannot control when you urinate or have a bowel movement  Contact your healthcare provider if:   · You have nausea or are vomiting  · Your face or neck is red for a few days and you feel warm  · You have more pain than you had before the procedure  · You have swelling in your hands or feet  · You have questions or concerns about your condition or care  Follow up with your healthcare provider as directed:  Write down your questions so you remember to ask them during your visits  © 2017 [address] [employer] Information is for End User's use only and may not be sold, redistributed or otherwise used for commercial purposes  All illustrations and images included in CareNotes® are the copyrighted property of [employer] , [employer]  or Nicholas Gasc[employer]  The above information is an  only  It is not intended as medical advice for individual conditions or treatments  Talk to your doctor, nurse or pharmacist before following any medical regimen to see if it is safe and effective for you

## 2020-02-04 NOTE — PROGRESS NOTES
Assessment:  1  Chronic bilateral low back pain without sciatica    2  Lumbar radiculopathy    3  Lumbar disc herniation        Plan:  While the patient was in the office today, I did have a thorough conversation with the patient regarding his treatment plan options  I explained the patient with his current symptoms and exam findings, I do feel that it would be beneficial to proceed with a left L5 and S1 transforaminal epidural steroid injection with Dr Chloé Tristan as hopefully this will provide better relief of the radicular pain symptoms into his buttocks and leg  Complete risks and benefits including bleeding, infection, tissue reaction, nerve injury and allergic reaction were discussed  The approach was demonstrated using models and literature was provided  Verbal and written consent was obtained  Although it is the patient's wishes to try to avoid medications, we may need to consider discussing neuropathic medications such as gabapentin or Lyrica to try to better address the underlying neuropathic component as again the patient is trying to avoid surgery at all costs  However, we will see how he does after the repeat injection and proceed from there as appropriate  The patient is schedule a follow-up office office visit 3-4 weeks after the repeat injection at that point we will re-evaluate his pain symptoms and discuss his treatment options  The patient was agreeable and verbalized an understanding  History of Present Illness: The patient is a 77 y o  male last seen on 12/13/19 who presents for a follow up office visit in regards to chronic left-sided low back and leg pain with radiculopathy secondary to and lumbar disc herniation    The patient currently reports that since his last office visit he does feel that the right-sided low back and leg pain has definitely improved as a result of the right L4 and L5 transforaminal epidural steroid injection and is doing much better, however, he continues with the left-sided low back and radicular pain into his buttocks and L5-S1 dermatome distribution  The patient reports that the L5-S1 interlaminar lumbar epidural steroid injection directed towards the left at not provide any significant relief and presents today for re-evaluation and to see if maybe we can do is similar injection on the left side that we did on the right side to see if that would be more helpful as the patient is trying to hold off medication options or even considering surgery  I have personally reviewed and/or updated the patient's past medical history, past surgical history, family history, social history, current medications, allergies, and vital signs today  Review of Systems:    Review of Systems   Respiratory: Negative for shortness of breath  Cardiovascular: Negative for chest pain  Gastrointestinal: Negative for constipation, diarrhea, nausea and vomiting  Musculoskeletal: Negative for arthralgias, gait problem, joint swelling and myalgias  Skin: Negative for rash  Neurological: Negative for dizziness, seizures and weakness  All other systems reviewed and are negative          Past Medical History:   Diagnosis Date    Acute medial meniscus tear     Allergic rhinitis     Allergic rhinitis     last assessed 5/8/14    Anxiety     Appendicitis     Arthritis     Benign essential hypertension     last assessed 1/6/14    Biceps tendon tear     Cancer (HCC)     skin    Cervical radiculopathy     and lumbar    Chronic pain disorder     knees and hips     Colon polyps     CPAP (continuous positive airway pressure) dependence     Depression     Depression with anxiety     Dyskinesia of esophagus     Erythema migrans (Lyme disease)     last assessed 10/16/12    Fatigue     GERD (gastroesophageal reflux disease)     Heel spur, right     Herpes     Herpes zoster     Hyperlipidemia     Hyperplastic colon polyp     last assessed 11/14/14    Joint pain     Kidney stones     Lyme disease     Nephrolithiasis     Panic disorder with agoraphobia     last assessed 8/7/13    Seasonal affective disorder ([facility] [address] )     Sleep apnea     Vertigo     Vitamin D deficiency        Past Surgical History:   Procedure Laterality Date    APPENDECTOMY  08/01/2003    BICEPS TENDON REPAIR      KNEE ARTHROSCOPY      with medial meniscus repair, resolved 01/01/05    KNEE CARTILAGE SURGERY      OTHER SURGICAL HISTORY      distal reinsertion of ruptured biceps tendon    [place] KNEE SCOPE,MED/LAT MENISECTOMY Right 7/30/2019    Procedure: RIGHT KNEE ARTHROSCOPY, MEDIAL MENISCECTOMY;  Surgeon: Savilla Rubinstein, DO;  Location: [address] [place] [address];  Service: Orthopedics    TOOTH EXTRACTION      Middle of October 2019    VASECTOMY         Family History   Problem Relation Age of Onset    Diabetes Mother     Coronary artery disease Father     Kidney disease Father     Heart disease Father        Social History     Occupational History    Not on file   Tobacco Use    Smoking status: Former Smoker     Types: Cigars    Smokeless tobacco: Never Used   Substance and Sexual Activity    Alcohol use: No    Drug use: No    Sexual activity: Not on file         Current Outpatient Medications:     ALPRAZolam (XANAX) 0 25 mg tablet, Take 1 tablet (0 25 mg total) by mouth 3 (three) times a day as needed for anxiety, Disp: 30 tablet, Rfl: 0    cholecalciferol (VITAMIN D3) 1,000 units tablet, Take 1,000 Units by mouth daily, Disp: , Rfl:     Coenzyme Q10 (COQ10 PO), Take by mouth daily, Disp: , Rfl:     escitalopram (LEXAPRO) 10 mg tablet, Take 1 5 tablets (15 mg total) by mouth daily, Disp: 135 tablet, Rfl: 1    esomeprazole (NexIUM) 40 MG capsule, Take 40 mg by mouth every morning before breakfast, Disp: , Rfl:     Glucosamine-Chondroit-Vit C-Mn (GLUCOSAMINE 1500 COMPLEX PO), Take by mouth, Disp: , Rfl:     Hyoscyamine Sulfate SL 0 125 MG SUBL, Place 0 125 mg under the tongue 3 (three) times a day as needed (as needed), Disp: 60 each, Rfl: 1    JUBLIA 10 % SOLN, , Disp: , Rfl:     Lidocaine Viscous HCl (XYLOCAINE) 2 % mucosal solution, Swish and spit 15 mL 4 (four) times a day as needed for mild pain, Disp: 100 mL, Rfl: 1    Multiple Vitamins-Minerals (MULTI FOR HIM 50+) TABS, Take by mouth, Disp: , Rfl:     Omega-3 Fatty Acids (FISH OIL) 1,000 mg, Take 1,000 mg by mouth daily, Disp: , Rfl:     pravastatin (PRAVACHOL) 80 mg tablet, Take 1 tablet (80 mg total) by mouth daily at bedtime, Disp: 30 tablet, Rfl: 5    ciclopirox (LOPROX) 0 77 % cream, apply twice a day to fungal rash on feet til cleared, Disp: , Rfl: 3    HYDROcodone-acetaminophen (NORCO) 5-325 mg per tablet, Take 1 tablet by mouth every 4 (four) hours as needed for painMax Daily Amount: 6 tablets (Patient not taking: Reported on 1/6/2020), Disp: 40 tablet, Rfl: 0    metaxalone (SKELAXIN) 800 mg tablet, Take 1 tablet (800 mg total) by mouth every 6 (six) hours as needed for muscle spasms (Patient not taking: Reported on 2/4/2020), Disp: 60 tablet, Rfl: 1    Allergies   Allergen Reactions    Pseudoephedrine Hives     Reaction Date: [id number];     Sulfa Antibiotics Dizziness       Physical Exam:    /70 (BP Location: Left arm, Patient Position: Sitting, Cuff Size: Standard)   Pulse 68   Ht 6' 1" (1 854 m)   Wt 115 kg (254 lb)   BMI 33 51 kg/m²     Constitutional:normal, well developed, well nourished, alert, in no distress and non-toxic and no overt pain behavior  and overweight  Eyes:anicteric  HEENT:grossly intact  Neck:supple, symmetric, trachea midline and no masses   Pulmonary:even and unlabored  Cardiovascular:No edema or pitting edema present  Skin:Normal without rashes or lesions and well hydrated  Psychiatric:Mood and affect appropriate  Neurologic:Cranial Nerves II-XII grossly intact  Musculoskeletal:The patient's gait is slightly antalgic, but steady without the use of any assistive devices    Tenderness is noted upon exam of the left L5-S1 facet joints as well as left sacroiliac joint and left ischial bursa    Pain was greater with flexion versus extension upon exam       Imaging  FL spine and pain procedure    (Results Pending)         Orders Placed This Encounter   Procedures    FL spine and pain procedure

## 2020-02-05 PROBLEM — G89.29 CHRONIC LEFT-SIDED LOW BACK PAIN WITHOUT SCIATICA: Status: ACTIVE | Noted: 2020-02-05

## 2020-02-05 PROBLEM — M54.50 CHRONIC LEFT-SIDED LOW BACK PAIN WITHOUT SCIATICA: Status: ACTIVE | Noted: 2020-02-05

## 2020-02-12 ENCOUNTER — TELEPHONE (OUTPATIENT)
Dept: OTHER | Facility: OTHER | Age: 70
End: 2020-02-12

## 2020-02-18 DIAGNOSIS — F41.9 ANXIETY: ICD-10-CM

## 2020-02-18 RX ORDER — ALPRAZOLAM 0.25 MG/1
0.25 TABLET ORAL 3 TIMES DAILY PRN
Qty: 30 TABLET | Refills: 0 | OUTPATIENT
Start: 2020-02-18 | End: 2020-02-18 | Stop reason: SDUPTHER

## 2020-02-18 RX ORDER — ALPRAZOLAM 0.25 MG/1
0.25 TABLET ORAL 3 TIMES DAILY PRN
Qty: 30 TABLET | Refills: 0 | OUTPATIENT
Start: 2020-02-18 | End: 2020-02-24 | Stop reason: SDUPTHER

## 2020-02-21 ENCOUNTER — TELEPHONE (OUTPATIENT)
Dept: OTHER | Facility: OTHER | Age: 70
End: 2020-02-21

## 2020-02-21 DIAGNOSIS — F41.9 ANXIETY: ICD-10-CM

## 2020-02-21 RX ORDER — ALPRAZOLAM 0.25 MG/1
0.25 TABLET ORAL 3 TIMES DAILY PRN
Qty: 30 TABLET | Refills: 0 | OUTPATIENT
Start: 2020-02-21

## 2020-02-24 DIAGNOSIS — F41.9 ANXIETY: ICD-10-CM

## 2020-02-24 RX ORDER — ALPRAZOLAM 0.25 MG/1
0.25 TABLET ORAL 3 TIMES DAILY PRN
Qty: 30 TABLET | Refills: 0 | Status: SHIPPED | OUTPATIENT
Start: 2020-02-24 | End: 2020-03-27 | Stop reason: SDUPTHER

## 2020-02-24 NOTE — TELEPHONE ENCOUNTER
I called patient to find out what was the issues with his RX and he stated that he call his xanax about a week ago but the pharmacy never received his RX   After reviewing patient chart I noticed that his RX was sent up to be phone in instead of normal

## 2020-03-21 RX ORDER — GABAPENTIN 300 MG/1
CAPSULE ORAL
Qty: 90 CAPSULE | Refills: 1 | Status: SHIPPED | OUTPATIENT
Start: 2020-03-21 | End: 2020-05-04

## 2020-03-21 NOTE — PROGRESS NOTES
Virtual Brief Visit    Reason for visit is a follow-up office visit for re-evaluation of his continued left-sided greater than right low back and leg pain  Encounter provider Rosina Gallegos, [address]    Provider located at [address]  [address]  [address] [address]  [phone]      Recent Visits  No visits were found meeting these conditions  Showing recent visits within past 7 days and meeting all other requirements     Future Appointments  No visits were found meeting these conditions  Showing future appointments within next 150 days and meeting all other requirements        Patient agrees to participate in a virtual check in via telephone or video visit instead of presenting to the office to address urgent/immediate medical needs  Patient is aware this is a billable service  After connecting through telephone, the patient was identified by name and date of birth  Alejandra Marquez was informed that this was a telemedicine visit and that the visit is being conducted through telephone which may not be secure and therefore might not be HIPAA-compliant  My office door was closed  No one else was in the room  He acknowledged consent and understanding of privacy and security of the virtual check-in visit  I informed the patient that I have reviewed his record in Epic and presented the opportunity for him to ask any questions regarding the visit today  The patient initiated communication and agreed to participate  Subjective  Alejandra Marquez is a 77 y o  male who is currently concerned about the risks of COVID-19  The patient currently denies any of the signs or symptoms of COVID-19, but because of age or other comorbidities, medical history, and/or fear of exposure to COVID-19, the patient preferred to proceed with a virtual visit today    The patient continues with left-sided greater than right low back and left leg pain secondary to a lumbar disc herniation  He was originally scheduled for a left L5 and S1 transforaminal epidural steroid injection, however, due to other issues had to cancel the injection and at this point he is aware that currently our office is not doing any procedures and that most likely we will not be doing procedures for quite some time because of the virus  However, the patient was interested in discussing the previously discussed gabapentin or Lyrica that we had reviewed at his last office visit to see if maybe that would provide some relief for now        Past Medical History:   Diagnosis Date    Acute medial meniscus tear     Allergic rhinitis     Allergic rhinitis     last assessed 5/8/14    Anxiety     Appendicitis     Arthritis     Benign essential hypertension     last assessed 1/6/14    Biceps tendon tear     Cancer (HCC)     skin    Cervical radiculopathy     and lumbar    Chronic pain disorder     knees and hips     Colon polyps     CPAP (continuous positive airway pressure) dependence     Depression     Depression with anxiety     Dyskinesia of esophagus     Erythema migrans (Lyme disease)     last assessed 10/16/12    Fatigue     GERD (gastroesophageal reflux disease)     Heel spur, right     Herpes     Herpes zoster     Hyperlipidemia     Hyperplastic colon polyp     last assessed 11/14/14    Joint pain     Kidney stones     Lyme disease     Nephrolithiasis     Panic disorder with agoraphobia     last assessed 8/7/13    Seasonal affective disorder ([facility] [address] )     Sleep apnea     Vertigo     Vitamin D deficiency        Past Surgical History:   Procedure Laterality Date    APPENDECTOMY  08/01/2003    BICEPS TENDON REPAIR      KNEE ARTHROSCOPY      with medial meniscus repair, resolved 01/01/05    KNEE CARTILAGE SURGERY      OTHER SURGICAL HISTORY      distal reinsertion of ruptured biceps tendon    [place] KNEE SCOPE,MED/LAT MENISECTOMY Right 7/30/2019    Procedure: RIGHT KNEE ARTHROSCOPY, MEDIAL MENISCECTOMY;  Surgeon: Arvind Suazo DO;  Location:  MAIN OR;  Service: Orthopedics    TOOTH EXTRACTION      Middle of October 2019    VASECTOMY         Current Outpatient Medications   Medication Sig Dispense Refill    ALPRAZolam (XANAX) 0 25 mg tablet Take 1 tablet (0 25 mg total) by mouth 3 (three) times a day as needed for anxiety 30 tablet 0    cholecalciferol (VITAMIN D3) 1,000 units tablet Take 1,000 Units by mouth daily      ciclopirox (LOPROX) 0 77 % cream apply twice a day to fungal rash on feet til cleared  3    Coenzyme Q10 (COQ10 PO) Take by mouth daily      esomeprazole (NexIUM) 40 MG capsule Take 40 mg by mouth every morning before breakfast      gabapentin (NEURONTIN) 300 mg capsule Take 1 PO HS x 1 week, then 1 PO BID x 1 week, then 1 PO TID  90 capsule 1    Glucosamine-Chondroit-Vit C-Mn (GLUCOSAMINE 1500 COMPLEX PO) Take by mouth      Hyoscyamine Sulfate SL 0 125 MG SUBL Place 0 125 mg under the tongue 3 (three) times a day as needed (as needed) 60 each 1    JUBLIA 10 % SOLN       Multiple Vitamins-Minerals (MULTI FOR HIM 50+) TABS Take by mouth      Omega-3 Fatty Acids (FISH OIL) 1,000 mg Take 1,000 mg by mouth daily      pravastatin (PRAVACHOL) 80 mg tablet Take 1 tablet (80 mg total) by mouth daily at bedtime 30 tablet 5     No current facility-administered medications for this visit  Allergies   Allergen Reactions    Pseudoephedrine Hives     Reaction Date: [id number];     Sulfa Antibiotics Dizziness       Assessment    [facility] telephone assessment is That the patient continues with left-sided greater than right low back and left lower extremity radicular symptoms that has not significantly improved or worsened since his last office visit  Matheus Sahu Disposition:  The patient seemed to be understanding of the current situation and still uncomfortable at times, but willing to proceed with the discussed treatment plan  Plan:  1   At this point time we will hold off on the previously discussed left L5 and S1 transforaminal epidural steroid injection with Dr Segundo Argueta, however, in the future, when it would be appropriate to proceed with injections, we could do so if he still needed at that time  The patient was agreeable and verbalized an understanding  2  I discussed with the patient that at this point since I definitely feel there is a significant neuropathic component of his pain symptoms, that he would benefit from a neuropathic medications such as gabapentin  I discussed with the patient the type of medication it is, how it works, and that it requires a titration process that is specific to each individual  I reviewed with the patient that it may take 3-4 weeks for the medication's effects to be noticed and that it should never be abruptly stopped  Possible side effects include but are not limited to; vertigo, lethargy, nausea, and edema of the extremities  Advised the patient to call our office if they experience any side effects  We will start the patient on 300 mg and work him up to 900 mg over the next several weeks and see how he does  The patient verbalized an understanding  3  The patient is to follow-up as scheduled in 6 weeks on May 4, 2020 to arrive at 9:30 a m  4 and 9:45 a m  Appointment  The patient was agreeable and verbalized an understanding  I spent 14 minutes with the patient during this virtual check-in visit

## 2020-03-21 NOTE — PATIENT INSTRUCTIONS
Plan:  1  At this point time we will hold off on the previously discussed left L5 and S1 transforaminal epidural steroid injection with Dr George Bermudez, however, in the future, when it would be appropriate to proceed with injections, we could do so if he still needed at that time  The patient was agreeable and verbalized an understanding  2  I discussed with the patient that at this point since I definitely feel there is a significant neuropathic component of his pain symptoms, that he would benefit from a neuropathic medications such as gabapentin  I discussed with the patient the type of medication it is, how it works, and that it requires a titration process that is specific to each individual  I reviewed with the patient that it may take 3-4 weeks for the medication's effects to be noticed and that it should never be abruptly stopped  Possible side effects include but are not limited to; vertigo, lethargy, nausea, and edema of the extremities  Advised the patient to call our office if they experience any side effects  We will start the patient on 300 mg and work him up to 900 mg over the next several weeks and see how he does  The patient verbalized an understanding  3  The patient is to follow-up as scheduled in 6 weeks on May 4, 2020 to arrive at 9:30 a m  4 and 9:45 a m  Appointment  The patient was agreeable and verbalized an understanding  Gabapentin (By mouth)   Gabapentin (danica-a-PEN-tin)  Treats seizures and pain caused by shingles  Brand Name(s): ACTIVE-PAC with Gabapentin, Convenience Ruddy, Cyclo/Sebastien 10/300 Pack, FusePaq Fanatrex, Sebastien-V, Gralise, [employer] [address] Pack, Neurontin, SmartRx Sebastien Kit   There may be other brand names for this medicine  When This Medicine Should Not Be Used: This medicine is not right for everyone  Do not use it if you had an allergic reaction to gabapentin  How to Use This Medicine:   Capsule, Liquid, Tablet  · Take your medicine as directed   Your dose may need to be changed several times to find what works best for you  If you have epilepsy, do not allow more than 12 hours to pass between doses  · Capsule: Swallow the capsule whole with plenty of water  Do not open, crush, or chew it  · Gralise® tablet: Swallow the tablet whole   Do not crush, break, or chew it  · Neurontin® tablet: If you break a tablet into 2 pieces, use the second half as your next dose  If you don't use it within 28 days, throw it away  · Measure the oral liquid medicine with a marked measuring spoon, oral syringe, or medicine cup  · This medicine should come with a Medication Guide  Ask your pharmacist for a copy if you do not have one  · Missed dose: Take a dose as soon as you remember  If it is almost time for your next dose, wait until then and take a regular dose  Do not take extra medicine to make up for a missed dose  · Store the medicine in a closed container at room temperature, away from heat, moisture, and direct light  Store the Neurontin® oral liquid in the refrigerator  Do not freeze  Drugs and Foods to Avoid:   Ask your doctor or pharmacist before using any other medicine, including over-the-counter medicines, vitamins, and herbal products  · Some medicines can affect how gabapentin works  Tell your doctor if you also use any of the following:   ¨ Hydrocodone  ¨ Morphine  · If you take an antacid, wait at least 2 hours before you take gabapentin  · Tell your doctor if you use anything else that makes you sleepy  Some examples are allergy medicine, narcotic pain medicine, and alcohol  Warnings While Using This Medicine:   · Tell your doctor if you are pregnant or breastfeeding, or if you have kidney problems or are receiving dialysis  Tell your doctor if you have a history of depression or mental health problems  · This medicine may increase depression or thoughts of suicide  Tell your doctor right away if you start to feel more depressed or think about hurting yourself    · This medicine may cause a serious allergic reaction called multiorgan hypersensitivity, which can damage organs and be life-threatening  · Do not stop using this medicine suddenly  Your doctor will need to slowly decrease your dose before you stop it completely  If you take this medicine to prevent seizures, your seizures may return or occur more often if you stop this medicine suddenly  · This medicine may make you dizzy or drowsy  Do not drive or do anything else that could be dangerous until you know how this medicine affects you  · Tell any doctor or dentist who treats you that you are using this medicine  This medicine may affect certain medical test results  · Your doctor will check your progress and the effects of this medicine at regular visits  Keep all appointments  · Keep all medicine out of the reach of children  Never share your medicine with anyone    Possible Side Effects While Using This Medicine:   Call your doctor right away if you notice any of these side effects:  · Allergic reaction: Itching or hives, swelling in your face or hands, swelling or tingling in your mouth or throat, chest tightness, trouble breathing  · Behavior problems, aggression, restlessness, trouble concentrating, moodiness (especially in children)  · Blistering, peeling, red skin rash  · Change in how much or how often you urinate, bloody or cloudy urine,  · Chest pain, fast heartbeat, trouble breathing  · Dark urine or pale stools, nausea, vomiting, loss of appetite, stomach pain, yellow skin or eyes  · Fever, rash, swollen or tender glands in the neck, armpit, or groin  · Problems with coordination, shakiness, unsteadiness  · Rapid weight gain, swelling in your hands, ankles, or feet  · Unusual moods or behaviors, thoughts of hurting yourself, feeling depressed  If you notice these less serious side effects, talk with your doctor:   · Dizziness, drowsiness, sleepiness, tiredness  If you notice other side effects that you think are caused by this medicine, tell your doctor  Call your doctor for medical advice about side effects  You may report side effects to FDA at 9-872-FDA-6271  © 2017 [address] Information is for End User's use only and may not be sold, redistributed or otherwise used for commercial purposes  The above information is an  only  It is not intended as medical advice for individual conditions or treatments  Talk to your doctor, nurse or pharmacist before following any medical regimen to see if it is safe and effective for you

## 2020-03-23 ENCOUNTER — TELEMEDICINE (OUTPATIENT)
Dept: PAIN MEDICINE | Facility: CLINIC | Age: 70
End: 2020-03-23
Payer: COMMERCIAL

## 2020-03-23 DIAGNOSIS — M54.50 CHRONIC LEFT-SIDED LOW BACK PAIN WITHOUT SCIATICA: ICD-10-CM

## 2020-03-23 DIAGNOSIS — M51.26 LUMBAR DISC HERNIATION: ICD-10-CM

## 2020-03-23 DIAGNOSIS — M25.551 CHRONIC HIP PAIN, BILATERAL: ICD-10-CM

## 2020-03-23 DIAGNOSIS — M54.16 LUMBAR RADICULOPATHY: ICD-10-CM

## 2020-03-23 DIAGNOSIS — G89.29 CHRONIC LEFT-SIDED LOW BACK PAIN WITHOUT SCIATICA: ICD-10-CM

## 2020-03-23 DIAGNOSIS — M25.552 CHRONIC HIP PAIN, BILATERAL: ICD-10-CM

## 2020-03-23 DIAGNOSIS — G89.4 CHRONIC PAIN SYNDROME: Primary | ICD-10-CM

## 2020-03-23 DIAGNOSIS — G89.29 CHRONIC HIP PAIN, BILATERAL: ICD-10-CM

## 2020-03-23 PROCEDURE — 99214 OFFICE O/P EST MOD 30 MIN: CPT | Performed by: NURSE PRACTITIONER

## 2020-03-27 DIAGNOSIS — F41.9 ANXIETY: ICD-10-CM

## 2020-03-27 RX ORDER — ALPRAZOLAM 0.25 MG/1
0.25 TABLET ORAL 3 TIMES DAILY PRN
Qty: 30 TABLET | Refills: 0 | Status: SHIPPED | OUTPATIENT
Start: 2020-03-27 | End: 2020-05-04 | Stop reason: SDUPTHER

## 2020-05-04 ENCOUNTER — TELEMEDICINE (OUTPATIENT)
Dept: PAIN MEDICINE | Facility: CLINIC | Age: 70
End: 2020-05-04
Payer: COMMERCIAL

## 2020-05-04 ENCOUNTER — TELEMEDICINE (OUTPATIENT)
Dept: FAMILY MEDICINE CLINIC | Facility: CLINIC | Age: 70
End: 2020-05-04
Payer: COMMERCIAL

## 2020-05-04 VITALS — HEIGHT: 73 IN | WEIGHT: 260 LBS | BODY MASS INDEX: 34.46 KG/M2

## 2020-05-04 DIAGNOSIS — M54.50 CHRONIC LEFT-SIDED LOW BACK PAIN WITHOUT SCIATICA: Primary | ICD-10-CM

## 2020-05-04 DIAGNOSIS — F41.9 ANXIETY: ICD-10-CM

## 2020-05-04 DIAGNOSIS — M25.552 CHRONIC HIP PAIN, BILATERAL: ICD-10-CM

## 2020-05-04 DIAGNOSIS — F33.8 SEASONAL AFFECTIVE DISORDER (HCC): Primary | ICD-10-CM

## 2020-05-04 DIAGNOSIS — M54.16 LUMBAR RADICULOPATHY: ICD-10-CM

## 2020-05-04 DIAGNOSIS — G89.29 CHRONIC LEFT-SIDED LOW BACK PAIN WITHOUT SCIATICA: Primary | ICD-10-CM

## 2020-05-04 DIAGNOSIS — M25.551 CHRONIC HIP PAIN, BILATERAL: ICD-10-CM

## 2020-05-04 DIAGNOSIS — M25.561 CHRONIC PAIN OF RIGHT KNEE: ICD-10-CM

## 2020-05-04 DIAGNOSIS — G89.29 CHRONIC PAIN OF RIGHT KNEE: ICD-10-CM

## 2020-05-04 DIAGNOSIS — G89.29 CHRONIC HIP PAIN, BILATERAL: ICD-10-CM

## 2020-05-04 DIAGNOSIS — J01.80 ACUTE NON-RECURRENT SINUSITIS OF OTHER SINUS: ICD-10-CM

## 2020-05-04 DIAGNOSIS — M51.26 LUMBAR DISC HERNIATION: ICD-10-CM

## 2020-05-04 PROCEDURE — 99214 OFFICE O/P EST MOD 30 MIN: CPT | Performed by: NURSE PRACTITIONER

## 2020-05-04 PROCEDURE — 99214 OFFICE O/P EST MOD 30 MIN: CPT | Performed by: FAMILY MEDICINE

## 2020-05-04 RX ORDER — PREDNISONE 10 MG/1
TABLET ORAL
Qty: 40 TABLET | Refills: 0 | Status: SHIPPED | OUTPATIENT
Start: 2020-05-04 | End: 2020-08-10

## 2020-05-04 RX ORDER — METAXALONE 800 MG/1
TABLET ORAL
COMMUNITY
End: 2020-08-10

## 2020-05-04 RX ORDER — ESCITALOPRAM OXALATE 10 MG/1
TABLET ORAL
COMMUNITY
Start: 2020-03-27 | End: 2020-08-10

## 2020-05-04 RX ORDER — HYDROCODONE BITARTRATE AND ACETAMINOPHEN 5; 325 MG/1; MG/1
1 TABLET ORAL EVERY 6 HOURS PRN
Qty: 30 TABLET | Refills: 0 | Status: SHIPPED | OUTPATIENT
Start: 2020-05-04 | End: 2020-08-18 | Stop reason: ALTCHOICE

## 2020-05-04 RX ORDER — ASPIRIN 325 MG
TABLET ORAL
COMMUNITY
Start: 2009-04-03 | End: 2022-02-14

## 2020-05-04 RX ORDER — AMOXICILLIN AND CLAVULANATE POTASSIUM 875; 125 MG/1; MG/1
1 TABLET, FILM COATED ORAL EVERY 12 HOURS SCHEDULED
Qty: 20 TABLET | Refills: 0 | Status: SHIPPED | OUTPATIENT
Start: 2020-05-04 | End: 2020-05-15

## 2020-05-04 RX ORDER — HYOSCYAMINE SULFATE 0.12 MG/5ML
LIQUID ORAL
COMMUNITY
Start: 2013-12-31 | End: 2020-08-10

## 2020-05-04 RX ORDER — ALPRAZOLAM 0.25 MG/1
0.25 TABLET ORAL 3 TIMES DAILY PRN
Qty: 30 TABLET | Refills: 0 | Status: SHIPPED | OUTPATIENT
Start: 2020-05-04 | End: 2020-06-18 | Stop reason: SDUPTHER

## 2020-05-07 ENCOUNTER — CLINICAL SUPPORT (OUTPATIENT)
Dept: FAMILY MEDICINE CLINIC | Facility: CLINIC | Age: 70
End: 2020-05-07
Payer: COMMERCIAL

## 2020-05-07 DIAGNOSIS — Z13.1 SCREENING FOR DIABETES MELLITUS: ICD-10-CM

## 2020-05-07 DIAGNOSIS — Z12.5 SCREENING FOR PROSTATE CANCER: ICD-10-CM

## 2020-05-07 DIAGNOSIS — E66.9 OBESITY (BMI 30.0-34.9): ICD-10-CM

## 2020-05-07 DIAGNOSIS — Z13.29 SCREENING FOR ENDOCRINE, NUTRITIONAL, METABOLIC AND IMMUNITY DISORDER: ICD-10-CM

## 2020-05-07 DIAGNOSIS — E78.5 HYPERLIPIDEMIA, UNSPECIFIED HYPERLIPIDEMIA TYPE: Primary | ICD-10-CM

## 2020-05-07 DIAGNOSIS — Z13.29 SCREENING FOR THYROID DISORDER: ICD-10-CM

## 2020-05-07 DIAGNOSIS — Z13.0 SCREENING, ANEMIA, DEFICIENCY, IRON: ICD-10-CM

## 2020-05-07 DIAGNOSIS — Z13.0 SCREENING FOR ENDOCRINE, NUTRITIONAL, METABOLIC AND IMMUNITY DISORDER: ICD-10-CM

## 2020-05-07 DIAGNOSIS — Z13.21 SCREENING FOR ENDOCRINE, NUTRITIONAL, METABOLIC AND IMMUNITY DISORDER: ICD-10-CM

## 2020-05-07 DIAGNOSIS — Z13.228 SCREENING FOR ENDOCRINE, NUTRITIONAL, METABOLIC AND IMMUNITY DISORDER: ICD-10-CM

## 2020-05-07 PROCEDURE — 36415 COLL VENOUS BLD VENIPUNCTURE: CPT

## 2020-05-08 LAB
ALBUMIN SERPL-MCNC: 4.4 G/DL (ref 3.8–4.8)
ALBUMIN/GLOB SERPL: 2.2 {RATIO} (ref 1.2–2.2)
ALP SERPL-CCNC: 75 IU/L (ref 39–117)
ALT SERPL-CCNC: 27 IU/L (ref 0–44)
AST SERPL-CCNC: 28 IU/L (ref 0–40)
BASOPHILS # BLD AUTO: 0.1 X10E3/UL (ref 0–0.2)
BASOPHILS NFR BLD AUTO: 1 %
BILIRUB SERPL-MCNC: 0.7 MG/DL (ref 0–1.2)
BUN SERPL-MCNC: 25 MG/DL (ref 8–27)
BUN/CREAT SERPL: 23 (ref 10–24)
CALCIUM SERPL-MCNC: 9.8 MG/DL (ref 8.6–10.2)
CHLORIDE SERPL-SCNC: 104 MMOL/L (ref 96–106)
CHOLEST SERPL-MCNC: 202 MG/DL (ref 100–199)
CO2 SERPL-SCNC: 25 MMOL/L (ref 20–29)
CREAT SERPL-MCNC: 1.11 MG/DL (ref 0.76–1.27)
EOSINOPHIL # BLD AUTO: 0.3 X10E3/UL (ref 0–0.4)
EOSINOPHIL NFR BLD AUTO: 3 %
ERYTHROCYTE [DISTWIDTH] IN BLOOD BY AUTOMATED COUNT: 12.8 % (ref 11.6–15.4)
GLOBULIN SER-MCNC: 2 G/DL (ref 1.5–4.5)
GLUCOSE SERPL-MCNC: 89 MG/DL (ref 65–99)
HCT VFR BLD AUTO: 44.4 % (ref 37.5–51)
HDLC SERPL-MCNC: 61 MG/DL
HGB BLD-MCNC: 15.2 G/DL (ref 13–17.7)
IMM GRANULOCYTES # BLD: 0 X10E3/UL (ref 0–0.1)
IMM GRANULOCYTES NFR BLD: 0 %
LDLC SERPL CALC-MCNC: 114 MG/DL (ref 0–99)
LDLC/HDLC SERPL: 1.9 RATIO (ref 0–3.6)
LYMPHOCYTES # BLD AUTO: 3.5 X10E3/UL (ref 0.7–3.1)
LYMPHOCYTES NFR BLD AUTO: 40 %
MCH RBC QN AUTO: 33.9 PG (ref 26.6–33)
MCHC RBC AUTO-ENTMCNC: 34.2 G/DL (ref 31.5–35.7)
MCV RBC AUTO: 99 FL (ref 79–97)
MONOCYTES # BLD AUTO: 1.1 X10E3/UL (ref 0.1–0.9)
MONOCYTES NFR BLD AUTO: 12 %
NEUTROPHILS # BLD AUTO: 3.9 X10E3/UL (ref 1.4–7)
NEUTROPHILS NFR BLD AUTO: 44 %
PLATELET # BLD AUTO: 182 X10E3/UL (ref 150–450)
POTASSIUM SERPL-SCNC: 4.8 MMOL/L (ref 3.5–5.2)
PROT SERPL-MCNC: 6.4 G/DL (ref 6–8.5)
PSA FREE MFR SERPL: 56.7 %
PSA FREE SERPL-MCNC: 0.51 NG/ML
PSA SERPL-MCNC: 0.9 NG/ML (ref 0–4)
RBC # BLD AUTO: 4.48 X10E6/UL (ref 4.14–5.8)
SL AMB EGFR AFRICAN AMERICAN: 78 ML/MIN/1.73
SL AMB EGFR NON AFRICAN AMERICAN: 68 ML/MIN/1.73
SL AMB VLDL CHOLESTEROL CALC: 27 MG/DL (ref 5–40)
SODIUM SERPL-SCNC: 142 MMOL/L (ref 134–144)
TRIGL SERPL-MCNC: 133 MG/DL (ref 0–149)
TSH SERPL DL<=0.005 MIU/L-ACNC: 1.87 UIU/ML (ref 0.45–4.5)
WBC # BLD AUTO: 8.8 X10E3/UL (ref 3.4–10.8)

## 2020-05-11 ENCOUNTER — TELEPHONE (OUTPATIENT)
Dept: RADIOLOGY | Facility: CLINIC | Age: 70
End: 2020-05-11

## 2020-05-15 ENCOUNTER — TELEMEDICINE (OUTPATIENT)
Dept: FAMILY MEDICINE CLINIC | Facility: CLINIC | Age: 70
End: 2020-05-15
Payer: COMMERCIAL

## 2020-05-15 VITALS — BODY MASS INDEX: 34.46 KG/M2 | WEIGHT: 260 LBS | HEIGHT: 73 IN

## 2020-05-15 DIAGNOSIS — E78.2 MIXED HYPERLIPIDEMIA: Primary | ICD-10-CM

## 2020-05-15 DIAGNOSIS — K63.5 MULTIPLE POLYPS OF SIGMOID COLON: ICD-10-CM

## 2020-05-15 DIAGNOSIS — K22.4 ESOPHAGEAL MOTILITY DISORDER: ICD-10-CM

## 2020-05-15 DIAGNOSIS — G47.33 OBSTRUCTIVE SLEEP APNEA: ICD-10-CM

## 2020-05-15 DIAGNOSIS — K21.9 GASTROESOPHAGEAL REFLUX DISEASE WITHOUT ESOPHAGITIS: ICD-10-CM

## 2020-05-15 PROCEDURE — 1160F RVW MEDS BY RX/DR IN RCRD: CPT | Performed by: FAMILY MEDICINE

## 2020-05-15 PROCEDURE — 99214 OFFICE O/P EST MOD 30 MIN: CPT | Performed by: FAMILY MEDICINE

## 2020-06-12 ENCOUNTER — HOSPITAL ENCOUNTER (OUTPATIENT)
Dept: RADIOLOGY | Facility: CLINIC | Age: 70
Discharge: HOME | End: 2020-06-12
Attending: ANESTHESIOLOGY | Admitting: ANESTHESIOLOGY
Payer: COMMERCIAL

## 2020-06-12 VITALS
SYSTOLIC BLOOD PRESSURE: 142 MMHG | HEART RATE: 63 BPM | DIASTOLIC BLOOD PRESSURE: 83 MMHG | TEMPERATURE: 98.8 F | RESPIRATION RATE: 20 BRPM | OXYGEN SATURATION: 97 %

## 2020-06-12 DIAGNOSIS — M54.16 LUMBAR RADICULOPATHY: ICD-10-CM

## 2020-06-12 DIAGNOSIS — M54.50 CHRONIC LEFT-SIDED LOW BACK PAIN WITHOUT SCIATICA: ICD-10-CM

## 2020-06-12 DIAGNOSIS — M51.26 LUMBAR DISC HERNIATION: ICD-10-CM

## 2020-06-12 DIAGNOSIS — G89.29 CHRONIC LEFT-SIDED LOW BACK PAIN WITHOUT SCIATICA: ICD-10-CM

## 2020-06-12 PROCEDURE — 64484 NJX AA&/STRD TFRM EPI L/S EA: CPT | Performed by: ANESTHESIOLOGY

## 2020-06-12 PROCEDURE — 64483 NJX AA&/STRD TFRM EPI L/S 1: CPT | Performed by: ANESTHESIOLOGY

## 2020-06-12 RX ORDER — LIDOCAINE HYDROCHLORIDE 10 MG/ML
5 INJECTION, SOLUTION EPIDURAL; INFILTRATION; INTRACAUDAL; PERINEURAL ONCE
Status: COMPLETED | OUTPATIENT
Start: 2020-06-12 | End: 2020-06-12

## 2020-06-12 RX ORDER — METHYLPREDNISOLONE ACETATE 80 MG/ML
160 INJECTION, SUSPENSION INTRA-ARTICULAR; INTRALESIONAL; INTRAMUSCULAR; PARENTERAL; SOFT TISSUE ONCE
Status: COMPLETED | OUTPATIENT
Start: 2020-06-12 | End: 2020-06-12

## 2020-06-12 RX ADMIN — IOHEXOL 1 ML: 300 INJECTION, SOLUTION INTRAVENOUS at 10:01

## 2020-06-12 RX ADMIN — METHYLPREDNISOLONE ACETATE 160 MG: 80 INJECTION, SUSPENSION INTRA-ARTICULAR; INTRALESIONAL; INTRAMUSCULAR; SOFT TISSUE at 10:02

## 2020-06-12 RX ADMIN — LIDOCAINE HYDROCHLORIDE 3 ML: 10 INJECTION, SOLUTION EPIDURAL; INFILTRATION; INTRACAUDAL; PERINEURAL at 10:01

## 2020-06-12 RX ADMIN — LIDOCAINE HYDROCHLORIDE 2 ML: 20 INJECTION, SOLUTION EPIDURAL; INFILTRATION; INTRACAUDAL at 10:02

## 2020-06-18 DIAGNOSIS — F41.9 ANXIETY: ICD-10-CM

## 2020-06-18 RX ORDER — ALPRAZOLAM 0.25 MG/1
0.25 TABLET ORAL 3 TIMES DAILY PRN
Qty: 30 TABLET | Refills: 0 | Status: SHIPPED | OUTPATIENT
Start: 2020-06-18 | End: 2020-08-06 | Stop reason: SDUPTHER

## 2020-06-19 ENCOUNTER — TELEPHONE (OUTPATIENT)
Dept: PAIN MEDICINE | Facility: CLINIC | Age: 70
End: 2020-06-19

## 2020-08-06 DIAGNOSIS — F41.9 ANXIETY: ICD-10-CM

## 2020-08-06 RX ORDER — ALPRAZOLAM 0.25 MG/1
0.25 TABLET ORAL 3 TIMES DAILY PRN
Qty: 30 TABLET | Refills: 0 | Status: SHIPPED | OUTPATIENT
Start: 2020-08-06 | End: 2020-09-15 | Stop reason: SDUPTHER

## 2020-08-10 ENCOUNTER — OFFICE VISIT (OUTPATIENT)
Dept: PAIN MEDICINE | Facility: CLINIC | Age: 70
End: 2020-08-10
Payer: COMMERCIAL

## 2020-08-10 VITALS
HEIGHT: 73 IN | HEART RATE: 68 BPM | SYSTOLIC BLOOD PRESSURE: 134 MMHG | BODY MASS INDEX: 35.25 KG/M2 | TEMPERATURE: 98.1 F | WEIGHT: 266 LBS | DIASTOLIC BLOOD PRESSURE: 74 MMHG

## 2020-08-10 DIAGNOSIS — M48.062 SPINAL STENOSIS OF LUMBAR REGION WITH NEUROGENIC CLAUDICATION: ICD-10-CM

## 2020-08-10 DIAGNOSIS — M54.16 LUMBAR RADICULOPATHY: ICD-10-CM

## 2020-08-10 DIAGNOSIS — M51.26 LUMBAR DISC HERNIATION: ICD-10-CM

## 2020-08-10 DIAGNOSIS — M54.50 CHRONIC BILATERAL LOW BACK PAIN WITHOUT SCIATICA: Primary | ICD-10-CM

## 2020-08-10 DIAGNOSIS — G89.29 CHRONIC BILATERAL LOW BACK PAIN WITHOUT SCIATICA: Primary | ICD-10-CM

## 2020-08-10 PROCEDURE — 1160F RVW MEDS BY RX/DR IN RCRD: CPT | Performed by: NURSE PRACTITIONER

## 2020-08-10 PROCEDURE — 1036F TOBACCO NON-USER: CPT | Performed by: NURSE PRACTITIONER

## 2020-08-10 PROCEDURE — 4040F PNEUMOC VAC/ADMIN/RCVD: CPT | Performed by: NURSE PRACTITIONER

## 2020-08-10 PROCEDURE — 3008F BODY MASS INDEX DOCD: CPT | Performed by: NURSE PRACTITIONER

## 2020-08-10 PROCEDURE — 99213 OFFICE O/P EST LOW 20 MIN: CPT | Performed by: NURSE PRACTITIONER

## 2020-08-10 NOTE — PROGRESS NOTES
Assessment:  1  Chronic bilateral low back pain without sciatica    2  Lumbar radiculopathy    3  Lumbar disc herniation        Plan:  While the patient was in the office today, I discussed with the patient at this point time 1 option would be to proceed with a repeat right L4 and L5 transforaminal epidural steroid injection with Dr Evelyn Shaffer to try to address the right-sided pain, however, then we would have to hold off injections for at least 3-4 months if not longer because of the overall steroid load  At this point the patient feels the pain is still manageable and unless it changes in the next few months he wants to try to hold off any repeat injections for now  I also discussed the possibility of trying a different neuropathic medication since he tried and failed gabapentin and we could consider Lyrica as he also reports he has tried Cymbalta in the past as well  However, for now, the patient would like to hold off on any medications unless he changes his mind, then the patient will let us know  I discussed with the patient that at this point time he can followup with our office on an as-needed basis  I did review the patient that if his pain symptoms should change, worsen, and/or if he would experience any new symptoms he would like to be evaluated for, he should give our office a call  The patient was agreeable and verbalized an understanding  History of Present Illness: The patient is a 77 y o  male last seen on 6/12/2020 who presents for a follow up office visit in regards to chronic low back pain secondary to lumbar disc herniation, with stenosis and radiculopathy  The patient currently reports that there is definite moderate overall improvement, especially in the left-sided low back and leg pain as he is status post a left L5 and S1 transforaminal epidural steroid injection on June 12, 2020 with Dr Evelyn Shaffer and is noting at least a 50-60% improvement in the left-sided low back and leg pain  However, the right-sided low back and leg pain has started to return and the patient had previously had a right L4 and L5 transforaminal epidural steroid injection as well as an L5-S1 interlaminar lumbar epidural steroid injection directed towards the left in 2019  Patient has also previously tried and failed gabapentin as he did not like the way that it made him feel  He presents today to discuss his treatment plan  I have personally reviewed and/or updated the patient's past medical history, past surgical history, family history, social history, current medications, allergies, and vital signs today  Review of Systems:    Review of Systems   Respiratory: Negative for shortness of breath  Cardiovascular: Negative for chest pain  Gastrointestinal: Negative for constipation, diarrhea, nausea and vomiting  Musculoskeletal: Negative for arthralgias, gait problem, joint swelling and myalgias  Skin: Negative for rash  Neurological: Negative for dizziness, seizures and weakness  All other systems reviewed and are negative          Past Medical History:   Diagnosis Date    Acute medial meniscus tear     Allergic rhinitis     Allergic rhinitis     last assessed 5/8/14    Anxiety     Appendicitis     Arthritis     Benign essential hypertension     last assessed 1/6/14    Biceps tendon tear     Cancer (HCC)     skin    Cervical radiculopathy     and lumbar    Chronic pain disorder     knees and hips     Colon polyps     CPAP (continuous positive airway pressure) dependence     Depression     Depression with anxiety     Dyskinesia of esophagus     Erythema migrans (Lyme disease)     last assessed 10/16/12    Fatigue     GERD (gastroesophageal reflux disease)     Heel spur, right     Herpes     Herpes zoster     Hyperlipidemia     Hyperplastic colon polyp     last assessed 11/14/14    Joint pain     Kidney stones     Lyme disease     Nephrolithiasis     Panic disorder with agoraphobia     last assessed 8/7/13    Seasonal affective disorder ([facility] [address] )     Sleep apnea     Vertigo     Vitamin D deficiency        Past Surgical History:   Procedure Laterality Date    APPENDECTOMY  08/01/2003    BICEPS TENDON REPAIR      KNEE ARTHROSCOPY      with medial meniscus repair, resolved 01/01/05    KNEE CARTILAGE SURGERY      OTHER SURGICAL HISTORY      distal reinsertion of ruptured biceps tendon    GA KNEE SCOPE,MED/LAT MENISECTOMY Right 7/30/2019    Procedure: RIGHT KNEE ARTHROSCOPY, MEDIAL MENISCECTOMY;  Surgeon: Vinicius Lowe DO;  Location: [facility] MAIN OR;  Service: Orthopedics    TOOTH EXTRACTION      Middle of October 2019    VASECTOMY         Family History   Problem Relation Age of Onset    Diabetes Mother     Coronary artery disease Father     Kidney disease Father     Heart disease Father        Social History     Occupational History    Not on file   Tobacco Use    Smoking status: Former Smoker     Types: Cigars    Smokeless tobacco: Never Used   Substance and Sexual Activity    Alcohol use: No    Drug use: No    Sexual activity: Not on file         Current Outpatient Medications:     ALPRAZolam (XANAX) 0 25 mg tablet, Take 1 tablet (0 25 mg total) by mouth 3 (three) times a day as needed for anxiety, Disp: 30 tablet, Rfl: 0    aspirin 325 mg tablet, Take one tablet by mouth daily, Disp: , Rfl:     cholecalciferol (VITAMIN D3) 1,000 units tablet, Take 1,000 Units by mouth daily, Disp: , Rfl:     Coenzyme Q10 (COQ10 PO), Take by mouth daily, Disp: , Rfl:     esomeprazole (NexIUM) 40 MG capsule, Take 40 mg by mouth every morning before breakfast, Disp: , Rfl:     Glucosamine-Chondroit-Vit C-Mn (GLUCOSAMINE 1500 COMPLEX PO), Take by mouth, Disp: , Rfl:     Hyoscyamine Sulfate SL 0 125 MG SUBL, Place 0 125 mg under the tongue 3 (three) times a day as needed (as needed), Disp: 60 each, Rfl: 1    Multiple Vitamins-Minerals (MULTI FOR HIM 50+) TABS, Take by mouth, Disp: , Rfl:     Omega-3 Fatty Acids (FISH OIL) 1,000 mg, Take 1,000 mg by mouth daily, Disp: , Rfl:     pravastatin (PRAVACHOL) 80 mg tablet, Take 1 tablet (80 mg total) by mouth daily at bedtime, Disp: 30 tablet, Rfl: 5    HYDROcodone-acetaminophen (NORCO) 5-325 mg per tablet, Take 1 tablet by mouth every 6 (six) hours as needed for painMax Daily Amount: 4 tablets, Disp: 30 tablet, Rfl: 0    Allergies   Allergen Reactions    Pseudoephedrine Hives     Reaction Date: 16Apr2011;     Sulfa Antibiotics Dizziness       Physical Exam:    /74 (BP Location: Left arm, Patient Position: Sitting, Cuff Size: Standard)   Pulse 68   Temp 98 1 °F (36 7 °C)   Ht 6' 1" (1 854 m)   Wt 121 kg (266 lb)   BMI 35 09 kg/m²     Constitutional:normal, well developed, well nourished, alert, in no distress and non-toxic and no overt pain behavior  and overweight  Eyes:anicteric  HEENT:grossly intact  Neck:supple, symmetric, trachea midline and no masses   Pulmonary:even and unlabored  Cardiovascular:No edema or pitting edema present  Skin:Normal without rashes or lesions and well hydrated  Psychiatric:Mood and affect appropriate  Neurologic:Cranial Nerves II-XII grossly intact  Musculoskeletal:The patient's gait is slightly antalgic, but steady without the use of any assistive devices  Imaging  No orders to display         No orders of the defined types were placed in this encounter

## 2020-08-18 ENCOUNTER — OFFICE VISIT (OUTPATIENT)
Dept: FAMILY MEDICINE CLINIC | Facility: CLINIC | Age: 70
End: 2020-08-18
Payer: COMMERCIAL

## 2020-08-18 VITALS
HEIGHT: 73 IN | TEMPERATURE: 97.6 F | DIASTOLIC BLOOD PRESSURE: 70 MMHG | BODY MASS INDEX: 35.39 KG/M2 | WEIGHT: 267 LBS | HEART RATE: 93 BPM | OXYGEN SATURATION: 96 % | SYSTOLIC BLOOD PRESSURE: 120 MMHG

## 2020-08-18 DIAGNOSIS — F41.9 ANXIETY: ICD-10-CM

## 2020-08-18 DIAGNOSIS — S23.41XA SPRAIN OF COSTAL CARTILAGE, INITIAL ENCOUNTER: Primary | ICD-10-CM

## 2020-08-18 DIAGNOSIS — K63.5 MULTIPLE POLYPS OF SIGMOID COLON: ICD-10-CM

## 2020-08-18 PROCEDURE — 1036F TOBACCO NON-USER: CPT | Performed by: FAMILY MEDICINE

## 2020-08-18 PROCEDURE — 1101F PT FALLS ASSESS-DOCD LE1/YR: CPT | Performed by: FAMILY MEDICINE

## 2020-08-18 PROCEDURE — 99214 OFFICE O/P EST MOD 30 MIN: CPT | Performed by: FAMILY MEDICINE

## 2020-08-18 PROCEDURE — 4040F PNEUMOC VAC/ADMIN/RCVD: CPT | Performed by: FAMILY MEDICINE

## 2020-08-18 PROCEDURE — 3008F BODY MASS INDEX DOCD: CPT | Performed by: FAMILY MEDICINE

## 2020-08-18 PROCEDURE — 1160F RVW MEDS BY RX/DR IN RCRD: CPT | Performed by: FAMILY MEDICINE

## 2020-08-18 PROCEDURE — 3288F FALL RISK ASSESSMENT DOCD: CPT | Performed by: FAMILY MEDICINE

## 2020-08-18 NOTE — PROGRESS NOTES
Subjective:   Chief Complaint   Patient presents with    Back Pain     pt c/o mid right side back pain that started last night,         Patient ID: Sloane Gregory is a 77 y o  male      The pt is here today because he has had pain in the right upper back since last night  Working last night until 11, not really doing anything strenuous  He was pulling in moving some things around  Toward the end the night he developed a pain in the upper right back that had worsened abruptly before he went to bed  He did not sleep almost at all last night  Felt really run down this morning  Has had an itchy ear for a couple of weeks  Started sneezing, just felt like he was fighting something   Friendship really lousy and the pain persisted and he said he almost went to the emergency room  He has had some heaviness in the center of his chest  Sometimes when the pain gets really sharp hurts when he takes a deep breath  He does know this made him more anxious, he was worried about this being something more serious  He did a xanax this morning and that did help  He took some advil this morning as well, he does not think this help much the, though  He gets a sharp stabbing pain underneath the scapula on the right side of the upper back especially with different movements like twisting  It's there all of the time, though  Worse last night and this morning than it is now  He does have a muscle relaxant, metaxalone, home  He has not tried taking that yet           The following portions of the patient's history were reviewed and updated as appropriate: allergies, current medications, past family history, past medical history, past social history, past surgical history and problem list     Review of Systems          Objective:  Vitals:    08/18/20 1504   BP: 120/70   Pulse: 93   Temp: 97 6 °F (36 4 °C)   SpO2: 96%   Weight: 121 kg (267 lb)   Height: 6' 1" (1 854 m)      Physical Exam  Constitutional:       General: He is not in acute distress  Appearance: Normal appearance  He is not ill-appearing  Musculoskeletal:        Back:    Skin:     General: Skin is warm and dry  Findings: No erythema or rash  Neurological:      General: No focal deficit present  Mental Status: He is alert and oriented to person, place, and time  Cranial Nerves: No cranial nerve deficit  Gait: Gait normal    Psychiatric:         Mood and Affect: Mood normal          Behavior: Behavior normal          Thought Content: Thought content normal          Judgment: Judgment normal            Assessment/Plan:    Multiple polyps of sigmoid colon  The patient is due for colonoscopy so he was referred for this  Diagnoses and all orders for this visit:    Sprain of costal cartilage, initial encounter  -     Ambulatory referral to Physical Therapy; Future    Anxiety    Multiple polyps of sigmoid colon  -     Ambulatory referral for colonoscopy; Future        I do think the patient likely pulled something in one of the muscles underneath the scapula, possibly in the rib  I think that the patient's anxiety increased when he started having some pain with deep breaths, as does he  This Xanax definitely did help that  I advised that he use his metaxalone that he has at home, especially tonight before bed  We discussed stretching including twisting and reaching to try and stretch the muscles out  If over the next day or two this is not getting better I recommended he contact physical therapy or possibly even a chiropractor  He has been to physical therapy in the past and is very comfortable there so I did place an order just in case this is not get better quickly

## 2020-09-15 ENCOUNTER — OFFICE VISIT (OUTPATIENT)
Dept: FAMILY MEDICINE CLINIC | Facility: CLINIC | Age: 70
End: 2020-09-15
Payer: COMMERCIAL

## 2020-09-15 VITALS
OXYGEN SATURATION: 98 % | DIASTOLIC BLOOD PRESSURE: 88 MMHG | HEIGHT: 73 IN | WEIGHT: 269.25 LBS | HEART RATE: 86 BPM | SYSTOLIC BLOOD PRESSURE: 132 MMHG | TEMPERATURE: 97.6 F | BODY MASS INDEX: 35.68 KG/M2

## 2020-09-15 DIAGNOSIS — Z23 NEED FOR VACCINATION: ICD-10-CM

## 2020-09-15 DIAGNOSIS — H81.13 BENIGN PAROXYSMAL POSITIONAL VERTIGO DUE TO BILATERAL VESTIBULAR DISORDER: Primary | ICD-10-CM

## 2020-09-15 DIAGNOSIS — Z12.11 SCREENING FOR COLON CANCER: ICD-10-CM

## 2020-09-15 DIAGNOSIS — F41.1 GENERALIZED ANXIETY DISORDER: ICD-10-CM

## 2020-09-15 DIAGNOSIS — J01.40 ACUTE NON-RECURRENT PANSINUSITIS: ICD-10-CM

## 2020-09-15 DIAGNOSIS — E78.2 MIXED HYPERLIPIDEMIA: ICD-10-CM

## 2020-09-15 DIAGNOSIS — F41.0 PANIC DISORDER: ICD-10-CM

## 2020-09-15 PROCEDURE — 1160F RVW MEDS BY RX/DR IN RCRD: CPT | Performed by: FAMILY MEDICINE

## 2020-09-15 PROCEDURE — 99214 OFFICE O/P EST MOD 30 MIN: CPT | Performed by: FAMILY MEDICINE

## 2020-09-15 PROCEDURE — 90750 HZV VACC RECOMBINANT IM: CPT

## 2020-09-15 PROCEDURE — 1036F TOBACCO NON-USER: CPT | Performed by: FAMILY MEDICINE

## 2020-09-15 PROCEDURE — 90471 IMMUNIZATION ADMIN: CPT

## 2020-09-15 RX ORDER — PRAVASTATIN SODIUM 80 MG/1
80 TABLET ORAL
Qty: 30 TABLET | Refills: 5 | Status: SHIPPED | OUTPATIENT
Start: 2020-09-15 | End: 2021-04-05 | Stop reason: SDUPTHER

## 2020-09-15 RX ORDER — PREDNISONE 10 MG/1
TABLET ORAL
Qty: 40 TABLET | Refills: 0 | Status: SHIPPED | OUTPATIENT
Start: 2020-09-15 | End: 2021-01-04 | Stop reason: SDUPTHER

## 2020-09-15 RX ORDER — CEFUROXIME AXETIL 500 MG/1
500 TABLET ORAL EVERY 12 HOURS SCHEDULED
Qty: 20 TABLET | Refills: 0 | Status: SHIPPED | OUTPATIENT
Start: 2020-09-15 | End: 2020-09-25

## 2020-09-15 RX ORDER — VILAZODONE HYDROCHLORIDE 10 MG/1
10 TABLET ORAL
Qty: 7 TABLET | Refills: 0 | Status: SHIPPED | OUTPATIENT
Start: 2020-09-15 | End: 2020-12-21

## 2020-09-15 RX ORDER — ALPRAZOLAM 0.25 MG/1
0.25 TABLET ORAL 3 TIMES DAILY PRN
Qty: 30 TABLET | Refills: 0 | Status: SHIPPED | OUTPATIENT
Start: 2020-09-15 | End: 2020-12-08 | Stop reason: SDUPTHER

## 2020-09-15 RX ORDER — VILAZODONE HYDROCHLORIDE 20 MG/1
20 TABLET ORAL
Qty: 30 TABLET | Refills: 0 | Status: SHIPPED | OUTPATIENT
Start: 2020-09-15 | End: 2020-10-20 | Stop reason: SDUPTHER

## 2020-09-15 NOTE — ASSESSMENT & PLAN NOTE
As far as the patient's vertigo, he has tried physical therapy in the past which really did not help a whole lot more than doing the exercises at home  He is going to do his exercises at home to see if he can get any relief from that  He will continue to use the meclizine as needed  Additionally, we will treat for possible sinusitis  I am going to treat him with prednisone because at this point I am not certain he actually has an infection but rather simply inflammation  If he is not feeling significantly better on the prednisone within a few days he will add the antibiotic

## 2020-09-15 NOTE — PROGRESS NOTES
BMI Counseling: There is no height or weight on file to calculate BMI  The BMI is above normal  Exercise recommendations include exercising 3-5 times per week  Subjective:   Chief Complaint   Patient presents with    Dizziness     Pt has been dizzy for the past 3-4 weeks  Not sure if it is sinus or vertigo related  He is waking up and the room is spinning  Pt given colonoscopy orders because he is due in November  FBW 5/7/2020  Patient ID: Mya Oquendo is a 77 y o  male      The patient is here today because he has had really bad vertigo for the past 4-5 days  He actually has had vertigo for the past month but things seem to be getting better  He went on vacation things were pretty good, he came back and he felt like things were improved but then things got much worse last Friday  He played golf on Friday and it was really bad when he was playing golf  He overdid it Friday, was running until after midnight  He was really tired a couple of days afterwards  He takes meclizine to calm it down but it does not always work  He is worried about possible sinus infection  He denies any fevers or chills  He denies any nausea or vomiting  His nose is congested  He is not sleeping well - if he lies on his right side it spins like crazt  He feels sinus pressure  He denies any coughing or shortness of breath  As far as the vertigo, He has not tried the exercises on the bed but plans to    His anxiety has also started flaring more than it has been doing  He does suffer with seasonal depression and we had him on Lexapro last year  He did feel initially was making a difference but after a few months he did not think it was making much of a difference at all  This is when he stopped it anyway because we were going into the spring and summer  He has done pretty well until the past few weeks  He has been using more of his Xanax that he likes to, he usually uses it somewhat infrequently  He uses it for certain drives because he has severe anxiety with driving at times  He is is it more for panic  He feels that lately he has been using it more for his generalized anxiety  He has been on multiple medications in the past including Wellbutrin, Lexapro, Celexa, and Effexor if not a couple of more  All of them either had side effects or did not work for him  In addition to the Lexapro not really doing much by the end of the winter he also had significant sexual side effects with that which he did not like at all      The following portions of the patient's history were reviewed and updated as appropriate: allergies, current medications, past family history, past medical history, past social history, past surgical history and problem list     Review of Systems          Objective:  Vitals:    09/15/20 1413   BP: 132/88   BP Location: Left arm   Patient Position: Sitting   Cuff Size: Standard   Pulse: 86   Temp: 97 6 °F (36 4 °C)   TempSrc: Tympanic   SpO2: 98%   Weight: 122 kg (269 lb 4 oz)   Height: 6' 1" (1 854 m)      Physical Exam  Constitutional:       General: He is not in acute distress  Appearance: Normal appearance  He is well-developed  He is not ill-appearing  HENT:      Head: Normocephalic and atraumatic  Right Ear: Tympanic membrane, ear canal and external ear normal       Left Ear: Tympanic membrane, ear canal and external ear normal       Nose: Mucosal edema present  Right Sinus: Maxillary sinus tenderness present  No frontal sinus tenderness  Left Sinus: Maxillary sinus tenderness present  No frontal sinus tenderness  Mouth/Throat:      Pharynx: No posterior oropharyngeal erythema  Tonsils: No tonsillar abscesses  Eyes:      General: Lids are normal       Conjunctiva/sclera: Conjunctivae normal    Pulmonary:      Effort: Pulmonary effort is normal  No respiratory distress  Skin:     General: Skin is warm and dry  Findings: No erythema or rash     Neurological:      General: No focal deficit present  Mental Status: He is alert and oriented to person, place, and time  Cranial Nerves: No cranial nerve deficit  Gait: Gait normal    Psychiatric:         Mood and Affect: Mood normal          Behavior: Behavior normal          Thought Content: Thought content normal          Judgment: Judgment normal            Assessment/Plan:    Benign paroxysmal positional vertigo due to bilateral vestibular disorder  As far as the patient's vertigo, he has tried physical therapy in the past which really did not help a whole lot more than doing the exercises at home  He is going to do his exercises at home to see if he can get any relief from that  He will continue to use the meclizine as needed  Additionally, we will treat for possible sinusitis  I am going to treat him with prednisone because at this point I am not certain he actually has an infection but rather simply inflammation  If he is not feeling significantly better on the prednisone within a few days he will add the antibiotic  Generalized anxiety disorder  As far as the patient's generalized anxiety, I do think the patient would be better off on a long-term medication but he has failed multiple different medications  I would like to try him on one more, Viibryd  He is going to start with 10 mg for a week and then increase to 20 mg  He will continue his Xanax as needed for panic  I am hopeful of this might make a difference for him and not have the sexual side effects but if it does not we may need to move to something more long acting like a Klonopin or Ativan  Diagnoses and all orders for this visit:    Benign paroxysmal positional vertigo due to bilateral vestibular disorder    Acute non-recurrent pansinusitis  -     predniSONE 10 mg tablet; 5 tabs daily x3 days, 4 tabs daily x2 days, 3 tabs daily x2 days, 2 tabs daily x 2 days, 1 tab daily x2 days  -     cefuroxime (CEFTIN) 500 mg tablet;  Take 1 tablet (500 mg total) by mouth every 12 (twelve) hours for 10 days    Generalized anxiety disorder  -     ALPRAZolam (XANAX) 0 25 mg tablet; Take 1 tablet (0 25 mg total) by mouth 3 (three) times a day as needed for anxiety  -     vilazodone (Viibryd) 10 mg tablet; Take 1 tablet (10 mg total) by mouth daily with breakfast  -     vilazodone (Viibryd) 20 mg tablet; Take 1 tablet (20 mg total) by mouth daily with breakfast    Panic disorder    Mixed hyperlipidemia  -     pravastatin (PRAVACHOL) 80 mg tablet; Take 1 tablet (80 mg total) by mouth daily at bedtime    Screening for colon cancer  -     Ambulatory referral for colonoscopy;  Future    Need for vaccination  -     Zoster Vaccine Recombinant IM

## 2020-09-15 NOTE — ASSESSMENT & PLAN NOTE
As far as the patient's generalized anxiety, I do think the patient would be better off on a long-term medication but he has failed multiple different medications  I would like to try him on one more, Viibryd  He is going to start with 10 mg for a week and then increase to 20 mg  He will continue his Xanax as needed for panic  I am hopeful of this might make a difference for him and not have the sexual side effects but if it does not we may need to move to something more long acting like a Klonopin or Ativan

## 2020-10-20 DIAGNOSIS — F41.1 GENERALIZED ANXIETY DISORDER: ICD-10-CM

## 2020-10-20 RX ORDER — VILAZODONE HYDROCHLORIDE 10 MG/1
10 TABLET ORAL
Qty: 7 TABLET | Refills: 0 | Status: CANCELLED | OUTPATIENT
Start: 2020-10-20

## 2020-10-20 RX ORDER — VILAZODONE HYDROCHLORIDE 20 MG/1
20 TABLET ORAL
Qty: 30 TABLET | Refills: 0 | Status: SHIPPED | OUTPATIENT
Start: 2020-10-20 | End: 2020-11-16 | Stop reason: SDUPTHER

## 2020-11-06 ENCOUNTER — IMMUNIZATIONS (OUTPATIENT)
Dept: FAMILY MEDICINE CLINIC | Facility: CLINIC | Age: 70
End: 2020-11-06
Payer: COMMERCIAL

## 2020-11-06 DIAGNOSIS — Z23 NEED FOR VACCINATION: Primary | ICD-10-CM

## 2020-11-06 PROCEDURE — 90662 IIV NO PRSV INCREASED AG IM: CPT

## 2020-11-06 PROCEDURE — 90471 IMMUNIZATION ADMIN: CPT

## 2020-11-16 DIAGNOSIS — F41.1 GENERALIZED ANXIETY DISORDER: ICD-10-CM

## 2020-11-16 RX ORDER — VILAZODONE HYDROCHLORIDE 20 MG/1
20 TABLET ORAL
Qty: 90 TABLET | Refills: 1 | Status: SHIPPED | OUTPATIENT
Start: 2020-11-16 | End: 2020-12-21 | Stop reason: SDUPTHER

## 2020-11-17 ENCOUNTER — CLINICAL SUPPORT (OUTPATIENT)
Dept: FAMILY MEDICINE CLINIC | Facility: CLINIC | Age: 70
End: 2020-11-17
Payer: COMMERCIAL

## 2020-11-17 DIAGNOSIS — Z23 NEED FOR VACCINATION: Primary | ICD-10-CM

## 2020-11-18 PROCEDURE — 90750 HZV VACC RECOMBINANT IM: CPT

## 2020-11-18 PROCEDURE — 90471 IMMUNIZATION ADMIN: CPT

## 2020-12-08 DIAGNOSIS — F41.1 GENERALIZED ANXIETY DISORDER: ICD-10-CM

## 2020-12-08 RX ORDER — ALPRAZOLAM 0.25 MG/1
0.25 TABLET ORAL 3 TIMES DAILY PRN
Qty: 30 TABLET | Refills: 0 | Status: SHIPPED | OUTPATIENT
Start: 2020-12-08 | End: 2021-02-10 | Stop reason: SDUPTHER

## 2020-12-18 ENCOUNTER — TELEMEDICINE (OUTPATIENT)
Dept: FAMILY MEDICINE CLINIC | Facility: CLINIC | Age: 70
End: 2020-12-18
Payer: COMMERCIAL

## 2020-12-18 VITALS — BODY MASS INDEX: 35.65 KG/M2 | HEIGHT: 73 IN | WEIGHT: 269 LBS

## 2020-12-18 DIAGNOSIS — U07.1 COVID-19: Primary | ICD-10-CM

## 2020-12-18 PROCEDURE — 99214 OFFICE O/P EST MOD 30 MIN: CPT | Performed by: FAMILY MEDICINE

## 2020-12-18 PROCEDURE — 1160F RVW MEDS BY RX/DR IN RCRD: CPT | Performed by: FAMILY MEDICINE

## 2020-12-18 PROCEDURE — 3008F BODY MASS INDEX DOCD: CPT | Performed by: FAMILY MEDICINE

## 2020-12-18 PROCEDURE — 3725F SCREEN DEPRESSION PERFORMED: CPT | Performed by: FAMILY MEDICINE

## 2020-12-18 PROCEDURE — 1036F TOBACCO NON-USER: CPT | Performed by: FAMILY MEDICINE

## 2020-12-18 RX ORDER — SODIUM CHLORIDE 9 MG/ML
20 INJECTION, SOLUTION INTRAVENOUS ONCE
Status: CANCELLED | OUTPATIENT
Start: 2020-12-19

## 2020-12-18 RX ORDER — ACETAMINOPHEN 325 MG/1
650 TABLET ORAL ONCE AS NEEDED
Status: CANCELLED | OUTPATIENT
Start: 2020-12-19

## 2020-12-18 RX ORDER — ALBUTEROL SULFATE 90 UG/1
3 AEROSOL, METERED RESPIRATORY (INHALATION) ONCE AS NEEDED
Status: CANCELLED | OUTPATIENT
Start: 2020-12-19

## 2020-12-19 ENCOUNTER — HOSPITAL ENCOUNTER (OUTPATIENT)
Dept: INFUSION CENTER | Facility: HOSPITAL | Age: 70
Discharge: HOME | End: 2020-12-19
Payer: COMMERCIAL

## 2020-12-19 VITALS
HEART RATE: 54 BPM | RESPIRATION RATE: 18 BRPM | DIASTOLIC BLOOD PRESSURE: 84 MMHG | TEMPERATURE: 97.2 F | SYSTOLIC BLOOD PRESSURE: 174 MMHG | OXYGEN SATURATION: 99 %

## 2020-12-19 DIAGNOSIS — U07.1 COVID-19: Primary | ICD-10-CM

## 2020-12-19 PROCEDURE — M0239 BAMLANIVIMAB-XXXX INFUSION: HCPCS | Performed by: FAMILY MEDICINE

## 2020-12-19 RX ORDER — ALBUTEROL SULFATE 90 UG/1
3 AEROSOL, METERED RESPIRATORY (INHALATION) ONCE AS NEEDED
Status: CANCELLED | OUTPATIENT
Start: 2020-12-19

## 2020-12-19 RX ORDER — SODIUM CHLORIDE 9 MG/ML
20 INJECTION, SOLUTION INTRAVENOUS ONCE
Status: COMPLETED | OUTPATIENT
Start: 2020-12-19 | End: 2020-12-19

## 2020-12-19 RX ORDER — ACETAMINOPHEN 325 MG/1
650 TABLET ORAL ONCE AS NEEDED
Status: DISCONTINUED | OUTPATIENT
Start: 2020-12-19 | End: 2020-12-22 | Stop reason: HOSPADM

## 2020-12-19 RX ORDER — ACETAMINOPHEN 325 MG/1
650 TABLET ORAL ONCE AS NEEDED
Status: CANCELLED | OUTPATIENT
Start: 2020-12-19

## 2020-12-19 RX ORDER — ALBUTEROL SULFATE 90 UG/1
3 AEROSOL, METERED RESPIRATORY (INHALATION) ONCE AS NEEDED
Status: DISCONTINUED | OUTPATIENT
Start: 2020-12-19 | End: 2020-12-22 | Stop reason: HOSPADM

## 2020-12-19 RX ORDER — SODIUM CHLORIDE 9 MG/ML
20 INJECTION, SOLUTION INTRAVENOUS ONCE
Status: CANCELLED | OUTPATIENT
Start: 2020-12-19

## 2020-12-19 RX ADMIN — SODIUM CHLORIDE 700 MG: 9 INJECTION, SOLUTION INTRAVENOUS at 09:31

## 2020-12-19 RX ADMIN — SODIUM CHLORIDE 20 ML/HR: 0.9 INJECTION, SOLUTION INTRAVENOUS at 09:31

## 2020-12-21 ENCOUNTER — TELEPHONE (OUTPATIENT)
Dept: FAMILY MEDICINE CLINIC | Facility: CLINIC | Age: 70
End: 2020-12-21

## 2020-12-21 DIAGNOSIS — F41.1 GENERALIZED ANXIETY DISORDER: ICD-10-CM

## 2020-12-21 RX ORDER — OMEPRAZOLE 20 MG/1
CAPSULE, DELAYED RELEASE ORAL EVERY 24 HOURS
COMMUNITY
End: 2022-02-14

## 2020-12-21 RX ORDER — VILAZODONE HYDROCHLORIDE 20 MG/1
20 TABLET ORAL
Qty: 90 TABLET | Refills: 1 | Status: SHIPPED | OUTPATIENT
Start: 2020-12-21 | End: 2021-01-22 | Stop reason: SDUPTHER

## 2020-12-22 ENCOUNTER — APPOINTMENT (EMERGENCY)
Dept: RADIOLOGY | Facility: HOSPITAL | Age: 70
End: 2020-12-22
Payer: COMMERCIAL

## 2020-12-22 ENCOUNTER — HOSPITAL ENCOUNTER (EMERGENCY)
Facility: HOSPITAL | Age: 70
Discharge: HOME | End: 2020-12-22
Attending: EMERGENCY MEDICINE | Admitting: EMERGENCY MEDICINE
Payer: COMMERCIAL

## 2020-12-22 VITALS
TEMPERATURE: 99.8 F | RESPIRATION RATE: 18 BRPM | HEART RATE: 88 BPM | BODY MASS INDEX: 35.62 KG/M2 | WEIGHT: 270 LBS | SYSTOLIC BLOOD PRESSURE: 137 MMHG | DIASTOLIC BLOOD PRESSURE: 86 MMHG | OXYGEN SATURATION: 97 %

## 2020-12-22 DIAGNOSIS — R06.02 SOB (SHORTNESS OF BREATH): Primary | ICD-10-CM

## 2020-12-22 DIAGNOSIS — R51.9 HEADACHE: ICD-10-CM

## 2020-12-22 PROCEDURE — 70450 CT HEAD/BRAIN W/O DYE: CPT

## 2020-12-22 PROCEDURE — 99284 EMERGENCY DEPT VISIT MOD MDM: CPT | Performed by: EMERGENCY MEDICINE

## 2020-12-22 PROCEDURE — G1004 CDSM NDSC: HCPCS

## 2020-12-22 PROCEDURE — 71250 CT THORAX DX C-: CPT

## 2020-12-22 PROCEDURE — 72125 CT NECK SPINE W/O DYE: CPT

## 2020-12-22 PROCEDURE — 96374 THER/PROPH/DIAG INJ IV PUSH: CPT

## 2020-12-22 PROCEDURE — 99285 EMERGENCY DEPT VISIT HI MDM: CPT

## 2020-12-22 RX ORDER — LIDOCAINE 50 MG/G
1 PATCH TOPICAL ONCE
Status: DISCONTINUED | OUTPATIENT
Start: 2020-12-22 | End: 2020-12-23 | Stop reason: HOSPADM

## 2020-12-22 RX ORDER — ACETAMINOPHEN 325 MG/1
650 TABLET ORAL ONCE
Status: COMPLETED | OUTPATIENT
Start: 2020-12-22 | End: 2020-12-22

## 2020-12-22 RX ORDER — ALBUTEROL SULFATE 90 UG/1
2 AEROSOL, METERED RESPIRATORY (INHALATION) ONCE
Status: COMPLETED | OUTPATIENT
Start: 2020-12-22 | End: 2020-12-22

## 2020-12-22 RX ORDER — KETOROLAC TROMETHAMINE 30 MG/ML
15 INJECTION, SOLUTION INTRAMUSCULAR; INTRAVENOUS ONCE
Status: COMPLETED | OUTPATIENT
Start: 2020-12-22 | End: 2020-12-22

## 2020-12-22 RX ADMIN — ALBUTEROL SULFATE 2 PUFF: 90 AEROSOL, METERED RESPIRATORY (INHALATION) at 21:37

## 2020-12-22 RX ADMIN — ACETAMINOPHEN 650 MG: 325 TABLET, FILM COATED ORAL at 21:12

## 2020-12-22 RX ADMIN — LIDOCAINE 1 PATCH: 50 PATCH TOPICAL at 21:37

## 2020-12-22 RX ADMIN — KETOROLAC TROMETHAMINE 15 MG: 30 INJECTION, SOLUTION INTRAMUSCULAR at 21:12

## 2020-12-23 ENCOUNTER — TELEMEDICINE (OUTPATIENT)
Dept: FAMILY MEDICINE CLINIC | Facility: CLINIC | Age: 70
End: 2020-12-23
Payer: COMMERCIAL

## 2020-12-23 VITALS — BODY MASS INDEX: 35.65 KG/M2 | WEIGHT: 269 LBS | HEIGHT: 73 IN

## 2020-12-23 DIAGNOSIS — U07.1 COVID-19: Primary | ICD-10-CM

## 2020-12-23 DIAGNOSIS — M54.50 ACUTE LOW BACK PAIN WITHOUT SCIATICA, UNSPECIFIED BACK PAIN LATERALITY: ICD-10-CM

## 2020-12-23 DIAGNOSIS — M79.10 MYALGIA: ICD-10-CM

## 2020-12-23 DIAGNOSIS — J12.82 PNEUMONIA DUE TO COVID-19 VIRUS: ICD-10-CM

## 2020-12-23 DIAGNOSIS — U07.1 PNEUMONIA DUE TO COVID-19 VIRUS: ICD-10-CM

## 2020-12-23 PROCEDURE — 99214 OFFICE O/P EST MOD 30 MIN: CPT | Performed by: FAMILY MEDICINE

## 2020-12-23 PROCEDURE — 1160F RVW MEDS BY RX/DR IN RCRD: CPT | Performed by: FAMILY MEDICINE

## 2020-12-23 PROCEDURE — 1036F TOBACCO NON-USER: CPT | Performed by: FAMILY MEDICINE

## 2020-12-23 PROCEDURE — 3008F BODY MASS INDEX DOCD: CPT | Performed by: FAMILY MEDICINE

## 2020-12-23 RX ORDER — KETOROLAC TROMETHAMINE 10 MG/1
10 TABLET, FILM COATED ORAL EVERY 6 HOURS PRN
Qty: 12 TABLET | Refills: 0 | Status: SHIPPED | OUTPATIENT
Start: 2020-12-23 | End: 2022-02-14

## 2021-01-04 ENCOUNTER — TELEPHONE (OUTPATIENT)
Dept: FAMILY MEDICINE CLINIC | Facility: CLINIC | Age: 71
End: 2021-01-04

## 2021-01-04 DIAGNOSIS — J01.40 ACUTE NON-RECURRENT PANSINUSITIS: ICD-10-CM

## 2021-01-04 RX ORDER — PREDNISONE 10 MG/1
TABLET ORAL
Qty: 20 TABLET | Refills: 0 | Status: SHIPPED | OUTPATIENT
Start: 2021-01-04 | End: 2021-09-20 | Stop reason: ALTCHOICE

## 2021-01-04 NOTE — TELEPHONE ENCOUNTER
Patient called stating he had a terrible, terrible migraine Saturday pm and Sunday  This was by far the worst   He has a little fatigue and body aches  He is asking if you can send prednisone into his pharmacy to help with these as it did help him the last time  Please advise at [phone]    [facility]

## 2021-01-22 DIAGNOSIS — F41.1 GENERALIZED ANXIETY DISORDER: ICD-10-CM

## 2021-01-22 RX ORDER — VILAZODONE HYDROCHLORIDE 20 MG/1
20 TABLET ORAL
Qty: 90 TABLET | Refills: 1 | Status: SHIPPED | OUTPATIENT
Start: 2021-01-22 | End: 2021-09-20 | Stop reason: ALTCHOICE

## 2021-02-10 DIAGNOSIS — F41.1 GENERALIZED ANXIETY DISORDER: ICD-10-CM

## 2021-02-10 RX ORDER — ALPRAZOLAM 0.25 MG/1
0.25 TABLET ORAL 3 TIMES DAILY PRN
Qty: 30 TABLET | Refills: 0 | Status: SHIPPED | OUTPATIENT
Start: 2021-02-10 | End: 2021-04-05 | Stop reason: SDUPTHER

## 2021-02-15 ENCOUNTER — TELEPHONE (OUTPATIENT)
Dept: FAMILY MEDICINE CLINIC | Facility: CLINIC | Age: 71
End: 2021-02-15

## 2021-02-15 NOTE — TELEPHONE ENCOUNTER
Pt states he's been having like chest discomfort trouble breathing  He states he had covid in December  He wants to be seen

## 2021-02-16 ENCOUNTER — APPOINTMENT (EMERGENCY)
Dept: RADIOLOGY | Facility: HOSPITAL | Age: 71
End: 2021-02-16
Payer: COMMERCIAL

## 2021-02-16 ENCOUNTER — HOSPITAL ENCOUNTER (EMERGENCY)
Facility: HOSPITAL | Age: 71
Discharge: HOME | End: 2021-02-16
Attending: EMERGENCY MEDICINE | Admitting: EMERGENCY MEDICINE
Payer: COMMERCIAL

## 2021-02-16 ENCOUNTER — APPOINTMENT (EMERGENCY)
Dept: CT IMAGING | Facility: HOSPITAL | Age: 71
End: 2021-02-16
Payer: COMMERCIAL

## 2021-02-16 VITALS
SYSTOLIC BLOOD PRESSURE: 160 MMHG | WEIGHT: 270 LBS | OXYGEN SATURATION: 97 % | DIASTOLIC BLOOD PRESSURE: 88 MMHG | TEMPERATURE: 97.1 F | BODY MASS INDEX: 35.78 KG/M2 | RESPIRATION RATE: 20 BRPM | HEART RATE: 67 BPM | HEIGHT: 73 IN

## 2021-02-16 DIAGNOSIS — R06.02 SOB (SHORTNESS OF BREATH): Primary | ICD-10-CM

## 2021-02-16 DIAGNOSIS — S22.010A: ICD-10-CM

## 2021-02-16 LAB
ALBUMIN SERPL BCP-MCNC: 3.8 G/DL (ref 3.5–5)
ALP SERPL-CCNC: 86 U/L (ref 46–116)
ALT SERPL W P-5'-P-CCNC: 39 U/L (ref 12–78)
ANION GAP SERPL CALCULATED.3IONS-SCNC: 8 MMOL/L (ref 4–13)
APTT PPP: 33 SECONDS (ref 23–37)
AST SERPL W P-5'-P-CCNC: 28 U/L (ref 5–45)
BASOPHILS # BLD AUTO: 0.04 THOUSANDS/ΜL (ref 0–0.1)
BASOPHILS NFR BLD AUTO: 1 % (ref 0–1)
BILIRUB SERPL-MCNC: 0.7 MG/DL (ref 0.2–1)
BUN SERPL-MCNC: 29 MG/DL (ref 5–25)
CALCIUM SERPL-MCNC: 9.3 MG/DL (ref 8.3–10.1)
CHLORIDE SERPL-SCNC: 104 MMOL/L (ref 100–108)
CO2 SERPL-SCNC: 26 MMOL/L (ref 21–32)
CREAT SERPL-MCNC: 1.17 MG/DL (ref 0.6–1.3)
D DIMER PPP FEU-MCNC: 2.9 UG/ML FEU
EOSINOPHIL # BLD AUTO: 0.15 THOUSAND/ΜL (ref 0–0.61)
EOSINOPHIL NFR BLD AUTO: 2 % (ref 0–6)
ERYTHROCYTE [DISTWIDTH] IN BLOOD BY AUTOMATED COUNT: 13.8 % (ref 11.6–15.1)
GFR SERPL CREATININE-BSD FRML MDRD: 63 ML/MIN/1.73SQ M
GLUCOSE SERPL-MCNC: 101 MG/DL (ref 65–140)
HCT VFR BLD AUTO: 45.8 % (ref 36.5–49.3)
HGB BLD-MCNC: 15.3 G/DL (ref 12–17)
IMM GRANULOCYTES # BLD AUTO: 0.02 THOUSAND/UL (ref 0–0.2)
IMM GRANULOCYTES NFR BLD AUTO: 0 % (ref 0–2)
INR PPP: 0.97 (ref 0.84–1.19)
LYMPHOCYTES # BLD AUTO: 2.32 THOUSANDS/ΜL (ref 0.6–4.47)
LYMPHOCYTES NFR BLD AUTO: 30 % (ref 14–44)
MCH RBC QN AUTO: 32.7 PG (ref 26.8–34.3)
MCHC RBC AUTO-ENTMCNC: 33.4 G/DL (ref 31.4–37.4)
MCV RBC AUTO: 98 FL (ref 82–98)
MONOCYTES # BLD AUTO: 0.73 THOUSAND/ΜL (ref 0.17–1.22)
MONOCYTES NFR BLD AUTO: 9 % (ref 4–12)
NEUTROPHILS # BLD AUTO: 4.52 THOUSANDS/ΜL (ref 1.85–7.62)
NEUTS SEG NFR BLD AUTO: 58 % (ref 43–75)
NRBC BLD AUTO-RTO: 0 /100 WBCS
NT-PROBNP SERPL-MCNC: 122 PG/ML
PLATELET # BLD AUTO: 203 THOUSANDS/UL (ref 149–390)
PMV BLD AUTO: 10.1 FL (ref 8.9–12.7)
POTASSIUM SERPL-SCNC: 4.4 MMOL/L (ref 3.5–5.3)
PROT SERPL-MCNC: 7.4 G/DL (ref 6.4–8.2)
PROTHROMBIN TIME: 12.9 SECONDS (ref 11.6–14.5)
RBC # BLD AUTO: 4.68 MILLION/UL (ref 3.88–5.62)
SODIUM SERPL-SCNC: 138 MMOL/L (ref 136–145)
TROPONIN I SERPL-MCNC: <0.02 NG/ML
WBC # BLD AUTO: 7.78 THOUSAND/UL (ref 4.31–10.16)

## 2021-02-16 PROCEDURE — 85025 COMPLETE CBC W/AUTO DIFF WBC: CPT | Performed by: PHYSICIAN ASSISTANT

## 2021-02-16 PROCEDURE — 80053 COMPREHEN METABOLIC PANEL: CPT | Performed by: PHYSICIAN ASSISTANT

## 2021-02-16 PROCEDURE — 71275 CT ANGIOGRAPHY CHEST: CPT

## 2021-02-16 PROCEDURE — 99285 EMERGENCY DEPT VISIT HI MDM: CPT

## 2021-02-16 PROCEDURE — 85730 THROMBOPLASTIN TIME PARTIAL: CPT | Performed by: PHYSICIAN ASSISTANT

## 2021-02-16 PROCEDURE — 85379 FIBRIN DEGRADATION QUANT: CPT | Performed by: PHYSICIAN ASSISTANT

## 2021-02-16 PROCEDURE — 84484 ASSAY OF TROPONIN QUANT: CPT | Performed by: PHYSICIAN ASSISTANT

## 2021-02-16 PROCEDURE — G1004 CDSM NDSC: HCPCS

## 2021-02-16 PROCEDURE — 99284 EMERGENCY DEPT VISIT MOD MDM: CPT | Performed by: PHYSICIAN ASSISTANT

## 2021-02-16 PROCEDURE — 36415 COLL VENOUS BLD VENIPUNCTURE: CPT | Performed by: PHYSICIAN ASSISTANT

## 2021-02-16 PROCEDURE — 71045 X-RAY EXAM CHEST 1 VIEW: CPT

## 2021-02-16 PROCEDURE — 85610 PROTHROMBIN TIME: CPT | Performed by: PHYSICIAN ASSISTANT

## 2021-02-16 PROCEDURE — 83880 ASSAY OF NATRIURETIC PEPTIDE: CPT | Performed by: PHYSICIAN ASSISTANT

## 2021-02-16 PROCEDURE — 93005 ELECTROCARDIOGRAM TRACING: CPT

## 2021-02-16 RX ORDER — ALBUTEROL SULFATE 90 UG/1
1-2 AEROSOL, METERED RESPIRATORY (INHALATION) EVERY 6 HOURS PRN
Qty: 1 INHALER | Refills: 0 | Status: SHIPPED | OUTPATIENT
Start: 2021-02-16 | End: 2021-09-20 | Stop reason: ALTCHOICE

## 2021-02-16 RX ADMIN — IOHEXOL 85 ML: 350 INJECTION, SOLUTION INTRAVENOUS at 11:59

## 2021-02-16 NOTE — DISCHARGE INSTRUCTIONS
Continue albuterol inhaler  Follow up with family doctor for recheck in 2-3 days    Follow up with pain management concerning compression fracture of T1

## 2021-02-16 NOTE — ED PROVIDER NOTES
History  Chief Complaint   Patient presents with    Shortness of Breath     patient presents to the ED with c/o SOB, states he had COVID in december  states that his SOB started on friday      Patient is a 71 y/o M with h/o Covid 2 weeks ago presents to the ED with SOB 3 days ago  He states the SOB is constant, worse with exertion, better with rest   Patient states he just took a long 6 hour trip to [place] before his symptoms started  He denies leg pain or swelling  No history of blood clots  No chest pain, but he states he has "discomfort in his airway "  No fevers, chills, runny nose, sore throat  He does have an occasional dry cough  No headaches or dizziness  History provided by:  Patient  Shortness of Breath  Severity:  Moderate  Onset quality:  Gradual  Duration:  3 days  Timing:  Constant  Progression:  Worsening  Chronicity:  New  Context: not URI    Context comment:  Recent covid infection  Relieved by:  Rest  Worsened by:  Exertion  Ineffective treatments:  Inhaler (prednisone)  Associated symptoms: cough    Associated symptoms: no abdominal pain, no chest pain, no diaphoresis, no fever, no headaches, no neck pain, no rash, no sore throat, no sputum production and no vomiting    Risk factors: prolonged immobilization    Risk factors: no hx of cancer, no hx of PE/DVT, no obesity and no tobacco use        Prior to Admission Medications   Prescriptions Last Dose Informant Patient Reported? Taking?    ALPRAZolam (XANAX) 0 25 mg tablet   No No   Sig: Take 1 tablet (0 25 mg total) by mouth 3 (three) times a day as needed for anxiety   Coenzyme Q10 (COQ10 PO)  Self Yes No   Sig: Take by mouth daily   Glucosamine-Chondroit-Vit C-Mn (GLUCOSAMINE 1500 COMPLEX PO)  Self Yes No   Sig: Take by mouth   Glucosamine-Chondroitin 750-600 MG CHEW   Yes No   Hyoscyamine Sulfate SL 0 125 MG SUBL  Self No No   Sig: Place 0 125 mg under the tongue 3 (three) times a day as needed (as needed)   Multiple Vitamins-Minerals (MULTI FOR HIM 50+) TABS  Self Yes No   Sig: Take by mouth   Omega-3 Fatty Acids (FISH OIL) 1,000 mg  Self Yes No   Sig: Take 1,000 mg by mouth daily   aspirin 325 mg tablet  Self Yes No   Sig: Take one tablet by mouth daily   cholecalciferol (VITAMIN D3) 1,000 units tablet  Self Yes No   Sig: Take 1,000 Units by mouth daily   esomeprazole (NexIUM) 40 MG capsule  Self Yes No   Sig: Take 40 mg by mouth every morning before breakfast   ketorolac (TORADOL) 10 mg tablet   No No   Sig: Take 1 tablet (10 mg total) by mouth every 6 (six) hours as needed for moderate pain for up to 3 days   omeprazole (PriLOSEC) 20 mg delayed release capsule   Yes No   Sig: every 24 hours   pravastatin (PRAVACHOL) 80 mg tablet  Self No No   Sig: Take 1 tablet (80 mg total) by mouth daily at bedtime   predniSONE 10 mg tablet   No No   Si tabs daily x2 days, 3 tabs daily x2 days, 2 tabs daily x 2 days, 1 tab daily x2 days   vilazodone (Viibryd) 20 mg tablet   No No   Sig: Take 1 tablet (20 mg total) by mouth daily with breakfast      Facility-Administered Medications: None       Past Medical History:   Diagnosis Date    Acute medial meniscus tear     Allergic rhinitis     Allergic rhinitis     last assessed 14    Anxiety     Appendicitis     Arthritis     Benign essential hypertension     last assessed 14    Biceps tendon tear     Cancer (HCC)     skin    Cervical radiculopathy     and lumbar    Chronic pain disorder     knees and hips     Colon polyps     CPAP (continuous positive airway pressure) dependence     Depression     Depression with anxiety     Dyskinesia of esophagus     Erythema migrans (Lyme disease)     last assessed 10/16/12    Fatigue     GERD (gastroesophageal reflux disease)     Heel spur, right     Herpes     Herpes zoster     Hyperlipidemia     Hyperplastic colon polyp     last assessed 14    Joint pain     Kidney stones     Lyme disease     Nephrolithiasis  Panic disorder with agoraphobia     last assessed 8/7/13    Seasonal affective disorder ([facility] [address] [facility]     Sleep apnea     Vertigo     Vitamin D deficiency        Past Surgical History:   Procedure Laterality Date    APPENDECTOMY  08/01/2003    BICEPS TENDON REPAIR      KNEE ARTHROSCOPY      with medial meniscus repair, resolved 01/01/05    KNEE CARTILAGE SURGERY      OTHER SURGICAL HISTORY      distal reinsertion of ruptured biceps tendon    MI KNEE SCOPE,MED/LAT MENISECTOMY Right 7/30/2019    Procedure: RIGHT KNEE ARTHROSCOPY, MEDIAL MENISCECTOMY;  Surgeon: Emilia Pineda DO;  Location: [facility] MAIN OR;  Service: Orthopedics    TOOTH EXTRACTION      Middle of October 2019    VASECTOMY         Family History   Problem Relation Age of Onset    Diabetes Mother     Coronary artery disease Father     Kidney disease Father     Heart disease Father      I have reviewed and agree with the history as documented  E-Cigarette/Vaping    E-Cigarette Use Never User      E-Cigarette/Vaping Substances     Social History     Tobacco Use    Smoking status: Former Smoker     Types: Cigars    Smokeless tobacco: Never Used   Substance Use Topics    Alcohol use: No    Drug use: No       Review of Systems   Constitutional: Negative for diaphoresis and fever  HENT: Negative for sore throat  Respiratory: Positive for cough and shortness of breath  Negative for sputum production  Cardiovascular: Negative for chest pain, palpitations and leg swelling  Gastrointestinal: Negative for abdominal pain, diarrhea, nausea and vomiting  Genitourinary: Negative for dysuria  Musculoskeletal: Negative for back pain and neck pain  Skin: Negative for color change and rash  Neurological: Positive for light-headedness  Negative for dizziness, weakness, numbness and headaches  Psychiatric/Behavioral: Negative for confusion  All other systems reviewed and are negative        Physical Exam  Physical Exam  Vitals signs and nursing note reviewed  Constitutional:       General: He is not in acute distress  Appearance: Normal appearance  He is well-developed, well-groomed and normal weight  He is not ill-appearing  HENT:      Head: Normocephalic and atraumatic  Right Ear: Hearing normal       Left Ear: Hearing normal       Nose: Nose normal       Mouth/Throat:      Mouth: Mucous membranes are moist       Pharynx: Oropharynx is clear  Eyes:      Conjunctiva/sclera: Conjunctivae normal    Neck:      Musculoskeletal: Normal range of motion  Cardiovascular:      Rate and Rhythm: Normal rate and regular rhythm  Heart sounds: Normal heart sounds  Pulmonary:      Effort: Pulmonary effort is normal       Breath sounds: Examination of the right-lower field reveals decreased breath sounds  Decreased breath sounds present  No wheezing, rhonchi or rales  Abdominal:      General: Abdomen is flat  Bowel sounds are normal       Tenderness: There is no abdominal tenderness  Musculoskeletal: Normal range of motion  General: No tenderness  Right lower leg: No edema  Left lower leg: No edema  Skin:     General: Skin is warm and dry  Coloration: Skin is not pale  Findings: No rash  Neurological:      General: No focal deficit present  Mental Status: He is alert and oriented to person, place, and time  Motor: No weakness  Psychiatric:         Mood and Affect: Mood normal          Behavior: Behavior is cooperative           Vital Signs  ED Triage Vitals   Temperature Pulse Respirations Blood Pressure SpO2   02/16/21 1034 02/16/21 1034 02/16/21 1034 02/16/21 1034 02/16/21 1034   (!) 97 1 °F (36 2 °C) 67 20 160/88 97 %      Temp Source Heart Rate Source Patient Position - Orthostatic VS BP Location FiO2 (%)   02/16/21 1034 02/16/21 1034 -- -- --   Temporal Monitor         Pain Score       02/16/21 1032       3           Vitals:    02/16/21 1034   BP: 160/88   Pulse: 67         Visual Acuity      ED Medications  Medications   iohexol (OMNIPAQUE) 350 MG/ML injection (MULTI-DOSE) 100 mL (has no administration in time range)   iohexol (OMNIPAQUE) 350 MG/ML injection (SINGLE-DOSE) 100 mL (85 mL Intravenous Given 2/16/21 1159)       Diagnostic Studies  Results Reviewed     Procedure Component Value Units Date/Time    NT-BNP PRO [[id number]]  (Normal) Collected: 02/16/21 1106    Lab Status: Final result Specimen: Blood from Arm, Right Updated: 02/16/21 1138     NT-proBNP 122 pg/mL     Troponin I [[id number]]  (Normal) Collected: 02/16/21 1106    Lab Status: Final result Specimen: Blood from Arm, Right Updated: 02/16/21 1134     Troponin I <0 02 ng/mL     D-Dimer [[id number]]  (Abnormal) Collected: 02/16/21 1106    Lab Status: Final result Specimen: Blood from Arm, Right Updated: 02/16/21 1132     D-Dimer, Quant 2 90 ug/ml FEU     Comprehensive metabolic panel [[id number]]  (Abnormal) Collected: 02/16/21 1106    Lab Status: Final result Specimen: Blood from Arm, Right Updated: 02/16/21 1131     Sodium 138 mmol/L      Potassium 4 4 mmol/L      Chloride 104 mmol/L      CO2 26 mmol/L      ANION GAP 8 mmol/L      BUN 29 mg/dL      Creatinine 1 17 mg/dL      Glucose 101 mg/dL      Calcium 9 3 mg/dL      AST 28 U/L      ALT 39 U/L      Alkaline Phosphatase 86 U/L      Total Protein 7 4 g/dL      Albumin 3 8 g/dL      Total Bilirubin 0 70 mg/dL      eGFR 63 ml/min/1 73sq m     Narrative:      Kenny guidelines for Chronic Kidney Disease (CKD):     Stage 1 with normal or high GFR (GFR > 90 mL/min/1 73 square meters)    Stage 2 Mild CKD (GFR = 60-89 mL/min/1 73 square meters)    Stage 3A Moderate CKD (GFR = 45-59 mL/min/1 73 square meters)    Stage 3B Moderate CKD (GFR = 30-44 mL/min/1 73 square meters)    Stage 4 Severe CKD (GFR = 15-29 mL/min/1 73 square meters)    Stage 5 End Stage CKD (GFR <15 mL/min/1 73 square meters)  Note: GFR calculation is accurate only with a steady state creatinine    Protime-INR [[id number]]  (Normal) Collected: 02/16/21 1106    Lab Status: Final result Specimen: Blood from Arm, Right Updated: 02/16/21 1130     Protime 12 9 seconds      INR 0 97    APTT [[id number]]  (Normal) Collected: 02/16/21 1106    Lab Status: Final result Specimen: Blood from Arm, Right Updated: 02/16/21 1130     PTT 33 seconds     CBC and differential [[id number]] Collected: 02/16/21 1106    Lab Status: Final result Specimen: Blood from Arm, Right Updated: 02/16/21 1114     WBC 7 78 Thousand/uL      RBC 4 68 Million/uL      Hemoglobin 15 3 g/dL      Hematocrit 45 8 %      MCV 98 fL      MCH 32 7 pg      MCHC 33 4 g/dL      RDW 13 8 %      MPV 10 1 fL      Platelets 320 Thousands/uL      nRBC 0 /100 WBCs      Neutrophils Relative 58 %      Immat GRANS % 0 %      Lymphocytes Relative 30 %      Monocytes Relative 9 %      Eosinophils Relative 2 %      Basophils Relative 1 %      Neutrophils Absolute 4 52 Thousands/µL      Immature Grans Absolute 0 02 Thousand/uL      Lymphocytes Absolute 2 32 Thousands/µL      Monocytes Absolute 0 73 Thousand/µL      Eosinophils Absolute 0 15 Thousand/µL      Basophils Absolute 0 04 Thousands/µL                  CTA ED chest PE study   Final Result by  (02/16 1405)   Addendum 1 of 1 by Jasmyn Brizuela MD (02/16 1246)   ADDENDUM      There is a voice recognition software related error in the LUNGS section    of the report  A sentence in this section incorrectly reads " Suspicious    pulmonary mass " this section should read,       "LUNGS:  Hypoventilatory changes are noted in the posterior lung bases  Patchy peripheral groundglass airspace opacity seen on the December 22, 2020 examination have resolved  NO SUSPICIOUS pulmonary mass  No    tracheal or endobronchial lesions "               Final      XR chest 1 view portable    (Results Pending)              Procedures  ECG 12 Lead Documentation Only    Date/Time: 2/16/2021 10:40 AM  Performed by: Leighann Kong Nora Donovan PA-C  Authorized by: Nan De La Paz PA-C     Indications / Diagnosis:  SOB  ECG reviewed by me, the ED Provider: yes    Patient location:  ED  Previous ECG:     Previous ECG:  Compared to current    Similarity:  No change  Rate:     ECG rate:  63  Rhythm:     Rhythm: sinus rhythm    Conduction:     Conduction: normal    ST segments:     ST segments:  Normal  T waves:     T waves: normal               ED Course                             SBIRT 20yo+      Most Recent Value   SBIRT (22 yo +)   In order to provide better care to our patients, we are screening all of our patients for alcohol and drug use  Would it be okay to ask you these screening questions? Yes Filed at: 02/16/2021 1049   Initial Alcohol Screen: US AUDIT-C    1  How often do you have a drink containing alcohol?  0 Filed at: 02/16/2021 1049   2  How many drinks containing alcohol do you have on a typical day you are drinking? 0 Filed at: 02/16/2021 1049   3a  Male UNDER 65: How often do you have five or more drinks on one occasion? 0 Filed at: 02/16/2021 1049   3b  FEMALE Any Age, or MALE 65+: How often do you have 4 or more drinks on one occassion? 0 Filed at: 02/16/2021 1049   Audit-C Score  0 Filed at: 02/16/2021 1049   WILL: How many times in the past year have you    Used an illegal drug or used a prescription medication for non-medical reasons?   Never Filed at: 02/16/2021 1049          Wells' Criteria for PE      Most Recent Value   Wells' Criteria for PE   Clinical signs and symptoms of DVT  0 Filed at: 02/16/2021 1136   PE is primary diagnosis or equally likely  0 Filed at: 02/16/2021 1136   HR >100  0 Filed at: 02/16/2021 1136   Immobilization at least 3 days or Surgery in the previous 4 weeks  0 Filed at: 02/16/2021 1136   Previous, objectively diagnosed PE or DVT  0 Filed at: 02/16/2021 1136   Hemoptysis  0 Filed at: 02/16/2021 1136   Malignancy with treatment within 6 months or palliative  0 Filed at: 02/16/2021 1136 Allan Goodman' Criteria Total  0 Filed at: 02/16/2021 1136                MDM  Number of Diagnoses or Management Options  Compression fracture of T1 vertebra ([facility] [address] ): established and improving  SOB (shortness of breath): new and requires workup  Diagnosis management comments: Patient with SOB, recent covid and long trip will order labs, CT scan to r/o PE, or cardiac disease  Patient with normal CT scan except for T1 compression fracture that does not appear new, advised f/u with his pain management doctor  Will prescribe more albuterol for chest tightness and advised f/u with PCP for recheck  Amount and/or Complexity of Data Reviewed  Clinical lab tests: ordered and reviewed  Tests in the radiology section of CPT®: ordered and reviewed    Patient Progress  Patient progress: stable      Disposition  Final diagnoses:   SOB (shortness of breath)   Compression fracture of T1 vertebra ([facility] [address] )     Time reflects when diagnosis was documented in both MDM as applicable and the Disposition within this note     Time User Action Codes Description Comment    2/16/2021 12:52 PM Von Campbell Add [R06 02] SOB (shortness of breath)     2/16/2021 12:52 PM Von Campbell Add [S22 010A] Compression fracture of T1 vertebra [facility])       ED Disposition     ED Disposition Condition Date/Time Comment    Discharge Stable Tue Feb 16, 2021 12:58 PM Concha Alfaro discharge to home/self care              Follow-up Information     Follow up With Specialties Details Why Contact Info    Ne Ha MD Family Medicine Call in 2 days For recheck [address]  [phone]            Discharge Medication List as of 2/16/2021  1:00 PM      START taking these medications    Details   albuterol (PROVENTIL HFA,VENTOLIN HFA) 90 mcg/act inhaler Inhale 1-2 puffs every 6 (six) hours as needed for wheezing, Starting Tue 2/16/2021, Normal         CONTINUE these medications which have NOT CHANGED    Details ALPRAZolam (XANAX) 0 25 mg tablet Take 1 tablet (0 25 mg total) by mouth 3 (three) times a day as needed for anxiety, Starting Wed 2/10/2021, Normal      aspirin 325 mg tablet Take one tablet by mouth daily, Historical Med      cholecalciferol (VITAMIN D3) 1,000 units tablet Take 1,000 Units by mouth daily, Historical Med      Coenzyme Q10 (COQ10 PO) Take by mouth daily, Historical Med      esomeprazole (NexIUM) 40 MG capsule Take 40 mg by mouth every morning before breakfast, Historical Med      Glucosamine-Chondroit-Vit C-Mn (GLUCOSAMINE 1500 COMPLEX PO) Take by mouth, Historical Med      Glucosamine-Chondroitin 750-600 MG CHEW Historical Med      Hyoscyamine Sulfate SL 0 125 MG SUBL Place 0 125 mg under the tongue 3 (three) times a day as needed (as needed), Starting Fri 11/1/2019, Normal      ketorolac (TORADOL) 10 mg tablet Take 1 tablet (10 mg total) by mouth every 6 (six) hours as needed for moderate pain for up to 3 days, Starting Wed 12/23/2020, Until Sat 12/26/2020, Normal      Multiple Vitamins-Minerals (MULTI FOR HIM 50+) TABS Take by mouth, Historical Med      Omega-3 Fatty Acids (FISH OIL) 1,000 mg Take 1,000 mg by mouth daily, Historical Med      omeprazole (PriLOSEC) 20 mg delayed release capsule every 24 hours, Historical Med      pravastatin (PRAVACHOL) 80 mg tablet Take 1 tablet (80 mg total) by mouth daily at bedtime, Starting Tue 9/15/2020, Normal      predniSONE 10 mg tablet 4 tabs daily x2 days, 3 tabs daily x2 days, 2 tabs daily x 2 days, 1 tab daily x2 days, Normal      vilazodone (Viibryd) 20 mg tablet Take 1 tablet (20 mg total) by mouth daily with breakfast, Starting Fri 1/22/2021, Normal           No discharge procedures on file      PDMP Review       Value Time User    PDMP Reviewed  Yes 2/10/2021  2:50 Wang Eubanks MD          ED Provider  Electronically Signed by           Cristopher Lundy PA-C  02/16/21 0710

## 2021-02-17 LAB
ATRIAL RATE: 63 BPM
P AXIS: 36 DEGREES
PR INTERVAL: 192 MS
QRS AXIS: 34 DEGREES
QRSD INTERVAL: 84 MS
QT INTERVAL: 390 MS
QTC INTERVAL: 399 MS
T WAVE AXIS: 43 DEGREES
VENTRICULAR RATE: 63 BPM

## 2021-02-17 PROCEDURE — 93010 ELECTROCARDIOGRAM REPORT: CPT | Performed by: INTERNAL MEDICINE

## 2021-03-04 DIAGNOSIS — Z23 ENCOUNTER FOR IMMUNIZATION: ICD-10-CM

## 2021-03-25 ENCOUNTER — IMMUNIZATIONS (OUTPATIENT)
Dept: FAMILY MEDICINE CLINIC | Facility: HOSPITAL | Age: 71
End: 2021-03-25

## 2021-03-25 DIAGNOSIS — Z23 ENCOUNTER FOR IMMUNIZATION: Primary | ICD-10-CM

## 2021-03-25 PROCEDURE — 0001A SARS-COV-2 / COVID-19 MRNA VACCINE (PFIZER-BIONTECH) 30 MCG: CPT

## 2021-03-25 PROCEDURE — 91300 SARS-COV-2 / COVID-19 MRNA VACCINE (PFIZER-BIONTECH) 30 MCG: CPT

## 2021-04-05 DIAGNOSIS — E78.2 MIXED HYPERLIPIDEMIA: ICD-10-CM

## 2021-04-05 DIAGNOSIS — F41.1 GENERALIZED ANXIETY DISORDER: ICD-10-CM

## 2021-04-06 RX ORDER — ALPRAZOLAM 0.25 MG/1
0.25 TABLET ORAL 3 TIMES DAILY PRN
Qty: 30 TABLET | Refills: 0 | Status: SHIPPED | OUTPATIENT
Start: 2021-04-06 | End: 2021-06-09 | Stop reason: SDUPTHER

## 2021-04-06 RX ORDER — PRAVASTATIN SODIUM 80 MG/1
80 TABLET ORAL
Qty: 30 TABLET | Refills: 5 | Status: SHIPPED | OUTPATIENT
Start: 2021-04-06 | End: 2021-09-20 | Stop reason: ALTCHOICE

## 2021-04-16 ENCOUNTER — IMMUNIZATIONS (OUTPATIENT)
Dept: FAMILY MEDICINE CLINIC | Facility: HOSPITAL | Age: 71
End: 2021-04-16

## 2021-04-16 DIAGNOSIS — Z23 ENCOUNTER FOR IMMUNIZATION: Primary | ICD-10-CM

## 2021-04-16 PROCEDURE — 0002A SARS-COV-2 / COVID-19 MRNA VACCINE (PFIZER-BIONTECH) 30 MCG: CPT

## 2021-04-16 PROCEDURE — 91300 SARS-COV-2 / COVID-19 MRNA VACCINE (PFIZER-BIONTECH) 30 MCG: CPT

## 2021-04-30 ENCOUNTER — TELEPHONE (OUTPATIENT)
Dept: FAMILY MEDICINE CLINIC | Facility: CLINIC | Age: 71
End: 2021-04-30

## 2021-04-30 DIAGNOSIS — Z03.818 ENCOUNTER FOR OBSERVATION FOR SUSPECTED EXPOSURE TO OTHER BIOLOGICAL AGENTS RULED OUT: Primary | ICD-10-CM

## 2021-04-30 DIAGNOSIS — Z03.818 ENCOUNTER FOR OBSERVATION FOR SUSPECTED EXPOSURE TO OTHER BIOLOGICAL AGENTS RULED OUT: ICD-10-CM

## 2021-04-30 PROCEDURE — U0003 INFECTIOUS AGENT DETECTION BY NUCLEIC ACID (DNA OR RNA); SEVERE ACUTE RESPIRATORY SYNDROME CORONAVIRUS 2 (SARS-COV-2) (CORONAVIRUS DISEASE [COVID-19]), AMPLIFIED PROBE TECHNIQUE, MAKING USE OF HIGH THROUGHPUT TECHNOLOGIES AS DESCRIBED BY CMS-2020-01-R: HCPCS | Performed by: FAMILY MEDICINE

## 2021-04-30 PROCEDURE — U0005 INFEC AGEN DETEC AMPLI PROBE: HCPCS | Performed by: FAMILY MEDICINE

## 2021-04-30 NOTE — TELEPHONE ENCOUNTER
Pt called stating he was exposure for covid on 4/27 no mask on  The  person who tested positive is fully   vaccinated for covid, pt states since yesterday is having headaches and feeling achy

## 2021-05-01 LAB — SARS-COV-2 RNA RESP QL NAA+PROBE: NEGATIVE

## 2021-05-27 ENCOUNTER — CLINICAL SUPPORT (OUTPATIENT)
Dept: CARDIAC REHAB | Facility: HOSPITAL | Age: 71
End: 2021-05-27
Attending: INTERNAL MEDICINE
Payer: COMMERCIAL

## 2021-05-27 DIAGNOSIS — Z95.5 S/P CORONARY ARTERY STENT PLACEMENT: Primary | ICD-10-CM

## 2021-05-27 PROCEDURE — 93797 PHYS/QHP OP CAR RHAB WO ECG: CPT

## 2021-05-27 NOTE — PROGRESS NOTES
Cardiac Rehabilitation Plan of Care   Care Plan       Today's date: 2021   Visits: 1-evaluation  Patient name: Nora Wilder      : 1950  Age: 72 y o  MRN: [id number]  Referring Physician: Katelynn Pizano MD  Cardiologist: Dr Kortney Blakely  Provider: Juan Antonio Sidhu  Clinician: Etta Villarreal MS, CEP      Dx: stent x 2  Date of onset: 5/10/2021      SUMMARY OF PROGRESS:  Mr Matteo Killian is here today for cardiac rehabilitation evaluation after recent stenting procedure at [facility]  He reports he is feeling well, and has returned to work  His main goals for his program are to increase overall strength and endurance, establish regular exercise program, and continue to lose weight  He reports he has lost 25 lbs recently by changing his diet  He is focusing on decreasing fats and increasing whole grains  He denies depression or anxiety at this time, and reports minimal stress  He reports good support from family  He completed TM ETT today reaching 5 8 METs at 11 min  He tolerated assessment well with stable hemodynamic response to exercise  Will plan to start exercise at 3 0-3 5 METs and increase as tolerated by patient over first 30 days of rehab  He is in agreement to attend rehab sessions 2-3x/week for 12 weeks, or 36 sessions         Medication compliance: Yes   Comments: Reports taking medications as prescribed      Fall Risk: Low   Comments: n/a    EKG changes: NSR      EXERCISE ASSESSMENT and PLAN    Current Exercise Program in Rehab:       Frequency: 2-3 days/week        Minutes: 30-40         METS: 3 0-3 5            HR: 30 beats above resting   RPE: 4-6         Modalities: Treadmill, Airdyne bike, UBE, NuStep and Recumbent bike      Exercise Progression 30 Day Goals :    Frequency: 2-3 days/week   Minutes: 40   METS: 3 5   HR: 30 beats above resting    RPE: 4-6   Modalities: Treadmill, Airdyne bike, UBE, Rower and Recumbent bike    Strength trainin-3 days / week  12-15 repetitions  1-2 sets per modality   Will be added following 2-3 weeks of monitored exercise sessions   Modalities: Pull Downs, Lateral Raise, Arm Extension, Arm Curl, Front Raises and leg ext    Progressing:  Reviewed Pt goals and determined plan of care    Home Exercise: none    Goals: 10% improvement in functional capacity - based on max METs achieved in fitness assessment, improved DASI score by 10%, Increase in exercise capacity by 40% - based on peak METs tolerated in cardiac rehab exercise session, Exercise 5 days/wk, >150mins/wk of moderate intensity exercise, Resume ADLs with increased strength, Attend Rehab regularly and start a home exercise program  Education: benefit of exercise for CAD risk factors, home exercise guidelines, AHA guidelines to achieve >150 mins/wk of moderate exercise and RPE scale   Plan:education on home exercise guidelines, home exercise 30+ mins 2 days opposite CR and Education class: Risk Factors for Heart Disease  Readiness to change: Preparation:  (Getting ready to change)       NUTRITION ASSESSMENT AND PLAN    Weight control:    Starting weight: 243 5 lb   Current weight:     Waist circumference:    Startin 5   Current:    Diabetes: N/A  Lipid management: no recent profile on chart  Goals:LDL <100, HDL >40, TRG <150 and CHOL <200  Education: heart healthy eating  low sodium diet  hydration  nutrition for  lipid management  wt  loss   Progressing:Reviewed Pt goals and determined plan of care  Plan: Education class: Reading Food Labels and Education Class: Heart Healthy Eating  Readiness to change: Action      PSYCHOSOCIAL ASSESSMENT AND PLAN    Emotional:  Depression assessment:  PHQ-9 = 1-4 = Minimal Depression            Anxiety measure:  MONICA-7 = 0-4  = Not anxious  Self-reported stress level:  4  Social support: Very Good  Goals:  No goals, no concerns  Education: no needs*  Progressing:Reviewed Pt goals and determined plan of care  Plan: Exercise, Spend time outdoors and Enjoy a hobby  Readiness to change: Maintenance: (Maintaining the behavior change)      OTHER CORE COMPONENTS     Tobacco:   Social History     Tobacco Use   Smoking Status Former Smoker    Types: Cigars   Smokeless Tobacco Never Used       Tobacco Use Intervention:   N/A:  Patient is a non-smoker     Blood pressure:    Restin/72   Exercise: 138/72    Goals: consistent BP < 130/80, reduced dietary sodium <2300mg, moderate intensity exercise >150 mins/wk and medication compliance  Education:  understanding high blood pressure and it's relationship to CAD and low sodium diet and HTN  Progressing:Reviewed Pt goals and determined plan of care  Plan: Class: Understanding Heart Disease and Class: Common Heart Medications  Readiness to change: Action:  (Changing behavior)

## 2021-05-28 ENCOUNTER — CLINICAL SUPPORT (OUTPATIENT)
Dept: CARDIAC REHAB | Facility: HOSPITAL | Age: 71
End: 2021-05-28
Attending: INTERNAL MEDICINE
Payer: COMMERCIAL

## 2021-05-28 DIAGNOSIS — Z95.5 S/P CORONARY ARTERY STENT PLACEMENT: ICD-10-CM

## 2021-05-28 PROCEDURE — 93798 PHYS/QHP OP CAR RHAB W/ECG: CPT

## 2021-06-01 ENCOUNTER — CLINICAL SUPPORT (OUTPATIENT)
Dept: CARDIAC REHAB | Facility: HOSPITAL | Age: 71
End: 2021-06-01
Attending: INTERNAL MEDICINE
Payer: COMMERCIAL

## 2021-06-01 DIAGNOSIS — Z95.5 S/P CORONARY ARTERY STENT PLACEMENT: ICD-10-CM

## 2021-06-01 PROCEDURE — 93798 PHYS/QHP OP CAR RHAB W/ECG: CPT

## 2021-06-03 ENCOUNTER — CLINICAL SUPPORT (OUTPATIENT)
Dept: CARDIAC REHAB | Facility: HOSPITAL | Age: 71
End: 2021-06-03
Attending: INTERNAL MEDICINE
Payer: COMMERCIAL

## 2021-06-03 DIAGNOSIS — Z95.5 S/P CORONARY ARTERY STENT PLACEMENT: ICD-10-CM

## 2021-06-03 PROCEDURE — 93798 PHYS/QHP OP CAR RHAB W/ECG: CPT

## 2021-06-04 ENCOUNTER — CLINICAL SUPPORT (OUTPATIENT)
Dept: CARDIAC REHAB | Facility: HOSPITAL | Age: 71
End: 2021-06-04
Attending: INTERNAL MEDICINE
Payer: COMMERCIAL

## 2021-06-04 DIAGNOSIS — Z95.5 S/P CORONARY ARTERY STENT PLACEMENT: ICD-10-CM

## 2021-06-04 PROCEDURE — 93798 PHYS/QHP OP CAR RHAB W/ECG: CPT

## 2021-06-07 ENCOUNTER — CLINICAL SUPPORT (OUTPATIENT)
Dept: CARDIAC REHAB | Facility: HOSPITAL | Age: 71
End: 2021-06-07
Attending: INTERNAL MEDICINE
Payer: COMMERCIAL

## 2021-06-07 DIAGNOSIS — Z95.5 S/P CORONARY ARTERY STENT PLACEMENT: ICD-10-CM

## 2021-06-07 PROCEDURE — 93798 PHYS/QHP OP CAR RHAB W/ECG: CPT

## 2021-06-09 DIAGNOSIS — F41.1 GENERALIZED ANXIETY DISORDER: ICD-10-CM

## 2021-06-09 RX ORDER — ALPRAZOLAM 0.25 MG/1
0.25 TABLET ORAL 3 TIMES DAILY PRN
Qty: 30 TABLET | Refills: 0 | Status: SHIPPED | OUTPATIENT
Start: 2021-06-09 | End: 2021-07-19 | Stop reason: SDUPTHER

## 2021-06-10 ENCOUNTER — CLINICAL SUPPORT (OUTPATIENT)
Dept: CARDIAC REHAB | Facility: HOSPITAL | Age: 71
End: 2021-06-10
Attending: INTERNAL MEDICINE
Payer: COMMERCIAL

## 2021-06-10 DIAGNOSIS — Z95.5 S/P CORONARY ARTERY STENT PLACEMENT: ICD-10-CM

## 2021-06-10 PROCEDURE — 93798 PHYS/QHP OP CAR RHAB W/ECG: CPT

## 2021-06-11 ENCOUNTER — CLINICAL SUPPORT (OUTPATIENT)
Dept: CARDIAC REHAB | Facility: HOSPITAL | Age: 71
End: 2021-06-11
Attending: INTERNAL MEDICINE
Payer: COMMERCIAL

## 2021-06-11 DIAGNOSIS — Z95.5 S/P CORONARY ARTERY STENT PLACEMENT: ICD-10-CM

## 2021-06-11 PROCEDURE — 93798 PHYS/QHP OP CAR RHAB W/ECG: CPT

## 2021-06-15 ENCOUNTER — CLINICAL SUPPORT (OUTPATIENT)
Dept: CARDIAC REHAB | Facility: HOSPITAL | Age: 71
End: 2021-06-15
Attending: INTERNAL MEDICINE
Payer: COMMERCIAL

## 2021-06-15 DIAGNOSIS — Z95.5 S/P CORONARY ARTERY STENT PLACEMENT: ICD-10-CM

## 2021-06-15 PROCEDURE — 93798 PHYS/QHP OP CAR RHAB W/ECG: CPT

## 2021-06-17 ENCOUNTER — APPOINTMENT (OUTPATIENT)
Dept: CARDIAC REHAB | Facility: HOSPITAL | Age: 71
End: 2021-06-17
Attending: INTERNAL MEDICINE
Payer: COMMERCIAL

## 2021-06-18 ENCOUNTER — CLINICAL SUPPORT (OUTPATIENT)
Dept: CARDIAC REHAB | Facility: HOSPITAL | Age: 71
End: 2021-06-18
Attending: INTERNAL MEDICINE
Payer: COMMERCIAL

## 2021-06-18 DIAGNOSIS — Z95.5 S/P CORONARY ARTERY STENT PLACEMENT: ICD-10-CM

## 2021-06-18 PROCEDURE — 93798 PHYS/QHP OP CAR RHAB W/ECG: CPT

## 2021-06-21 ENCOUNTER — APPOINTMENT (OUTPATIENT)
Dept: CARDIAC REHAB | Facility: HOSPITAL | Age: 71
End: 2021-06-21
Attending: INTERNAL MEDICINE
Payer: COMMERCIAL

## 2021-06-25 ENCOUNTER — CLINICAL SUPPORT (OUTPATIENT)
Dept: CARDIAC REHAB | Facility: HOSPITAL | Age: 71
End: 2021-06-25
Attending: INTERNAL MEDICINE
Payer: COMMERCIAL

## 2021-06-25 DIAGNOSIS — Z95.5 S/P CORONARY ARTERY STENT PLACEMENT: ICD-10-CM

## 2021-06-25 PROCEDURE — 93798 PHYS/QHP OP CAR RHAB W/ECG: CPT

## 2021-06-25 NOTE — PROGRESS NOTES
Cardiac Rehabilitation Plan of Care   30 Day Report      Today's date: 2021   Visits: 11  Patient name: Dionna Cooper      : 1950  Age: 72 y o  MRN: [id number]  Referring Physician: Juan F Chand MD  Cardiologist: Dr Sharon Capone  Provider: Leo callahan  Clinician: Vanda Rene MS, CEP      Dx: stent x 2  Date of onset: 5/10/2021      SUMMARY OF PROGRESS:  Mr Vito Mukherjee has started his cardiac rehabilitation program after recent stenting procedure at [facility]  He reports he is feeling well, and has returned to work  He is compliant with his rehab program and is attending 2-3x/week, depending on his work schedule since he travels part of the week  He is doing well with exercise progression and is increasing exercise times and intensities  Currently he is tolerating 50 min of aerobic exercise at 4 4 METs  He shows stable hemodynamic response to exercise  Telemetry shows NSR  His main goals for his program are to increase overall strength and endurance, establish regular exercise program, and continue to lose weight  He reports he has lost 25 lbs recently by changing his diet  He is focusing on decreasing fats and increasing whole grains  He reports he is on target with goals at 30 days  Continues to work towards weight loss  He has been given education on heart healthy eating and label reading to help with weight loss goal    He denies depression or anxiety at this time, and reports minimal stress  He reports good support from family  Will continue to increase exercise times and intensities as tolerated by patient over next 30 days of rehab         Medication compliance: Yes   Comments: Reports taking medications as prescribed      Fall Risk: Low   Comments: n/a    EKG changes: NSR      EXERCISE ASSESSMENT and PLAN    Current Exercise Program in Rehab:       Frequency: 2-3 days/week        Minutes: 50         METS: 4 4            HR: 30 beats above resting   RPE: 4-6         Modalities: Treadmill, Airdyne bike, UBE, NuStep and Recumbent bike      Exercise Progression 30 Day Goals :    Frequency: 2-3 days/week   Minutes: 50-60   METS: 4 4-5 0   HR: 30 beats above resting    RPE: 4-6   Modalities: Treadmill, Airdyne bike, UBE, Rower and Recumbent bike    Strength trainin-3 days / week  12-15 repetitions  1-2 sets per modality   Will be added following 2-3 weeks of monitored exercise sessions   Modalities: Pull Downs, Lateral Raise, Arm Extension, Arm Curl, Front Raises and leg ext    Progressing:  Pt is progressing and showing improvement  toward the following goals:  increasing functional capacity with increased MET levels, increased strength and endurance with exercise, attending rehab regularly          Home Exercise: none    Goals: 10% improvement in functional capacity - based on max METs achieved in fitness assessment, improved DASI score by 10%, Increase in exercise capacity by 40% - based on peak METs tolerated in cardiac rehab exercise session, Exercise 5 days/wk, >150mins/wk of moderate intensity exercise, Resume ADLs with increased strength, Attend Rehab regularly and start a home exercise program  Education: benefit of exercise for CAD risk factors, home exercise guidelines, AHA guidelines to achieve >150 mins/wk of moderate exercise and RPE scale   Plan:education on home exercise guidelines, home exercise 30+ mins 2 days opposite CR and Education class: Risk Factors for Heart Disease  Readiness to change: Preparation:  (Getting ready to change)       NUTRITION ASSESSMENT AND PLAN    Weight control:    Starting weight: 243 5 lb   Current weight: 248 lb   Waist circumference:    Startin 5   Current:    Diabetes: N/A  Lipid management: no recent profile on chart  Goals:LDL <100, HDL >40, TRG <150 and CHOL <200  Education: heart healthy eating  low sodium diet  hydration  nutrition for  lipid management  wt  loss   Progressing:Pt is progressing and showing improvement  toward the following goals:  learning about heart healthy eating, reading food labels to help make better choices and decrease fat intake to help manage risk factors and weight loss  Plan: Education class: Reading Food Labels and Education Class: Heart Healthy Eating  Readiness to change: Action      PSYCHOSOCIAL ASSESSMENT AND PLAN    Emotional:  Depression assessment:  PHQ-9 = 1-4 = Minimal Depression            Anxiety measure:  MONICA-7 = 0-4  = Not anxious  Self-reported stress level:  4  Social support: Very Good  Goals:  No goals, no concerns  Education: no needs*  Progressing:Reviewed Pt goals and determined plan of care  Plan: Exercise, Spend time outdoors and Enjoy a hobby  Readiness to change: Maintenance: (Maintaining the behavior change)      OTHER CORE COMPONENTS     Tobacco:   Social History     Tobacco Use   Smoking Status Former Smoker    Types: Cigars   Smokeless Tobacco Never Used       Tobacco Use Intervention:   N/A:  Patient is a non-smoker     Blood pressure:    Restin//72   Exercise: 138//74    Goals: consistent BP < 130/80, reduced dietary sodium <2300mg, moderate intensity exercise >150 mins/wk and medication compliance  Education:  understanding high blood pressure and it's relationship to CAD and low sodium diet and HTN  Progressing:Pt is progressing and showing improvement  toward the following goals:  BP stable in normal resting range        Plan: Class: Understanding Heart Disease and Class: Common Heart Medications  Readiness to change: Action:  (Changing behavior)

## 2021-06-29 ENCOUNTER — APPOINTMENT (OUTPATIENT)
Dept: CARDIAC REHAB | Facility: HOSPITAL | Age: 71
End: 2021-06-29
Attending: INTERNAL MEDICINE
Payer: COMMERCIAL

## 2021-07-01 ENCOUNTER — CLINICAL SUPPORT (OUTPATIENT)
Dept: CARDIAC REHAB | Facility: HOSPITAL | Age: 71
End: 2021-07-01
Attending: INTERNAL MEDICINE
Payer: COMMERCIAL

## 2021-07-01 DIAGNOSIS — Z95.5 STATUS POST CORONARY ARTERY STENT PLACEMENT: ICD-10-CM

## 2021-07-01 PROCEDURE — 93798 PHYS/QHP OP CAR RHAB W/ECG: CPT

## 2021-07-02 ENCOUNTER — CLINICAL SUPPORT (OUTPATIENT)
Dept: CARDIAC REHAB | Facility: HOSPITAL | Age: 71
End: 2021-07-02
Attending: INTERNAL MEDICINE
Payer: COMMERCIAL

## 2021-07-02 DIAGNOSIS — Z95.5 S/P CORONARY ARTERY STENT PLACEMENT: ICD-10-CM

## 2021-07-02 PROCEDURE — 93798 PHYS/QHP OP CAR RHAB W/ECG: CPT

## 2021-07-06 ENCOUNTER — CLINICAL SUPPORT (OUTPATIENT)
Dept: CARDIAC REHAB | Facility: HOSPITAL | Age: 71
End: 2021-07-06
Attending: INTERNAL MEDICINE
Payer: COMMERCIAL

## 2021-07-06 DIAGNOSIS — Z95.5 S/P CORONARY ARTERY STENT PLACEMENT: ICD-10-CM

## 2021-07-06 PROCEDURE — 93798 PHYS/QHP OP CAR RHAB W/ECG: CPT

## 2021-07-16 ENCOUNTER — CLINICAL SUPPORT (OUTPATIENT)
Dept: CARDIAC REHAB | Facility: HOSPITAL | Age: 71
End: 2021-07-16
Attending: INTERNAL MEDICINE
Payer: COMMERCIAL

## 2021-07-16 DIAGNOSIS — Z95.5 S/P CORONARY ARTERY STENT PLACEMENT: ICD-10-CM

## 2021-07-16 PROCEDURE — 93798 PHYS/QHP OP CAR RHAB W/ECG: CPT

## 2021-07-19 DIAGNOSIS — F41.1 GENERALIZED ANXIETY DISORDER: ICD-10-CM

## 2021-07-19 RX ORDER — ALPRAZOLAM 0.25 MG/1
0.25 TABLET ORAL 3 TIMES DAILY PRN
Qty: 30 TABLET | Refills: 0 | Status: SHIPPED | OUTPATIENT
Start: 2021-07-19 | End: 2021-08-16 | Stop reason: SDUPTHER

## 2021-07-22 ENCOUNTER — CLINICAL SUPPORT (OUTPATIENT)
Dept: CARDIAC REHAB | Facility: HOSPITAL | Age: 71
End: 2021-07-22
Attending: INTERNAL MEDICINE
Payer: COMMERCIAL

## 2021-07-22 DIAGNOSIS — Z95.5 S/P CORONARY ARTERY STENT PLACEMENT: ICD-10-CM

## 2021-07-22 PROCEDURE — 93798 PHYS/QHP OP CAR RHAB W/ECG: CPT

## 2021-07-22 NOTE — PROGRESS NOTES
Cardiac Rehabilitation Plan of Care   60 Day Report      Today's date: 2021   Visits: 16  Patient name: Morteza Francisco      : 1950  Age: 72 y o  MRN: [id number]  Referring Physician: Kathy De La Rosa MD  Cardiologist: Dr Minerva Dowell  Provider: Alxe  Clinician: Ilana Valle MS, CEP      Dx: stent x 2  Date of onset: 5/10/2021      SUMMARY OF PROGRESS:  Mr Truman Rojas has started his cardiac rehabilitation program after recent stenting procedure at [facility]  He reports he is feeling well, and has returned to work  He is compliant with his rehab program and is attending 2-3x/week, depending on his work schedule since he travels part of the week  He has attended 16 sessions over the past 60 days  He is doing well with exercise progression and is increasing exercise times and intensities  Currently he is tolerating 50-60 min of aerobic exercise at 5 6 METs  He shows stable hemodynamic response to exercise  Telemetry shows NSR  His main goals for his program are to increase overall strength and endurance, establish regular exercise program, and continue to lose weight  He reports he had lost 25 lbs recently by changing his diet  He is focusing on decreasing fats and increasing whole grains  He has increased weight to 250 lbs from 243 lbs at start of rehab program  He continues to work towards weight loss with goal to reach 225-230 lbs  He has been given education on heart healthy eating and label reading to help with weight loss goal  Overall, he is on target with his goals at 60 days  He denies depression or anxiety at this time, and reports minimal stress  He reports good support from family  Will continue to increase exercise times and intensities as tolerated by patient over next 30 days of rehab         Medication compliance: Yes   Comments: Reports taking medications as prescribed      Fall Risk: Low   Comments: n/a    EKG changes: NSR      EXERCISE ASSESSMENT and PLAN    Current Exercise Program in Rehab:       Frequency: 2 days/week        Minutes: 50-60         METS: 4 4-5 6            HR: 30 beats above resting   RPE: 4-6         Modalities: Treadmill, Airdyne bike, UBE, NuStep and Recumbent bike      Exercise Progression 30 Day Goals :    Frequency: 2 days/week   Minutes: 50-60   METS: 4 5-6 0   HR: 30 beats above resting    RPE: 4-6   Modalities: Treadmill, Airdyne bike, UBE, Rower and Recumbent bike    Strength trainin-3 days / week  12-15 repetitions  1-2 sets per modality   Will be added following 2-3 weeks of monitored exercise sessions   Modalities: Pull Downs, Lateral Raise, Arm Extension, Arm Curl, Front Raises and leg ext    Progressing:  Pt is progressing and showing improvement  toward the following goals:  increasing functional capacity with increased MET levels, increased strength and endurance with exercise, attending rehab regularly          Home Exercise: none-encouraged to walk daily 20-30 min    Goals: 10% improvement in functional capacity - based on max METs achieved in fitness assessment, improved DASI score by 10%, Increase in exercise capacity by 40% - based on peak METs tolerated in cardiac rehab exercise session, Exercise 5 days/wk, >150mins/wk of moderate intensity exercise, Resume ADLs with increased strength, Attend Rehab regularly and start a home exercise program  Education: benefit of exercise for CAD risk factors, home exercise guidelines, AHA guidelines to achieve >150 mins/wk of moderate exercise and RPE scale   Plan:education on home exercise guidelines, home exercise 30+ mins 2 days opposite CR and Education class: Risk Factors for Heart Disease  Readiness to change: Preparation:  (Getting ready to change)       NUTRITION ASSESSMENT AND PLAN    Weight control:    Starting weight: 243 5 lb   Current weight: 250 lb   Waist circumference:    Startin 5   Current:    Diabetes: N/A  Lipid management: no recent profile on chart  Goals:LDL <100, HDL >40, TRG <150 and CHOL <200  Education: heart healthy eating  low sodium diet  hydration  nutrition for  lipid management  wt  loss   Progressing:Pt is progressing and showing improvement  toward the following goals:  learning about heart healthy eating, reading food labels to help make better choices and decrease fat intake to help manage risk factors and weight loss  Not progressing towards weight loss goal, has increased 7 lbs since start of rehab    Plan: Education class: Reading Food Labels and Education Class: Heart Healthy Eating  Readiness to change: Action      PSYCHOSOCIAL ASSESSMENT AND PLAN    Emotional:  Depression assessment:  PHQ-9 = 1-4 = Minimal Depression            Anxiety measure:  MONICA-7 = 0-4  = Not anxious  Self-reported stress level:  4  Social support: Very Good  Goals:  No goals, no concerns  Education: no needs*  Progressing: Pt is progressing and showing improvement  toward the following goals:  no concerns  denies depression, anxity  Plan: Exercise, Spend time outdoors and Enjoy a hobby  Readiness to change: Maintenance: (Maintaining the behavior change)      OTHER CORE COMPONENTS     Tobacco:   Social History     Tobacco Use   Smoking Status Former Smoker    Types: Cigars   Smokeless Tobacco Never Used       Tobacco Use Intervention:   N/A:  Patient is a non-smoker     Blood pressure:    Restin//72   Exercise: 138//74    Goals: consistent BP < 130/80, reduced dietary sodium <2300mg, moderate intensity exercise >150 mins/wk and medication compliance  Education:  understanding high blood pressure and it's relationship to CAD and low sodium diet and HTN  Progressing:Pt is progressing and showing improvement  toward the following goals:  BP stable in normal resting range        Plan: Class: Understanding Heart Disease and Class: Common Heart Medications  Readiness to change: Action:  (Changing behavior)

## 2021-07-23 ENCOUNTER — CLINICAL SUPPORT (OUTPATIENT)
Dept: CARDIAC REHAB | Facility: HOSPITAL | Age: 71
End: 2021-07-23
Attending: INTERNAL MEDICINE
Payer: COMMERCIAL

## 2021-07-23 DIAGNOSIS — Z95.5 S/P CORONARY ARTERY STENT PLACEMENT: ICD-10-CM

## 2021-07-23 PROCEDURE — 93798 PHYS/QHP OP CAR RHAB W/ECG: CPT

## 2021-07-29 ENCOUNTER — CLINICAL SUPPORT (OUTPATIENT)
Dept: CARDIAC REHAB | Facility: HOSPITAL | Age: 71
End: 2021-07-29
Attending: INTERNAL MEDICINE
Payer: COMMERCIAL

## 2021-07-29 DIAGNOSIS — Z95.5 S/P CORONARY ARTERY STENT PLACEMENT: ICD-10-CM

## 2021-07-29 PROCEDURE — 93798 PHYS/QHP OP CAR RHAB W/ECG: CPT

## 2021-07-30 ENCOUNTER — CLINICAL SUPPORT (OUTPATIENT)
Dept: CARDIAC REHAB | Facility: HOSPITAL | Age: 71
End: 2021-07-30
Attending: INTERNAL MEDICINE
Payer: COMMERCIAL

## 2021-07-30 DIAGNOSIS — Z95.5 S/P CORONARY ARTERY STENT PLACEMENT: ICD-10-CM

## 2021-07-30 PROCEDURE — 93798 PHYS/QHP OP CAR RHAB W/ECG: CPT

## 2021-08-06 ENCOUNTER — CLINICAL SUPPORT (OUTPATIENT)
Dept: CARDIAC REHAB | Facility: HOSPITAL | Age: 71
End: 2021-08-06
Attending: INTERNAL MEDICINE
Payer: COMMERCIAL

## 2021-08-06 DIAGNOSIS — Z95.5 S/P CORONARY ARTERY STENT PLACEMENT: ICD-10-CM

## 2021-08-06 PROCEDURE — 93798 PHYS/QHP OP CAR RHAB W/ECG: CPT

## 2021-08-11 ENCOUNTER — OFFICE VISIT (OUTPATIENT)
Dept: FAMILY MEDICINE CLINIC | Facility: CLINIC | Age: 71
End: 2021-08-11
Payer: COMMERCIAL

## 2021-08-11 VITALS
DIASTOLIC BLOOD PRESSURE: 74 MMHG | WEIGHT: 256 LBS | BODY MASS INDEX: 33.93 KG/M2 | HEART RATE: 77 BPM | OXYGEN SATURATION: 98 % | SYSTOLIC BLOOD PRESSURE: 114 MMHG | HEIGHT: 73 IN | TEMPERATURE: 97.3 F

## 2021-08-11 DIAGNOSIS — E78.2 MIXED HYPERLIPIDEMIA: ICD-10-CM

## 2021-08-11 DIAGNOSIS — E66.9 OBESITY (BMI 30.0-34.9): ICD-10-CM

## 2021-08-11 DIAGNOSIS — I25.10 CORONARY ARTERY DISEASE INVOLVING NATIVE CORONARY ARTERY OF NATIVE HEART WITHOUT ANGINA PECTORIS: Primary | ICD-10-CM

## 2021-08-11 DIAGNOSIS — Z12.11 SCREENING FOR COLON CANCER: ICD-10-CM

## 2021-08-11 DIAGNOSIS — Z13.29 SCREENING FOR THYROID DISORDER: ICD-10-CM

## 2021-08-11 DIAGNOSIS — K13.0 PERLECHE: ICD-10-CM

## 2021-08-11 DIAGNOSIS — G43.811 OTHER MIGRAINE WITH STATUS MIGRAINOSUS, INTRACTABLE: ICD-10-CM

## 2021-08-11 PROCEDURE — 1036F TOBACCO NON-USER: CPT | Performed by: FAMILY MEDICINE

## 2021-08-11 PROCEDURE — 3008F BODY MASS INDEX DOCD: CPT | Performed by: FAMILY MEDICINE

## 2021-08-11 PROCEDURE — 99214 OFFICE O/P EST MOD 30 MIN: CPT | Performed by: FAMILY MEDICINE

## 2021-08-11 PROCEDURE — 1160F RVW MEDS BY RX/DR IN RCRD: CPT | Performed by: FAMILY MEDICINE

## 2021-08-11 RX ORDER — ASPIRIN 81 MG/1
TABLET, CHEWABLE ORAL EVERY 24 HOURS
COMMUNITY

## 2021-08-11 RX ORDER — PANTOPRAZOLE SODIUM 40 MG/1
INJECTION, POWDER, FOR SOLUTION INTRAVENOUS EVERY 24 HOURS
COMMUNITY
End: 2022-02-14

## 2021-08-11 RX ORDER — CHOLECALCIFEROL (VITAMIN D3) 1MM UNIT/G
LIQUID (ML) MISCELLANEOUS
COMMUNITY

## 2021-08-11 RX ORDER — LISINOPRIL 2.5 MG/1
TABLET ORAL EVERY 24 HOURS
COMMUNITY

## 2021-08-11 RX ORDER — OMEGA-3 FATTY ACIDS/FISH OIL 300-1000MG
CAPSULE ORAL 3 TIMES DAILY
COMMUNITY

## 2021-08-11 RX ORDER — ATORVASTATIN CALCIUM 80 MG/1
TABLET, FILM COATED ORAL EVERY 24 HOURS
COMMUNITY

## 2021-08-11 RX ORDER — ALPRAZOLAM 0.25 MG/1
TABLET ORAL
COMMUNITY
End: 2021-09-20 | Stop reason: SDUPTHER

## 2021-08-11 RX ORDER — AMOXICILLIN 500 MG
CAPSULE ORAL EVERY 24 HOURS
COMMUNITY
End: 2022-02-14

## 2021-08-11 RX ORDER — BUTALBITAL, ACETAMINOPHEN AND CAFFEINE 50; 325; 40 MG/1; MG/1; MG/1
1 TABLET ORAL EVERY 4 HOURS PRN
Qty: 30 TABLET | Refills: 0 | Status: SHIPPED | OUTPATIENT
Start: 2021-08-11 | End: 2021-09-30 | Stop reason: SDUPTHER

## 2021-08-11 NOTE — PROGRESS NOTES
Subjective:   Chief Complaint   Patient presents with    Follow-up     pt here for routine f/u, pt states he is following up on the 2 stents he had put in, in May        Patient ID: Morteza Francisco is a 72 y o  male  Patient is here for routine follow-up  He reports that back in May he had 2 stents placed after experiencing chest pain and any low level bump in his troponins  He has been taking Brilinta along with his other medications  He just had her labs recently for the cardiologist and his potassium was high  Some adjustments were made in his medication  He notes that via bruit has worked fairly well for his depression particularly in the winter months  He wants to try to maximize the dose and I informed him that 40 mg would be appropriate to work up to once the season starts  This should begin November 1st   He is going to schedule his colonoscopy at [facility]  This will be delayed until he is a minimum of a year after his stent  He notes a rash around his mouth  He has had some recent headaches and is aware of the usual medication restrictions  Tylenol is not effective  I informed him we could try some Fioricet      The following portions of the patient's history were reviewed and updated as appropriate: allergies, current medications, past family history, past medical history, past social history, past surgical history and problem list     Review of Systems   Constitutional: Negative for activity change, appetite change, chills, diaphoresis, fatigue and unexpected weight change  HENT: Negative for congestion, ear discharge, ear pain, hearing loss, nosebleeds and rhinorrhea  Eyes: Negative for pain, redness, itching and visual disturbance  Respiratory: Negative for cough, choking, chest tightness and shortness of breath  Cardiovascular: Positive for chest pain  Negative for leg swelling     Gastrointestinal: Negative for abdominal pain, blood in stool, constipation, diarrhea and nausea  Endocrine: Negative for cold intolerance, polydipsia and polyphagia  Genitourinary: Negative for dysuria, frequency, hematuria and urgency  Musculoskeletal: Negative for arthralgias, back pain, gait problem, joint swelling, neck pain and neck stiffness  Skin: Negative for color change and rash  Allergic/Immunologic: Negative for environmental allergies and food allergies  Neurological: Positive for headaches  Negative for dizziness, tremors, seizures, speech difficulty and numbness  Hematological: Negative for adenopathy  Does not bruise/bleed easily  Psychiatric/Behavioral: Negative for behavioral problems, dysphoric mood, hallucinations and self-injury  Objective:  Vitals:    08/11/21 0932   BP: 114/74   Pulse: 77   Temp: (!) 97 3 °F (36 3 °C)   SpO2: 98%   Weight: 116 kg (256 lb)   Height: 6' 1" (1 854 m)      Physical Exam  Constitutional:       General: He is not in acute distress  Appearance: He is well-developed  He is not diaphoretic  HENT:      Head: Normocephalic and atraumatic  Right Ear: External ear normal       Left Ear: External ear normal       Nose: Nose normal       Mouth/Throat:      Pharynx: No oropharyngeal exudate  Eyes:      General: No scleral icterus  Right eye: No discharge  Left eye: No discharge  Conjunctiva/sclera: Conjunctivae normal       Pupils: Pupils are equal, round, and reactive to light  Neck:      Thyroid: No thyromegaly  Cardiovascular:      Rate and Rhythm: Normal rate and regular rhythm  Heart sounds: Normal heart sounds  No murmur heard  Pulmonary:      Effort: Pulmonary effort is normal       Breath sounds: Normal breath sounds  No wheezing or rales  Abdominal:      General: Bowel sounds are normal       Palpations: Abdomen is soft  There is no mass  Tenderness: There is no abdominal tenderness  There is no guarding  Musculoskeletal:         General: No tenderness   Normal range of motion  Cervical back: Normal range of motion and neck supple  Lymphadenopathy:      Cervical: No cervical adenopathy  Skin:     General: Skin is warm and dry  Neurological:      Mental Status: He is alert and oriented to person, place, and time  Deep Tendon Reflexes: Reflexes are normal and symmetric  Psychiatric:         Thought Content: Thought content normal          Judgment: Judgment normal            Assessment/Plan:    No problem-specific Assessment & Plan notes found for this encounter  Diagnoses and all orders for this visit:    Coronary artery disease involving native coronary artery of native heart without angina pectoris    Mixed hyperlipidemia  -     Lipid Panel with Direct LDL reflex; Future  -     Lipid Panel with Direct LDL reflex    Obesity (BMI 30 0-34 9)  -     Lipid Panel with Direct LDL reflex; Future  -     TSH, 3rd generation with Free T4 reflex; Future  -     Lipid Panel with Direct LDL reflex  -     TSH, 3rd generation with Free T4 reflex    Screening for thyroid disorder  -     TSH, 3rd generation with Free T4 reflex; Future  -     TSH, 3rd generation with Free T4 reflex    Screening for colon cancer  -     PSA, total and free; Future  -     PSA, total and free    Other migraine with status migrainosus, intractable  -     butalbital-acetaminophen-caffeine (FIORICET,ESGIC) -40 mg per tablet; Take 1 tablet by mouth every 4 (four) hours as needed for headaches    Perleche  -     nystatin-triamcinolone (MYCOLOG-II) cream; Apply topically 2 (two) times a day    Other orders  -     pantoprazole (PROTONIX) 40 mg injection; every 24 hours  -     Cholecalciferol (Vitamin D3) 9161505 UNIT/GM LIQD; as directed (Patient not taking: Reported on 8/11/2021)  -     ALPRAZolam (Xanax) 0 25 mg tablet;  Take one tablet by mouth daily prn (Patient not taking: Reported on 8/11/2021)  -     lisinopril (ZESTRIL) 2 5 mg tablet; every 24 hours  -     Omega-3 Fatty Acids (Fish Oil) 1200 MG CAPS; every 24 hours (Patient not taking: Reported on 8/11/2021)  -     Coenzyme Q10 (CoQ-10) 100 MG CAPS; as directed (Patient not taking: Reported on 8/11/2021)  -     atorvastatin (LIPITOR) 80 mg tablet; every 24 hours  -     aspirin (Aspirin 81) 81 mg chewable tablet; every 24 hours  -     Ibuprofen (Advil) 200 MG CAPS; 3 (three) times a day  -     ticagrelor (BRILINTA) 90 MG; Every 12 hours        He will get his labs done for his annual blood work  He will use Mycolog cream to his mouth  He will increase fiber to 40 mg starting in November  A new prescription for Fioricet is provided    He will continue under the care of the cardiologist

## 2021-08-12 ENCOUNTER — APPOINTMENT (OUTPATIENT)
Dept: CARDIAC REHAB | Facility: HOSPITAL | Age: 71
End: 2021-08-12
Attending: INTERNAL MEDICINE
Payer: COMMERCIAL

## 2021-08-13 ENCOUNTER — CLINICAL SUPPORT (OUTPATIENT)
Dept: CARDIAC REHAB | Facility: HOSPITAL | Age: 71
End: 2021-08-13
Attending: INTERNAL MEDICINE
Payer: COMMERCIAL

## 2021-08-13 DIAGNOSIS — Z95.5 S/P CORONARY ARTERY STENT PLACEMENT: ICD-10-CM

## 2021-08-13 PROCEDURE — 93798 PHYS/QHP OP CAR RHAB W/ECG: CPT

## 2021-08-16 DIAGNOSIS — F41.1 GENERALIZED ANXIETY DISORDER: ICD-10-CM

## 2021-08-16 RX ORDER — ALPRAZOLAM 0.25 MG/1
0.25 TABLET ORAL 3 TIMES DAILY PRN
Qty: 30 TABLET | Refills: 0 | Status: SHIPPED | OUTPATIENT
Start: 2021-08-16 | End: 2021-09-20 | Stop reason: SDUPTHER

## 2021-08-19 ENCOUNTER — CLINICAL SUPPORT (OUTPATIENT)
Dept: CARDIAC REHAB | Facility: HOSPITAL | Age: 71
End: 2021-08-19
Attending: INTERNAL MEDICINE
Payer: COMMERCIAL

## 2021-08-19 DIAGNOSIS — Z95.5 S/P CORONARY ARTERY STENT PLACEMENT: ICD-10-CM

## 2021-08-19 PROCEDURE — 93798 PHYS/QHP OP CAR RHAB W/ECG: CPT

## 2021-08-20 ENCOUNTER — CLINICAL SUPPORT (OUTPATIENT)
Dept: CARDIAC REHAB | Facility: HOSPITAL | Age: 71
End: 2021-08-20
Attending: INTERNAL MEDICINE
Payer: COMMERCIAL

## 2021-08-20 DIAGNOSIS — Z95.5 S/P CORONARY ARTERY STENT PLACEMENT: ICD-10-CM

## 2021-08-20 PROCEDURE — 93798 PHYS/QHP OP CAR RHAB W/ECG: CPT

## 2021-08-20 NOTE — PROGRESS NOTES
Cardiac Rehabilitation Plan of Care   90 Day Report      Today's date: 2021   Visits: 23  Patient name: Keke Wallis      : 1950  Age: 72 y o  MRN: [id number]  Referring Physician: Jax Vera MD  Cardiologist: Dr Noel Branham  Provider: Leo callahan  Clinician: Cassidy Swann MS, CEP      Dx: stent x 2  Date of onset: 5/10/2021      SUMMARY OF PROGRESS:  Mr Aisha Schneider has started his cardiac rehabilitation program after recent stenting procedure at [facility]  He reports he is feeling well, and has returned to work  He is compliant with his rehab program and is attending 2-3x/week, depending on his work schedule since he travels part of the week  He has attended 23 sessions over the past 90 days  He is doing well with exercise progression and is increasing exercise times and intensities  Currently he is tolerating 50-60 min of aerobic exercise at 5 6 METs  He shows stable hemodynamic response to exercise  Telemetry shows NSR  His main goals for his program are to increase overall strength and endurance, establish regular exercise program, and continue to lose weight  He is focusing on decreasing fats and increasing whole grains  He has increased weight to 253 lbs from 243 lbs at start of rehab program, so is not seeing continued weight loss at this time  He continues to work towards weight loss with goal to reach 225-230 lbs  He has been given education on heart healthy eating and label reading to help with weight loss goal  Overall, he is on target with his goals at 90 days  He would like to continue past 12 weeks of rehab at this time to continue working towards weight loss goal  Will plan to reassess need for continuation of program in another 30 days  He denies depression or anxiety at this time, and reports minimal stress  He reports good support from family  Will continue to increase exercise times and intensities as tolerated by patient over next 30 days of rehab  Medication compliance: Yes   Comments: Reports taking medications as prescribed      Fall Risk: Low   Comments: n/a    EKG changes: NSR      EXERCISE ASSESSMENT and PLAN    Current Exercise Program in Rehab:       Frequency: 2 days/week        Minutes: 50-60         METS: 4 4-5 6            HR: 30 beats above resting   RPE: 4-6         Modalities: Treadmill, Airdyne bike, UBE, NuStep and Recumbent bike      Exercise Progression 30 Day Goals :    Frequency: 2 days/week   Minutes: 50-60   METS: 4 5-6 0   HR: 30 beats above resting    RPE: 4-6   Modalities: Treadmill, Airdyne bike, UBE, Rower and Recumbent bike    Strength trainin-3 days / week  12-15 repetitions  1-2 sets per modality   Will be added following 2-3 weeks of monitored exercise sessions   Modalities: Pull Downs, Lateral Raise, Arm Extension, Arm Curl, Front Raises and leg ext    Progressing:  Pt is progressing and showing improvement  toward the following goals:  increasing functional capacity with increased MET levels, increased strength and endurance with exercise, attending rehab regularly          Home Exercise: trying to  walk daily 20-30 min    Goals: 10% improvement in functional capacity - based on max METs achieved in fitness assessment, improved DASI score by 10%, Increase in exercise capacity by 40% - based on peak METs tolerated in cardiac rehab exercise session, Exercise 5 days/wk, >150mins/wk of moderate intensity exercise, Resume ADLs with increased strength, Attend Rehab regularly and start a home exercise program  Education: benefit of exercise for CAD risk factors, home exercise guidelines, AHA guidelines to achieve >150 mins/wk of moderate exercise and RPE scale   Plan:education on home exercise guidelines, home exercise 30+ mins 2 days opposite CR and Education class: Risk Factors for Heart Disease  Readiness to change: action      NUTRITION ASSESSMENT AND PLAN    Weight control:    Starting weight: 243 5 lb   Current weight: 253 lb   Waist circumference:    Startin 5   Current:    Diabetes: N/A  Lipid management: no recent profile on chart  Goals:LDL <100, HDL >40, TRG <150 and CHOL <200  Education: heart healthy eating  low sodium diet  hydration  nutrition for  lipid management  wt  loss   Progressing:Pt is progressing and showing improvement  toward the following goals:  learning about heart healthy eating, reading food labels to help make better choices and decrease fat intake to help manage risk factors and weight loss  Not progressing towards weight loss goal, has increased 10 lbs since start of rehab    Plan: Education class: Reading Food Labels and Education Class: Heart Healthy Eating  Readiness to change: Preparation      PSYCHOSOCIAL ASSESSMENT AND PLAN    Emotional:  Depression assessment:  PHQ-9 = 1-4 = Minimal Depression            Anxiety measure:  MONICA-7 = 0-4  = Not anxious  Self-reported stress level:  4  Social support: Very Good  Goals:  No goals, no concerns  Education: no needs*  Progressing: Pt is progressing and showing improvement  toward the following goals:  no concerns  denies depression, anxity  Plan: Exercise, Spend time outdoors and Enjoy a hobby  Readiness to change: Maintenance: (Maintaining the behavior change)      OTHER CORE COMPONENTS     Tobacco:   Social History     Tobacco Use   Smoking Status Former Smoker    Types: Cigars   Smokeless Tobacco Never Used       Tobacco Use Intervention:   N/A:  Patient is a non-smoker     Blood pressure:    Restin//72   Exercise: 138//74    Goals: consistent BP < 130/80, reduced dietary sodium <2300mg, moderate intensity exercise >150 mins/wk and medication compliance  Education:  understanding high blood pressure and it's relationship to CAD and low sodium diet and HTN  Progressing:Pt is progressing and showing improvement  toward the following goals:  BP stable in normal resting range        Plan: Class: Understanding Heart Disease and Class: Common Heart Medications  Readiness to change: Action:  (Changing behavior)

## 2021-08-26 ENCOUNTER — APPOINTMENT (OUTPATIENT)
Dept: CARDIAC REHAB | Facility: HOSPITAL | Age: 71
End: 2021-08-26
Attending: INTERNAL MEDICINE
Payer: COMMERCIAL

## 2021-08-27 ENCOUNTER — APPOINTMENT (OUTPATIENT)
Dept: CARDIAC REHAB | Facility: HOSPITAL | Age: 71
End: 2021-08-27
Attending: INTERNAL MEDICINE
Payer: COMMERCIAL

## 2021-09-16 NOTE — PROGRESS NOTES
Patient has not attended any rehab sessions over the past 30 days  No progress to update at 120 days  Patient plans to resume his rehab sessions next week after vacation and travel for work this week

## 2021-09-20 DIAGNOSIS — F41.9 ANXIETY: Primary | ICD-10-CM

## 2021-09-20 RX ORDER — ALPRAZOLAM 0.25 MG/1
0.25 TABLET ORAL 3 TIMES DAILY PRN
Qty: 30 TABLET | Refills: 0 | Status: SHIPPED | OUTPATIENT
Start: 2021-09-20 | End: 2021-10-15 | Stop reason: SDUPTHER

## 2021-09-30 DIAGNOSIS — G43.811 OTHER MIGRAINE WITH STATUS MIGRAINOSUS, INTRACTABLE: ICD-10-CM

## 2021-10-01 RX ORDER — BUTALBITAL, ACETAMINOPHEN AND CAFFEINE 50; 325; 40 MG/1; MG/1; MG/1
1 TABLET ORAL EVERY 4 HOURS PRN
Qty: 30 TABLET | Refills: 0 | Status: SHIPPED | OUTPATIENT
Start: 2021-10-01 | End: 2021-11-09 | Stop reason: SDUPTHER

## 2021-10-13 NOTE — TELEPHONE ENCOUNTER
Pt will call us back to schedule  Mohs Method Verbiage: An incision at a 90 degree angle following the standard Mohs approach was done and the specimen was harvested as a microscopic controlled layer.

## 2021-10-15 DIAGNOSIS — F41.9 ANXIETY: ICD-10-CM

## 2021-10-15 RX ORDER — ALPRAZOLAM 0.25 MG/1
0.25 TABLET ORAL 3 TIMES DAILY PRN
Qty: 30 TABLET | Refills: 0 | Status: SHIPPED | OUTPATIENT
Start: 2021-10-15 | End: 2021-11-26 | Stop reason: SDUPTHER

## 2021-10-30 ENCOUNTER — IMMUNIZATIONS (OUTPATIENT)
Dept: FAMILY MEDICINE CLINIC | Facility: HOSPITAL | Age: 71
End: 2021-10-30

## 2021-10-30 DIAGNOSIS — Z23 ENCOUNTER FOR IMMUNIZATION: Primary | ICD-10-CM

## 2021-10-30 PROCEDURE — 91300 COVID-19 PFIZER VACC 0.3 ML: CPT

## 2021-10-30 PROCEDURE — 0001A COVID-19 PFIZER VACC 0.3 ML: CPT

## 2021-11-09 DIAGNOSIS — G43.811 OTHER MIGRAINE WITH STATUS MIGRAINOSUS, INTRACTABLE: ICD-10-CM

## 2021-11-09 RX ORDER — BUTALBITAL, ACETAMINOPHEN AND CAFFEINE 50; 325; 40 MG/1; MG/1; MG/1
1 TABLET ORAL EVERY 4 HOURS PRN
Qty: 30 TABLET | Refills: 0 | Status: SHIPPED | OUTPATIENT
Start: 2021-11-09 | End: 2021-12-09 | Stop reason: SDUPTHER

## 2021-11-26 DIAGNOSIS — F41.9 ANXIETY: ICD-10-CM

## 2021-11-29 RX ORDER — ALPRAZOLAM 0.25 MG/1
0.25 TABLET ORAL 3 TIMES DAILY PRN
Qty: 30 TABLET | Refills: 0 | Status: SHIPPED | OUTPATIENT
Start: 2021-11-29 | End: 2022-01-10 | Stop reason: SDUPTHER

## 2021-12-01 ENCOUNTER — TELEPHONE (OUTPATIENT)
Dept: FAMILY MEDICINE CLINIC | Facility: CLINIC | Age: 71
End: 2021-12-01

## 2021-12-09 DIAGNOSIS — F32.A DEPRESSION, UNSPECIFIED DEPRESSION TYPE: Primary | ICD-10-CM

## 2021-12-09 DIAGNOSIS — G43.811 OTHER MIGRAINE WITH STATUS MIGRAINOSUS, INTRACTABLE: ICD-10-CM

## 2021-12-09 RX ORDER — VILAZODONE HYDROCHLORIDE 40 MG/1
40 TABLET ORAL
Qty: 30 TABLET | Refills: 3 | Status: SHIPPED | OUTPATIENT
Start: 2021-12-09 | End: 2022-01-10 | Stop reason: SDUPTHER

## 2021-12-09 RX ORDER — BUTALBITAL, ACETAMINOPHEN AND CAFFEINE 50; 325; 40 MG/1; MG/1; MG/1
1 TABLET ORAL EVERY 4 HOURS PRN
Qty: 30 TABLET | Refills: 0 | Status: SHIPPED | OUTPATIENT
Start: 2021-12-09 | End: 2022-02-01 | Stop reason: SDUPTHER

## 2021-12-16 ENCOUNTER — OFFICE VISIT (OUTPATIENT)
Dept: URGENT CARE | Facility: CLINIC | Age: 71
End: 2021-12-16
Payer: COMMERCIAL

## 2021-12-16 VITALS
TEMPERATURE: 96.6 F | RESPIRATION RATE: 20 BRPM | BODY MASS INDEX: 34.46 KG/M2 | HEART RATE: 60 BPM | OXYGEN SATURATION: 96 % | WEIGHT: 260 LBS | HEIGHT: 73 IN

## 2021-12-16 DIAGNOSIS — Z11.59 SPECIAL SCREENING EXAMINATION FOR VIRAL DISEASE: Primary | ICD-10-CM

## 2021-12-16 PROCEDURE — 87636 SARSCOV2 & INF A&B AMP PRB: CPT | Performed by: PHYSICIAN ASSISTANT

## 2021-12-16 PROCEDURE — 99213 OFFICE O/P EST LOW 20 MIN: CPT | Performed by: PHYSICIAN ASSISTANT

## 2021-12-16 RX ORDER — LISINOPRIL 5 MG/1
TABLET ORAL
COMMUNITY
Start: 2021-10-03 | End: 2022-02-14

## 2021-12-16 RX ORDER — SUCRALFATE ORAL 1 G/10ML
SUSPENSION ORAL
COMMUNITY
Start: 2021-10-29 | End: 2022-03-31

## 2021-12-16 RX ORDER — PANTOPRAZOLE SODIUM 40 MG/1
TABLET, DELAYED RELEASE ORAL
COMMUNITY
Start: 2021-11-23

## 2021-12-18 LAB
FLUAV RNA RESP QL NAA+PROBE: NEGATIVE
FLUBV RNA RESP QL NAA+PROBE: NEGATIVE
SARS-COV-2 RNA RESP QL NAA+PROBE: NEGATIVE

## 2022-01-10 ENCOUNTER — TELEPHONE (OUTPATIENT)
Dept: FAMILY MEDICINE CLINIC | Facility: CLINIC | Age: 72
End: 2022-01-10

## 2022-01-10 DIAGNOSIS — F41.9 ANXIETY: ICD-10-CM

## 2022-01-10 DIAGNOSIS — F32.A DEPRESSION, UNSPECIFIED DEPRESSION TYPE: ICD-10-CM

## 2022-01-10 RX ORDER — VILAZODONE HYDROCHLORIDE 40 MG/1
40 TABLET ORAL
Qty: 30 TABLET | Refills: 3 | Status: SHIPPED | OUTPATIENT
Start: 2022-01-10 | End: 2022-05-26

## 2022-01-10 RX ORDER — ALPRAZOLAM 0.25 MG/1
0.25 TABLET ORAL 3 TIMES DAILY PRN
Qty: 30 TABLET | Refills: 0 | Status: SHIPPED | OUTPATIENT
Start: 2022-01-10 | End: 2022-02-14 | Stop reason: SDUPTHER

## 2022-01-20 DIAGNOSIS — R10.9 ABDOMINAL CRAMPING: ICD-10-CM

## 2022-01-20 RX ORDER — HYOSCYAMINE SULFATE 0.12 MG/1
0.12 TABLET SUBLINGUAL 3 TIMES DAILY PRN
Qty: 30 TABLET | Refills: 1 | Status: SHIPPED | OUTPATIENT
Start: 2022-01-20

## 2022-02-01 DIAGNOSIS — G43.811 OTHER MIGRAINE WITH STATUS MIGRAINOSUS, INTRACTABLE: ICD-10-CM

## 2022-02-02 RX ORDER — BUTALBITAL, ACETAMINOPHEN AND CAFFEINE 50; 325; 40 MG/1; MG/1; MG/1
1 TABLET ORAL EVERY 4 HOURS PRN
Qty: 30 TABLET | Refills: 0 | Status: SHIPPED | OUTPATIENT
Start: 2022-02-02 | End: 2022-03-07 | Stop reason: SDUPTHER

## 2022-02-14 ENCOUNTER — OFFICE VISIT (OUTPATIENT)
Dept: FAMILY MEDICINE CLINIC | Facility: CLINIC | Age: 72
End: 2022-02-14
Payer: COMMERCIAL

## 2022-02-14 VITALS
TEMPERATURE: 97.4 F | HEIGHT: 73 IN | OXYGEN SATURATION: 97 % | SYSTOLIC BLOOD PRESSURE: 128 MMHG | BODY MASS INDEX: 34.33 KG/M2 | DIASTOLIC BLOOD PRESSURE: 84 MMHG | WEIGHT: 259 LBS | HEART RATE: 81 BPM

## 2022-02-14 DIAGNOSIS — Z12.11 SCREENING FOR COLORECTAL CANCER: ICD-10-CM

## 2022-02-14 DIAGNOSIS — Z00.00 ANNUAL PHYSICAL EXAM: Primary | ICD-10-CM

## 2022-02-14 DIAGNOSIS — Z12.12 SCREENING FOR COLORECTAL CANCER: ICD-10-CM

## 2022-02-14 DIAGNOSIS — E66.9 OBESITY (BMI 30-39.9): ICD-10-CM

## 2022-02-14 DIAGNOSIS — F41.9 ANXIETY: ICD-10-CM

## 2022-02-14 DIAGNOSIS — Z23 NEED FOR VACCINATION: ICD-10-CM

## 2022-02-14 DIAGNOSIS — M48.062 SPINAL STENOSIS OF LUMBAR REGION WITH NEUROGENIC CLAUDICATION: ICD-10-CM

## 2022-02-14 PROCEDURE — 1160F RVW MEDS BY RX/DR IN RCRD: CPT | Performed by: NURSE PRACTITIONER

## 2022-02-14 PROCEDURE — 99397 PER PM REEVAL EST PAT 65+ YR: CPT | Performed by: NURSE PRACTITIONER

## 2022-02-14 PROCEDURE — 90662 IIV NO PRSV INCREASED AG IM: CPT

## 2022-02-14 PROCEDURE — 90471 IMMUNIZATION ADMIN: CPT

## 2022-02-14 PROCEDURE — 3008F BODY MASS INDEX DOCD: CPT | Performed by: NURSE PRACTITIONER

## 2022-02-14 PROCEDURE — 1036F TOBACCO NON-USER: CPT | Performed by: NURSE PRACTITIONER

## 2022-02-14 RX ORDER — ALPRAZOLAM 0.25 MG/1
0.25 TABLET ORAL 3 TIMES DAILY PRN
Qty: 30 TABLET | Refills: 0 | Status: SHIPPED | OUTPATIENT
Start: 2022-02-14 | End: 2022-03-10 | Stop reason: SDUPTHER

## 2022-02-14 NOTE — PATIENT INSTRUCTIONS
Have lab results sent from your cardiologist   Continue medications as prescribed  Call with any problems or concerns  Wellness Visit for Adults   AMBULATORY CARE:   A wellness visit  is when you see your healthcare provider to get screened for health problems  Your healthcare provider will also give you advice on how to stay healthy  Write down your questions so you remember to ask them  Ask your healthcare provider how often you should have a wellness visit  What happens at a wellness visit:  Your healthcare provider will ask about your health, and your family history of health problems  This includes high blood pressure, heart disease, and cancer  He or she will ask if you have symptoms that concern you, if you smoke, and about your mood  You may also be asked about your intake of medicines, supplements, food, and alcohol  Any of the following may be done:  · Your weight  will be checked  Your height may also be checked so your body mass index (BMI) can be calculated  Your BMI shows if you are at a healthy weight  · Your blood pressure  and heart rate will be checked  Your temperature may also be checked  · Blood and urine tests  may be done  Blood tests may be done to check your cholesterol levels  Abnormal cholesterol levels increase your risk for heart disease and stroke  You may also need a blood or urine test to check for diabetes if you are at increased risk  Urine tests may be done to look for signs of an infection or kidney disease  · A physical exam  includes checking your heartbeat and lungs with a stethoscope  Your healthcare provider may also check your skin to look for sun damage  · Screening tests  may be recommended  A screening test is done to check for diseases that may not cause symptoms  The screening tests you may need depend on your age, gender, family history, and lifestyle habits   For example, colorectal screening may be recommended if you are 48years old or older     Screening tests you need if you are a woman:   · A Pap smear  is used to screen for cervical cancer  Pap smears are usually done every 3 to 5 years depending on your age  You may need them more often if you have had abnormal Pap smear test results in the past  Ask your healthcare provider how often you should have a Pap smear  · A mammogram  is an x-ray of your breasts to screen for breast cancer  Experts recommend mammograms every 2 years starting at age 48 years  You may need a mammogram at age 52 years or younger if you have an increased risk for breast cancer  Talk to your healthcare provider about when you should start having mammograms and how often you need them  Vaccines you may need:   · Get an influenza vaccine  every year  The influenza vaccine protects you from the flu  Several types of viruses cause the flu  The viruses change over time, so new vaccines are made each year  · Get a tetanus-diphtheria (Td) booster vaccine  every 10 years  This vaccine protects you against tetanus and diphtheria  Tetanus is a severe infection that may cause painful muscle spasms and lockjaw  Diphtheria is a severe bacterial infection that causes a thick covering in the back of your mouth and throat  · Get a human papillomavirus (HPV) vaccine  if you are female and aged 23 to 32 or male 23 to 24 and never received it  This vaccine protects you from HPV infection  HPV is the most common infection spread by sexual contact  HPV may also cause vaginal, penile, and anal cancers  · Get a pneumococcal vaccine  if you are aged 72 years or older  The pneumococcal vaccine is an injection given to protect you from pneumococcal disease  Pneumococcal disease is an infection caused by pneumococcal bacteria  The infection may cause pneumonia, meningitis, or an ear infection  · Get a shingles vaccine  if you are 60 or older, even if you have had shingles before   The shingles vaccine is an injection to protect you from the varicella-zoster virus  This is the same virus that causes chickenpox  Shingles is a painful rash that develops in people who had chickenpox or have been exposed to the virus  How to eat healthy:  My Plate is a model for planning healthy meals  It shows the types and amounts of foods that should go on your plate  Fruits and vegetables make up about half of your plate, and grains and protein make up the other half  A serving of dairy is included on the side of your plate  The amount of calories and serving sizes you need depends on your age, gender, weight, and height  Examples of healthy foods are listed below:  · Eat a variety of vegetables  such as dark green, red, and orange vegetables  You can also include canned vegetables low in sodium (salt) and frozen vegetables without added butter or sauces  · Eat a variety of fresh fruits , canned fruit in 100% juice, frozen fruit, and dried fruit  · Include whole grains  At least half of the grains you eat should be whole grains  Examples include whole-wheat bread, wheat pasta, brown rice, and whole-grain cereals such as oatmeal     · Eat a variety of protein foods such as seafood (fish and shellfish), lean meat, and poultry without skin (turkey and chicken)  Examples of lean meats include pork leg, shoulder, or tenderloin, and beef round, sirloin, tenderloin, and extra lean ground beef  Other protein foods include eggs and egg substitutes, beans, peas, soy products, nuts, and seeds  · Choose low-fat dairy products such as skim or 1% milk or low-fat yogurt, cheese, and cottage cheese  · Limit unhealthy fats  such as butter, hard margarine, and shortening  Exercise:  Exercise at least 30 minutes per day on most days of the week  Some examples of exercise include walking, biking, dancing, and swimming  You can also fit in more physical activity by taking the stairs instead of the elevator or parking farther away from stores   Include muscle strengthening activities 2 days each week  Regular exercise provides many health benefits  It helps you manage your weight, and decreases your risk for type 2 diabetes, heart disease, stroke, and high blood pressure  Exercise can also help improve your mood  Ask your healthcare provider about the best exercise plan for you  General health and safety guidelines:   · Do not smoke  Nicotine and other chemicals in cigarettes and cigars can cause lung damage  Ask your healthcare provider for information if you currently smoke and need help to quit  E-cigarettes or smokeless tobacco still contain nicotine  Talk to your healthcare provider before you use these products  · Limit alcohol  A drink of alcohol is 12 ounces of beer, 5 ounces of wine, or 1½ ounces of liquor  · Lose weight, if needed  Being overweight increases your risk of certain health conditions  These include heart disease, high blood pressure, type 2 diabetes, and certain types of cancer  · Protect your skin  Do not sunbathe or use tanning beds  Use sunscreen with a SPF 15 or higher  Apply sunscreen at least 15 minutes before you go outside  Reapply sunscreen every 2 hours  Wear protective clothing, hats, and sunglasses when you are outside  · Drive safely  Always wear your seatbelt  Make sure everyone in your car wears a seatbelt  A seatbelt can save your life if you are in an accident  Do not use your cell phone when you are driving  This could distract you and cause an accident  Pull over if you need to make a call or send a text message  · Practice safe sex  Use latex condoms if are sexually active and have more than one partner  Your healthcare provider may recommend screening tests for sexually transmitted infections (STIs)  · Wear helmets, lifejackets, and protective gear  Always wear a helmet when you ride a bike or motorcycle, go skiing, or play sports that could cause a head injury   Wear protective equipment when you play sports  Wear a lifejacket when you are on a boat or doing water sports  © Copyright [employer] 2021 Information is for End User's use only and may not be sold, redistributed or otherwise used for commercial purposes  All illustrations and images included in CareNotes® are the copyrighted property of [employer]  or Kimberly Dong   The above information is an  only  It is not intended as medical advice for individual conditions or treatments  Talk to your doctor, nurse or pharmacist before following any medical regimen to see if it is safe and effective for you

## 2022-02-14 NOTE — PROGRESS NOTES
[facility] [id number]    NAME: Dionna Cooper  AGE: 80 y o  SEX: male  : 1950     DATE: 2022     Assessment and Plan:     Problem List Items Addressed This Visit        Other    Obesity (BMI 30-39 9)    Spinal stenosis of lumbar region with neurogenic claudication    Relevant Orders    Ambulatory Referral to Pain Management      Other Visit Diagnoses     Annual physical exam    -  Primary    Screening for colorectal cancer        Relevant Orders    Ambulatory referral for Colonoscopy    Need for vaccination        Relevant Orders    influenza vaccine, high-dose, PF 0 7 mL (FLUZONE HIGH-DOSE) (Completed)    Anxiety        Relevant Medications    ALPRAZolam (Xanax) 0 25 mg tablet          Immunizations and preventive care screenings were discussed with patient today  Appropriate education was printed on patient's after visit summary  Counseling:  Alcohol/drug use: discussed moderation in alcohol intake, the recommendations for healthy alcohol use, and avoidance of illicit drug use  Dental Health: discussed importance of regular tooth brushing, flossing, and dental visits  Injury prevention: discussed safety/seat belts, safety helmets, smoke detectors, carbon dioxide detectors, and smoking near bedding or upholstery  Sexual health: discussed sexually transmitted diseases, partner selection, use of condoms, avoidance of unintended pregnancy, and contraceptive alternatives  · Exercise: the importance of regular exercise/physical activity was discussed  Recommend exercise 3-5 times per week for at least 30 minutes  BMI Counseling: Body mass index is 34 17 kg/m²  The BMI is above normal  Nutrition recommendations include decreasing portion sizes, encouraging healthy choices of fruits and vegetables and moderation in carbohydrate intake  Rationale for BMI follow-up plan is due to patient being overweight or obese           Return in about 6 months (around 8/14/2022) for med check  Chief Complaint:     Chief Complaint   Patient presents with    Physical Exam    COLONOSCOPY     CARDIO WILL CLEAR HIM FOR MAY- WILL COMPLETE THEN IF CLEARANCE IS GIVEN DUE TO BLOOD THINNERS    Immunizations     FLU      History of Present Illness:     Adult Annual Physical   Patient here for a comprehensive physical exam  The patient reports problems - SPINAL STENOSIS  Needs referral to return to pain management  Diet and Physical Activity  · Diet/Nutrition: well balanced diet  · Exercise: 1-2 times a week on average  Depression Screening  PHQ-2/9 Depression Screening         General Health  · Sleep: sleeps well  · Hearing: normal - bilateral   · Vision: goes for regular eye exams  · Dental: regular dental visits   Health  · Symptoms include: none     Review of Systems:     Review of Systems   Constitutional: Negative for activity change, appetite change, chills, diaphoresis, fatigue and fever  HENT: Negative for congestion, facial swelling, hearing loss, rhinorrhea, sinus pressure, sinus pain, sneezing, sore throat and voice change  Eyes: Negative for discharge and visual disturbance  Respiratory: Negative for cough, choking, chest tightness, shortness of breath, wheezing and stridor  Cardiovascular: Negative for chest pain, palpitations and leg swelling  Gastrointestinal: Negative for abdominal distention, abdominal pain, constipation, diarrhea, nausea and vomiting  Endocrine: Negative for polydipsia, polyphagia and polyuria  Genitourinary: Negative for difficulty urinating, dysuria, frequency and urgency  Musculoskeletal: Negative for arthralgias, back pain, gait problem, joint swelling, myalgias, neck pain and neck stiffness  Skin: Negative for color change, rash and wound  Neurological: Negative for dizziness, syncope, speech difficulty, weakness, light-headedness and headaches     Hematological: Negative for adenopathy  Does not bruise/bleed easily  Psychiatric/Behavioral: Negative for agitation, behavioral problems, confusion, decreased concentration, dysphoric mood, hallucinations, sleep disturbance and suicidal ideas  The patient is not nervous/anxious         Past Medical History:     Past Medical History:   Diagnosis Date    Acute medial meniscus tear     Allergic rhinitis     Allergic rhinitis     last assessed 5/8/14    Anxiety     Appendicitis     Arthritis     Benign essential hypertension     last assessed 1/6/14    Biceps tendon tear     Cancer (HCC)     skin    Cervical radiculopathy     and lumbar    Chronic pain disorder     knees and hips     Colon polyps     CPAP (continuous positive airway pressure) dependence     Depression     Depression with anxiety     Dyskinesia of esophagus     Erythema migrans (Lyme disease)     last assessed 10/16/12    Fatigue     GERD (gastroesophageal reflux disease)     Heel spur, right     Herpes     Herpes zoster     Hyperlipidemia     Hyperplastic colon polyp     last assessed 11/14/14    Joint pain     Kidney stones     Lyme disease     Nephrolithiasis     Panic disorder with agoraphobia     last assessed 8/7/13    Seasonal affective disorder ([facility] [address] )     Sleep apnea     Vertigo     Vitamin D deficiency       Past Surgical History:     Past Surgical History:   Procedure Laterality Date    APPENDECTOMY  08/01/2003    BICEPS TENDON REPAIR      KNEE ARTHROSCOPY      with medial meniscus repair, resolved 01/01/05    KNEE CARTILAGE SURGERY      OTHER SURGICAL HISTORY      distal reinsertion of ruptured biceps tendon    TX KNEE SCOPE,MED/LAT MENISECTOMY Right 7/30/2019    Procedure: RIGHT KNEE ARTHROSCOPY, MEDIAL MENISCECTOMY;  Surgeon: Lou Argueta DO;  Location: [address] [place] [address];  Service: Orthopedics    TOOTH EXTRACTION      Middle of October 2019    VASECTOMY        Family History:     Family History   Problem Relation Age of Onset    Diabetes Mother     Coronary artery disease Father     Kidney disease Father     Heart disease Father       Social History:     Social History     Socioeconomic History    Marital status: /Civil Union     Spouse name: None    Number of children: None    Years of education: None    Highest education level: None   Occupational History    None   Tobacco Use    Smoking status: Former Smoker     Types: Cigars    Smokeless tobacco: Never Used   Vaping Use    Vaping Use: Never used   Substance and Sexual Activity    Alcohol use: No    Drug use: No    Sexual activity: None   Other Topics Concern    None   Social History Narrative    None     Social Determinants of Health     Financial Resource Strain: Not on file   Food Insecurity: Not on file   Transportation Needs: Not on file   Physical Activity: Not on file   Stress: Not on file   Social Connections: Not on file   Intimate Partner Violence: Not on file   Housing Stability: Not on file      Current Medications:     Current Outpatient Medications   Medication Sig Dispense Refill    ALPRAZolam (Xanax) 0 25 mg tablet Take 1 tablet (0 25 mg total) by mouth 3 (three) times a day as needed for anxiety 30 tablet 0    aspirin (Aspirin 81) 81 mg chewable tablet every 24 hours      atorvastatin (LIPITOR) 80 mg tablet every 24 hours      butalbital-acetaminophen-caffeine (FIORICET,ESGIC) -40 mg per tablet Take 1 tablet by mouth every 4 (four) hours as needed for headaches 30 tablet 0    cholecalciferol (VITAMIN D3) 1,000 units tablet Take 1,000 Units by mouth daily      Coenzyme Q10 (COQ10 PO) Take by mouth daily      Glucosamine-Chondroit-Vit C-Mn (GLUCOSAMINE 1500 COMPLEX PO) Take by mouth      Hyoscyamine Sulfate SL 0 125 MG SUBL Place 0 125 mg under the tongue 3 (three) times a day as needed (as needed) 30 tablet 1    Ibuprofen (Advil) 200 MG CAPS 3 (three) times a day      lisinopril (ZESTRIL) 2 5 mg tablet every 24 hours      Multiple Vitamins-Minerals (MULTI FOR HIM 50+) TABS Take by mouth      Omega-3 Fatty Acids (FISH OIL) 1,000 mg Take 1,000 mg by mouth daily      pantoprazole (PROTONIX) 40 mg tablet ONE TABLET DAILY for GI issue      ticagrelor (BRILINTA) 90 MG Every 12 hours      vilazodone (VIIBRYD) 40 mg tablet Take 1 tablet (40 mg total) by mouth daily with breakfast 30 tablet 3    Cholecalciferol (Vitamin D3) 8652269 UNIT/GM LIQD as directed (Patient not taking: Reported on 8/11/2021)      Coenzyme Q10 (CoQ-10) 100 MG CAPS as directed (Patient not taking: Reported on 8/11/2021)      sucralfate (CARAFATE) 1 g/10 mL suspension 10 ml's FOUR TIMES DAILY (Patient not taking: Reported on 12/16/2021)       No current facility-administered medications for this visit  Allergies: Allergies   Allergen Reactions    Pseudoephedrine Hives     Reaction Date: 16Apr2011;     Sulfa Antibiotics Dizziness    Codeine Headache      Physical Exam:     /84 (BP Location: Left arm, Patient Position: Sitting, Cuff Size: Adult)   Pulse 81   Temp (!) 97 4 °F (36 3 °C) (Tympanic)   Ht 6' 1" (1 854 m)   Wt 117 kg (259 lb)   SpO2 97%   BMI 34 17 kg/m²     Physical Exam  Vitals and nursing note reviewed  Constitutional:       General: He is not in acute distress  Appearance: Normal appearance  He is well-developed  He is obese  He is not ill-appearing, toxic-appearing or diaphoretic  HENT:      Head: Normocephalic and atraumatic  Right Ear: Tympanic membrane, ear canal and external ear normal  There is no impacted cerumen  Left Ear: Tympanic membrane, ear canal and external ear normal  There is no impacted cerumen  Nose: Nose normal  No congestion or rhinorrhea  Mouth/Throat:      Mouth: Mucous membranes are moist       Pharynx: Oropharynx is clear  No oropharyngeal exudate or posterior oropharyngeal erythema  Eyes:      General:         Right eye: No discharge  Left eye: No discharge  Extraocular Movements: Extraocular movements intact  Conjunctiva/sclera: Conjunctivae normal       Pupils: Pupils are equal, round, and reactive to light  Neck:      Vascular: No carotid bruit  Cardiovascular:      Rate and Rhythm: Normal rate and regular rhythm  Heart sounds: Normal heart sounds  No murmur heard  No friction rub  No gallop  Pulmonary:      Effort: Pulmonary effort is normal  No respiratory distress  Breath sounds: No wheezing, rhonchi or rales  Chest:      Chest wall: No tenderness  Abdominal:      General: Bowel sounds are normal  There is no distension  Palpations: Abdomen is soft  There is no mass  Tenderness: There is no abdominal tenderness  There is no guarding or rebound  Hernia: No hernia is present  Musculoskeletal:         General: No swelling or tenderness  Normal range of motion  Cervical back: Normal range of motion and neck supple  No rigidity or tenderness  Lymphadenopathy:      Cervical: No cervical adenopathy  Skin:     General: Skin is warm and dry  Capillary Refill: Capillary refill takes less than 2 seconds  Coloration: Skin is not pale  Findings: No erythema, lesion or rash  Neurological:      General: No focal deficit present  Mental Status: He is alert and oriented to person, place, and time  Cranial Nerves: No cranial nerve deficit  Sensory: No sensory deficit  Motor: No weakness  Coordination: Coordination normal       Gait: Gait normal       Deep Tendon Reflexes: Reflexes normal    Psychiatric:         Mood and Affect: Mood normal          Behavior: Behavior normal          Thought Content:  Thought content normal          Judgment: Judgment normal           [facility]

## 2022-02-28 ENCOUNTER — HOSPITAL ENCOUNTER (OUTPATIENT)
Dept: NON INVASIVE DIAGNOSTICS | Facility: HOSPITAL | Age: 72
Discharge: HOME | End: 2022-02-28
Payer: COMMERCIAL

## 2022-02-28 DIAGNOSIS — R09.89 BRUIT: ICD-10-CM

## 2022-02-28 PROCEDURE — 93880 EXTRACRANIAL BILAT STUDY: CPT

## 2022-02-28 PROCEDURE — 93880 EXTRACRANIAL BILAT STUDY: CPT | Performed by: INTERNAL MEDICINE

## 2022-03-07 DIAGNOSIS — G43.811 OTHER MIGRAINE WITH STATUS MIGRAINOSUS, INTRACTABLE: ICD-10-CM

## 2022-03-07 RX ORDER — BUTALBITAL, ACETAMINOPHEN AND CAFFEINE 50; 325; 40 MG/1; MG/1; MG/1
1 TABLET ORAL EVERY 4 HOURS PRN
Qty: 30 TABLET | Refills: 0 | Status: SHIPPED | OUTPATIENT
Start: 2022-03-07 | End: 2022-04-14 | Stop reason: SDUPTHER

## 2022-03-10 DIAGNOSIS — F41.9 ANXIETY: ICD-10-CM

## 2022-03-11 RX ORDER — ALPRAZOLAM 0.25 MG/1
0.25 TABLET ORAL 3 TIMES DAILY PRN
Qty: 30 TABLET | Refills: 0 | Status: SHIPPED | OUTPATIENT
Start: 2022-03-11 | End: 2022-04-14 | Stop reason: SDUPTHER

## 2022-03-31 ENCOUNTER — TELEPHONE (OUTPATIENT)
Dept: PAIN MEDICINE | Facility: CLINIC | Age: 72
End: 2022-03-31

## 2022-03-31 ENCOUNTER — OFFICE VISIT (OUTPATIENT)
Dept: PAIN MEDICINE | Facility: CLINIC | Age: 72
End: 2022-03-31
Payer: COMMERCIAL

## 2022-03-31 VITALS
DIASTOLIC BLOOD PRESSURE: 82 MMHG | SYSTOLIC BLOOD PRESSURE: 124 MMHG | TEMPERATURE: 98 F | HEIGHT: 73 IN | HEART RATE: 84 BPM | WEIGHT: 259 LBS | BODY MASS INDEX: 34.33 KG/M2

## 2022-03-31 DIAGNOSIS — M51.26 LUMBAR DISC HERNIATION: ICD-10-CM

## 2022-03-31 DIAGNOSIS — M48.062 SPINAL STENOSIS OF LUMBAR REGION WITH NEUROGENIC CLAUDICATION: ICD-10-CM

## 2022-03-31 DIAGNOSIS — M54.16 LUMBAR RADICULOPATHY: ICD-10-CM

## 2022-03-31 DIAGNOSIS — M54.50 CHRONIC LEFT-SIDED LOW BACK PAIN WITHOUT SCIATICA: Primary | ICD-10-CM

## 2022-03-31 DIAGNOSIS — G89.29 CHRONIC LEFT-SIDED LOW BACK PAIN WITHOUT SCIATICA: Primary | ICD-10-CM

## 2022-03-31 PROCEDURE — 3008F BODY MASS INDEX DOCD: CPT | Performed by: NURSE PRACTITIONER

## 2022-03-31 PROCEDURE — 99214 OFFICE O/P EST MOD 30 MIN: CPT | Performed by: NURSE PRACTITIONER

## 2022-03-31 RX ORDER — TRAMADOL HYDROCHLORIDE 50 MG/1
TABLET ORAL
Qty: 14 TABLET | Refills: 0 | Status: SHIPPED | OUTPATIENT
Start: 2022-03-31

## 2022-03-31 NOTE — TELEPHONE ENCOUNTER
S/w khloe at Dr Mata Stout office  Advised of L L5-S1 TFESI, no procedure date  Per khloe, dr Tony Bryant is out of the office and may not reply until next week  Pt is aware

## 2022-03-31 NOTE — PROGRESS NOTES
Assessment:  1  Chronic left-sided low back pain without sciatica    2  Lumbar radiculopathy    3  Lumbar disc herniation    4  Spinal stenosis of lumbar region with neurogenic claudication        Plan:  While the patient was in the office today, I did have a thorough conversation with the patient regarding their chronic pain syndrome, symptoms, medication regimen, and treatment plan  I discussed with the patient at this point time with his current symptoms and exam findings since they correlate with his previous imaging and a very similar to his pain prior to his last injection and his last injection provided moderate to significant relief for an extended period of time, I feel would be beneficial proceed with a repeat left L5 and S1 transforaminal epidural steroid injection with Dr Holli Remy  The patient was agreeable and verbalized an understanding  Complete risks and benefits including bleeding, infection, tissue reaction, nerve injury and allergic reaction were discussed  The approach was demonstrated using models and literature was provided  However, I reminded the patient that we need to receive the approval to hold his 81 mg aspirin and Brilinta before we can get him scheduled for his procedure and he needs to continue to take his medication until he hears otherwise from our office  The patient was agreeable and verbalized an understanding  In the meantime, I feel would be reasonable to give the patient a small prescription for p r n  tramadol in place of the previously prescribed hydrocodone from his previous primary care provider for him to take as needed when the pain is severe  The patient was agreeable and verbalized an understanding  I advised the patient that if they experience any side effects or issues with the changes in their medication regiment, they should give our office a call to discuss   I also advised the patient not to drive or operate machinery until they see how the changes in the medication regimen affects them  The patient was agreeable and verbalized an understanding  [place] Prescription Drug Monitoring Program report was reviewed and was appropriate     There are risks associated with opioid medications, including dependence, addiction and tolerance  The patient understands and agrees to use these medications only as prescribed  Potential side effects of the medications include, but are not limited to, constipation, drowsiness, addiction, impaired judgment and risk of fatal overdose if not taken as prescribed  The patient was warned against driving while taking sedation medications  Sharing medications is a felony  At this point in time, the patient is showing no signs of addiction, abuse, diversion or suicidal ideation  The patient is schedule a follow-up office visit 3-4 weeks after his injection and at that point time we will re-evaluate his pain symptoms and discuss his treatment plan options  The patient was agreeable and verbalized an understanding  History of Present Illness: The patient is a 80 y o  male last seen on 8/10/2020 who presents for a follow up office visit in regards to chronic left-sided low back pain with occasional radiculopathy secondary to herniated lumbar disc with stenosis  The patient currently reports that since his last office visit overall his chronic left-sided low back and leg pain was relatively stable and manageable and that over the past several months it has been getting worse and worse as he does drive a lot and the longer he is sitting in a car the more uncomfortable the pain gets  The patient denies any recent trauma or injury and since his last injection was in 2020 was hoping to be re-evaluated for repeat injection today  However, since his last office visit the patient has also had some cardiac stenting approximately 1 year ago and is now on 81 mg of aspirin and Brilinta for blood thinners    The patient also want to know if we could maybe consider prescribing him something to take as needed as now with the blood thinners he can take the over-the-counter anti-inflammatories and the over-the-counter Tylenol does not help his pain  In the past the patient had been given a small prescription of p r n  hydrocodone from his previous primary care provider who has now left the practice and he found that helpful when his pain was severe or when he has a significant flare-up and made that small prescription of 20 pills last for 2 years  The patient presents today for re-evaluation and to discuss his treatment plan options  I have personally reviewed and/or updated the patient's past medical history, past surgical history, family history, social history, current medications, allergies, and vital signs today  Review of Systems:    Review of Systems   Respiratory: Negative for shortness of breath  Cardiovascular: Negative for chest pain  Gastrointestinal: Negative for constipation, diarrhea, nausea and vomiting  Musculoskeletal: Positive for gait problem  Negative for arthralgias, joint swelling and myalgias  Skin: Negative for rash  Neurological: Negative for dizziness, seizures and weakness  All other systems reviewed and are negative          Past Medical History:   Diagnosis Date    Acute medial meniscus tear     Allergic rhinitis     Allergic rhinitis     last assessed 5/8/14    Anxiety     Appendicitis     Arthritis     Benign essential hypertension     last assessed 1/6/14    Biceps tendon tear     Cancer (HCC)     skin    Cervical radiculopathy     and lumbar    Chronic pain disorder     knees and hips     Colon polyps     CPAP (continuous positive airway pressure) dependence     Depression     Depression with anxiety     Dyskinesia of esophagus     Erythema migrans (Lyme disease)     last assessed 10/16/12    Fatigue     GERD (gastroesophageal reflux disease)     Heel spur, right     Herpes     Herpes zoster     Hyperlipidemia     Hyperplastic colon polyp     last assessed 11/14/14    Joint pain     Kidney stones     Lyme disease     Nephrolithiasis     Panic disorder with agoraphobia     last assessed 8/7/13    Seasonal affective disorder ([facility] [address] )     Sleep apnea     Vertigo     Vitamin D deficiency        Past Surgical History:   Procedure Laterality Date    APPENDECTOMY  08/01/2003    BICEPS TENDON REPAIR      KNEE ARTHROSCOPY      with medial meniscus repair, resolved 01/01/05    KNEE CARTILAGE SURGERY      OTHER SURGICAL HISTORY      distal reinsertion of ruptured biceps tendon    MD KNEE SCOPE,MED/LAT MENISECTOMY Right 7/30/2019    Procedure: RIGHT KNEE ARTHROSCOPY, MEDIAL MENISCECTOMY;  Surgeon: Jairon Mckinley DO;  Location[facility] MAIN OR;  Service: Orthopedics    TOOTH EXTRACTION      Middle of October 2019    VASECTOMY         Family History   Problem Relation Age of Onset    Diabetes Mother     Coronary artery disease Father     Kidney disease Father     Heart disease Father        Social History     Occupational History    Not on file   Tobacco Use    Smoking status: Former Smoker     Types: Cigars    Smokeless tobacco: Never Used   Vaping Use    Vaping Use: Never used   Substance and Sexual Activity    Alcohol use: No    Drug use: No    Sexual activity: Not on file         Current Outpatient Medications:     ALPRAZolam (Xanax) 0 25 mg tablet, Take 1 tablet (0 25 mg total) by mouth 3 (three) times a day as needed for anxiety, Disp: 30 tablet, Rfl: 0    aspirin (Aspirin 81) 81 mg chewable tablet, every 24 hours, Disp: , Rfl:     atorvastatin (LIPITOR) 80 mg tablet, every 24 hours, Disp: , Rfl:     butalbital-acetaminophen-caffeine (FIORICET,ESGIC) -40 mg per tablet, Take 1 tablet by mouth every 4 (four) hours as needed for headaches, Disp: 30 tablet, Rfl: 0    cholecalciferol (VITAMIN D3) 1,000 units tablet, Take 1,000 Units by mouth daily, Disp: , Rfl:     Coenzyme Q10 (COQ10 PO), Take by mouth daily, Disp: , Rfl:     Glucosamine-Chondroit-Vit C-Mn (GLUCOSAMINE 1500 COMPLEX PO), Take by mouth, Disp: , Rfl:     Hyoscyamine Sulfate SL 0 125 MG SUBL, Place 0 125 mg under the tongue 3 (three) times a day as needed (as needed), Disp: 30 tablet, Rfl: 1    Ibuprofen (Advil) 200 MG CAPS, 3 (three) times a day, Disp: , Rfl:     lisinopril (ZESTRIL) 2 5 mg tablet, every 24 hours, Disp: , Rfl:     Multiple Vitamins-Minerals (MULTI FOR HIM 50+) TABS, Take by mouth, Disp: , Rfl:     Omega-3 Fatty Acids (FISH OIL) 1,000 mg, Take 1,000 mg by mouth daily, Disp: , Rfl:     pantoprazole (PROTONIX) 40 mg tablet, ONE TABLET DAILY for GI issue, Disp: , Rfl:     ticagrelor (BRILINTA) 90 MG, Every 12 hours, Disp: , Rfl:     Cholecalciferol (Vitamin D3) 7716563 UNIT/GM LIQD, as directed (Patient not taking: Reported on 8/11/2021), Disp: , Rfl:     traMADol (Ultram) 50 mg tablet, Take 1 PO BID PRN for pain , Disp: 14 tablet, Rfl: 0    vilazodone (VIIBRYD) 40 mg tablet, Take 1 tablet (40 mg total) by mouth daily with breakfast (Patient not taking: Reported on 3/31/2022 ), Disp: 30 tablet, Rfl: 3    Allergies   Allergen Reactions    Pseudoephedrine Hives     Reaction Date: 16Apr2011;     Sulfa Antibiotics Dizziness    Codeine Headache       Physical Exam:    /82 (BP Location: Left arm, Patient Position: Sitting, Cuff Size: Standard)   Pulse 84   Temp 98 °F (36 7 °C)   Ht 6' 1" (1 854 m)   Wt 117 kg (259 lb)   BMI 34 17 kg/m²     Constitutional:normal, well developed, well nourished, alert, in no distress and non-toxic and no overt pain behavior    Eyes:anicteric  HEENT:grossly intact  Neck:supple, symmetric, trachea midline and no masses   Pulmonary:even and unlabored  Cardiovascular:No edema or pitting edema present  Skin:Normal without rashes or lesions and well hydrated  Psychiatric:Mood and affect appropriate  Neurologic:Cranial Nerves II-XII grossly intact  Musculoskeletal:The patient's gait is slightly antalgic, but steady without the use of any assistive devices      Lumbar Spine Exam    Appearance:  Normal lordosis  Palpation/Tenderness:  left lumbar paraspinal tenderness  left sacroiliac joint tenderness  Sensory:  no sensory deficits noted  Range of Motion:  Flexion:  Minimally limited  with pain  Extension:  No limitation  without pain  Rotation - Left:  Minimally limited  with pain  Rotation - Right:  Minimally limited  with pain  Motor Strength:  Left hip flexion:  5/5  Left hip extension:  5/5  Right hip flexion:  5/5  Right hip extension:  5/5  Left knee flexion:  5/5  Left knee extension:  5/5  Right knee flexion:  5/5  Right knee extension:  5/5  Left foot dorsiflexion:  5/5  Left foot plantar flexion:  5/5  Right foot dorsiflexion:  5/5  Right foot plantar flexion:  5/5  Reflexes:  Left Patellar:  2+   Right Patellar:  1+   Left Achilles:  absent   Right Achilles:  absent   Special Tests:  Left Straight Leg Test:  positive  Right Straight Leg Test:  negative  Left Pawel's Maneuver:  positive  Right Pawel's Maneuver:  negative  Left Gaenslen's Test:  positive  Right Gaenslen's Test:  negative      Imaging  FL spine and pain procedure    (Results Pending)         Orders Placed This Encounter   Procedures    FL spine and pain procedure

## 2022-03-31 NOTE — TELEPHONE ENCOUNTER
Shameka Lewis  From Dr Albert Arizmendi Cardiologist requesting details of what kind of epidural      Please advise    Thank you

## 2022-03-31 NOTE — PATIENT INSTRUCTIONS
Epidural Steroid Injection   AMBULATORY CARE:   What you need to know about an epidural steroid injection (JUAN ANTONIO):  An JUAN ANTONIO is a procedure to inject steroid medicine into the epidural space  The epidural space is between your spinal cord and vertebrae  Steroids reduce inflammation and fluid buildup in your spine that may be causing pain  You may be given pain medicine along with the steroids  How to prepare for an JUAN ANTONIO:  Your healthcare provider will talk to you about how to prepare for your procedure  He or she will tell you what medicines to take or not take on the day of your procedure  You may need to stop taking blood thinners or other medicines several days before your procedure  You may need to adjust any diabetes medicine you take on the day of your procedure  Steroid medicine can increase your blood sugar level  Arrange for someone to drive you home when you are discharged  What will happen during an JUAN ANTONIO:   · You will be given medicine to numb the procedure area  You will be awake for the procedure, but you will not feel pain  You may also be given medicine to help you relax  Contrast liquid will be used to help your healthcare provider see the area better  Tell the healthcare provider if you have ever had an allergic reaction to contrast liquid  · Your healthcare provider may place the needle into your neck area, middle of your back, or tailbone area  He may inject the medicine next to the nerves that are causing your pain  He may instead inject the medicine into a larger area of the epidural space  This helps the medicine spread to more nerves  Your healthcare provider will use a fluoroscope to help guide the needle to the right place  A fluoroscope is a type of x-ray  After the procedure, a bandage will be placed over the injection site to prevent infection  What will happen after an JUAN ANTONIO:  You will have a bandage over the injection site to prevent infection   Your healthcare provider will tell you when you can bathe and any activity guidelines  You will be able to go home  Risks of an JUAN ANTONIO:  You may have temporary or permanent nerve damage or paralysis  You may have bleeding or develop a serious infection, such as meningitis (swelling of the brain coverings)  An abscess may also develop  An abscess is a pus-filled area under the skin  You may need surgery to fix the abscess  You may have a seizure, anxiety, or trouble sleeping  If you are a man, you may have temporary erectile dysfunction (not able to have an erection)  Call your local emergency number (911 in the [address] [place] [address],[address]) if:   · You have a seizure  · You have trouble moving your legs  Seek care immediately if:   · Blood soaks through your bandage  · You have a fever or chills, severe back pain, and the procedure area is sensitive to the touch  · You cannot control when you urinate or have a bowel movement  Call your doctor if:   · You have weakness or numbness in your legs  · Your wound is red, swollen, or draining pus  · You have nausea or are vomiting  · Your face or neck is red and you feel warm  · You have more pain than you had before the procedure  · You have swelling in your hands or feet  · You have questions or concerns about your condition or care  Care for your wound as directed: You may remove the bandage before you go to bed the day of your procedure  You may take a shower, but do not take a bath for at least 24 hours  Self-care:   · Do not drive,  use machines, or do strenuous activity for 24 hours after your procedure or as directed  · Continue other treatments  as directed  Steroid injections alone will not control your pain  The injections are meant to be used with other treatments, such as physical therapy  Follow up with your doctor as directed:  Write down your questions so you remember to ask them during your visits     © Copyright [address] 2022 Information is for End User's use only and may not be sold, redistributed or otherwise used for commercial purposes  All illustrations and images included in CareNotes® are the copyrighted property of A D A M , Inc  or Kimberly Lujan  The above information is an  only  It is not intended as medical advice for individual conditions or treatments  Talk to your doctor, nurse or pharmacist before following any medical regimen to see if it is safe and effective for you  Opioid Safety   AMBULATORY CARE:   Opioid safety  includes the correct use, storage, and disposal of opioids  Examples of opioid medicines to treat pain include oxycodone, morphine, fentanyl, and codeine  Call your local emergency number (911 in the [address]), or have someone else call if:   · You have a seizure  · You cannot be woken  · You have trouble staying awake and your breathing is slow or shallow  · Your speech is slurred, or you are confused  · You are dizzy or stumble when you walk  Call your doctor, or have someone close to you call if:   · You are extremely drowsy, or you have trouble staying awake or speaking  · You have pale or clammy skin  · You have blue fingernails or lips  · Your heartbeat is slower than normal     · You cannot stop vomiting  · You have questions or concerns about your condition or care  Use opioids safely:   · Take prescribed opioids exactly as directed  Opioids come with directions based on the kind of opioid and how it is given  Do not take more than the recommended amount, or for longer than needed  · Do not give opioids to others or take opioids that belong to someone else  Misuse of opioids can lead to an addiction or overdose  · Do not mix opioids with other medicines or alcohol  The combination can cause an overdose, or lead to a coma  · Do not drive or operate heavy machinery after you take the opioid    Your provider or pharmacist can tell you how long to wait after a dose before you do these activities  · Talk to your healthcare provider if you have any side effects  He or she can help you prevent or relieve side effects  Side effects include nausea, sleepiness, itching, and trouble thinking clearly  Manage constipation:  Constipation is the most common side effect of opioid medicine  Constipation is when you have hard, dry bowel movements, or you go longer than usual between bowel movements  Tell your healthcare provider about all changes in your bowel movements while you are taking opioids  He or she may recommend laxative medicine to help you have a bowel movement  He or she may also change the kind of opioid you are taking, or change when you take it  The following are more ways you can prevent or relieve constipation:  · Drink liquids as directed  You may need to drink extra liquids to help soften and move your bowels  Ask how much liquid to drink each day and which liquids are best for you  · Eat high-fiber foods  This may help decrease constipation by adding bulk to your bowel movements  High-fiber foods include fruits, vegetables, whole-grain breads and cereals, and beans  Your healthcare provider or dietitian can help you create a high-fiber meal plan  Your provider may also recommend a fiber supplement if you cannot get enough fiber from food  · Exercise regularly  Regular physical activity can help stimulate your intestines  Walking is a good exercise to prevent or relieve constipation  Ask which exercises are best for you  · Schedule a time each day to have a bowel movement  This may help train your body to have regular bowel movements  Bend forward while you are on the toilet to help move the bowel movement out  Sit on the toilet for at least 10 minutes, even if you do not have a bowel movement  Store opioids safely:   · Store opioids where others cannot easily get them  Keep them in a locked cabinet or secure area   Do not  keep them in a purse or other bag you carry with you  A person may be looking for something else and find the opioids  · Make sure opioids are stored out of the reach of children  A child can easily overdose on opioids  Opioids may look like candy to a small child  The best way to dispose of opioids: The laws vary by country and area  In the [place], the best way is to return the opioids through a take-back program  This program is offered by the [employer]  The following are options for using the program:  · Take the opioids to a DUY collection site  The site is often a law enforcement center  Call your local law enforcement center for scheduled take-back days in your area  You will be given information on where to go if the collection site is in a different location  · Take the opioids to an approved pharmacy or hospital   A pharmacy or hospital may be set up as a collection site  You will need to ask if it is a DUY collection site if you were not directed there  A pharmacy or doctor's office may not be able to take back opioids unless it is a DUY site  · Use a mail-back system  This means you are given containers to put the opioids into  You will then mail them in the containers  · Use a take-back drop box  This is a place to leave the opioids at any time  People and animals will not be able to get into the box  Your local law enforcement agency can tell you where to find a drop box in your area  Other safe ways to dispose of opioids: The medicine may come with disposal instructions  The instructions may vary depending on the brand of medicine you are using  Instructions may come in a Medication Guide, but not every medicine has one  You may instead get instructions from your pharmacy or doctor  Follow instructions carefully  The following are general guidelines to follow:  · Find out if you can flush the opioid  Some opioids can be flushed down the toilet or poured into the sink   You will need to contact authorities in your area to see if this is an option for you  The FDA also offers a list of medicines that are safe to flush down the toilet  You can check the list if you cannot get the information for your local area  · Ask your waste management company about rules for putting opioids in the trash  The company will be able to give you specific directions  Scratch out personal information on the original medicine label so it cannot be read  Then put it in the trash  Do not label the trash or put any information on it about the opioids  It should look like regular household trash so no one is tempted to look for the opioids  Keep the trash out of the reach of children and animals  Always make sure trash is secure  · Talk to officials if you live in a facility  If you live in a nursing home or assisted living center, talk to an official  The person will know the rules for your area  Other ways to manage pain:   · Ask your healthcare provider about non-opioid medicines to control pain  Nonprescription medicines include NSAIDs (such as ibuprofen) and acetaminophen  Prescription medicines include muscle relaxers, antidepressants, and steroids  · Pain may be managed without any medicines  Some ways to relieve pain include massage, aromatherapy, or meditation  Physical or occupational therapy may also help  For more information:   · Drug Enforcement Administration  [address]  Phone: 8- 038 - 214-0518  Web Address: [facility]/drug_disposal/    · [address] and Drug Administration  [facility] , [address] [facility]  Phone: 1- 318 - 038-6959  Web Address: http://[employer]/    Follow up with your doctor or pain specialist as directed: You may need to have your dose adjusted  Your doctor or pain specialist can also help you find ways to manage pain without opioids   Write down your questions so you remember to ask them during your visits  © Copyright [employer] 2022 Information is for End User's use only and may not be sold, redistributed or otherwise used for commercial purposes  All illustrations and images included in CareNotes® are the copyrighted property of A D A M , Inc  or Kimberly Lujan  The above information is an  only  It is not intended as medical advice for individual conditions or treatments  Talk to your doctor, nurse or pharmacist before following any medical regimen to see if it is safe and effective for you

## 2022-04-01 ENCOUNTER — TELEPHONE (OUTPATIENT)
Dept: PAIN MEDICINE | Facility: CLINIC | Age: 72
End: 2022-04-01

## 2022-04-01 NOTE — TELEPHONE ENCOUNTER
S/w Pt to schedule f/u ov per Dr Sridevi Daniels stated that he talked to his cardiologist this morning and he suggests waiting 1 full year before having an injection which would be the middle of May    I mentioned the info to Dr Felipa Woods - Dr Felipa Woods suggests Pt calls us back in May to schedule procedure    Follow up ov no longer needed

## 2022-04-01 NOTE — TELEPHONE ENCOUNTER
Received fax from Dr Court Lance office  Pt will need to complete 1 year of  dual antiplatelet therapy following RCA stent prior to anticoag hold  Per SL - fu ov w/ DG / NM to discuss tx options

## 2022-04-04 ENCOUNTER — TELEPHONE (OUTPATIENT)
Dept: RADIOLOGY | Facility: CLINIC | Age: 72
End: 2022-04-04

## 2022-04-04 NOTE — TELEPHONE ENCOUNTER
anti  coag:   Faxed anti coag hold form to Dr Amy Chairez    Requested Medication to be held: 81 mg ASA & Brilinta  Fax: [phone]     Phone:   Procedure to be scheduled:Left L5 & S1 TFESI  ?? *please keep this task in clinical pool until we get response back   (if forwarding this task to another office or physician, please keep clinical pool on the recipient list for tracking purposes)

## 2022-04-12 NOTE — TELEPHONE ENCOUNTER
Per 4/1/22 telephone note: Pt to cb in May to schedule               Ry Kelly         Telephone Encounter   Addendum   Encounter Date:  4/1/2022            Telephone Encounter        S/w Pt to schedule f/u ov per Dr Alvin Quiroz     Pt stated that he talked to his cardiologist this morning and he suggests waiting 1 full year before having an injection which would be the middle of May     I mentioned the info to Dr Roel Vail - Dr Roel Vail suggests Pt calls us back in May to schedule procedure     Follow up ov no longer needed            Electronically signed by Ry Kelly at 4/1/2022  1:10 PM   Electronically signed by Ry Kelly at 4/1/2022  1:11 PM          Links

## 2022-04-14 DIAGNOSIS — F41.9 ANXIETY: ICD-10-CM

## 2022-04-14 DIAGNOSIS — G43.811 OTHER MIGRAINE WITH STATUS MIGRAINOSUS, INTRACTABLE: ICD-10-CM

## 2022-04-15 RX ORDER — BUTALBITAL, ACETAMINOPHEN AND CAFFEINE 50; 325; 40 MG/1; MG/1; MG/1
1 TABLET ORAL EVERY 4 HOURS PRN
Qty: 30 TABLET | Refills: 0 | Status: SHIPPED | OUTPATIENT
Start: 2022-04-15 | End: 2022-05-26 | Stop reason: SDUPTHER

## 2022-04-15 RX ORDER — ALPRAZOLAM 0.25 MG/1
0.25 TABLET ORAL 3 TIMES DAILY PRN
Qty: 30 TABLET | Refills: 0 | Status: SHIPPED | OUTPATIENT
Start: 2022-04-15 | End: 2022-05-16 | Stop reason: SDUPTHER

## 2022-04-29 ENCOUNTER — VBI (OUTPATIENT)
Dept: ADMINISTRATIVE | Facility: OTHER | Age: 72
End: 2022-04-29

## 2022-04-29 NOTE — TELEPHONE ENCOUNTER
04/29/22 3:37 PM     [facility]

## 2022-05-02 ENCOUNTER — TELEPHONE (OUTPATIENT)
Dept: FAMILY MEDICINE CLINIC | Facility: CLINIC | Age: 72
End: 2022-05-02

## 2022-05-02 NOTE — TELEPHONE ENCOUNTER
Patient is asking which doctor he should see when he schedules his colonoscopy  He said you told him to see a specific doctor at [facility]  Please advise  Also his cardiologist ordered bloodwork and said for you to review in case you would like to order any at [facility]     Please advise

## 2022-05-16 DIAGNOSIS — F41.9 ANXIETY: ICD-10-CM

## 2022-05-17 RX ORDER — ALPRAZOLAM 0.25 MG/1
0.25 TABLET ORAL 3 TIMES DAILY PRN
Qty: 30 TABLET | Refills: 0 | Status: SHIPPED | OUTPATIENT
Start: 2022-05-17 | End: 2022-06-16 | Stop reason: SDUPTHER

## 2022-05-26 ENCOUNTER — OFFICE VISIT (OUTPATIENT)
Dept: FAMILY MEDICINE CLINIC | Facility: CLINIC | Age: 72
End: 2022-05-26
Payer: COMMERCIAL

## 2022-05-26 VITALS
WEIGHT: 259.8 LBS | OXYGEN SATURATION: 95 % | HEIGHT: 73 IN | SYSTOLIC BLOOD PRESSURE: 130 MMHG | BODY MASS INDEX: 34.43 KG/M2 | HEART RATE: 80 BPM | TEMPERATURE: 97.4 F | DIASTOLIC BLOOD PRESSURE: 90 MMHG

## 2022-05-26 DIAGNOSIS — F32.A DEPRESSION, UNSPECIFIED DEPRESSION TYPE: ICD-10-CM

## 2022-05-26 DIAGNOSIS — F41.1 GENERALIZED ANXIETY DISORDER: ICD-10-CM

## 2022-05-26 DIAGNOSIS — Z12.5 PROSTATE CANCER SCREENING: ICD-10-CM

## 2022-05-26 DIAGNOSIS — F11.20 CONTINUOUS OPIOID DEPENDENCE (HCC): ICD-10-CM

## 2022-05-26 DIAGNOSIS — G43.811 OTHER MIGRAINE WITH STATUS MIGRAINOSUS, INTRACTABLE: ICD-10-CM

## 2022-05-26 DIAGNOSIS — G47.33 OBSTRUCTIVE SLEEP APNEA: Primary | ICD-10-CM

## 2022-05-26 PROCEDURE — 3288F FALL RISK ASSESSMENT DOCD: CPT | Performed by: FAMILY MEDICINE

## 2022-05-26 PROCEDURE — 1101F PT FALLS ASSESS-DOCD LE1/YR: CPT | Performed by: FAMILY MEDICINE

## 2022-05-26 PROCEDURE — 1160F RVW MEDS BY RX/DR IN RCRD: CPT | Performed by: FAMILY MEDICINE

## 2022-05-26 PROCEDURE — 99214 OFFICE O/P EST MOD 30 MIN: CPT | Performed by: FAMILY MEDICINE

## 2022-05-26 PROCEDURE — 1036F TOBACCO NON-USER: CPT | Performed by: FAMILY MEDICINE

## 2022-05-26 RX ORDER — BUTALBITAL, ACETAMINOPHEN AND CAFFEINE 50; 325; 40 MG/1; MG/1; MG/1
1 TABLET ORAL EVERY 4 HOURS PRN
Qty: 30 TABLET | Refills: 0 | Status: SHIPPED | OUTPATIENT
Start: 2022-05-26 | End: 2022-07-29 | Stop reason: SDUPTHER

## 2022-05-26 RX ORDER — EZETIMIBE 10 MG/1
TABLET ORAL
COMMUNITY
Start: 2022-05-11

## 2022-05-26 RX ORDER — VILAZODONE HYDROCHLORIDE 20 MG/1
20 TABLET ORAL
Qty: 30 TABLET | Refills: 1 | Status: SHIPPED | OUTPATIENT
Start: 2022-05-26 | End: 2022-08-03 | Stop reason: SDUPTHER

## 2022-05-26 RX ORDER — AMOXICILLIN 500 MG/1
CAPSULE ORAL
COMMUNITY
Start: 2022-05-11

## 2022-05-26 NOTE — PROGRESS NOTES
Subjective:   Chief Complaint   Patient presents with    Fatigue     Pt is having fatigue couples of month, f/u after cardiology had some test done everything was good  Patient ID: Jasmeet Martinez is a 80 y o  male  Patient was seen by Cardiology  Had a stress test   Still has profound fatigue  Has not been using his CPAP machine due to the recall  Really feels when he awakens in the morning that he does not get restful sleep  Started back on by [employer] bread which he normally uses for seasonal affective disorder  Requests a refill for his Fioricet      The following portions of the patient's history were reviewed and updated as appropriate: allergies, current medications, past family history, past medical history, past social history, past surgical history and problem list     Review of Systems   Constitutional: Positive for fatigue  Negative for activity change, appetite change, chills, diaphoresis and unexpected weight change  HENT: Negative for congestion, ear discharge, ear pain, hearing loss, nosebleeds and rhinorrhea  Eyes: Negative for pain, redness, itching and visual disturbance  Respiratory: Negative for cough, choking, chest tightness and shortness of breath  Cardiovascular: Negative for chest pain and leg swelling  Gastrointestinal: Negative for abdominal pain, blood in stool, constipation, diarrhea and nausea  Endocrine: Negative for cold intolerance, polydipsia and polyphagia  Genitourinary: Negative for dysuria, frequency, hematuria and urgency  Musculoskeletal: Negative for arthralgias, back pain, gait problem, joint swelling, neck pain and neck stiffness  Skin: Negative for color change and rash  Allergic/Immunologic: Negative for environmental allergies and food allergies  Neurological: Positive for headaches  Negative for dizziness, tremors, seizures, speech difficulty and numbness  Hematological: Negative for adenopathy  Does not bruise/bleed easily  Psychiatric/Behavioral: Negative for behavioral problems, dysphoric mood, hallucinations and self-injury  Objective:  Vitals:    05/26/22 1413   BP: 130/90   Pulse: 80   Temp: (!) 97 4 °F (36 3 °C)   SpO2: 95%   Weight: 118 kg (259 lb 12 8 oz)   Height: 6' 1" (1 854 m)      Physical Exam  Constitutional:       General: He is not in acute distress  Appearance: He is well-developed  He is not diaphoretic  HENT:      Head: Normocephalic and atraumatic  Right Ear: External ear normal       Left Ear: External ear normal       Nose: Nose normal       Mouth/Throat:      Pharynx: No oropharyngeal exudate  Eyes:      General: No scleral icterus  Right eye: No discharge  Left eye: No discharge  Conjunctiva/sclera: Conjunctivae normal       Pupils: Pupils are equal, round, and reactive to light  Neck:      Thyroid: No thyromegaly  Cardiovascular:      Rate and Rhythm: Normal rate and regular rhythm  Heart sounds: Normal heart sounds  No murmur heard  Pulmonary:      Effort: Pulmonary effort is normal       Breath sounds: Normal breath sounds  No wheezing or rales  Abdominal:      General: Bowel sounds are normal       Palpations: Abdomen is soft  There is no mass  Tenderness: There is no abdominal tenderness  There is no guarding  Musculoskeletal:         General: No tenderness  Normal range of motion  Cervical back: Normal range of motion and neck supple  Lymphadenopathy:      Cervical: No cervical adenopathy  Skin:     General: Skin is warm and dry  Neurological:      Mental Status: He is alert and oriented to person, place, and time  Deep Tendon Reflexes: Reflexes are normal and symmetric  Psychiatric:         Thought Content: Thought content normal          Judgment: Judgment normal            Assessment/Plan:    No problem-specific Assessment & Plan notes found for this encounter         Diagnoses and all orders for this visit:    Obstructive sleep apnea    Generalized anxiety disorder    Depression, unspecified depression type  -     vilazodone (VIIBRYD) 20 mg tablet; Take 1 tablet (20 mg total) by mouth daily with breakfast    Continuous opioid dependence (HCC)    Prostate cancer screening  -     PSA, total and free; Future  -     PSA, total and free    Other migraine with status migrainosus, intractable  -     butalbital-acetaminophen-caffeine (FIORICET,ESGIC) -40 mg per tablet; Take 1 tablet by mouth every 4 (four) hours as needed for headaches    Other orders  -     amoxicillin (AMOXIL) 500 mg capsule; TAKE ONE CAPSULE BY MOUTH THREE TIMES DAILY UNTIL FINISHED (Patient not taking: Reported on 5/26/2022)  -     ezetimibe (ZETIA) 10 mg tablet; 1 tablet Orally Once a day 90 days        Get the labs  Contact the [facility] regarding a replacement for his CPAP machine  Refilled Fioricet    Restart Viibryd at 20 mg  Recheck with me 3 months

## 2022-06-16 DIAGNOSIS — F41.9 ANXIETY: ICD-10-CM

## 2022-06-17 RX ORDER — ALPRAZOLAM 0.25 MG/1
0.25 TABLET ORAL 3 TIMES DAILY PRN
Qty: 30 TABLET | Refills: 0 | Status: SHIPPED | OUTPATIENT
Start: 2022-06-17 | End: 2022-07-29 | Stop reason: SDUPTHER

## 2022-06-21 ENCOUNTER — OFFICE VISIT (OUTPATIENT)
Dept: URGENT CARE | Facility: CLINIC | Age: 72
End: 2022-06-21
Payer: COMMERCIAL

## 2022-06-21 VITALS — BODY MASS INDEX: 33.13 KG/M2 | HEIGHT: 73 IN | WEIGHT: 250 LBS

## 2022-06-21 DIAGNOSIS — U07.1 COVID-19: Primary | ICD-10-CM

## 2022-06-21 PROCEDURE — 99213 OFFICE O/P EST LOW 20 MIN: CPT | Performed by: PHYSICIAN ASSISTANT

## 2022-06-21 PROCEDURE — U0005 INFEC AGEN DETEC AMPLI PROBE: HCPCS | Performed by: PHYSICIAN ASSISTANT

## 2022-06-21 PROCEDURE — U0003 INFECTIOUS AGENT DETECTION BY NUCLEIC ACID (DNA OR RNA); SEVERE ACUTE RESPIRATORY SYNDROME CORONAVIRUS 2 (SARS-COV-2) (CORONAVIRUS DISEASE [COVID-19]), AMPLIFIED PROBE TECHNIQUE, MAKING USE OF HIGH THROUGHPUT TECHNOLOGIES AS DESCRIBED BY CMS-2020-01-R: HCPCS | Performed by: PHYSICIAN ASSISTANT

## 2022-06-21 NOTE — PATIENT INSTRUCTIONS
Take Paxlovid as directed for the next 5 days  Discontinue statin  Discontinue Brilinta  Motrin and/or Tylenol as needed for fevers aches and pains   Follow up with PCP in 3-5 days  Proceed to  ER if symptoms worsen  COVID-19 (Coronavirus Disease 2019)   AMBULATORY CARE:   What you need to know about COVID-19:  COVID-19 is the disease caused by a coronavirus first discovered in December 2019  Coronaviruses generally cause upper respiratory (nose, throat, and lung) infections, such as a cold  The 2019 virus spreads quickly and easily  It can be spread starting 2 to 3 days before symptoms even begin  What you need to know about variants: The virus has changed into several new forms (called variants) since it was discovered  The variants may be more contagious (easily spread) than the original form  Some may also cause more severe illness than others  Signs and symptoms of COVID-19  may not develop  Signs and symptoms usually start about 5 days after infection but can take 2 to 14 days  You may feel like you have the flu or a bad cold  Some signs and symptoms go away in a few days  Others can last weeks, months, or possibly years  You may have any of the following:  A cough    Shortness of breath or trouble breathing that may become severe    A fever    Chills that might include shaking    Muscle pain, body aches, or a headache    A sore throat    Sudden changes or loss of your taste or smell    Feeling mentally and physically tired (fatigue)    Congestion (stuffy head and nose), or a runny nose    Diarrhea, nausea, or vomiting    Call your local emergency number (911 in the [address] [place] [address],[address]) if:   You have trouble breathing or shortness of breath at rest     You have chest pain or pressure that lasts longer than 5 minutes  You become confused or hard to wake  Your lips or face are blue  Seek care immediately if:   You have a fever of 104°F (40°C) or higher        Call your doctor if:   You have symptoms of COVID-19  You have questions or concerns about your condition or care  How COVID-19 is diagnosed:  Testing is offered at many sites  You may need to quarantine until you get your results  Any of the following tests may be used:  A viral PCR test  shows if you have a current infection  A sample is taken from your nose or throat with a swab  You may need to wait 1 or more days to get the test results  An antigen test  shows if you have a protein from the COVID-19 virus  This test is often called a rapid test because the results can be available in 30 minutes or less  An antibody test  shows if you had a recent or past infection  Blood samples are used for this test  Antibodies are made by your immune system to fight the virus that causes COVID-19  Antibodies form 1 to 3 weeks after you are infected  This test is not used to show if you are immune to the virus  A CT, MRI, ultrasound, or x-ray  may be used to check for complications of [id number]  These may include pneumonia, blood clots, or other complications  Treatment:   Mild symptoms  may get better on their own  Some treatments have emergency use authorization (EUA)  Examples include monoclonal antibodies and convalescent plasma  These may be given to help prevent worsening of your symptoms  You may also need any of the following:    Decongestants  help reduce nasal congestion and help you breathe more easily  If you take decongestant pills, they may make you feel restless or cause problems with your sleep  Do not use decongestant sprays for more than a few days  Cough suppressants  help reduce coughing  Ask your healthcare provider which type of cough medicine is best for you  To soothe a sore throat,  gargle with warm salt water, or use throat lozenges or a throat spray  Drink more liquids to thin and loosen mucus and to prevent dehydration  NSAIDs or acetaminophen  can help lower a fever and relieve body aches or a headache   Follow directions  If not taken correctly, NSAIDs can cause kidney damage and acetaminophen can cause liver damage  Severe or life-threatening symptoms  are treated in the hospital  You may need any of the following:     Medicines  may be given to fight the virus or treat inflammation  Blood thinners  help prevent or treat blood clots  If you have a deep vein thrombosis (DVT) or pulmonary embolism (PE), you may need to use blood thinners for at least 3 months  Extra oxygen  may be given if you have respiratory failure  This means your lungs cannot get enough oxygen into your blood and out to your organs  A ventilator  may be used to help you breathe  What you need to know about health problems the virus may cause: You may develop long-term health problems caused by the virus  Your risk is higher if you are 65 or older  A weak immune system, obesity, diabetes, chronic kidney disease, or a heart or lung condition can also increase your risk  Your risk is also higher if you are a current or former cigarette smoker  COVID-19 can lead to any of the following:  Multisymptom inflammatory syndrome in adults (MIS-A) or in children (MIS-C), causing inflammation in the heart, digestive system, skin, or brain    Shortness of breath, serious lower respiratory conditions, such as pneumonia or acute respiratory distress syndrome (ARDS)    Blood clots or blood vessel damage    Organ damage from a lack of oxygen or from blood clots    Sleep problems    Problems thinking clearly, remembering information, or concentrating    Mood changes, depression, or anxiety    Long-term problems tasting or smelling    Loss of appetite and weight loss    Nerve pain    Fatigue (feeling mentally and physically tired)    What you need to know about COVID-19 vaccines:  Healthcare providers recommend a COVID-19 vaccine, even if you have already had COVID-19   You are considered fully vaccinated against COVID-19 two weeks after the final dose of any COVID-19 vaccine  Let your healthcare provider know when you have received the final dose of the vaccine  Make a copy of your vaccination card  Keep the original with you in case you need to show it  Keep the copy in a safe place  COVID-19 vaccines are given as a shot in 1 or 2 doses  Vaccination is recommended for everyone 5 years or older  One 2-dose vaccine is fully approved  for those 16 years or older  This vaccine also has an emergency use authorization (EUA) for children 11to 13years old  No vaccine is currently available for children younger than 5 years  A booster (additional) dose  is given to help the immune system continue to protect against severe COVID-19  A booster is recommended for all adults 18 or older  The booster can be a different brand of the COVID-19 vaccine than you originally received  The timing for the booster depends on the type of vaccine you received:    1-dose vaccine: The booster is given at least 2 months after you received the vaccine  2-dose vaccine: The booster is given at least 5 or 6 months after the second dose  A booster can be given to adolescents 15to 16years old  Only 1 COVID-19 vaccine has this EUA  The booster is given at least 5 months after the second dose of the original vaccine series  A booster is recommended for immunocompromised children 11to 6years old  Only 1 COVID-19 vaccine has this EUA  The booster is given 28 days after the second dose  Continue social distancing and other measures, even after you get the vaccine  Although it is not common, you can become infected after you get the vaccine  You may also be able to pass the virus to others without knowing you are infected  After you get the vaccine, check local, national, and international travel rules  You may need to be tested before you travel  Some countries require proof of a negative test before you travel   You may also need to quarantine after you return  Medicine may be given to prevent infection  The medicine can be given if you are at high risk for infection and cannot get the vaccine  It can also be given if your immune system does not respond well to the vaccine  How the 2019 coronavirus spreads:   Droplets are the main way all coronaviruses spread  The virus travels in droplets that form when a person talks, sings, coughs, or sneezes  The droplets can also float in the air for minutes or hours  Infection happens when you breathe in the droplets or get them in your eyes or nose  Close personal contact with an infected person increases your risk for infection  This means being within 6 feet (2 meters) of the person for at least 15 minutes over 24 hours  Person-to-person contact can spread the virus  For example, a person with the virus on his or her hands can spread it by shaking hands with someone  The virus can stay on objects and surfaces for up to 3 days  You may become infected by touching the object or surface and then touching your eyes or mouth  Help lower the risk for COVID-19:   Wash your hands often throughout the day  Use soap and water  Rub your soapy hands together, lacing your fingers, for at least 20 seconds  Rinse with warm, running water  Dry your hands with a clean towel or paper towel  Use hand  that contains alcohol if soap and water are not available  Teach children how to wash their hands and use hand   Cover sneezes and coughs  Turn your face away and cover your mouth and nose with a tissue  Throw the tissue away  Use the bend of your arm if a tissue is not available  Then wash your hands well with soap and water or use hand   Teach children how to cover a cough or sneeze  Wear a face covering (mask) when needed  Use a cloth covering with at least 2 layers  You can also create layers by putting a cloth covering over a disposable non-medical mask   Cover your mouth and your nose          Follow worldwide, national, and local social distancing guidelines  Keep at least 6 feet (2 meters) between you and others  Try not to touch your face  If you get the virus on your hands, you can transfer it to your eyes, nose, or mouth and become infected  You can also transfer it to objects, surfaces, or people  Clean and disinfect high-touch surfaces and objects often  Use disinfecting wipes, or make a solution of 4 teaspoons of bleach in 1 quart (4 cups) of water  Ask about other vaccines you may need  Get the influenza (flu) vaccine as soon as recommended each year, usually starting in September or October  Get the pneumonia vaccine if recommended  Your healthcare provider can tell you if you should also get other vaccines, and when to get them  Follow social distancing guidelines:  National and local social distancing rules vary  Rules and restrictions may change over time as restrictions are lifted  The following are general guidelines:  Stay home if you are sick or think you may have COVID-19  It is important to stay home if you are waiting for a testing appointment or for test results  Avoid close physical contact with anyone who does not live in your home  Do not shake hands with, hug, or kiss a person as a greeting  If you must use public transportation (such as a bus or subway), try to sit or stand away from others  Wear your face covering  Avoid in-person gatherings and crowds  Attend virtually if possible  Follow up with your doctor as directed:  Write down your questions so you remember to ask them during your visits  For more information:   [facility]  [address] , [address]  Phone: 6- 478 - [phone]  Web Address: [employer] com br    © Copyright [employer] 2022 Information is for End User's use only and may not be sold, redistributed or otherwise used for commercial purposes   All illustrations and images included in CareNotes® are the copyrighted property of A D A M [employer]  or Kimberly Dong   The above information is an  only  It is not intended as medical advice for individual conditions or treatments  Talk to your doctor, nurse or pharmacist before following any medical regimen to see if it is safe and effective for you

## 2022-06-21 NOTE — PROGRESS NOTES
[facility]        NAME: Elsa Starks is a 80 y o  male  : 1950    MRN: [id number]  DATE: 2022  TIME: 6:36 PM    Assessment and Plan   COVID-19 [U07 1]  1  COVID-19  COVID Only - Collected at [facility]    COVID Only - Collected at [facility]    nirmatrelvir & ritonavir (Paxlovid) tablet therapy pack         Patient Instructions     Take Paxlovid as directed for the next 5 days  Discontinue statin  Discontinue Brilinta  Motrin and/or Tylenol as needed for fevers aches and pains   Follow up with PCP in 3-5 days  Proceed to  ER if symptoms worsen  Chief Complaint     Chief Complaint   Patient presents with    Sore Throat     Sore throat, headache, body aches, symptoms started last night, covid positive on home test this am, would like PCR         History of Present Illness       78-year-old male presents with sore throat fevers body aches fatigue  Patient reports symptoms started last night and progressively got worse today  Took a rapid COVID test at home which came back undetermined am requesting PCR test   Patient reports he had COVID in December and had recent similar symptoms but not as severe as the initial infection  Denies any chest pain shortness of breath or cough  No abdominal pain nausea vomiting diarrhea  Having some headaches  Generalized Body Aches  The current episode started yesterday  The problem occurs constantly  The problem has been gradually worsening since onset  The pain is moderate  Nothing aggravates the symptoms  Associated symptoms include headaches, rhinorrhea, a sore throat, fatigue, a fever, joint pain and muscle aches  Pertinent negatives include no congestion, decreased vision, double vision, ear pain, eye pain, photophobia, coughing, abdominal pain, constipation, diarrhea, nausea or vomiting  Past treatments include acetaminophen  The treatment provided mild relief  The fever has been present for less than 1 day   His temperature was unmeasured prior to arrival  The nasal discharge has a clear appearance  He has been experiencing a moderate sore throat  Neither side is more painful than the other  The sore throat is characterized by pain only  There were no sick contacts  Review of Systems   Review of Systems   Constitutional: Positive for fatigue and fever  HENT: Positive for rhinorrhea and sore throat  Negative for congestion and ear pain  Eyes: Negative  Negative for double vision, photophobia and pain  Respiratory: Negative  Negative for cough  Cardiovascular: Negative  Gastrointestinal: Negative  Negative for abdominal pain, constipation, diarrhea, nausea and vomiting  Musculoskeletal: Positive for joint pain  Skin: Negative  Neurological: Positive for headaches           Current Medications       Current Outpatient Medications:     ALPRAZolam (Xanax) 0 25 mg tablet, Take 1 tablet (0 25 mg total) by mouth 3 (three) times a day as needed for anxiety, Disp: 30 tablet, Rfl: 0    aspirin 81 mg chewable tablet, every 24 hours, Disp: , Rfl:     atorvastatin (LIPITOR) 80 mg tablet, every 24 hours, Disp: , Rfl:     butalbital-acetaminophen-caffeine (FIORICET,ESGIC) -40 mg per tablet, Take 1 tablet by mouth every 4 (four) hours as needed for headaches, Disp: 30 tablet, Rfl: 0    cholecalciferol (VITAMIN D3) 1,000 units tablet, Take 1,000 Units by mouth daily, Disp: , Rfl:     Coenzyme Q10 (COQ10 PO), Take by mouth daily, Disp: , Rfl:     ezetimibe (ZETIA) 10 mg tablet, 1 tablet Orally Once a day 90 days, Disp: , Rfl:     Glucosamine-Chondroit-Vit C-Mn (GLUCOSAMINE 1500 COMPLEX PO), Take by mouth, Disp: , Rfl:     Hyoscyamine Sulfate SL 0 125 MG SUBL, Place 0 125 mg under the tongue 3 (three) times a day as needed (as needed), Disp: 30 tablet, Rfl: 1    Ibuprofen 200 MG CAPS, 3 (three) times a day, Disp: , Rfl:     lisinopril (ZESTRIL) 2 5 mg tablet, every 24 hours, Disp: , Rfl:    Multiple Vitamins-Minerals (MULTI FOR HIM 50+) TABS, Take by mouth, Disp: , Rfl:     nirmatrelvir & ritonavir (Paxlovid) tablet therapy pack, Take 3 tablets by mouth 2 (two) times a day for 5 days Take 2 nirmatrelvir tablets + 1 ritonavir tablet together per dose, Disp: 30 tablet, Rfl: 0    Omega-3 Fatty Acids (FISH OIL) 1,000 mg, Take 1,000 mg by mouth daily, Disp: , Rfl:     pantoprazole (PROTONIX) 40 mg tablet, ONE TABLET DAILY for GI issue, Disp: , Rfl:     ticagrelor (BRILINTA) 90 MG, 60 mg Every 12 hours, Disp: , Rfl:     traMADol (Ultram) 50 mg tablet, Take 1 PO BID PRN for pain , Disp: 14 tablet, Rfl: 0    vilazodone (VIIBRYD) 20 mg tablet, Take 1 tablet (20 mg total) by mouth daily with breakfast, Disp: 30 tablet, Rfl: 1    amoxicillin (AMOXIL) 500 mg capsule, TAKE ONE CAPSULE BY MOUTH THREE TIMES DAILY UNTIL FINISHED (Patient not taking: No sig reported), Disp: , Rfl:     Cholecalciferol (Vitamin D3) 0822136 UNIT/GM LIQD, as directed (Patient not taking: No sig reported), Disp: , Rfl:     Current Allergies     Allergies as of 06/21/2022 - Reviewed 06/21/2022   Allergen Reaction Noted    Pseudoephedrine Hives 04/21/2012    Sulfa antibiotics Dizziness 10/08/2012    Codeine Headache 09/23/2019            The following portions of the patient's history were reviewed and updated as appropriate: allergies, current medications, past family history, past medical history, past social history, past surgical history and problem list      Past Medical History:   Diagnosis Date    Acute medial meniscus tear     Allergic rhinitis     Allergic rhinitis     last assessed 5/8/14    Anxiety     Appendicitis     Arthritis     Benign essential hypertension     last assessed 1/6/14    Biceps tendon tear     Cancer (HCC)     skin    Cervical radiculopathy     and lumbar    Chronic pain disorder     knees and hips     Colon polyps     CPAP (continuous positive airway pressure) dependence     Depression  Depression with anxiety     Dyskinesia of esophagus     Erythema migrans (Lyme disease)     last assessed 10/16/12    Fatigue     GERD (gastroesophageal reflux disease)     Heel spur, right     Herpes     Herpes zoster     Hyperlipidemia     Hyperplastic colon polyp     last assessed 11/14/14    Joint pain     Kidney stones     Lyme disease     Nephrolithiasis     Panic disorder with agoraphobia     last assessed 8/7/13    Seasonal affective disorder ([facility] [address] [facility]     Sleep apnea     Vertigo     Vitamin D deficiency        Past Surgical History:   Procedure Laterality Date    APPENDECTOMY  08/01/2003    BICEPS TENDON REPAIR      KNEE ARTHROSCOPY      with medial meniscus repair, resolved 01/01/05    KNEE CARTILAGE SURGERY      OTHER SURGICAL HISTORY      distal reinsertion of ruptured biceps tendon    [place] KNEE SCOPE,MED/LAT MENISECTOMY Right 7/30/2019    Procedure: RIGHT KNEE ARTHROSCOPY, MEDIAL MENISCECTOMY;  Surgeon: Gonzalez Peñaloza DO;  Location: [address] [place] [address];  Service: Orthopedics    TOOTH EXTRACTION      Middle of October 2019    VASECTOMY         Family History   Problem Relation Age of Onset    Diabetes Mother     Coronary artery disease Father     Kidney disease Father     Heart disease Father          Medications have been verified  Objective   Ht 6' 1" (1 854 m)   Wt 113 kg (250 lb)   BMI 32 98 kg/m²   No LMP for male patient  Physical Exam     Physical Exam  Vitals and nursing note reviewed  Constitutional:       General: He is not in acute distress  Appearance: Normal appearance  He is well-developed  HENT:      Head: Normocephalic and atraumatic  Right Ear: Hearing, tympanic membrane, ear canal and external ear normal  There is no impacted cerumen  Left Ear: Hearing, tympanic membrane, ear canal and external ear normal  There is no impacted cerumen  Nose: Congestion and rhinorrhea present  Mouth/Throat:      Pharynx: Uvula midline  Posterior oropharyngeal erythema (Mild to moderate) present  No oropharyngeal exudate  Eyes:      General:         Right eye: No discharge  Left eye: No discharge  Conjunctiva/sclera: Conjunctivae normal    Cardiovascular:      Rate and Rhythm: Normal rate and regular rhythm  Heart sounds: Normal heart sounds  No murmur heard  Pulmonary:      Effort: Pulmonary effort is normal  No respiratory distress  Breath sounds: Normal breath sounds  No wheezing or rales  Abdominal:      General: Bowel sounds are normal       Palpations: Abdomen is soft  Tenderness: There is no abdominal tenderness  Musculoskeletal:         General: Normal range of motion  Cervical back: Normal range of motion and neck supple  Lymphadenopathy:      Cervical: No cervical adenopathy  Skin:     General: Skin is warm and dry  Neurological:      Mental Status: He is alert and oriented to person, place, and time  Psychiatric:         Mood and Affect: Mood normal        Told patient to discontinue blood thinner and statin while on antiviral therapy    Told patient that he needs contact PCP and cardiologist to monitor symptoms

## 2022-06-22 ENCOUNTER — TELEPHONE (OUTPATIENT)
Dept: URGENT CARE | Facility: CLINIC | Age: 72
End: 2022-06-22

## 2022-06-22 LAB — SARS-COV-2 RNA RESP QL NAA+PROBE: POSITIVE

## 2022-07-12 LAB
PSA FREE MFR SERPL: 64 %
PSA FREE SERPL-MCNC: 0.32 NG/ML
PSA SERPL-MCNC: 0.5 NG/ML (ref 0–4)

## 2022-07-13 ENCOUNTER — TELEPHONE (OUTPATIENT)
Dept: FAMILY MEDICINE CLINIC | Facility: CLINIC | Age: 72
End: 2022-07-13

## 2022-07-15 ENCOUNTER — OFFICE VISIT (OUTPATIENT)
Dept: URGENT CARE | Facility: CLINIC | Age: 72
End: 2022-07-15
Payer: COMMERCIAL

## 2022-07-15 VITALS
TEMPERATURE: 96.9 F | OXYGEN SATURATION: 97 % | DIASTOLIC BLOOD PRESSURE: 69 MMHG | RESPIRATION RATE: 16 BRPM | HEART RATE: 92 BPM | SYSTOLIC BLOOD PRESSURE: 115 MMHG

## 2022-07-15 DIAGNOSIS — J01.10 ACUTE NON-RECURRENT FRONTAL SINUSITIS: Primary | ICD-10-CM

## 2022-07-15 PROCEDURE — 99213 OFFICE O/P EST LOW 20 MIN: CPT | Performed by: FAMILY MEDICINE

## 2022-07-15 RX ORDER — TICAGRELOR 60 MG/1
60 TABLET ORAL 2 TIMES DAILY
COMMUNITY
Start: 2022-06-21 | End: 2022-09-19

## 2022-07-15 RX ORDER — AZITHROMYCIN 250 MG/1
TABLET, FILM COATED ORAL
Qty: 6 TABLET | Refills: 0 | Status: SHIPPED | OUTPATIENT
Start: 2022-07-15 | End: 2022-07-19

## 2022-07-15 RX ORDER — METHYLPREDNISOLONE 4 MG/1
TABLET ORAL
Qty: 21 TABLET | Refills: 0 | Status: SHIPPED | OUTPATIENT
Start: 2022-07-15 | End: 2022-09-19 | Stop reason: HOSPADM

## 2022-07-15 NOTE — PROGRESS NOTES
[facility]        NAME: Hyun De La Paz is a 80 y o  male  : 1950    MRN: [id number]  DATE: July 15, 2022  TIME: 3:31 PM    Assessment and Plan   Acute non-recurrent frontal sinusitis [J01 10]  1  Acute non-recurrent frontal sinusitis  methylPREDNISolone 4 MG tablet therapy pack    azithromycin (ZITHROMAX) 250 mg tablet         Patient Instructions       Follow up with PCP in 3-5 days  Proceed to  ER if symptoms worsen  Chief Complaint     Chief Complaint   Patient presents with    Fatigue     Pt had covid 25 days ago + dx, has been feeling fatigued and having burning in his chest since  Hx of GERD  History of Present Illness       81-year-old male with persistent symptoms of cough, fatigue and nasal congestion for the past 25 days since being diagnosed with COVID-19  Review of Systems   Review of Systems   Constitutional: Positive for fatigue  Negative for fever  HENT: Positive for congestion  Eyes: Negative  Respiratory: Positive for cough  Cardiovascular: Negative  Gastrointestinal: Negative  Genitourinary: Negative  Musculoskeletal: Positive for arthralgias and myalgias  Skin: Negative  Allergic/Immunologic: Negative  Neurological: Negative  Hematological: Negative  Psychiatric/Behavioral: Negative            Current Medications       Current Outpatient Medications:     ALPRAZolam (Xanax) 0 25 mg tablet, Take 1 tablet (0 25 mg total) by mouth 3 (three) times a day as needed for anxiety, Disp: 30 tablet, Rfl: 0    aspirin 81 mg chewable tablet, every 24 hours, Disp: , Rfl:     atorvastatin (LIPITOR) 80 mg tablet, every 24 hours, Disp: , Rfl:     azithromycin (ZITHROMAX) 250 mg tablet, Take 2 tablets today then 1 tablet daily x 4 days, Disp: 6 tablet, Rfl: 0    Brilinta 60 MG, Take 60 mg by mouth 2 (two) times a day, Disp: , Rfl:     butalbital-acetaminophen-caffeine (FIORICET,ESGIC) -40 mg per tablet, Take 1 tablet by mouth every 4 (four) hours as needed for headaches, Disp: 30 tablet, Rfl: 0    cholecalciferol (VITAMIN D3) 1,000 units tablet, Take 1,000 Units by mouth daily, Disp: , Rfl:     Coenzyme Q10 (COQ10 PO), Take by mouth daily, Disp: , Rfl:     ezetimibe (ZETIA) 10 mg tablet, 1 tablet Orally Once a day 90 days, Disp: , Rfl:     Glucosamine-Chondroit-Vit C-Mn (GLUCOSAMINE 1500 COMPLEX PO), Take by mouth, Disp: , Rfl:     Hyoscyamine Sulfate SL 0 125 MG SUBL, Place 0 125 mg under the tongue 3 (three) times a day as needed (as needed), Disp: 30 tablet, Rfl: 1    Ibuprofen 200 MG CAPS, 3 (three) times a day, Disp: , Rfl:     lisinopril (ZESTRIL) 2 5 mg tablet, every 24 hours, Disp: , Rfl:     methylPREDNISolone 4 MG tablet therapy pack, Use as directed on package, Disp: 21 tablet, Rfl: 0    Multiple Vitamins-Minerals (MULTI FOR HIM 50+) TABS, Take by mouth, Disp: , Rfl:     Omega-3 Fatty Acids (FISH OIL) 1,000 mg, Take 1,000 mg by mouth daily, Disp: , Rfl:     pantoprazole (PROTONIX) 40 mg tablet, ONE TABLET DAILY for GI issue, Disp: , Rfl:     ticagrelor (BRILINTA) 90 MG, 60 mg Every 12 hours, Disp: , Rfl:     vilazodone (VIIBRYD) 20 mg tablet, Take 1 tablet (20 mg total) by mouth daily with breakfast, Disp: 30 tablet, Rfl: 1    amoxicillin (AMOXIL) 500 mg capsule, TAKE ONE CAPSULE BY MOUTH THREE TIMES DAILY UNTIL FINISHED (Patient not taking: No sig reported), Disp: , Rfl:     Cholecalciferol (Vitamin D3) 5240297 UNIT/GM LIQD, as directed (Patient not taking: No sig reported), Disp: , Rfl:     traMADol (Ultram) 50 mg tablet, Take 1 PO BID PRN for pain   (Patient not taking: Reported on 7/15/2022), Disp: 14 tablet, Rfl: 0    Current Allergies     Allergies as of 07/15/2022 - Reviewed 06/21/2022   Allergen Reaction Noted    Pseudoephedrine Hives 04/21/2012    Sulfa antibiotics Dizziness and Hives 10/08/2012    Codeine Headache 09/23/2019            The following portions of the patient's history were reviewed and updated as appropriate: allergies, current medications, past family history, past medical history, past social history, past surgical history and problem list      Past Medical History:   Diagnosis Date    Acute medial meniscus tear     Allergic rhinitis     Allergic rhinitis     last assessed 5/8/14    Anxiety     Appendicitis     Arthritis     Benign essential hypertension     last assessed 1/6/14    Biceps tendon tear     Cancer ([facility] [address] [facility]     skin    Cervical radiculopathy     and lumbar    Chronic pain disorder     knees and hips     Colon polyps     CPAP (continuous positive airway pressure) dependence     Depression     Depression with anxiety     Dyskinesia of esophagus     Erythema migrans (Lyme disease)     last assessed 10/16/12    Fatigue     GERD (gastroesophageal reflux disease)     Heel spur, right     Herpes     Herpes zoster     Hyperlipidemia     Hyperplastic colon polyp     last assessed 11/14/14    Joint pain     Kidney stones     Lyme disease     Nephrolithiasis     Panic disorder with agoraphobia     last assessed 8/7/13    [facility]     Sleep apnea     Vertigo     Vitamin D deficiency        Past Surgical History:   Procedure Laterality Date    APPENDECTOMY  08/01/2003    BICEPS TENDON REPAIR      KNEE ARTHROSCOPY      with medial meniscus repair, resolved 01/01/05    KNEE CARTILAGE SURGERY      OTHER SURGICAL HISTORY      distal reinsertion of ruptured biceps tendon    [place] KNEE SCOPE,MED/LAT MENISECTOMY Right 7/30/2019    Procedure: RIGHT KNEE ARTHROSCOPY, MEDIAL MENISCECTOMY;  Surgeon: Gurvinder Barrera DO;  Location: [address] [place] [address];  Service: Orthopedics    TOOTH EXTRACTION      Middle of October 2019    VASECTOMY         Family History   Problem Relation Age of Onset    Diabetes Mother     Coronary artery disease Father     Kidney disease Father     Heart disease Father          Medications have been verified          Objective   BP 115/69   Pulse 92   Temp (!) 96 9 °F (36 1 °C)   Resp 16   SpO2 97%   No LMP for male patient  Physical Exam     Physical Exam  Constitutional:       Appearance: He is well-developed  HENT:      Mouth/Throat:      Pharynx: No oropharyngeal exudate or posterior oropharyngeal erythema  Eyes:      Pupils: Pupils are equal, round, and reactive to light  Cardiovascular:      Rate and Rhythm: Normal rate  Pulmonary:      Effort: Pulmonary effort is normal    Musculoskeletal:         General: Normal range of motion  Cervical back: Normal range of motion  Skin:     General: Skin is warm  Neurological:      General: No focal deficit present  Mental Status: He is alert

## 2022-07-15 NOTE — PATIENT INSTRUCTIONS
Lower Back Exercises   WHAT YOU NEED TO KNOW:   Lower back exercises help heal and strengthen your back muscles to prevent another injury  Ask your healthcare provider if you need to see a physical therapist for more advanced exercises  DISCHARGE INSTRUCTIONS:   Return to the emergency department if:   · You have severe pain that prevents you from moving  Contact your healthcare provider if:   · Your pain becomes worse  · You have new pain  · You have questions or concerns about your condition or care  Do lower back exercises safely:   · Do the exercises on a mat or firm surface  (not on a bed) to support your spine and prevent low back pain  · Move slowly and smoothly  Avoid fast or jerky motions  · Breathe normally  Do not hold your breath  · Stop if you feel pain  It is normal to feel some discomfort at first  Regular exercise will help decrease your discomfort over time  Lower back exercises: Your healthcare provider may recommend that you do back exercises 10 to 30 minutes each day  He may also recommend that you do exercises 1 to 3 times each day  Ask your healthcare provider which exercises are best for you and how often to do them  · Ankle pumps:  Lie on your back  Move your foot up (with your toes pointing toward your head)  Then, move your foot down (with your toes pointing away from you)  Repeat this exercise 10 times on each side  · Heel slides:  Lie on your back  Slowly bend one leg and then straighten it  Next, bend the other leg and then straighten it  Repeat 10 times on each side  · Pelvic tilt:  Lie on your back with your knees bent and feet flat on the floor  Place your arms in a relaxed position beside your body  Tighten the muscles of your abdomen and flatten your back against the floor  Hold for 5 seconds  Repeat 5 times  · Back stretch:  Lie on your back with your hands behind your head   Bend your knees and turn the lower half of your body to one side  Hold this position for 10 seconds  Repeat 3 times on each side  · Straight leg raises:  Lie on your back with one leg straight  Bend the other knee  Tighten your abdomen and then slowly lift the straight leg up about 6 to 12 inches off the floor  Hold for 1 to 5 seconds  Lower your leg slowly  Repeat 10 times on each leg  · Knee-to-chest:  Lie on your back with your knees bent and feet flat on the floor  Pull one of your knees toward your chest and hold it there for 5 seconds  Return your leg to the starting position  Lift the other knee toward your chest and hold for 5 seconds  Do this 5 times on each side  · Cat and camel:  Place your hands and knees on the floor  Arch your back upward toward the ceiling and lower your head  Round out your spine as much as you can  Hold for 5 seconds  Lift your head upward and push your chest downward toward the floor  Hold for 5 seconds  Do 3 sets or as directed  · Wall squats:  Stand with your back against a wall  Tighten the muscles of your abdomen  Slowly lower your body until your knees are bent at a 45 degree angle  Hold this position for 5 seconds  Slowly move back up to a standing position  Repeat 10 times  · Curl up:  Lie on your back with your knees bent and feet flat on the floor  Place your hands, palms down, underneath the curve in your lower back  Next, with your elbows on the floor, lift your shoulders and chest 2 to 3 inches  Keep your head in line with your shoulders  Hold this position for 5 seconds  When you can do this exercise without pain for 10 to 15 seconds, you may add a rotation  While your shoulders and chest are lifted off the ground, turn slightly to the left and hold  Repeat on the other side  · Bird dog:  Place your hands and knees on the floor  Keep your wrists directly below your shoulders and your knees directly below your hips   Pull your belly button in toward your spine  Do not flatten or arch your back  Tighten your abdominal muscles  Raise one arm straight out so that it is aligned with your head  Next, raise the leg opposite your arm  Hold this position for 15 seconds  Lower your arm and leg slowly and change sides  Do 5 sets  © 2017 [address] [employer] Information is for End User's use only and may not be sold, redistributed or otherwise used for commercial purposes  All illustrations and images included in CareNotes® are the copyrighted property of [employer] , [employer]  or Nicholas Gasc[employer]  The above information is an  only  It is not intended as medical advice for individual conditions or treatments  Talk to your doctor, nurse or pharmacist before following any medical regimen to see if it is safe and effective for you  yes

## 2022-07-29 DIAGNOSIS — F41.9 ANXIETY: ICD-10-CM

## 2022-07-29 DIAGNOSIS — G43.811 OTHER MIGRAINE WITH STATUS MIGRAINOSUS, INTRACTABLE: ICD-10-CM

## 2022-07-29 RX ORDER — ALPRAZOLAM 0.25 MG/1
0.25 TABLET ORAL 3 TIMES DAILY PRN
Qty: 30 TABLET | Refills: 0 | Status: SHIPPED | OUTPATIENT
Start: 2022-07-29 | End: 2022-08-25 | Stop reason: SDUPTHER

## 2022-07-29 RX ORDER — BUTALBITAL, ACETAMINOPHEN AND CAFFEINE 50; 325; 40 MG/1; MG/1; MG/1
1 TABLET ORAL EVERY 4 HOURS PRN
Qty: 30 TABLET | Refills: 0 | Status: SHIPPED | OUTPATIENT
Start: 2022-07-29 | End: 2022-08-25 | Stop reason: SDUPTHER

## 2022-07-29 RX ORDER — BUTALBITAL, ACETAMINOPHEN AND CAFFEINE 50; 325; 40 MG/1; MG/1; MG/1
1 TABLET ORAL EVERY 4 HOURS PRN
Qty: 30 TABLET | Refills: 0 | Status: SHIPPED | OUTPATIENT
Start: 2022-07-29 | End: 2022-07-29 | Stop reason: SDUPTHER

## 2022-08-03 DIAGNOSIS — F32.A DEPRESSION, UNSPECIFIED DEPRESSION TYPE: ICD-10-CM

## 2022-08-03 RX ORDER — VILAZODONE HYDROCHLORIDE 20 MG/1
20 TABLET ORAL
Qty: 30 TABLET | Refills: 1 | Status: SHIPPED | OUTPATIENT
Start: 2022-08-03 | End: 2022-10-10 | Stop reason: SDUPTHER

## 2022-08-23 ENCOUNTER — TELEPHONE (OUTPATIENT)
Dept: GASTROENTEROLOGY | Facility: CLINIC | Age: 72
End: 2022-08-23

## 2022-08-23 ENCOUNTER — PREP FOR PROCEDURE (OUTPATIENT)
Dept: GASTROENTEROLOGY | Facility: CLINIC | Age: 72
End: 2022-08-23

## 2022-08-23 DIAGNOSIS — Z86.010 HISTORY OF COLON POLYPS: Primary | ICD-10-CM

## 2022-08-23 NOTE — TELEPHONE ENCOUNTER
Scheduled date of colonoscopy (as of today): 09/19/22  Physician performing colonoscopy: Dr Gregoria Leach  Location of colonoscopy: [facility]  Bowel prep reviewed with patient: [facility]en[facility]  Instructions reviewed with patient by: Ernie Acosta  Clearances: none

## 2022-08-25 ENCOUNTER — OFFICE VISIT (OUTPATIENT)
Dept: FAMILY MEDICINE CLINIC | Facility: CLINIC | Age: 72
End: 2022-08-25
Payer: COMMERCIAL

## 2022-08-25 VITALS
HEIGHT: 73 IN | WEIGHT: 268.4 LBS | DIASTOLIC BLOOD PRESSURE: 80 MMHG | OXYGEN SATURATION: 98 % | BODY MASS INDEX: 35.57 KG/M2 | SYSTOLIC BLOOD PRESSURE: 124 MMHG | HEART RATE: 83 BPM | TEMPERATURE: 97.5 F

## 2022-08-25 DIAGNOSIS — Z13.220 SCREENING FOR LIPID DISORDERS: ICD-10-CM

## 2022-08-25 DIAGNOSIS — Z13.29 SCREENING FOR ENDOCRINE DISORDER: ICD-10-CM

## 2022-08-25 DIAGNOSIS — Z13.0 SCREENING FOR DEFICIENCY ANEMIA: ICD-10-CM

## 2022-08-25 DIAGNOSIS — G47.33 OBSTRUCTIVE SLEEP APNEA: ICD-10-CM

## 2022-08-25 DIAGNOSIS — F41.9 ANXIETY: ICD-10-CM

## 2022-08-25 DIAGNOSIS — E78.2 MIXED HYPERLIPIDEMIA: Primary | ICD-10-CM

## 2022-08-25 DIAGNOSIS — F32.A DEPRESSION, UNSPECIFIED DEPRESSION TYPE: ICD-10-CM

## 2022-08-25 DIAGNOSIS — R73.9 HYPERGLYCEMIA: ICD-10-CM

## 2022-08-25 DIAGNOSIS — G43.811 OTHER MIGRAINE WITH STATUS MIGRAINOSUS, INTRACTABLE: ICD-10-CM

## 2022-08-25 DIAGNOSIS — Z13.29 SCREENING FOR THYROID DISORDER: ICD-10-CM

## 2022-08-25 PROCEDURE — 1160F RVW MEDS BY RX/DR IN RCRD: CPT | Performed by: FAMILY MEDICINE

## 2022-08-25 PROCEDURE — 99214 OFFICE O/P EST MOD 30 MIN: CPT | Performed by: FAMILY MEDICINE

## 2022-08-25 RX ORDER — DOXYCYCLINE HYCLATE 100 MG/1
CAPSULE ORAL
COMMUNITY
Start: 2022-08-22

## 2022-08-25 RX ORDER — ALPRAZOLAM 0.25 MG/1
0.25 TABLET ORAL 3 TIMES DAILY PRN
Qty: 30 TABLET | Refills: 0 | Status: SHIPPED | OUTPATIENT
Start: 2022-08-25 | End: 2022-09-28 | Stop reason: SDUPTHER

## 2022-08-25 RX ORDER — BUTALBITAL, ACETAMINOPHEN AND CAFFEINE 50; 325; 40 MG/1; MG/1; MG/1
1 TABLET ORAL EVERY 4 HOURS PRN
Qty: 30 TABLET | Refills: 0 | Status: SHIPPED | OUTPATIENT
Start: 2022-08-25 | End: 2022-09-28 | Stop reason: SDUPTHER

## 2022-08-25 NOTE — PROGRESS NOTES
Subjective:   Chief Complaint   Patient presents with    Follow-up     3 months f/u, dx for lyme disease 08/22, like to check right ear, dental infection,   Had covid 06/27,         Patient ID: Donavan Zarate is a 80 y o  male  Here for checkup  Has a dental infection which is affecting his ear  Had a recent tick bite 2 and is on doxycycline from the dermatologist   Is off of [employer]  Still has some for a found fatigue  Bowel habits are stable  No chest pain  No panic attacks  Requests a refill for his headache medicine and Xanax      The following portions of the patient's history were reviewed and updated as appropriate: allergies, current medications, past family history, past medical history, past social history, past surgical history and problem list     Review of Systems   Constitutional: Negative for activity change, appetite change, chills, diaphoresis, fatigue and unexpected weight change  HENT: Positive for dental problem, ear pain and hearing loss  Negative for congestion, ear discharge, nosebleeds and rhinorrhea  Eyes: Negative for pain, redness, itching and visual disturbance  Respiratory: Negative for cough, choking, chest tightness and shortness of breath  Cardiovascular: Negative for chest pain and leg swelling  Gastrointestinal: Negative for abdominal pain, blood in stool, constipation, diarrhea and nausea  Endocrine: Negative for cold intolerance, polydipsia and polyphagia  Genitourinary: Negative for dysuria, frequency, hematuria and urgency  Musculoskeletal: Negative for arthralgias, back pain, gait problem, joint swelling, neck pain and neck stiffness  Skin: Positive for rash  Negative for color change  Allergic/Immunologic: Negative for environmental allergies and food allergies  Neurological: Negative for dizziness, tremors, seizures, speech difficulty, numbness and headaches  Hematological: Negative for adenopathy  Does not bruise/bleed easily  Psychiatric/Behavioral: Negative for behavioral problems, dysphoric mood, hallucinations and self-injury  Objective:  Vitals:    08/25/22 1352   BP: 124/80   Pulse: 83   Temp: 97 5 °F (36 4 °C)   SpO2: 98%   Weight: 122 kg (268 lb 6 4 oz)   Height: 6' 1" (1 854 m)      Physical Exam  Constitutional:       General: He is not in acute distress  Appearance: He is well-developed  He is not diaphoretic  HENT:      Head: Normocephalic and atraumatic  Right Ear: External ear normal       Left Ear: External ear normal       Nose: Nose normal       Mouth/Throat:      Pharynx: No oropharyngeal exudate  Eyes:      General: No scleral icterus  Right eye: No discharge  Left eye: No discharge  Conjunctiva/sclera: Conjunctivae normal       Pupils: Pupils are equal, round, and reactive to light  Neck:      Thyroid: No thyromegaly  Cardiovascular:      Rate and Rhythm: Normal rate and regular rhythm  Heart sounds: Normal heart sounds  No murmur heard  Pulmonary:      Effort: Pulmonary effort is normal       Breath sounds: Normal breath sounds  No wheezing or rales  Abdominal:      General: Bowel sounds are normal       Palpations: Abdomen is soft  There is no mass  Tenderness: There is no abdominal tenderness  There is no guarding  Musculoskeletal:         General: No tenderness  Normal range of motion  Cervical back: Normal range of motion and neck supple  Lymphadenopathy:      Cervical: No cervical adenopathy  Skin:     General: Skin is warm and dry  Neurological:      Mental Status: He is alert and oriented to person, place, and time  Deep Tendon Reflexes: Reflexes are normal and symmetric  Psychiatric:         Thought Content: Thought content normal          Judgment: Judgment normal            Assessment/Plan:    No problem-specific Assessment & Plan notes found for this encounter         Diagnoses and all orders for this visit:    Mixed hyperlipidemia    Anxiety  -     ALPRAZolam (Xanax) 0 25 mg tablet; Take 1 tablet (0 25 mg total) by mouth 3 (three) times a day as needed for anxiety    Depression, unspecified depression type    Screening for deficiency anemia    Screening for endocrine disorder    Screening for lipid disorders    Screening for thyroid disorder    Other migraine with status migrainosus, intractable  -     butalbital-acetaminophen-caffeine (FIORICET,ESGIC) -40 mg per tablet; Take 1 tablet by mouth every 4 (four) hours as needed for headaches    Obstructive sleep apnea    Hyperglycemia  -     Hemoglobin A1C; Future  -     Hemoglobin A1C    Other orders  -     doxycycline hyclate (VIBRAMYCIN) 100 mg capsule; take 1 capsule twice a day with a full glass of water and food for 3 weeks for Lyme disease        Refilled medication  He is to get a hemoglobin A1c  Finish the doxycycline    Follow through with seeing the dura a dentist

## 2022-08-30 ENCOUNTER — TELEPHONE (OUTPATIENT)
Dept: FAMILY MEDICINE CLINIC | Facility: CLINIC | Age: 72
End: 2022-08-30

## 2022-08-31 ENCOUNTER — TELEPHONE (OUTPATIENT)
Dept: FAMILY MEDICINE CLINIC | Facility: CLINIC | Age: 72
End: 2022-08-31

## 2022-08-31 NOTE — TELEPHONE ENCOUNTER
Lmom for patient that his butalbital was denied through his insurance    It must be prescribed in combination with a neurologist, pain doctor or headache specialist     He may purchase this out of pocket, bypassing his insurance through [employer], which would cost him under $20

## 2022-09-19 ENCOUNTER — HOSPITAL ENCOUNTER (OUTPATIENT)
Dept: GASTROENTEROLOGY | Facility: AMBULATORY SURGERY CENTER | Age: 72
Discharge: HOME | End: 2022-09-19
Attending: INTERNAL MEDICINE
Payer: COMMERCIAL

## 2022-09-19 ENCOUNTER — ANESTHESIA (OUTPATIENT)
Dept: GASTROENTEROLOGY | Facility: AMBULATORY SURGERY CENTER | Age: 72
End: 2022-09-19

## 2022-09-19 ENCOUNTER — ANESTHESIA EVENT (OUTPATIENT)
Dept: GASTROENTEROLOGY | Facility: AMBULATORY SURGERY CENTER | Age: 72
End: 2022-09-19

## 2022-09-19 VITALS
TEMPERATURE: 98.5 F | WEIGHT: 268 LBS | RESPIRATION RATE: 22 BRPM | HEART RATE: 66 BPM | HEIGHT: 73 IN | SYSTOLIC BLOOD PRESSURE: 152 MMHG | BODY MASS INDEX: 35.52 KG/M2 | OXYGEN SATURATION: 97 % | DIASTOLIC BLOOD PRESSURE: 83 MMHG

## 2022-09-19 DIAGNOSIS — Z86.010 HISTORY OF COLON POLYPS: ICD-10-CM

## 2022-09-19 PROBLEM — J01.10 ACUTE NON-RECURRENT FRONTAL SINUSITIS: Status: RESOLVED | Noted: 2022-07-15 | Resolved: 2022-09-19

## 2022-09-19 PROBLEM — I25.10 CAD (CORONARY ARTERY DISEASE): Status: ACTIVE | Noted: 2022-09-19

## 2022-09-19 PROBLEM — Z95.5 S/P CORONARY ARTERY STENT PLACEMENT: Status: ACTIVE | Noted: 2022-09-19

## 2022-09-19 PROBLEM — Z99.89 CPAP (CONTINUOUS POSITIVE AIRWAY PRESSURE) DEPENDENCE: Status: ACTIVE | Noted: 2022-09-19

## 2022-09-19 PROBLEM — I35.0 AORTIC STENOSIS: Status: ACTIVE | Noted: 2022-09-19

## 2022-09-19 PROCEDURE — G0105 COLORECTAL SCRN; HI RISK IND: HCPCS | Performed by: INTERNAL MEDICINE

## 2022-09-19 RX ORDER — LIDOCAINE HYDROCHLORIDE 10 MG/ML
INJECTION, SOLUTION EPIDURAL; INFILTRATION; INTRACAUDAL; PERINEURAL AS NEEDED
Status: DISCONTINUED | OUTPATIENT
Start: 2022-09-19 | End: 2022-09-19

## 2022-09-19 RX ORDER — SODIUM CHLORIDE, SODIUM LACTATE, POTASSIUM CHLORIDE, CALCIUM CHLORIDE 600; 310; 30; 20 MG/100ML; MG/100ML; MG/100ML; MG/100ML
50 INJECTION, SOLUTION INTRAVENOUS CONTINUOUS
Status: DISCONTINUED | OUTPATIENT
Start: 2022-09-19 | End: 2022-09-23 | Stop reason: HOSPADM

## 2022-09-19 RX ORDER — PROPOFOL 10 MG/ML
INJECTION, EMULSION INTRAVENOUS AS NEEDED
Status: DISCONTINUED | OUTPATIENT
Start: 2022-09-19 | End: 2022-09-19

## 2022-09-19 RX ADMIN — SODIUM CHLORIDE, SODIUM LACTATE, POTASSIUM CHLORIDE, CALCIUM CHLORIDE 50 ML/HR: 600; 310; 30; 20 INJECTION, SOLUTION INTRAVENOUS at 14:34

## 2022-09-19 RX ADMIN — PROPOFOL 50 MG: 10 INJECTION, EMULSION INTRAVENOUS at 15:50

## 2022-09-19 RX ADMIN — PROPOFOL 50 MG: 10 INJECTION, EMULSION INTRAVENOUS at 15:45

## 2022-09-19 RX ADMIN — PROPOFOL 120 MG: 10 INJECTION, EMULSION INTRAVENOUS at 15:38

## 2022-09-19 RX ADMIN — LIDOCAINE HYDROCHLORIDE 50 MG: 10 INJECTION, SOLUTION EPIDURAL; INFILTRATION; INTRACAUDAL; PERINEURAL at 15:38

## 2022-09-19 RX ADMIN — PROPOFOL 30 MG: 10 INJECTION, EMULSION INTRAVENOUS at 15:41

## 2022-09-19 NOTE — ANESTHESIA POSTPROCEDURE EVALUATION
Post-Op Assessment Note    CV Status:  Stable    Pain management: adequate     Mental Status:  Sleepy   Hydration Status:  Euvolemic   PONV Controlled:  Controlled   Airway Patency:  Patent      Post Op Vitals Reviewed: Yes      Staff: Anesthesiologist         No complications documented    VSS RA

## 2022-09-19 NOTE — H&P
History and Physical - [facility]    Cailin Jett 80 y o  male MRN: [id number]      HPI: Cailin Jett is a 80y o  year old male who presents for colon cancer screening, personal history of colon polyps  History of cardiac stents, Francyne Standard was stopped in 7/2022  REVIEW OF SYSTEMS: Per the HPI, and otherwise unremarkable      Historical Information     Past Medical History:   Diagnosis Date    Acute medial meniscus tear     Allergic rhinitis     Allergic rhinitis     last assessed 5/8/14    Anxiety     Appendicitis     Arthritis     Benign essential hypertension     last assessed 1/6/14    Biceps tendon tear     Cancer (HCC)     skin    Cervical radiculopathy     and lumbar    Chronic pain disorder     knees and hips     Colon polyps     CPAP (continuous positive airway pressure) dependence     Depression     Depression with anxiety     Dyskinesia of esophagus     Erythema migrans (Lyme disease)     last assessed 10/16/12    Fatigue     GERD (gastroesophageal reflux disease)     Heel spur, right     Herpes     Herpes zoster     Hyperlipidemia     Hyperplastic colon polyp     last assessed 11/14/14    Joint pain     Kidney stones     Lyme disease     Myocardial infarction ([address] )     Nephrolithiasis     Panic disorder with agoraphobia     last assessed 8/7/13    Seasonal affective disorder ([address]     Sleep apnea     Vertigo     Vitamin D deficiency      Past Surgical History:   Procedure Laterality Date    APPENDECTOMY  08/01/2003    BICEPS TENDON REPAIR      CARDIAC OTHER      stents x2    KNEE ARTHROSCOPY      with medial meniscus repair, resolved 01/01/05    KNEE CARTILAGE SURGERY      OTHER SURGICAL HISTORY      distal reinsertion of ruptured biceps tendon    NC KNEE SCOPE,MED/LAT MENISECTOMY Right 07/30/2019    Procedure: RIGHT KNEE ARTHROSCOPY, MEDIAL MENISCECTOMY;  Surgeon: Migel Betancur DO;  Location:  MAIN OR;  Service: Orthopedics    TOOTH EXTRACTION      Middle of October 2019    VASECTOMY       Social History   Social History     Substance and Sexual Activity   Alcohol Use Yes    Alcohol/week: 1 0 standard drink    Types: 1 Glasses of wine per week     Social History     Substance and Sexual Activity   Drug Use No     Social History     Tobacco Use   Smoking Status Former Smoker    Types: Cigars   Smokeless Tobacco Never Used     Family History   Problem Relation Age of Onset    Diabetes Mother     Coronary artery disease Father     Kidney disease Father     Heart disease Father        Meds/Allergies       Current Outpatient Medications:     atorvastatin (LIPITOR) 80 mg tablet    ezetimibe (ZETIA) 10 mg tablet    lisinopril (ZESTRIL) 2 5 mg tablet    pantoprazole (PROTONIX) 40 mg tablet    vilazodone (VIIBRYD) 20 mg tablet    ALPRAZolam (Xanax) 0 25 mg tablet    amoxicillin (AMOXIL) 500 mg capsule    aspirin 81 mg chewable tablet    Brilinta 60 MG    butalbital-acetaminophen-caffeine (FIORICET,ESGIC) -40 mg per tablet    cholecalciferol (VITAMIN D3) 1,000 units tablet    Cholecalciferol (Vitamin D3) 7384692 UNIT/GM LIQD    Coenzyme Q10 (COQ10 PO)    doxycycline hyclate (VIBRAMYCIN) 100 mg capsule    Glucosamine-Chondroit-Vit C-Mn (GLUCOSAMINE 1500 COMPLEX PO)    Hyoscyamine Sulfate SL 0 125 MG SUBL    Ibuprofen 200 MG CAPS    methylPREDNISolone 4 MG tablet therapy pack    Multiple Vitamins-Minerals (MULTI FOR HIM 50+) TABS    Omega-3 Fatty Acids (FISH OIL) 1,000 mg    ticagrelor (BRILINTA) 90 MG    traMADol (Ultram) 50 mg tablet    Current Facility-Administered Medications:     lactated ringers infusion, 50 mL/hr, Intravenous, Continuous, 50 mL/hr at 09/19/22 1434    Allergies   Allergen Reactions    Pseudoephedrine Hives     Reaction Date: 16Apr2011;     Sulfa Antibiotics Dizziness and Hives       Objective     /88   Pulse 82   Temp 98 5 °F (36 9 °C) (Temporal)   Resp (!) 24 Ht 6' 1" (1 854 m)   Wt 122 kg (268 lb)   SpO2 95%   BMI 35 36 kg/m²       PHYSICAL EXAM    Gen: NAD AAOx3  Head: Normocephalic, Atraumatic  CV: S1S2 RRR no m/r/g  CHEST: Clear b/l no c/r/w  ABD: soft, +BS NT/ND no masses  EXT: no edema      ASSESSMENT/PLAN:  This is a 80y o  year old male here for colonoscopy, and he is stable and optimized for his procedure

## 2022-09-19 NOTE — ANESTHESIA PREPROCEDURE EVALUATION
Procedure:  COLONOSCOPY    Relevant Problems   ANESTHESIA (within normal limits)   (-) History of anesthesia complications      CARDIO   (+) Aortic stenosis   (+) CAD (coronary artery disease)   (+) Mixed hyperlipidemia   (-) Chest pain   (-) PITTMAN (dyspnea on exertion)      GI/HEPATIC   (+) Gastroesophageal reflux disease      NEURO/PSYCH   (+) Continuous opioid dependence (HCC)   (+) Generalized anxiety disorder   (+) Panic disorder      PULMONARY   (+) Obstructive sleep apnea   (-) Shortness of breath   (-) URI (upper respiratory infection)      Nervous and Auditory   (+) Benign paroxysmal positional vertigo due to bilateral vestibular disorder      Other   (+) CPAP (continuous positive airway pressure) dependence   (+) PLMD (periodic limb movement disorder)   (+) S/P coronary artery stent placement      TTE 5/2022:  EF 58%, RV normal, mod AS, CLEO 1 1  Physical Exam    Airway    Mallampati score: II  TM Distance: >3 FB  Neck ROM: full     Dental       Cardiovascular      Pulmonary      Other Findings        Anesthesia Plan  ASA Score- 3     Anesthesia Type- IV sedation with anesthesia with ASA Monitors  Additional Monitors:   Airway Plan:           Plan Factors-Exercise tolerance (METS): >4 METS  Chart reviewed  Patient summary reviewed  Induction- intravenous  Postoperative Plan-     Informed Consent- Anesthetic plan and risks discussed with patient  I personally reviewed this patient with the CRNA  Discussed and agreed on the Anesthesia Plan with the CRNA  Maria Esther Medina

## 2022-09-28 DIAGNOSIS — K40.90 RIGHT GROIN HERNIA: Primary | ICD-10-CM

## 2022-09-28 DIAGNOSIS — F41.9 ANXIETY: ICD-10-CM

## 2022-09-28 DIAGNOSIS — G43.811 OTHER MIGRAINE WITH STATUS MIGRAINOSUS, INTRACTABLE: ICD-10-CM

## 2022-09-28 NOTE — TELEPHONE ENCOUNTER
Refill     Xanax  Fioricet patient stated in order for insurance to cover  Diagnosis to be cover for insurance stress headaches and migraine    [place] [facility]     Patient has an hernia for a while patient stated and did not do anything to fix, now patient stated is causing him issues  Patient want know should he come here for a office visit to evaluated or please advise

## 2022-09-29 RX ORDER — ALPRAZOLAM 0.25 MG/1
0.25 TABLET ORAL 3 TIMES DAILY PRN
Qty: 30 TABLET | Refills: 0 | Status: SHIPPED | OUTPATIENT
Start: 2022-09-29

## 2022-09-29 RX ORDER — BUTALBITAL, ACETAMINOPHEN AND CAFFEINE 50; 325; 40 MG/1; MG/1; MG/1
1 TABLET ORAL EVERY 4 HOURS PRN
Qty: 30 TABLET | Refills: 0 | Status: SHIPPED | OUTPATIENT
Start: 2022-09-29

## 2022-10-10 DIAGNOSIS — F32.A DEPRESSION, UNSPECIFIED DEPRESSION TYPE: ICD-10-CM

## 2022-10-10 RX ORDER — VILAZODONE HYDROCHLORIDE 20 MG/1
20 TABLET ORAL
Qty: 30 TABLET | Refills: 1 | Status: SHIPPED | OUTPATIENT
Start: 2022-10-10

## 2022-11-02 ENCOUNTER — OFFICE VISIT (OUTPATIENT)
Dept: FAMILY MEDICINE CLINIC | Facility: CLINIC | Age: 72
End: 2022-11-02

## 2022-11-02 VITALS
DIASTOLIC BLOOD PRESSURE: 82 MMHG | HEIGHT: 73 IN | OXYGEN SATURATION: 97 % | WEIGHT: 258 LBS | SYSTOLIC BLOOD PRESSURE: 130 MMHG | HEART RATE: 73 BPM | BODY MASS INDEX: 34.19 KG/M2 | TEMPERATURE: 97.6 F

## 2022-11-02 DIAGNOSIS — Z12.5 PROSTATE CANCER SCREENING: ICD-10-CM

## 2022-11-02 DIAGNOSIS — Z23 NEED FOR INFLUENZA VACCINATION: ICD-10-CM

## 2022-11-02 DIAGNOSIS — R73.9 HYPERGLYCEMIA: ICD-10-CM

## 2022-11-02 DIAGNOSIS — R53.82 CHRONIC FATIGUE: ICD-10-CM

## 2022-11-02 DIAGNOSIS — E78.2 MIXED HYPERLIPIDEMIA: ICD-10-CM

## 2022-11-02 DIAGNOSIS — H90.3 SENSORINEURAL HEARING LOSS (SNHL) OF BOTH EARS: Primary | ICD-10-CM

## 2022-11-02 PROBLEM — F11.20 CONTINUOUS OPIOID DEPENDENCE (HCC): Status: RESOLVED | Noted: 2022-05-26 | Resolved: 2022-11-02

## 2022-11-02 NOTE — PROGRESS NOTES
Subjective:   Chief Complaint   Patient presents with   • Hearing Problem   • Immunizations     Influenza vaccine        Patient ID: Robin Varner is a 71 y o  male  Patient is here with complaints of hearing loss  His wife reports that he has had a significant decline in his ability to hear particularly when there is background noise  He denies tinnitus  He has no dizziness  He is due for labs  He is noting urinary frequency and urgency  The following portions of the patient's history were reviewed and updated as appropriate: allergies, current medications, past family history, past medical history, past social history, past surgical history and problem list     Review of Systems   Constitutional: Negative for activity change, appetite change, chills, diaphoresis, fatigue and unexpected weight change  HENT: Positive for hearing loss  Negative for congestion, ear discharge, ear pain, nosebleeds and rhinorrhea  Eyes: Negative for pain, redness, itching and visual disturbance  Respiratory: Negative for cough, choking, chest tightness and shortness of breath  Cardiovascular: Negative for chest pain and leg swelling  Gastrointestinal: Negative for abdominal pain, blood in stool, constipation, diarrhea and nausea  Endocrine: Negative for cold intolerance, polydipsia and polyphagia  Genitourinary: Positive for frequency and urgency  Negative for dysuria and hematuria  Musculoskeletal: Negative for arthralgias, back pain, gait problem, joint swelling, neck pain and neck stiffness  Skin: Negative for color change and rash  Allergic/Immunologic: Negative for environmental allergies and food allergies  Neurological: Negative for dizziness, tremors, seizures, speech difficulty, numbness and headaches  Hematological: Negative for adenopathy  Does not bruise/bleed easily  Psychiatric/Behavioral: Negative for behavioral problems, dysphoric mood, hallucinations and self-injury  Objective:  Vitals:    11/02/22 0906   BP: 130/82   BP Location: Left arm   Patient Position: Sitting   Cuff Size: Adult   Pulse: 73   Temp: 97 6 °F (36 4 °C)   TempSrc: Tympanic   SpO2: 97%   Weight: 117 kg (258 lb)   Height: 6' 1" (1 854 m)      Physical Exam  Constitutional:       General: He is not in acute distress  Appearance: He is well-developed  He is not diaphoretic  HENT:      Head: Normocephalic and atraumatic  Right Ear: External ear normal       Left Ear: External ear normal       Nose: Nose normal       Mouth/Throat:      Pharynx: No oropharyngeal exudate  Eyes:      General: No scleral icterus  Right eye: No discharge  Left eye: No discharge  Conjunctiva/sclera: Conjunctivae normal       Pupils: Pupils are equal, round, and reactive to light  Neck:      Thyroid: No thyromegaly  Cardiovascular:      Rate and Rhythm: Normal rate and regular rhythm  Heart sounds: Normal heart sounds  No murmur heard  Pulmonary:      Effort: Pulmonary effort is normal       Breath sounds: Normal breath sounds  No wheezing or rales  Abdominal:      General: Bowel sounds are normal       Palpations: Abdomen is soft  There is no mass  Tenderness: There is no abdominal tenderness  There is no guarding  Musculoskeletal:         General: No tenderness  Normal range of motion  Cervical back: Normal range of motion and neck supple  Lymphadenopathy:      Cervical: No cervical adenopathy  Skin:     General: Skin is warm and dry  Neurological:      Mental Status: He is alert and oriented to person, place, and time  Deep Tendon Reflexes: Reflexes are normal and symmetric  Psychiatric:         Thought Content: Thought content normal          Judgment: Judgment normal            Assessment/Plan:    No problem-specific Assessment & Plan notes found for this encounter         Diagnoses and all orders for this visit:    Sensorineural hearing loss (SNHL) of both ears    Mixed hyperlipidemia  -     CBC and differential; Future  -     Lipid panel; Future  -     Comprehensive metabolic panel; Future  -     TSH, 3rd generation with Free T4 reflex; Future  -     CBC and differential  -     Lipid panel  -     Comprehensive metabolic panel  -     TSH, 3rd generation with Free T4 reflex    Hyperglycemia  -     CBC and differential; Future  -     Lipid panel; Future  -     Comprehensive metabolic panel; Future  -     TSH, 3rd generation with Free T4 reflex; Future  -     CBC and differential  -     Lipid panel  -     Comprehensive metabolic panel  -     TSH, 3rd generation with Free T4 reflex    Prostate cancer screening  -     PSA, total and free; Future  -     PSA, total and free    Chronic fatigue  -     CBC and differential; Future  -     Lipid panel; Future  -     Comprehensive metabolic panel; Future  -     TSH, 3rd generation with Free T4 reflex; Future  -     CBC and differential  -     Lipid panel  -     Comprehensive metabolic panel  -     TSH, 3rd generation with Free T4 reflex    Need for influenza vaccination  -     influenza vaccine, high-dose, PF 0 7 mL (FLUZONE HIGH-DOSE)    Other orders  -     CHONDROITIN SULFATE A PO; Take by mouth in the morning      he will get labs    He will see the audiologist for formal testing and the possibility of hearing aids

## 2022-11-03 DIAGNOSIS — F41.9 ANXIETY: ICD-10-CM

## 2022-11-03 RX ORDER — ALPRAZOLAM 0.25 MG/1
0.25 TABLET ORAL 3 TIMES DAILY PRN
Qty: 30 TABLET | Refills: 0 | Status: SHIPPED | OUTPATIENT
Start: 2022-11-03

## 2022-11-07 DIAGNOSIS — F32.A DEPRESSION, UNSPECIFIED DEPRESSION TYPE: ICD-10-CM

## 2022-11-07 RX ORDER — VILAZODONE HYDROCHLORIDE 20 MG/1
20 TABLET ORAL
Qty: 30 TABLET | Refills: 1 | Status: SHIPPED | OUTPATIENT
Start: 2022-11-07

## 2022-12-06 DIAGNOSIS — F32.A DEPRESSION, UNSPECIFIED DEPRESSION TYPE: ICD-10-CM

## 2022-12-06 DIAGNOSIS — G43.811 OTHER MIGRAINE WITH STATUS MIGRAINOSUS, INTRACTABLE: ICD-10-CM

## 2022-12-06 DIAGNOSIS — F41.9 ANXIETY: ICD-10-CM

## 2022-12-07 RX ORDER — BUTALBITAL, ACETAMINOPHEN AND CAFFEINE 50; 325; 40 MG/1; MG/1; MG/1
1 TABLET ORAL EVERY 4 HOURS PRN
Qty: 30 TABLET | Refills: 0 | Status: SHIPPED | OUTPATIENT
Start: 2022-12-07

## 2022-12-07 RX ORDER — ALPRAZOLAM 0.25 MG/1
0.25 TABLET ORAL 3 TIMES DAILY PRN
Qty: 30 TABLET | Refills: 0 | Status: SHIPPED | OUTPATIENT
Start: 2022-12-07

## 2022-12-07 RX ORDER — VILAZODONE HYDROCHLORIDE 20 MG/1
20 TABLET ORAL
Qty: 30 TABLET | Refills: 1 | Status: SHIPPED | OUTPATIENT
Start: 2022-12-07

## 2022-12-29 ENCOUNTER — TELEPHONE (OUTPATIENT)
Dept: FAMILY MEDICINE CLINIC | Facility: CLINIC | Age: 72
End: 2022-12-29

## 2022-12-29 DIAGNOSIS — J11.1 INFLUENZA: Primary | ICD-10-CM

## 2022-12-29 RX ORDER — OSELTAMIVIR PHOSPHATE 75 MG/1
75 CAPSULE ORAL 2 TIMES DAILY
Qty: 10 CAPSULE | Refills: 0 | Status: SHIPPED | OUTPATIENT
Start: 2022-12-29 | End: 2023-01-03

## 2022-12-29 NOTE — TELEPHONE ENCOUNTER
Sx- very ache, shills, head congestion, headache,     Sx started couple days ago shills since  Yesterday    Pt thing had flu, pt is requesting tamaflu send to his pharmacy     Pleases advised

## 2023-01-05 ENCOUNTER — TELEPHONE (OUTPATIENT)
Dept: FAMILY MEDICINE CLINIC | Facility: CLINIC | Age: 73
End: 2023-01-05
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
## 2023-01-05 RX ORDER — DOXYCYCLINE HYCLATE 100 MG
100 TABLET ORAL 2 TIMES DAILY
Qty: 28 TABLET | Refills: 0 | Status: SHIPPED | OUTPATIENT
Start: 2023-01-05 | End: 2023-01-19

## 2023-01-05 NOTE — TELEPHONE ENCOUNTER
Pt was bitten by a tick again  Has the classic bulls eye  Joints are hurting  Was bit about before new years  Can you call something in for him or do you want to see him      [place]

## 2023-01-10 DIAGNOSIS — F41.9 ANXIETY: ICD-10-CM

## 2023-01-10 RX ORDER — ALPRAZOLAM 0.25 MG/1
0.25 TABLET ORAL 3 TIMES DAILY PRN
Qty: 30 TABLET | Refills: 0 | Status: SHIPPED | OUTPATIENT
Start: 2023-01-10

## 2023-01-14 DIAGNOSIS — G43.811 OTHER MIGRAINE WITH STATUS MIGRAINOSUS, INTRACTABLE: ICD-10-CM

## 2023-01-14 NOTE — TELEPHONE ENCOUNTER
Medication Refill Request     Name butalbital-acetaminophen-caffeine   Dose/Frequency 50-32-40 mg 1 tablet q4hs  Quantity 30  Verified pharmacy   [x]  Verified ordering Provider   [x]  Does patient have enough for the next 3 days?  Yes [] No [x]

## 2023-01-15 RX ORDER — BUTALBITAL, ACETAMINOPHEN AND CAFFEINE 50; 325; 40 MG/1; MG/1; MG/1
1 TABLET ORAL EVERY 4 HOURS PRN
Qty: 30 TABLET | Refills: 0 | Status: SHIPPED | OUTPATIENT
Start: 2023-01-15 | End: 2023-01-17 | Stop reason: SDUPTHER

## 2023-01-15 RX ORDER — BUTALBITAL, ACETAMINOPHEN AND CAFFEINE 50; 325; 40 MG/1; MG/1; MG/1
1 TABLET ORAL EVERY 4 HOURS PRN
Qty: 30 TABLET | Refills: 0 | Status: SHIPPED | OUTPATIENT
Start: 2023-01-15 | End: 2023-01-15 | Stop reason: SDUPTHER

## 2023-01-17 DIAGNOSIS — G43.811 OTHER MIGRAINE WITH STATUS MIGRAINOSUS, INTRACTABLE: ICD-10-CM

## 2023-01-17 RX ORDER — BUTALBITAL, ACETAMINOPHEN AND CAFFEINE 50; 325; 40 MG/1; MG/1; MG/1
1 TABLET ORAL EVERY 4 HOURS PRN
Qty: 30 TABLET | Refills: 0 | Status: SHIPPED | OUTPATIENT
Start: 2023-01-17

## 2023-02-06 ENCOUNTER — HOSPITAL ENCOUNTER (EMERGENCY)
Facility: HOSPITAL | Age: 73
Discharge: HOME | End: 2023-02-06

## 2023-02-06 VITALS
TEMPERATURE: 98.2 F | HEIGHT: 73 IN | WEIGHT: 250 LBS | BODY MASS INDEX: 33.13 KG/M2 | DIASTOLIC BLOOD PRESSURE: 71 MMHG | HEART RATE: 95 BPM | SYSTOLIC BLOOD PRESSURE: 127 MMHG | OXYGEN SATURATION: 96 % | RESPIRATION RATE: 16 BRPM

## 2023-02-06 DIAGNOSIS — M54.50 ACUTE EXACERBATION OF CHRONIC LOW BACK PAIN: Primary | ICD-10-CM

## 2023-02-06 DIAGNOSIS — G89.29 ACUTE EXACERBATION OF CHRONIC LOW BACK PAIN: Primary | ICD-10-CM

## 2023-02-06 RX ORDER — METHOCARBAMOL 500 MG/1
500 TABLET, FILM COATED ORAL ONCE
Status: COMPLETED | OUTPATIENT
Start: 2023-02-06 | End: 2023-02-06

## 2023-02-06 RX ORDER — METHOCARBAMOL 500 MG/1
500 TABLET, FILM COATED ORAL 2 TIMES DAILY
Qty: 14 TABLET | Refills: 0 | Status: SHIPPED | OUTPATIENT
Start: 2023-02-06 | End: 2023-02-10 | Stop reason: SDUPTHER

## 2023-02-06 RX ORDER — ACETAMINOPHEN 325 MG/1
975 TABLET ORAL ONCE
Status: COMPLETED | OUTPATIENT
Start: 2023-02-06 | End: 2023-02-06

## 2023-02-06 RX ORDER — PREDNISONE 20 MG/1
40 TABLET ORAL ONCE
Status: COMPLETED | OUTPATIENT
Start: 2023-02-06 | End: 2023-02-06

## 2023-02-06 RX ORDER — PREDNISONE 20 MG/1
40 TABLET ORAL DAILY
Qty: 10 TABLET | Refills: 0 | Status: SHIPPED | OUTPATIENT
Start: 2023-02-06 | End: 2023-02-11

## 2023-02-06 RX ORDER — LIDOCAINE 50 MG/G
2 PATCH TOPICAL ONCE
Status: DISCONTINUED | OUTPATIENT
Start: 2023-02-06 | End: 2023-02-07 | Stop reason: HOSPADM

## 2023-02-06 RX ADMIN — PREDNISONE 40 MG: 20 TABLET ORAL at 23:06

## 2023-02-06 RX ADMIN — METHOCARBAMOL TABLETS 500 MG: 500 TABLET, COATED ORAL at 23:06

## 2023-02-06 RX ADMIN — ACETAMINOPHEN 975 MG: 325 TABLET ORAL at 23:06

## 2023-02-06 RX ADMIN — LIDOCAINE 5% 2 PATCH: 700 PATCH TOPICAL at 23:05

## 2023-02-07 ENCOUNTER — TELEPHONE (OUTPATIENT)
Dept: FAMILY MEDICINE CLINIC | Facility: CLINIC | Age: 73
End: 2023-02-07

## 2023-02-07 ENCOUNTER — TELEPHONE (OUTPATIENT)
Dept: PAIN MEDICINE | Facility: CLINIC | Age: 73
End: 2023-02-07

## 2023-02-07 ENCOUNTER — OFFICE VISIT (OUTPATIENT)
Dept: FAMILY MEDICINE CLINIC | Facility: CLINIC | Age: 73
End: 2023-02-07

## 2023-02-07 VITALS
TEMPERATURE: 96.9 F | BODY MASS INDEX: 32.98 KG/M2 | SYSTOLIC BLOOD PRESSURE: 148 MMHG | HEIGHT: 73 IN | OXYGEN SATURATION: 96 % | DIASTOLIC BLOOD PRESSURE: 90 MMHG | HEART RATE: 91 BPM

## 2023-02-07 DIAGNOSIS — M54.42 ACUTE LEFT-SIDED LOW BACK PAIN WITH LEFT-SIDED SCIATICA: Primary | ICD-10-CM

## 2023-02-07 DIAGNOSIS — M54.50 ACUTE EXACERBATION OF CHRONIC LOW BACK PAIN: ICD-10-CM

## 2023-02-07 DIAGNOSIS — G89.29 ACUTE EXACERBATION OF CHRONIC LOW BACK PAIN: ICD-10-CM

## 2023-02-07 DIAGNOSIS — I10 PRIMARY HYPERTENSION: ICD-10-CM

## 2023-02-07 PROBLEM — Z86.16 HISTORY OF COVID-19: Status: ACTIVE | Noted: 2023-02-07

## 2023-02-07 PROBLEM — I21.4 NSTEMI (NON-ST ELEVATED MYOCARDIAL INFARCTION) (HCC): Status: ACTIVE | Noted: 2023-02-07

## 2023-02-07 RX ORDER — HYDROCODONE BITARTRATE AND ACETAMINOPHEN 5; 325 MG/1; MG/1
1 TABLET ORAL EVERY 4 HOURS PRN
Qty: 40 TABLET | Refills: 0 | Status: SHIPPED | OUTPATIENT
Start: 2023-02-07 | End: 2023-02-10 | Stop reason: SDUPTHER

## 2023-02-07 RX ORDER — BUTALBITAL, ACETAMINOPHEN AND CAFFEINE 300; 40; 50 MG/1; MG/1; MG/1
CAPSULE ORAL
COMMUNITY
End: 2023-02-07 | Stop reason: SDUPTHER

## 2023-02-07 RX ORDER — AMOXICILLIN 500 MG
CAPSULE ORAL EVERY 24 HOURS
COMMUNITY

## 2023-02-07 RX ORDER — ASPIRIN 81 MG/1
TABLET, CHEWABLE ORAL EVERY 24 HOURS
COMMUNITY
End: 2023-02-07 | Stop reason: SDUPTHER

## 2023-02-07 RX ORDER — ALPRAZOLAM 0.25 MG/1
TABLET ORAL
COMMUNITY
End: 2023-02-07 | Stop reason: SDUPTHER

## 2023-02-07 NOTE — ED TRIAGE NOTES
Pt  Arrives to ED with complaints of lower L sided back pain that radiates to leg x1 month  Pt  Reports the pain has been intolerable today  Pt reports hx of pinched nerve and has needed injections into back for it

## 2023-02-07 NOTE — DISCHARGE INSTRUCTIONS
Take 500 mg robaxin every 12 hours   Take 40 mg prednisone daily, 20 mg in the morning and 20 mg in the evening  Apply voltaren gel to area up to 4 times per day  Take ibuprofen or tylenol every 4-6 hours for pain  Can alternate them every 3 hours as needed   Rest, use heating pads, ice on your back   Avoid heavy lifting for atleast a week  Lidocaine patches for 12 hours on and 12 hours off   Follow up with the spine program  Return to the ER if you develop intense back pain that is worse or different than before, cant feel or move your legs, numbness, weakness, develop pelvic numbness, bowel or bladder incontinence, fevers, chest pain, shortness of breath

## 2023-02-07 NOTE — PROGRESS NOTES
Assessment/Plan:      1  Acute left-sided low back pain with left-sided sciatica  Assessment & Plan:  Likely bulging /herniated disc encroaching on nerve  Continue prednisone,   appt with pain management  Xray lumbar spine/mri lumbar spine  Hydrocodone as needed  Orders:  -     XR spine lumbar 2 or 3 views injury; Future; Expected date: 02/07/2023  -     MRI lumbar spine wo contrast; Future; Expected date: 02/07/2023    2  Primary hypertension  Assessment & Plan:  Elevated, likely due to pain  Pt will monitor blood pressure      3  Acute exacerbation of chronic low back pain        Subjective:  Chief Complaint   Patient presents with   • Follow-up     Severe back pain/ED last night   Back pain began 1 mnth ago and gotten severely worse above level 10  Lower back, travel down to lower left from hip and leg  Burning, stabbing pain cannot stand or walk  Lower left leg is numb  Stenosis pain  Pulled trailer onSunday&exacerbated pain  Severe since Sunday, ER last night given tylenol&muscle relaxer,prednisone-none helping  Appt for pain and spine Friday monring  Pt 2 for xanax completing  Pt advised BW active   Pt advised to shoaib physical on or after 2/14/2023        Patient ID: Janice Gallegos is a 71 y o  male  Was running up and down stairs over the weekend  Has had a few injections in the past, had 2 on the right  Was considering one on the left  Complains of increased severe pain on the Left side and from hip to knee is numb  Complains of a Burning stabbing pain-   Using Ice and heat alternating - ice help,   Seen in ER, Started Prednisone, tylenol and robaxin  Prednisone 20mg 2 tabs once a day for 5 days  Lidocaine patches have not helped  voltaren gel no help  appt with pain management on Friday  Review of Systems   Constitutional: Negative  Negative for fatigue and fever  HENT: Negative  Eyes: Negative  Respiratory: Negative  Negative for cough  Cardiovascular: Negative  Gastrointestinal: Negative  Endocrine: Negative  Genitourinary: Negative  Musculoskeletal: Positive for back pain, gait problem and myalgias  Skin: Negative  Allergic/Immunologic: Negative  Psychiatric/Behavioral: Negative  The following portions of the patient's history were reviewed and updated as appropriate: allergies, current medications, past family history, past medical history, past social history, past surgical history and problem list     Objective:  Vitals:    02/07/23 1344   BP: 148/90   Pulse: 91   Temp: (!) 96 9 °F (36 1 °C)   SpO2: 96%   Height: 6' 1" (1 854 m)      Physical Exam  Vitals and nursing note reviewed  Constitutional:       Appearance: He is well-developed  HENT:      Head: Normocephalic and atraumatic  Cardiovascular:      Rate and Rhythm: Normal rate and regular rhythm  Heart sounds: Normal heart sounds  Pulmonary:      Effort: Pulmonary effort is normal       Breath sounds: Normal breath sounds  Abdominal:      General: Bowel sounds are normal       Palpations: Abdomen is soft  Musculoskeletal:         General: Tenderness present  No swelling, deformity or signs of injury  Comments: Central tenderness over L3-L5, left paraspinal    Skin:     General: Skin is warm and dry  Neurological:      Mental Status: He is alert and oriented to person, place, and time  Psychiatric:         Behavior: Behavior normal          Thought Content:  Thought content normal          Judgment: Judgment normal

## 2023-02-07 NOTE — PATIENT INSTRUCTIONS
Xray lumbar spine  Mri lumbar spine   Hydrocodone 5/325 1 tab every 4 hours as needed  Pain management evaluation

## 2023-02-07 NOTE — TELEPHONE ENCOUNTER
Confirmation Lizzy Jacinto [id number]  Effective: 02/07/2023     Expires: 05/08/2023  Active  Referred From  [address]  Group DFX: [id number]  Provider QE: [id number]  Tax YJ: [id number]   [address]  [place] [address]  Referred To  [address] [facility] [address] [facility]  Specialty: Not Available  Tier 2  Group XEP: [id number]  Provider RA: [id number]  Tax ST: [id number]  This referral is valid at any location for the above group  Patient Info  Stefanie King  [id number]  Male  1950  [place] [id number]  Clinical Information  Place of Service  Office  Service Type  Medical Care  Diagnoses  3 N64 38 - radiculopathy, cervicothoracic region  2 R68 89 - other general symptoms and signs  Procedures  7 55650 - unlisted evaluation and management service  Disclaimer  [facility] and [facility] ([facility]) will pay for only those services covered under the [employer] which are specifically noted and requested by the PCP or OBGYN on the referral  If any additional services, testing, or follow-up care are required, the PCP or OBGYN must be contacted prior to the delivery of such additional services for written approval on a separate referral  Non-referred services will not be covered by [facility]  Benefits are underwritten or administered by [employer], a subsidiary of [employer], which are independent licensees of the [employer] and [employer]

## 2023-02-07 NOTE — ED PROVIDER NOTES
History  Chief Complaint   Patient presents with   • Back Pain     This is a 70 y/o male with PMH HLD, GERD, HTN, chronic back pain, depression/anxiety who presents to the ER today with left sided low back pain x one month that radiates down posterior left thigh  He states over the past week it has been worsening and yesterday he did a lot of activity around the house that seemed to worsen his pain  He states the pain is a 10/10 and he describes it as a sharp shooting pain that goes down the left thigh  He denies any numbness, tingling, pelvic numbness, bowel or bladder incontinence, fevers, abdominal pain, nausea, vomiting, chest pain, shortness of breath  States he was taking an old muscle relaxant that he had at home but it has not been helping his symptoms  States he saw the pain and spine doctors in the past and has had injections of his cervical spine multiple times and lumbar spine once  Has been diagnosed with sciatica in the past        History provided by:  Patient   used: No    Back Pain  Location:  Sacro-iliac joint and gluteal region  Quality:  Shooting and stabbing  Radiates to:  L posterior upper leg  Pain severity:  Severe  Onset quality:  Gradual  Timing:  Constant  Progression:  Worsening  Chronicity:  Chronic  Context: physical stress    Ineffective treatments:  Muscle relaxants  Associated symptoms: no abdominal pain, no bladder incontinence, no bowel incontinence, no chest pain, no dysuria, no fever, no headaches, no numbness, no pelvic pain, no perianal numbness, no tingling and no weakness        Prior to Admission Medications   Prescriptions Last Dose Informant Patient Reported? Taking?    ALPRAZolam (Xanax) 0 25 mg tablet   No No   Sig: Take 1 tablet (0 25 mg total) by mouth 3 (three) times a day as needed for anxiety   CHONDROITIN SULFATE A PO   Yes No   Sig: Take by mouth in the morning   Hyoscyamine Sulfate SL 0 125 MG SUBL   No No   Sig: Place 0 125 mg under the tongue 3 (three) times a day as needed (as needed)   Ibuprofen 200 MG CAPS   Yes No   Sig: 3 (three) times a day   aspirin 81 mg chewable tablet   Yes No   Sig: every 24 hours   atorvastatin (LIPITOR) 80 mg tablet   Yes No   Sig: every 24 hours   butalbital-acetaminophen-caffeine (FIORICET,ESGIC) -40 mg per tablet   No No   Sig: Take 1 tablet by mouth every 4 (four) hours as needed for headaches   cholecalciferol (VITAMIN D3) 1,000 units tablet   Yes No   Sig: Take 1,000 Units by mouth daily   doxycycline hyclate (VIBRAMYCIN) 100 mg capsule   Yes No   Sig: take 1 capsule twice a day with a full glass of water and food for 3 weeks for Lyme disease   Patient not taking: Reported on 2022   ezetimibe (ZETIA) 10 mg tablet   Yes No   Si tablet Orally Once a day 90 days   lisinopril (ZESTRIL) 2 5 mg tablet   Yes No   Sig: every 24 hours   pantoprazole (PROTONIX) 40 mg tablet   Yes No   Sig: ONE TABLET DAILY for GI issue   vilazodone (VIIBRYD) 20 mg tablet   No No   Sig: Take 1 tablet (20 mg total) by mouth daily with breakfast      Facility-Administered Medications: None       Past Medical History:   Diagnosis Date   • Acute medial meniscus tear    • Allergic rhinitis    • Allergic rhinitis     last assessed 14   • Anxiety    • Appendicitis    • Arthritis    • Benign essential hypertension     last assessed 14   • Biceps tendon tear    • Cancer ([facility] [address] )     skin   • Cervical radiculopathy     and lumbar   • Chronic pain disorder     knees and hips    • Colon polyps    • CPAP (continuous positive airway pressure) dependence    • Depression    • Depression with anxiety    • Dyskinesia of esophagus    • Erythema migrans (Lyme disease)     last assessed 10/16/12   • Fatigue    • GERD (gastroesophageal reflux disease)    • Heart murmur 1951   • Heel spur, right    • Herpes    • Herpes zoster    • Hyperlipidemia    • Hyperplastic colon polyp     last assessed 14   • Joint pain    • Kidney stone    • Kidney stones    • Lyme disease    • Myocardial infarction [facility])    • Nephrolithiasis    • Panic disorder with agoraphobia     last assessed 13   • Seasonal affective disorder ([facility] [address] )    • Shingles    • Sleep apnea    • Vertigo    • Vitamin D deficiency        Past Surgical History:   Procedure Laterality Date   • APPENDECTOMY  2003   • BICEPS TENDON REPAIR     • CARDIAC OTHER      stents x2   • KNEE ARTHROSCOPY      with medial meniscus repair, resolved 05   • KNEE CARTILAGE SURGERY     • OTHER SURGICAL HISTORY      distal reinsertion of ruptured biceps tendon   • DC ARTHRS KNE SURG W/MENISCECTOMY MED/LAT W/SHVG Right 2019    Procedure: RIGHT KNEE ARTHROSCOPY, MEDIAL MENISCECTOMY;  Surgeon: Ranjith Zavala DO;  Location: [address] [place] [address];  Service: Orthopedics   • TOOTH EXTRACTION      Middle of 2019   • VASECTOMY         Family History   Problem Relation Age of Onset   • Diabetes Mother    • Coronary artery disease Father    • Kidney disease Father    • Heart disease Father    • Cancer Father         kidney cancer     I have reviewed and agree with the history as documented  E-Cigarette/Vaping   • E-Cigarette Use Never User      E-Cigarette/Vaping Substances     Social History     Tobacco Use   • Smoking status: Former     Packs/day: 1 00     Types: Cigarettes, Cigars     Start date: 11/15/1967     Quit date: 3/1/1987     Years since quittin 9   • Smokeless tobacco: Never   Vaping Use   • Vaping Use: Never used   Substance Use Topics   • Alcohol use: Yes     Alcohol/week: 1 0 standard drink     Types: 1 Glasses of wine per week   • Drug use: No       Review of Systems   Constitutional: Negative for chills and fever  Respiratory: Negative for chest tightness and shortness of breath  Cardiovascular: Negative for chest pain  Gastrointestinal: Negative for abdominal pain, bowel incontinence, nausea and vomiting     Genitourinary: Negative for bladder incontinence, dysuria and pelvic pain  Musculoskeletal: Positive for back pain  Skin: Negative for color change  Neurological: Negative for tingling, weakness, numbness and headaches  Psychiatric/Behavioral: Negative for behavioral problems and sleep disturbance  All other systems reviewed and are negative  Physical Exam  Physical Exam  Vitals and nursing note reviewed  Constitutional:       General: He is awake  Appearance: Normal appearance  He is well-developed  HENT:      Head: Normocephalic and atraumatic  Right Ear: External ear normal       Left Ear: External ear normal       Nose: Nose normal    Eyes:      General: No scleral icterus  Extraocular Movements: Extraocular movements intact  Cardiovascular:      Rate and Rhythm: Normal rate and regular rhythm  Heart sounds: Normal heart sounds, S1 normal and S2 normal  No murmur heard  No gallop  Pulmonary:      Effort: Pulmonary effort is normal       Breath sounds: Normal breath sounds  No wheezing, rhonchi or rales  Genitourinary:     Comments: No saddle anesthesia  Musculoskeletal:      Cervical back: Normal range of motion  Lumbar back: Positive left straight leg raise test         Back:       Comments: No midline spinal tenderness  No swelling, ecchymosis, erythema or deformity noted of the lumbar spine  Decreased flexion/extension of lumbar spine due to pain  Skin:     General: Skin is warm and dry  Neurological:      General: No focal deficit present  Mental Status: He is alert  Comments: Full sensation, 5/5 strength in all 4 extremities  Psychiatric:         Attention and Perception: Attention and perception normal          Mood and Affect: Mood normal          Behavior: Behavior normal  Behavior is cooperative           Vital Signs  ED Triage Vitals [02/06/23 2038]   Temperature Pulse Respirations Blood Pressure SpO2   98 2 °F (36 8 °C) 95 16 127/71 96 %      Temp Source Heart Rate Source Patient Position - Orthostatic VS BP Location FiO2 (%)   Temporal -- -- -- --      Pain Score       10 - Worst Possible Pain           Vitals:    02/06/23 2038   BP: 127/71   Pulse: 95         Visual Acuity  Visual Acuity    Flowsheet Row Most Recent Value   L Pupil Size (mm) 3   R Pupil Size (mm) 3          ED Medications  Medications   lidocaine (LIDODERM) 5 % patch 2 patch (2 patches Topical Medication Applied 2/6/23 2305)   acetaminophen (TYLENOL) tablet 975 mg (975 mg Oral Given 2/6/23 2306)   methocarbamol (ROBAXIN) tablet 500 mg (500 mg Oral Given 2/6/23 2306)   predniSONE tablet 40 mg (40 mg Oral Given 2/6/23 2306)       Diagnostic Studies  Results Reviewed     None                 No orders to display              Procedures  Procedures         ED Course  ED Course as of 02/06/23 2343   Mon Feb 06, 2023 2332 Patient has slight improvement in symptoms, he is now sitting in the bed  Okay for discharge  Will send prescriptions home and he will follow up w/ pain and spine  SBIRT 22yo+    Flowsheet Row Most Recent Value   SBIRT (25 yo +)    In order to provide better care to our patients, we are screening all of our patients for alcohol and drug use  Would it be okay to ask you these screening questions? Yes Filed at: 02/06/2023 2253   Initial Alcohol Screen: US AUDIT-C     1  How often do you have a drink containing alcohol? 0 Filed at: 02/06/2023 2253   2  How many drinks containing alcohol do you have on a typical day you are drinking? 0 Filed at: 02/06/2023 2253   3b  FEMALE Any Age, or MALE 65+: How often do you have 4 or more drinks on one occassion? 0 Filed at: 02/06/2023 2253   Audit-C Score 0 Filed at: 02/06/2023 2253   WILL: How many times in the past year have you    Used an illegal drug or used a prescription medication for non-medical reasons?  Never Filed at: 02/06/2023 2253                    Medical Decision Making  70 y/o male here with acute on chronic low back pain worsening past week Clinical diagnosis of acute on chronic low back pain  Plan: patient given tylenol, robaxin, prednisone and lidocaine patches in ED  See ED course  Prescribed robaxin, prednisone and voltaren  Sent referral to comprehensive spine program and patient told to schedule an appointment as soon as possible  He  was given strict return to ER precautions both verbally and in discharge papers  Patient verbalized understanding and agrees with plan  Acute exacerbation of chronic low back pain: acute illness or injury  Risk  OTC drugs  Prescription drug management  Disposition  Final diagnoses:   Acute exacerbation of chronic low back pain     Time reflects when diagnosis was documented in both MDM as applicable and the Disposition within this note     Time User Action Codes Description Comment    2/6/2023 11:35 PM Erwin Sylvester Add [M54 50,  G89 29] Acute exacerbation of chronic low back pain       ED Disposition     ED Disposition   Discharge    Condition   Stable    Date/Time   Mon Feb 6, 2023 11:35 PM    Comment   Richard See discharge to home/self care                 Follow-up Information     Follow up With Specialties Details Why Contact Info Additional Information    pain and spine management  Schedule an appointment as soon as possible for a visit         [facility] [facility] Emergency Medicine Go to  if you develop intense back pain that is worse or different than before, cant feel or move your legs, numbness, weakness, develop pelvic numbness, bowel or bladder incontinence, fevers, chest pain, shortness of breath [id number] [facility], [address] [place] [address] Jamie [address]          Patient's Medications   Discharge Prescriptions    DICLOFENAC SODIUM (VOLTAREN) 1 %    Apply 2 g topically 4 (four) times a day       Start Date: 2/6/2023  End Date: -- Order Dose: 2 g       Quantity: 50 g    Refills: 0    METHOCARBAMOL (ROBAXIN) 500 MG TABLET    Take 1 tablet (500 mg total) by mouth 2 (two) times a day for 7 days       Start Date: 2/6/2023  End Date: 2/13/2023       Order Dose: 500 mg       Quantity: 14 tablet    Refills: 0    PREDNISONE 20 MG TABLET    Take 2 tablets (40 mg total) by mouth daily for 5 days       Start Date: 2/6/2023  End Date: 2/11/2023       Order Dose: 40 mg       Quantity: 10 tablet    Refills: 0           PDMP Review       Value Time User    PDMP Reviewed  Yes 1/17/2023  2:23 PM Shemar De Jesus DO          ED Provider  Electronically Signed by           Sera Al PA-C  02/06/23 7023

## 2023-02-10 ENCOUNTER — APPOINTMENT (OUTPATIENT)
Dept: RADIOLOGY | Facility: CLINIC | Age: 73
End: 2023-02-10

## 2023-02-10 ENCOUNTER — OFFICE VISIT (OUTPATIENT)
Dept: PAIN MEDICINE | Facility: CLINIC | Age: 73
End: 2023-02-10

## 2023-02-10 VITALS
TEMPERATURE: 98.1 F | SYSTOLIC BLOOD PRESSURE: 142 MMHG | HEART RATE: 93 BPM | BODY MASS INDEX: 32.98 KG/M2 | HEIGHT: 73 IN | DIASTOLIC BLOOD PRESSURE: 78 MMHG

## 2023-02-10 DIAGNOSIS — G89.29 ACUTE EXACERBATION OF CHRONIC LOW BACK PAIN: ICD-10-CM

## 2023-02-10 DIAGNOSIS — M48.062 SPINAL STENOSIS OF LUMBAR REGION WITH NEUROGENIC CLAUDICATION: ICD-10-CM

## 2023-02-10 DIAGNOSIS — M54.42 ACUTE LEFT-SIDED LOW BACK PAIN WITH LEFT-SIDED SCIATICA: ICD-10-CM

## 2023-02-10 DIAGNOSIS — M54.50 ACUTE EXACERBATION OF CHRONIC LOW BACK PAIN: ICD-10-CM

## 2023-02-10 DIAGNOSIS — M51.26 LUMBAR DISC HERNIATION: ICD-10-CM

## 2023-02-10 DIAGNOSIS — M54.16 LUMBAR RADICULOPATHY: ICD-10-CM

## 2023-02-10 DIAGNOSIS — M54.42 ACUTE LEFT-SIDED LOW BACK PAIN WITH LEFT-SIDED SCIATICA: Primary | ICD-10-CM

## 2023-02-10 RX ORDER — HYDROCODONE BITARTRATE AND ACETAMINOPHEN 5; 325 MG/1; MG/1
1 TABLET ORAL EVERY 4 HOURS PRN
Qty: 40 TABLET | Refills: 0 | Status: SHIPPED | OUTPATIENT
Start: 2023-02-10 | End: 2023-02-16 | Stop reason: SDUPTHER

## 2023-02-10 RX ORDER — PREGABALIN 75 MG/1
CAPSULE ORAL
Qty: 60 CAPSULE | Refills: 0 | Status: SHIPPED | OUTPATIENT
Start: 2023-02-10 | End: 2023-02-16 | Stop reason: SDUPTHER

## 2023-02-10 RX ORDER — METHOCARBAMOL 500 MG/1
TABLET, FILM COATED ORAL
Qty: 120 TABLET | Refills: 0 | Status: SHIPPED | OUTPATIENT
Start: 2023-02-10 | End: 2023-02-16 | Stop reason: SDUPTHER

## 2023-02-10 NOTE — PATIENT INSTRUCTIONS
Get x-rays and MRI done, copy Mykel Garcia on it so we get the results as well  Finish Prednisone  Increase Methocarbamol to 1 in AM and afternoon and 2 at bed time  Continue Hydrocodone as needed  Start Lyrica 75 mg, 1 pill at bed time x 4 days, then 2 pills daily  Pregabalin (By mouth)   Pregabalin (pre-GA-ba-saúl)  Treats nerve and muscle pain, including fibromyalgia  Also treats partial-onset seizures  Brand Name(s): Lyrica Lyrica SHANIQUA   There may be other brand names for this medicine  When This Medicine Should Not Be Used: This medicine is not right for everyone  Do not use it if you had an allergic reaction to pregabalin  How to Use This Medicine:   Capsule, Liquid, Long Acting Tablet  Take your medicine as directed  Your dose may need to be changed several times to find what works best for you  Extended-release tablet: Swallow the extended-release tablet whole  Do not crush, break, or chew it  Take it after an evening meal   Oral liquid: Measure the oral liquid medicine with a marked measuring spoon, oral syringe, or medicine cup  This medicine should come with a Medication Guide  Ask your pharmacist for a copy if you do not have one  Missed dose: Take a dose as soon as you remember  If it is almost time for your next dose, wait until then and take a regular dose  Do not take extra medicine to make up for a missed dose  If you miss a dose of the extended-release tablet after your evening meal, take it before bedtime after a snack  If you miss the dose before bedtime, take it after your morning meal  If you do not take the dose the following morning, then take the next dose at your regular time after your evening meal  Do not take 2 doses at the same time  Store the medicine in a closed container at room temperature, away from heat, moisture, and direct light    Drugs and Foods to Avoid:   Ask your doctor or pharmacist before using any other medicine, including over-the-counter medicines, vitamins, and herbal products  Some medicines can affect how pregabalin works  Tell your doctor if you are using any of the following:   ACE inhibitor (including benazepril, enalapril, lisinopril, quinapril, ramipril)  Oral diabetes medicine (including metformin, pioglitazone, rosiglitazone)  Do not drink alcohol while you are using this medicine  Tell your doctor if you use anything else that makes you sleepy  Some examples are allergy medicine, narcotic pain medicine, and alcohol  Tell your doctor if you are also using oxycodone, lorazepam, or zolpidem  Warnings While Using This Medicine:   Tell your doctor if you are pregnant or breastfeeding, or if you have kidney disease, heart failure, heart rhythm problems, lung or breathing problems, a bleeding disorder, diabetes, sores or skin problems, or a low blood platelet count  Tell your doctor if you have a history of angioedema (severe swelling), alcohol or drug abuse, depression, or other mood problems  This medicine may cause the following problems:   Angioedema (severe swelling), which may be life-threatening  Changes in mood or behavior, including suicidal thoughts or behavior  Respiratory depression (serious breathing problem that can be life-threatening), when used with narcotic pain medicines  Peripheral edema (swelling of your hands, ankles, feet, or lower legs)  Increased risk for cancer and bleeding  Serious muscle problems  Heart rhythm changes  This medicine may make you dizzy or drowsy  It may also cause blurry or double vision  Do not drive or do anything else that could be dangerous until you know how this medicine affects you  Do not stop using this medicine suddenly  Your doctor will need to slowly decrease your dose before you stop it completely  Your doctor will do lab tests at regular visits to check on the effects of this medicine  Keep all appointments  Keep all medicine out of the reach of children   Never share your medicine with anyone  Possible Side Effects While Using This Medicine:   Call your doctor right away if you notice any of these side effects: Allergic reaction: Itching or hives, swelling in your face or hands, swelling or tingling in your mouth or throat, chest tightness, trouble breathing  Blistering, peeling, red skin rash  Blue lips, fingernails, or skin, trouble breathing, chest pain  Blurry or double vision  Fever, chills, cough, sore throat, body aches  Muscle pain, tenderness, or weakness, general feeling of illness  Rapid weight gain, swelling in your hands, ankles, or feet  Severe dizziness or drowsiness  Sudden mood changes, unusual moods or behavior, including extreme happiness or depression, thoughts or attempts of killing oneself  Swelling in your throat, head, or neck  Uneven heartbeat  Unusual bleeding, bruising, or weakness  If you notice these less serious side effects, talk with your doctor:   Confusion, trouble concentrating  Constipation  Dry mouth  If you notice other side effects that you think are caused by this medicine, tell your doctor  Call your doctor for medical advice about side effects  You may report side effects to FDA at 4-997-FDA-3245    © Copyright [employer] 2022 Information is for End User's use only and may not be sold, redistributed or otherwise used for commercial purposes  The above information is an  only  It is not intended as medical advice for individual conditions or treatments  Talk to your doctor, nurse or pharmacist before following any medical regimen to see if it is safe and effective for you  Opioid Safety   AMBULATORY CARE:   Opioid safety  includes the correct use, storage, and disposal of opioids  Examples of opioid medicines to treat pain include oxycodone, morphine, fentanyl, and codeine  Call your local emergency number (911 in the [address] [place] [address],[address]), or have someone else call if:   You have a seizure  You cannot be woken      You have trouble staying awake and your breathing is slow or shallow  Your speech is slurred, or you are confused  You are dizzy or stumble when you walk  Call your doctor, or have someone close to you call if:   You are extremely drowsy, or you have trouble staying awake or speaking  You have pale or clammy skin  You have blue fingernails or lips  Your heartbeat is slower than normal     You cannot stop vomiting  You have questions or concerns about your condition or care  Use opioids safely:   Take prescribed opioids exactly as directed  Opioids come with directions based on the kind of opioid and how it is given  Do not take more than the recommended amount, or for longer than needed  Do not give opioids to others or take opioids that belong to someone else  Misuse of opioids can lead to an addiction or overdose  Do not mix opioids with other medicines or alcohol  The combination can cause an overdose, or lead to a coma  Do not drive or operate heavy machinery after you take the opioid  Your provider or pharmacist can tell you how long to wait after a dose before you do these activities  Talk to your healthcare provider if you have any side effects  He or she can help you prevent or relieve side effects  Side effects include nausea, sleepiness, itching, and trouble thinking clearly  Manage constipation:  Constipation is the most common side effect of opioid medicine  Constipation is when you have hard, dry bowel movements, or you go longer than usual between bowel movements  Tell your healthcare provider about all changes in your bowel movements while you are taking opioids  He or she may recommend laxative medicine to help you have a bowel movement  He or she may also change the kind of opioid you are taking, or change when you take it  The following are more ways you can prevent or relieve constipation:  Drink liquids as directed    You may need to drink extra liquids to help soften and move your bowels  Ask how much liquid to drink each day and which liquids are best for you  Eat high-fiber foods  This may help decrease constipation by adding bulk to your bowel movements  High-fiber foods include fruits, vegetables, whole-grain breads and cereals, and beans  Your healthcare provider or dietitian can help you create a high-fiber meal plan  Your provider may also recommend a fiber supplement if you cannot get enough fiber from food  Exercise regularly  Regular physical activity can help stimulate your intestines  Walking is a good exercise to prevent or relieve constipation  Ask which exercises are best for you  Schedule a time each day to have a bowel movement  This may help train your body to have regular bowel movements  Bend forward while you are on the toilet to help move the bowel movement out  Sit on the toilet for at least 10 minutes, even if you do not have a bowel movement  Store opioids safely:   Store opioids where others cannot easily get them  Keep them in a locked cabinet or secure area  Do not  keep them in a purse or other bag you carry with you  A person may be looking for something else and find the opioids  Make sure opioids are stored out of the reach of children  A child can easily overdose on opioids  Opioids may look like candy to a small child  The best way to dispose of opioids: The laws vary by country and area  In the [place], the best way is to return the opioids through a take-back program  This program is offered by the [employer]  The following are options for using the program:  Take the opioids to a DUY collection site  The site is often a law enforcement center  Call your local law enforcement center for scheduled take-back days in your area  You will be given information on where to go if the collection site is in a different location      Take the opioids to an approved pharmacy or hospital   A pharmacy or hospital may be set up as a collection site  You will need to ask if it is a DUY collection site if you were not directed there  A pharmacy or doctor's office may not be able to take back opioids unless it is a DUY site  Use a mail-back system  This means you are given containers to put the opioids into  You will then mail them in the containers  Use a take-back drop box  This is a place to leave the opioids at any time  People and animals will not be able to get into the box  Your local law enforcement agency can tell you where to find a drop box in your area  Other safe ways to dispose of opioids: The medicine may come with disposal instructions  The instructions may vary depending on the brand of medicine you are using  Instructions may come in a Medication Guide, but not every medicine has one  You may instead get instructions from your pharmacy or doctor  Follow instructions carefully  The following are general guidelines to follow:  Find out if you can flush the opioid  Some opioids can be flushed down the toilet or poured into the sink  You will need to contact authorities in your area to see if this is an option for you  The FDA also offers a list of medicines that are safe to flush down the toilet  You can check the list if you cannot get the information for your local area  Ask your waste management company about rules for putting opioids in the trash  The company will be able to give you specific directions  Scratch out personal information on the original medicine label so it cannot be read  Then put it in the trash  Do not label the trash or put any information on it about the opioids  It should look like regular household trash so no one is tempted to look for the opioids  Keep the trash out of the reach of children and animals  Always make sure trash is secure  Talk to officials if you live in a facility    If you live in a nursing home or assisted living center, talk to an official  The person will know the rules for your area  Other ways to manage pain:   Ask your healthcare provider about non-opioid medicines to control pain  Nonprescription medicines include NSAIDs (such as ibuprofen) and acetaminophen  Prescription medicines include muscle relaxers, antidepressants, and steroids  Pain may be managed without any medicines  Some ways to relieve pain include massage, aromatherapy, or meditation  Physical or occupational therapy may also help  For more information:   Drug Enforcement Administration  [facility] [facility] [place] [address]  Phone: 9- 439 - [phone]  Web Address: [facility]/drug_disposal/    [address]  and Drug Administration  [place] [address] , [address] [facility]  Phone: 5- 702 - [phone]  Web Address: [email or web address]    Follow up with your doctor or pain specialist as directed: You may need to have your dose adjusted  Your doctor or pain specialist can also help you find ways to manage pain without opioids  Write down your questions so you remember to ask them during your visits  © Copyright [employer] 2022 Information is for End User's use only and may not be sold, redistributed or otherwise used for commercial purposes  All illustrations and images included in CareNotes® are the copyrighted property of [employer] M [employer]  or Kimberly Lujan  The above information is an  only  It is not intended as medical advice for individual conditions or treatments  Talk to your doctor, nurse or pharmacist before following any medical regimen to see if it is safe and effective for you

## 2023-02-10 NOTE — PROGRESS NOTES
Assessment:  1  Acute left-sided low back pain with left-sided sciatica    2  Acute exacerbation of chronic low back pain    3  Lumbar radiculopathy    4  Lumbar disc herniation    5  Spinal stenosis of lumbar region with neurogenic claudication        Plan:  While the patient was in the office today, I did have a thorough conversation with the patient regarding his current pain symptoms, medication regimen, and treatment plan  I discussed with the patient that at this point I would like him to proceed with his lumbar spine x-rays immediately after his office visit today and the MRI as scheduled and I did ask him to include our names for the reports so that way once they are completed and read we will get the reports as well  I advised the patient that once we have the reports of his x-rays and MRI, our office give him a call to review the results and discuss any treatment plan options  The patient was agreeable and verbalized an understanding  In the meantime, I would like him to finish out his prednisone as prescribed and because I feel there is definite significant myofascial component and he is on a very low dose of the methocarbamol without side effects or significant relief, I would like him to increase the methocarbamol to 1 tablet in the morning and afternoon and 2 tablets at bedtime to see if that would help him sleep a little bit better at nighttime  Because I feel there is definite significant neuropathic component and he has been on Lyrica in the past with relief for his shingles, I am going to restart him on Lyrica 75 mg at bedtime and then increase it to twice daily dosing and see how he does    I discussed with the patient that they should not drive or operate machinery until they see how the medication changes affects them and if they have any side effects or issues, they are to call our office and make us aware so we can take the appropriate action and provide changes to the medications or treatment plan  The patient was agreeable and verbalized an understanding  Because it is quite obvious the patient is very uncomfortable I feel it is reasonable and appropriate to send another small prescription for the as needed Norco with the hope that increasing the methocarbamol and adding the Lyrica will significantly reduce his pain and make it more manageable and decrease his need for the as needed Norco in the near future  The patient was agreeable and verbalized an understanding  [place] Prescription Drug Monitoring Program report was reviewed and was appropriate     There are risks associated with opioid medications, including dependence, addiction and tolerance  The patient understands and agrees to use these medications only as prescribed  Potential side effects of the medications include, but are not limited to, constipation, drowsiness, addiction, impaired judgment and risk of fatal overdose if not taken as prescribed  The patient was warned against driving while taking sedation medications  Sharing medications is a felony  At this point in time, the patient is showing no signs of addiction, abuse, diversion or suicidal ideation  The patient is going to hold off on a follow-up office visit until we have the results of his x-rays and MRI and proceed from there as appropriate  The patient was agreeable and verbalized an understanding  History of Present Illness: The patient is a 71 y o  male last seen on 3/31/2022 who presents for a follow up office visit in regards to acute flareup of his chronic left-sided low back pain and radicular symptoms secondary to a previously noted lumbar disc herniation and stenosis  The patient currently reports that approximately 3 to 4 weeks ago he started with left-sided low back, hip, and leg soreness, but that it was manageable and then slightly improved until last weekend    The patient reports that he was trying to move a trailer and the way that he pulled within a few hours he noted significant left-sided low back pain, hip pain groin pain and radicular pain down his leg in an L2-L3, L3-L4 dermatome distribution  The patient even went to the emergency room and they started him on prednisone 40 mg daily for the 5 days and he will finish the prednisone tomorrow and has not noted any significant relief or improvement  They also started him on methocarbamol 500 mg twice daily and his primary care provider gave him a small prescription for as needed Norco, however, because he he has not had any opioid medication in the past 4 months his pharmacy was only able to fill a very small amount and he is going to run out and although it is not significantly helpful it does seem to at least take the edge off as at this point he is scheduled for an updated MRI of the lumbosacral spine on February 20, 2023 and is going to proceed with updated x-rays as well  The patient presents today for reevaluation and to discuss any other medication or treatment plan options in the meantime as he is very uncomfortable and reports it is very difficult for him to stand up straight because of the spasms and pain and he has difficulty sleeping at nighttime and finding comfortable positions  Current pain medications includes: Carbamoyl 500 mg twice daily, Norco 5/325, 1 tablet every 4-6 hours as needed for pain, and prednisone 40 mg daily for 5 days  The patient reports that this regimen is providing 50% pain relief  The patient is reporting no side effects from this pain medication regimen  I have personally reviewed and/or updated the patient's past medical history, past surgical history, family history, social history, current medications, allergies, and vital signs today  Review of Systems:    Review of Systems   Respiratory: Negative for shortness of breath  Cardiovascular: Negative for chest pain     Gastrointestinal: Negative for constipation, diarrhea, nausea and vomiting  Musculoskeletal: Positive for gait problem  Negative for arthralgias, joint swelling and myalgias  Skin: Negative for rash  Neurological: Negative for dizziness, seizures and weakness  All other systems reviewed and are negative          Past Medical History:   Diagnosis Date   • Acute medial meniscus tear    • Allergic rhinitis    • Allergic rhinitis     last assessed 5/8/14   • Anxiety    • Appendicitis    • Arthritis    • Benign essential hypertension     last assessed 1/6/14   • Biceps tendon tear    • Cancer (HCC)     skin   • Cervical radiculopathy     and lumbar   • Chronic pain disorder     knees and hips    • Colon polyps    • CPAP (continuous positive airway pressure) dependence    • Depression    • Depression with anxiety    • Dyskinesia of esophagus    • Erythema migrans (Lyme disease)     last assessed 10/16/12   • Fatigue    • GERD (gastroesophageal reflux disease)    • Heart murmur 1951   • Heel spur, right    • Herpes    • Herpes zoster    • Hyperlipidemia    • Hyperplastic colon polyp     last assessed 11/14/14   • Joint pain    • Kidney stone 1995   • Kidney stones    • Lyme disease    • Myocardial infarction [facility])    • Nephrolithiasis    • Panic disorder with agoraphobia     last assessed 8/7/13   • Seasonal affective disorder ([facility] [address] )    • Shingles 2015   • Sleep apnea    • Vertigo    • Vitamin D deficiency        Past Surgical History:   Procedure Laterality Date   • APPENDECTOMY  08/01/2003   • BICEPS TENDON REPAIR     • CARDIAC OTHER      stents x2   • KNEE ARTHROSCOPY      with medial meniscus repair, resolved 01/01/05   • KNEE CARTILAGE SURGERY     • OTHER SURGICAL HISTORY      distal reinsertion of ruptured biceps tendon   • IL ARTHRS KNE SURG W/MENISCECTOMY MED/LAT W/SHVG Right 07/30/2019    Procedure: RIGHT KNEE ARTHROSCOPY, MEDIAL MENISCECTOMY;  Surgeon: Dedra Gandara DO;  Location: [address] [place] [address];  Service: Orthopedics   • TOOTH EXTRACTION      Middle of October 2019   • VASECTOMY         Family History   Problem Relation Age of Onset   • Diabetes Mother    • Coronary artery disease Father    • Kidney disease Father    • Heart disease Father    • Cancer Father         kidney cancer       Social History     Occupational History   • Not on file   Tobacco Use   • Smoking status: Former     Packs/day: 1 00     Types: Cigarettes, Cigars     Start date: 11/15/1967     Quit date: 3/1/1987     Years since quittin 9   • Smokeless tobacco: Never   Vaping Use   • Vaping Use: Never used   Substance and Sexual Activity   • Alcohol use:  Yes     Alcohol/week: 1 0 standard drink     Types: 1 Glasses of wine per week   • Drug use: No   • Sexual activity: Not on file         Current Outpatient Medications:   •  ALPRAZolam (Xanax) 0 25 mg tablet, Take 1 tablet (0 25 mg total) by mouth 3 (three) times a day as needed for anxiety, Disp: 30 tablet, Rfl: 0  •  aspirin 81 mg chewable tablet, every 24 hours, Disp: , Rfl:   •  atorvastatin (LIPITOR) 80 mg tablet, every 24 hours, Disp: , Rfl:   •  butalbital-acetaminophen-caffeine (FIORICET,ESGIC) -40 mg per tablet, Take 1 tablet by mouth every 4 (four) hours as needed for headaches, Disp: 30 tablet, Rfl: 0  •  cholecalciferol (VITAMIN D3) 1,000 units tablet, Take 1,000 Units by mouth daily, Disp: , Rfl:   •  CHONDROITIN SULFATE A PO, Take by mouth in the morning, Disp: , Rfl:   •  Coenzyme Q10 (CoQ-10) 100 MG CAPS, every 24 hours, Disp: , Rfl:   •  Diclofenac Sodium (VOLTAREN) 1 %, Apply 2 g topically 4 (four) times a day, Disp: 50 g, Rfl: 0  •  ezetimibe (ZETIA) 10 mg tablet, 1 tablet Orally Once a day 90 days, Disp: , Rfl:   •  GLUCOSAMINE-CALCIUM-VIT D PO, Take 1,200 mg by mouth 1200mg, Disp: , Rfl:   •  HYDROcodone-acetaminophen (Norco) 5-325 mg per tablet, Take 1 tablet by mouth every 4 (four) hours as needed for pain Max Daily Amount: 6 tablets, Disp: 40 tablet, Rfl: 0  •  Ibuprofen 200 MG CAPS, 3 (three) times a day, Disp: , Rfl: •  lisinopril (ZESTRIL) 2 5 mg tablet, every 24 hours, Disp: , Rfl:   •  methocarbamol (ROBAXIN) 500 mg tablet, Take 1 in AM and afternoon and 2 PO HS , Disp: 120 tablet, Rfl: 0  •  Omega-3 Fatty Acids (Fish Oil) 1200 MG CAPS, every 24 hours, Disp: , Rfl:   •  pantoprazole (PROTONIX) 40 mg tablet, ONE TABLET DAILY for GI issue, Disp: , Rfl:   •  predniSONE 20 mg tablet, Take 2 tablets (40 mg total) by mouth daily for 5 days, Disp: 10 tablet, Rfl: 0  •  pregabalin (LYRICA) 75 mg capsule, Take 1 PO HS x 4 days, then 1 PO BID , Disp: 60 capsule, Rfl: 0  •  vilazodone (VIIBRYD) 20 mg tablet, Take 1 tablet (20 mg total) by mouth daily with breakfast, Disp: 30 tablet, Rfl: 1  •  doxycycline hyclate (VIBRAMYCIN) 100 mg capsule, take 1 capsule twice a day with a full glass of water and food for 3 weeks for Lyme disease (Patient not taking: Reported on 11/2/2022), Disp: , Rfl:   •  Hyoscyamine Sulfate SL 0 125 MG SUBL, Place 0 125 mg under the tongue 3 (three) times a day as needed (as needed), Disp: 30 tablet, Rfl: 1    Allergies   Allergen Reactions   • Pseudoephedrine Hives and Dizziness     Reaction Date: 16Apr2011;    • Sulfa Antibiotics Dizziness, Hives and Other (See Comments)       Physical Exam:    /78 (BP Location: Left arm, Patient Position: Sitting, Cuff Size: Large)   Pulse 93   Temp 98 1 °F (36 7 °C)   Ht 6' 1" (1 854 m)   BMI 32 98 kg/m²     Constitutional:normal, well developed, well nourished, alert, in no distress and non-toxic and no overt pain behavior   and overweight  Eyes:anicteric  HEENT:grossly intact  Neck:supple, symmetric, trachea midline and no masses   Pulmonary:even and unlabored  Cardiovascular:No edema or pitting edema present  Skin:Normal without rashes or lesions and well hydrated  Psychiatric:Mood and affect appropriate  Neurologic:Cranial Nerves II-XII grossly intact  Musculoskeletal:The patient's gait is antalgic, calculated, painful, but steady without the use of any assistive devices  Lumbar Spine Exam    Appearance:  Normal lordosis  Palpation/Tenderness:  left lumbar paraspinal tenderness  left sacroiliac joint tenderness  left piriformis tenderness  Sensory:  dimished light touch sensation, location: And a left L3-L4, L4-L5 dermatome distribution  Range of Motion:  Flexion: Moderately limited  with pain  Extension:  Severely limited  with pain  Rotation - Left:  Moderately limited  with pain  Rotation - Right:  Moderately limited  with pain  Motor Strength:  Left hip flexion:  4/5  Left hip extension:  4/5  Right hip flexion:  5/5  Right hip extension:  5/5  Left knee flexion:  4/5  Left knee extension:  4/5  Right knee flexion:  5/5  Right knee extension:  5/5  Left foot dorsiflexion:  4/5  Left foot plantar flexion:  4/5  Right foot dorsiflexion:  5/5  Right foot plantar flexion:  5/5  Reflexes:  Left Patellar:  absent   Right Patellar:  1+   Left Achilles:  absent   Right Achilles:  absent   Special Tests:  Left Straight Leg Test:  positive  Right Straight Leg Test:  negative  Left Pawel's Maneuver:  positive  Right Pawel's Maneuver:  negative  Left Gaenslen's Test:  positive  Right Gaenslen's Test:  negative      Imaging  No orders to display         No orders of the defined types were placed in this encounter

## 2023-02-12 PROBLEM — E66.09 CLASS 1 OBESITY DUE TO EXCESS CALORIES WITH SERIOUS COMORBIDITY AND BODY MASS INDEX (BMI) OF 32.0 TO 32.9 IN ADULT: Status: ACTIVE | Noted: 2018-04-23

## 2023-02-12 PROBLEM — E66.811 CLASS 1 OBESITY DUE TO EXCESS CALORIES WITH SERIOUS COMORBIDITY AND BODY MASS INDEX (BMI) OF 32.0 TO 32.9 IN ADULT: Status: ACTIVE | Noted: 2018-04-23

## 2023-02-12 NOTE — ASSESSMENT & PLAN NOTE
Likely bulging /herniated disc encroaching on nerve  Continue prednisone,   appt with pain management  Xray lumbar spine/mri lumbar spine  Hydrocodone as needed

## 2023-02-13 ENCOUNTER — HOSPITAL ENCOUNTER (OUTPATIENT)
Dept: MRI IMAGING | Facility: HOSPITAL | Age: 73
Discharge: HOME | End: 2023-02-13

## 2023-02-13 DIAGNOSIS — M54.42 ACUTE LEFT-SIDED LOW BACK PAIN WITH LEFT-SIDED SCIATICA: ICD-10-CM

## 2023-02-13 NOTE — TELEPHONE ENCOUNTER
Caller: Manuel Crews (PT)    Doctor: [facility]    Reason for call: MRI disc , pt will drop off the disc      Call back#: N/A

## 2023-02-16 ENCOUNTER — OFFICE VISIT (OUTPATIENT)
Dept: PAIN MEDICINE | Facility: CLINIC | Age: 73
End: 2023-02-16

## 2023-02-16 ENCOUNTER — PATIENT MESSAGE (OUTPATIENT)
Dept: PAIN MEDICINE | Facility: CLINIC | Age: 73
End: 2023-02-16

## 2023-02-16 VITALS
DIASTOLIC BLOOD PRESSURE: 82 MMHG | HEART RATE: 81 BPM | TEMPERATURE: 97.6 F | BODY MASS INDEX: 34.06 KG/M2 | HEIGHT: 73 IN | SYSTOLIC BLOOD PRESSURE: 134 MMHG | WEIGHT: 257 LBS

## 2023-02-16 DIAGNOSIS — M54.50 CHRONIC LEFT-SIDED LOW BACK PAIN WITHOUT SCIATICA: ICD-10-CM

## 2023-02-16 DIAGNOSIS — M51.26 HERNIATED NUCLEUS PULPOSUS, L2-3 LEFT: Primary | ICD-10-CM

## 2023-02-16 DIAGNOSIS — M51.26 LUMBAR DISC HERNIATION: ICD-10-CM

## 2023-02-16 DIAGNOSIS — M48.062 SPINAL STENOSIS OF LUMBAR REGION WITH NEUROGENIC CLAUDICATION: ICD-10-CM

## 2023-02-16 DIAGNOSIS — M54.16 LUMBAR RADICULOPATHY: ICD-10-CM

## 2023-02-16 DIAGNOSIS — G89.29 CHRONIC LEFT-SIDED LOW BACK PAIN WITHOUT SCIATICA: ICD-10-CM

## 2023-02-16 DIAGNOSIS — G89.29 ACUTE EXACERBATION OF CHRONIC LOW BACK PAIN: Primary | ICD-10-CM

## 2023-02-16 DIAGNOSIS — M54.42 ACUTE LEFT-SIDED LOW BACK PAIN WITH LEFT-SIDED SCIATICA: ICD-10-CM

## 2023-02-16 DIAGNOSIS — M54.50 ACUTE EXACERBATION OF CHRONIC LOW BACK PAIN: Primary | ICD-10-CM

## 2023-02-16 RX ORDER — PREGABALIN 75 MG/1
CAPSULE ORAL
Qty: 90 CAPSULE | Refills: 0 | Status: SHIPPED | OUTPATIENT
Start: 2023-02-16

## 2023-02-16 RX ORDER — METHOCARBAMOL 500 MG/1
TABLET, FILM COATED ORAL
Qty: 120 TABLET | Refills: 0 | Status: SHIPPED | OUTPATIENT
Start: 2023-02-16

## 2023-02-16 RX ORDER — HYDROCODONE BITARTRATE AND ACETAMINOPHEN 5; 325 MG/1; MG/1
TABLET ORAL
Qty: 60 TABLET | Refills: 0 | Status: SHIPPED | OUTPATIENT
Start: 2023-02-16

## 2023-02-16 NOTE — TELEPHONE ENCOUNTER
From: Ester Rojas  To: Nohemy Jimenez  Sent: 2/16/2023 2:38 PM EST  Subject: MRI TEST RESULTS    the MRI test results are available  Please advise the next steps based on that test result  Thank you for your help  Aris Min

## 2023-02-16 NOTE — PROGRESS NOTES
Assessment:  1  Acute exacerbation of chronic low back pain    2  Chronic left-sided low back pain without sciatica    3  Lumbar radiculopathy    4  Lumbar disc herniation    5  Spinal stenosis of lumbar region with neurogenic claudication    6  Acute left-sided low back pain with left-sided sciatica        Plan:  While the patient was in the office today, I did have a thorough conversation with the patient regarding their chronic pain syndrome, symptoms, and medication regimen/treatment plan  I discussed with the patient that unfortunately even though we did try to get the reading room to get the report to us before his office visit, we do not have the official report from the radiologist   I advised the patient that as soon as he sees the result in his MyChart application, he should send us a message or call us so we can take a look at the MRI results and then discuss how to proceed from there  The patient was agreeable and verbalized an understanding  With regards to his medication regimen, I discussed with the patient that at this point I would like him to continue the Lyrica at the twice daily dosing until Saturday and since he is deftly on a subtherapeutic dose and is just starting to notice improvement, I would like him to increase the Lyrica to 3 times daily dosing on Saturday as I feel it is necessary to continue to try address the underlying neuropathic and radiculopathy component of his pain  In the meantime we will continue the methocarbamol and the parent Norco as prescribed and I did send a prescription for the as needed Norco with a do not fill date of Monday, February 20, 2023 with the hope that with the increase in Lyrica he will start seeing a decreased need for the as needed Norco as he does understand that this is not a long-term solution    I discussed with the patient that they should not drive or operate machinery until they see how the medication changes affects them and if they have any side effects or issues, they are to call our office and make us aware so we can take the appropriate action and provide changes to the medications or treatment plan  The patient was agreeable and verbalized an understanding  [place] Prescription Drug Monitoring Program report was reviewed and was appropriate     There are risks associated with opioid medications, including dependence, addiction and tolerance  The patient understands and agrees to use these medications only as prescribed  Potential side effects of the medications include, but are not limited to, constipation, drowsiness, addiction, impaired judgment and risk of fatal overdose if not taken as prescribed  The patient was warned against driving while taking sedation medications  Sharing medications is a felony  At this point in time, the patient is showing no signs of addiction, abuse, diversion or suicidal ideation  The patient is to follow-up as needed for now as we are going to await the results of his MRI and see how he does over the next week or so with the changes to his medication regimen  The patient was agreeable and verbalized an understanding  History of Present Illness: The patient is a 71 y o  male last seen on 2/10/2023 who presents for a follow up office visit in regards to an acute exacerbation of his chronic low back pain and radicular symptoms secondar herniated lumbar disc y to with stenosis and radiculopathy  That since his last office visit, although it took a few days he definitely feels there is improvement, the patient currently reports that over the past few days especially he is starting to sleep better at nighttime and feels the Lyrica is helping as well as the increase in the methocarbamol    The patient is still continuing to utilize the SD Norco every 4-5 hours and it is still very difficult for him to stand up straight but overall his pain is slowly improving and he just increase the Lyrica to the twice daily dosing yesterday, again, with noticeable improvement  The patient did proceed with his MRI of the lumbosacral spine on February 13, 2023 and was hoping that it would be available for review today  The patient is eager to consider either repeat injection and some physical therapy as he needs to start feeling better and get back to his more normal lifestyle as it is his ultimate goal to try to avoid surgery as much as possible  Current pain medications includes: Lyrica 75 mg twice daily and methocarbamol 5 mg 4 times daily with Norco 5/325, 1 tablet every 4 hours as needed for pain     The patient reports that this regimen is providing 50% pain relief  The patient is reporting no side effects from this pain medication regimen  I have personally reviewed and/or updated the patient's past medical history, past surgical history, family history, social history, current medications, allergies, and vital signs today  Review of Systems:    Review of Systems   Respiratory: Negative for shortness of breath  Cardiovascular: Negative for chest pain  Gastrointestinal: Negative for constipation, diarrhea, nausea and vomiting  Musculoskeletal: Positive for gait problem  Negative for arthralgias, joint swelling and myalgias  Skin: Negative for rash  Neurological: Negative for dizziness, seizures and weakness  All other systems reviewed and are negative          Past Medical History:   Diagnosis Date   • Acute medial meniscus tear    • Allergic rhinitis    • Allergic rhinitis     last assessed 5/8/14   • Anxiety    • Appendicitis    • Arthritis    • Benign essential hypertension     last assessed 1/6/14   • Biceps tendon tear    • Cancer (HCC)     skin   • Cervical radiculopathy     and lumbar   • Chronic pain disorder     knees and hips    • Colon polyps    • CPAP (continuous positive airway pressure) dependence    • Depression    • Depression with anxiety    • Dyskinesia of esophagus    • Erythema migrans (Lyme disease)     last assessed 10/16/12   • Fatigue    • GERD (gastroesophageal reflux disease)    • Heart murmur 1951   • Heel spur, right    • Herpes    • Herpes zoster    • Hyperlipidemia    • Hyperplastic colon polyp     last assessed 14   • Joint pain    • Kidney stone    • Kidney stones    • Lyme disease    • Myocardial infarction [facility])    • Nephrolithiasis    • Panic disorder with agoraphobia     last assessed 13   • Seasonal affective disorder ([facility] [address] )    • Shingles    • Sleep apnea    • Vertigo    • Vitamin D deficiency        Past Surgical History:   Procedure Laterality Date   • APPENDECTOMY  2003   • BICEPS TENDON REPAIR     • CARDIAC OTHER      stents x2   • KNEE ARTHROSCOPY      with medial meniscus repair, resolved 05   • KNEE CARTILAGE SURGERY     • OTHER SURGICAL HISTORY      distal reinsertion of ruptured biceps tendon   • [place] ARTHRS KNE SURG W/MENISCECTOMY MED/LAT W/SHVG Right 2019    Procedure: RIGHT KNEE ARTHROSCOPY, MEDIAL MENISCECTOMY;  Surgeon: Reynaldo Saldivar DO;  Location: [address] [place] [address];  Service: Orthopedics   • TOOTH EXTRACTION      Middle of 2019   • VASECTOMY         Family History   Problem Relation Age of Onset   • Diabetes Mother    • Coronary artery disease Father    • Kidney disease Father    • Heart disease Father    • Cancer Father         kidney cancer       Social History     Occupational History   • Not on file   Tobacco Use   • Smoking status: Former     Packs/day: 1 00     Types: Cigarettes, Cigars     Start date: 11/15/1967     Quit date: 3/1/1987     Years since quittin 9   • Smokeless tobacco: Never   Vaping Use   • Vaping Use: Never used   Substance and Sexual Activity   • Alcohol use:  Yes     Alcohol/week: 1 0 standard drink     Types: 1 Glasses of wine per week   • Drug use: No   • Sexual activity: Not on file         Current Outpatient Medications:   •  ALPRAZolam (Xanax) 0 25 mg tablet, Take 1 tablet (0 25 mg total) by mouth 3 (three) times a day as needed for anxiety, Disp: 30 tablet, Rfl: 0  •  aspirin 81 mg chewable tablet, every 24 hours, Disp: , Rfl:   •  atorvastatin (LIPITOR) 80 mg tablet, every 24 hours, Disp: , Rfl:   •  butalbital-acetaminophen-caffeine (FIORICET,ESGIC) -40 mg per tablet, Take 1 tablet by mouth every 4 (four) hours as needed for headaches, Disp: 30 tablet, Rfl: 0  •  cholecalciferol (VITAMIN D3) 1,000 units tablet, Take 1,000 Units by mouth daily, Disp: , Rfl:   •  CHONDROITIN SULFATE A PO, Take by mouth in the morning, Disp: , Rfl:   •  Coenzyme Q10 (CoQ-10) 100 MG CAPS, every 24 hours, Disp: , Rfl:   •  Diclofenac Sodium (VOLTAREN) 1 %, Apply 2 g topically 4 (four) times a day, Disp: 50 g, Rfl: 0  •  ezetimibe (ZETIA) 10 mg tablet, 1 tablet Orally Once a day 90 days, Disp: , Rfl:   •  GLUCOSAMINE-CALCIUM-VIT D PO, Take 1,200 mg by mouth 1200mg, Disp: , Rfl:   •  HYDROcodone-acetaminophen (Norco) 5-325 mg per tablet, Take 1 every 4 hours PRN for post pain   DO NOT FILL UNTIL: 2/20/23, Disp: 60 tablet, Rfl: 0  •  Hyoscyamine Sulfate SL 0 125 MG SUBL, Place 0 125 mg under the tongue 3 (three) times a day as needed (as needed), Disp: 30 tablet, Rfl: 1  •  Ibuprofen 200 MG CAPS, 3 (three) times a day, Disp: , Rfl:   •  lisinopril (ZESTRIL) 2 5 mg tablet, every 24 hours, Disp: , Rfl:   •  methocarbamol (ROBAXIN) 500 mg tablet, Take 1 in AM and afternoon and 2 PO HS , Disp: 120 tablet, Rfl: 0  •  Omega-3 Fatty Acids (Fish Oil) 1200 MG CAPS, every 24 hours, Disp: , Rfl:   •  pantoprazole (PROTONIX) 40 mg tablet, ONE TABLET DAILY for GI issue, Disp: , Rfl:   •  pregabalin (LYRICA) 75 mg capsule, Take 1 PO TID , Disp: 90 capsule, Rfl: 0  •  vilazodone (VIIBRYD) 20 mg tablet, Take 1 tablet (20 mg total) by mouth daily with breakfast, Disp: 30 tablet, Rfl: 1  •  doxycycline hyclate (VIBRAMYCIN) 100 mg capsule, take 1 capsule twice a day with a full glass of water and food for 3 weeks for Lyme disease (Patient not taking: Reported on 11/2/2022), Disp: , Rfl:     Allergies   Allergen Reactions   • Pseudoephedrine Hives and Dizziness     Reaction Date: 16Apr2011;    • Sulfa Antibiotics Dizziness, Hives and Other (See Comments)       Physical Exam:    /82 (BP Location: Left arm, Patient Position: Sitting, Cuff Size: Standard)   Pulse 81   Temp 97 6 °F (36 4 °C)   Ht 6' 1" (1 854 m)   Wt 117 kg (257 lb)   BMI 33 91 kg/m²     Constitutional:normal, well developed, well nourished, alert, in no distress and non-toxic and no overt pain behavior  and overweight  Eyes:anicteric  HEENT:grossly intact  Neck:supple, symmetric, trachea midline and no masses   Pulmonary:even and unlabored  Cardiovascular:No edema or pitting edema present  Skin:Normal without rashes or lesions and well hydrated  Psychiatric:Mood and affect appropriate  Neurologic:Cranial Nerves II-XII grossly intact  Musculoskeletal:The patient's gait is slightly antalgic, but steady without the use of any assistive devices and compared to his office visit last Friday he is obviously still uncomfortable but much more comfortable than he was a week ago  Imaging    XRAY LUMBAR SPINE 2/10/2023 @ [facility]    INDICATION:   M[id number]: Lumbago with sciatica, left side      COMPARISON:  October 2, 2018     VIEWS:  XR SPINE LUMBAR 2 OR 3 VIEWS INJURY        FINDINGS: Osseous structures are somewhat osteopenic      There are 5 non rib bearing lumbar vertebral bodies       There is no evidence of acute fracture or destructive osseous lesion      Straightening of lumbar lordosis and mild left convex curvature of lower lumbar spine, slightly progressed from previous examination  Grade 1 degenerative anterolisthesis of L4 relative to L5  Mild L4-L5 disc space narrowing    Moderate lower lumbar   facet spondylosis       The pedicles appear intact      Soft tissues are unremarkable      IMPRESSION:     No acute osseous abnormality        Degenerative changes as described, progressed from October 2018

## 2023-02-27 DIAGNOSIS — F32.A DEPRESSION, UNSPECIFIED DEPRESSION TYPE: ICD-10-CM

## 2023-02-27 RX ORDER — VILAZODONE HYDROCHLORIDE 20 MG/1
20 TABLET ORAL
Qty: 30 TABLET | Refills: 1 | Status: SHIPPED | OUTPATIENT
Start: 2023-02-27

## 2023-03-06 ENCOUNTER — HOSPITAL ENCOUNTER (OUTPATIENT)
Dept: RADIOLOGY | Facility: CLINIC | Age: 73
Discharge: HOME | End: 2023-03-06
Admitting: ANESTHESIOLOGY

## 2023-03-06 VITALS
TEMPERATURE: 97.3 F | OXYGEN SATURATION: 95 % | HEART RATE: 85 BPM | DIASTOLIC BLOOD PRESSURE: 82 MMHG | RESPIRATION RATE: 20 BRPM | SYSTOLIC BLOOD PRESSURE: 139 MMHG

## 2023-03-06 DIAGNOSIS — M51.26 HERNIATED NUCLEUS PULPOSUS, L2-3 LEFT: ICD-10-CM

## 2023-03-06 DIAGNOSIS — M54.16 LUMBAR RADICULOPATHY: ICD-10-CM

## 2023-03-06 RX ORDER — PAPAVERINE HCL 150 MG
15 CAPSULE, EXTENDED RELEASE ORAL ONCE
Status: COMPLETED | OUTPATIENT
Start: 2023-03-06 | End: 2023-03-06

## 2023-03-06 RX ADMIN — IOHEXOL 1 ML: 300 INJECTION, SOLUTION INTRAVENOUS at 13:42

## 2023-03-06 RX ADMIN — DEXAMETHASONE SODIUM PHOSPHATE 15 MG: 10 INJECTION, SOLUTION INTRAMUSCULAR; INTRAVENOUS at 13:42

## 2023-03-06 NOTE — DISCHARGE INSTRUCTIONS
Epidural Steroid Injection   WHAT YOU NEED TO KNOW:   An epidural steroid injection (JUAN ANTONIO) is a procedure to inject steroid medicine into the epidural space  The epidural space is between your spinal cord and vertebrae  Steroids reduce inflammation and fluid buildup in your spine that may be causing pain  You may be given pain medicine along with the steroids  ACTIVITY  Do not drive or operate machinery today  No strenuous activity today - bending, lifting, etc   You may resume normal activites starting tomorrow - start slowly and as tolerated  You may shower today, but no tub baths or hot tubs  You may have numbness for several hours from the local anesthetic  Please use caution and common sense, especially with weight-bearing activities  CARE OF THE INJECTION SITE  If you have soreness or pain, apply ice to the area today (20 minutes on/20 minutes off)  Starting tomorrow, you may use warm, moist heat or ice if needed  You may have an increase or change in your discomfort for 36-48 hours after your treatment  Apply ice and continue with any pain medication you have been prescribed  Notify the [facility] if you have any of the following: redness, drainage, swelling, headache, stiff neck or fever above 100°F     SPECIAL INSTRUCTIONS  Our office will contact you in approximately 7 days for a progress report  MEDICATIONS  Continue to take all routine medications  Our office may have instructed you to hold some medications  As no general anesthesia was used in today's procedure, you should not experience any side effects related to anesthesia  If you are diabetic, the steroids used in today's injection may temporarily increase your blood sugar levels after the first few days after your injection  Please keep a close eye on your sugars and alert the doctor who manages your diabetes if your sugars are significantly high from your baseline or you are symptomatic       If you have a problem specifically related to your procedure, please call our office at [phone]  Problems not related to your procedure should be directed to your primary care physician

## 2023-03-06 NOTE — H&P
History of Present Illness:  The patient is a 71 y o  male who presents with complaints of decreased range of motion lumbar spine    Past Medical History:   Diagnosis Date   • Acute medial meniscus tear    • Allergic rhinitis    • Allergic rhinitis     last assessed 5/8/14   • Anxiety    • Appendicitis    • Arthritis    • Benign essential hypertension     last assessed 1/6/14   • Biceps tendon tear    • Cancer ([facility] )     skin   • Cervical radiculopathy     and lumbar   • Chronic pain disorder     knees and hips    • Colon polyps    • CPAP (continuous positive airway pressure) dependence    • Depression    • Depression with anxiety    • Dyskinesia of esophagus    • Erythema migrans (Lyme disease)     last assessed 10/16/12   • Fatigue    • GERD (gastroesophageal reflux disease)    • Heart murmur 1951   • Heel spur, right    • Herpes    • Herpes zoster    • Hyperlipidemia    • Hyperplastic colon polyp     last assessed 11/14/14   • Joint pain    • Kidney stone 1995   • Kidney stones    • Lyme disease    • Myocardial infarction [facility])    • Nephrolithiasis    • Panic disorder with agoraphobia     last assessed 8/7/13   • Seasonal affective disorder ([facility] )    • Shingles 2015   • Sleep apnea    • Vertigo    • Vitamin D deficiency        Past Surgical History:   Procedure Laterality Date   • APPENDECTOMY  08/01/2003   • BICEPS TENDON REPAIR     • CARDIAC OTHER      stents x2   • KNEE ARTHROSCOPY      with medial meniscus repair, resolved 01/01/05   • KNEE CARTILAGE SURGERY     • OTHER SURGICAL HISTORY      distal reinsertion of ruptured biceps tendon   • [place] ARTHRS KNE SURG W/MENISCECTOMY MED/LAT W/SHVG Right 07/30/2019    Procedure: RIGHT KNEE ARTHROSCOPY, MEDIAL MENISCECTOMY;  Surgeon: Coby Juárez DO;  Location: [facility];  Service: Orthopedics   • TOOTH EXTRACTION      Middle of October 2019   • VASECTOMY           Current Outpatient Medications:   •  ALPRAZolam (Xanax) 0 25 mg tablet, Take 1 tablet (0 25 mg total) by mouth 3 (three) times a day as needed for anxiety, Disp: 30 tablet, Rfl: 0  •  aspirin 81 mg chewable tablet, every 24 hours, Disp: , Rfl:   •  atorvastatin (LIPITOR) 80 mg tablet, every 24 hours, Disp: , Rfl:   •  butalbital-acetaminophen-caffeine (FIORICET,ESGIC) -40 mg per tablet, Take 1 tablet by mouth every 4 (four) hours as needed for headaches, Disp: 30 tablet, Rfl: 0  •  cholecalciferol (VITAMIN D3) 1,000 units tablet, Take 1,000 Units by mouth daily, Disp: , Rfl:   •  CHONDROITIN SULFATE A PO, Take by mouth in the morning, Disp: , Rfl:   •  Coenzyme Q10 (CoQ-10) 100 MG CAPS, every 24 hours, Disp: , Rfl:   •  Diclofenac Sodium (VOLTAREN) 1 %, Apply 2 g topically 4 (four) times a day, Disp: 50 g, Rfl: 0  •  doxycycline hyclate (VIBRAMYCIN) 100 mg capsule, take 1 capsule twice a day with a full glass of water and food for 3 weeks for Lyme disease (Patient not taking: Reported on 11/2/2022), Disp: , Rfl:   •  ezetimibe (ZETIA) 10 mg tablet, 1 tablet Orally Once a day 90 days, Disp: , Rfl:   •  GLUCOSAMINE-CALCIUM-VIT D PO, Take 1,200 mg by mouth 1200mg, Disp: , Rfl:   •  HYDROcodone-acetaminophen (Norco) 5-325 mg per tablet, Take 1 every 4 hours PRN for post pain   DO NOT FILL UNTIL: 2/20/23, Disp: 60 tablet, Rfl: 0  •  Hyoscyamine Sulfate SL 0 125 MG SUBL, Place 0 125 mg under the tongue 3 (three) times a day as needed (as needed), Disp: 30 tablet, Rfl: 1  •  Ibuprofen 200 MG CAPS, 3 (three) times a day, Disp: , Rfl:   •  lisinopril (ZESTRIL) 2 5 mg tablet, every 24 hours, Disp: , Rfl:   •  methocarbamol (ROBAXIN) 500 mg tablet, Take 1 in AM and afternoon and 2 PO HS , Disp: 120 tablet, Rfl: 0  •  Omega-3 Fatty Acids (Fish Oil) 1200 MG CAPS, every 24 hours, Disp: , Rfl:   •  pantoprazole (PROTONIX) 40 mg tablet, ONE TABLET DAILY for GI issue, Disp: , Rfl:   •  pregabalin (LYRICA) 75 mg capsule, Take 1 PO TID , Disp: 90 capsule, Rfl: 0  •  vilazodone (VIIBRYD) 20 mg tablet, Take 1 tablet (20 mg total) by mouth daily with breakfast, Disp: 30 tablet, Rfl: 1    Current Facility-Administered Medications:   •  dexamethasone (PF) (DECADRON) injection 15 mg, 15 mg, Epidural, Once, Rick Long DO  •  iohexol (OMNIPAQUE) 300 mg/mL injection 50 mL, 50 mL, Epidural, Once, Rick Long DO    Allergies   Allergen Reactions   • Pseudoephedrine Hives and Dizziness     Reaction Date: 16Apr2011;    • Sulfa Antibiotics Dizziness, Hives and Other (See Comments)       Physical Exam:   General: Awake, Alert, Oriented x 3, Mood and affect appropriate  Respiratory: Respirations even and unlabored  Cardiovascular: Peripheral pulses intact; no edema  Musculoskeletal Exam: Low back and leg pain  ASA Score: II         Assessment:   1  Herniated nucleus pulposus, L2-3 left    2   Lumbar radiculopathy        Plan: Left L3 & L4 TFESI

## 2023-03-10 ENCOUNTER — TELEPHONE (OUTPATIENT)
Dept: PAIN MEDICINE | Facility: CLINIC | Age: 73
End: 2023-03-10

## 2023-03-10 DIAGNOSIS — M54.42 ACUTE LEFT-SIDED LOW BACK PAIN WITH LEFT-SIDED SCIATICA: ICD-10-CM

## 2023-03-10 DIAGNOSIS — G89.29 ACUTE EXACERBATION OF CHRONIC LOW BACK PAIN: ICD-10-CM

## 2023-03-10 DIAGNOSIS — M54.50 ACUTE EXACERBATION OF CHRONIC LOW BACK PAIN: ICD-10-CM

## 2023-03-10 NOTE — TELEPHONE ENCOUNTER
Pt contacted Call Center requested refill of their medication  Medication Name: methocarbamol (ROBAXIN    Dosage of Med: 500 mg tablet   Frequency of Med: Take 1 in AM and afternoon and 2 PO HS  Remaining Medication: 20        Medication Name: pregabalin (LYRICA  Dosage of Med: 75 mg capsule     Frequency of Med: Take 1 PO TID  Remaining Medication: 0      Pharmacy and Location: [address] [place] [address], [address] [place] [address]         Pt  Preferred Callback Phone Number: [phone]      Thank you  PLEASE ADVISE PATIENTS:    REFILL REQUESTS WILL BE PROCESSED WITHIN 24-48 HOURS

## 2023-03-13 ENCOUNTER — TELEPHONE (OUTPATIENT)
Dept: PAIN MEDICINE | Facility: CLINIC | Age: 73
End: 2023-03-13

## 2023-03-15 DIAGNOSIS — F41.9 ANXIETY: ICD-10-CM

## 2023-03-15 RX ORDER — PREGABALIN 100 MG/1
CAPSULE ORAL
Qty: 90 CAPSULE | Refills: 0 | Status: SHIPPED | OUTPATIENT
Start: 2023-03-15

## 2023-03-15 RX ORDER — METHOCARBAMOL 500 MG/1
TABLET, FILM COATED ORAL
Qty: 120 TABLET | Refills: 0 | Status: SHIPPED | OUTPATIENT
Start: 2023-03-15

## 2023-03-15 RX ORDER — HYDROCODONE BITARTRATE AND ACETAMINOPHEN 5; 325 MG/1; MG/1
TABLET ORAL
Qty: 30 TABLET | Refills: 0 | Status: SHIPPED | OUTPATIENT
Start: 2023-03-15

## 2023-03-15 NOTE — TELEPHONE ENCOUNTER
At this point, I would like him to further in crease the Lyrica to 100 mg TID, which is just at the cusp of the therapeutic dose of 300 mg daily, I sent a 30 day supply to his pharmacy on file  He should call us if he has any side effects or issues with the increase  I also sent 30 scripts for the Methocarbamol and Norco 1/2 tablet BID PRN for pain  He should proceed with the OV as scheduled later on this month  Thank you

## 2023-03-15 NOTE — TELEPHONE ENCOUNTER
S/w pt  Pt increased lyrica to tid as per last ov  States noticed some improvement and no side effects  Pt states he ran out a few days ago  Pt has not cut back on hydrocodone yet  States he needs refills of lyrica, methocarbamol and hydrocodone at this time

## 2023-03-15 NOTE — TELEPHONE ENCOUNTER
Pt contacted Call Center requested refill of their medication  Patient returning missed call  Stated did not have WIFI  Phone line should work now    Medication Name: hydrocodone      Dosage of Med: 5/325      Frequency of Med: half twice a day      Remaining Medication: 1/2 tab      Pharmacy and Location: [address] [place] [address] [place]  [phone]        Pt  Preferred Callback Phone Number:       Thank you         PLEASE ADVISE PATIENTS:    REFILL REQUESTS WILL BE PROCESSED WITHIN 24-48 HOURS

## 2023-03-16 RX ORDER — ALPRAZOLAM 0.25 MG/1
0.25 TABLET ORAL 3 TIMES DAILY PRN
Qty: 30 TABLET | Refills: 0 | Status: SHIPPED | OUTPATIENT
Start: 2023-03-16

## 2023-03-27 ENCOUNTER — OFFICE VISIT (OUTPATIENT)
Dept: PAIN MEDICINE | Facility: CLINIC | Age: 73
End: 2023-03-27

## 2023-03-27 ENCOUNTER — OFFICE VISIT (OUTPATIENT)
Dept: FAMILY MEDICINE CLINIC | Facility: CLINIC | Age: 73
End: 2023-03-27

## 2023-03-27 VITALS
HEIGHT: 73 IN | DIASTOLIC BLOOD PRESSURE: 78 MMHG | WEIGHT: 260 LBS | HEART RATE: 76 BPM | BODY MASS INDEX: 34.46 KG/M2 | TEMPERATURE: 97.2 F | SYSTOLIC BLOOD PRESSURE: 130 MMHG

## 2023-03-27 VITALS
HEIGHT: 73 IN | HEART RATE: 57 BPM | WEIGHT: 260 LBS | OXYGEN SATURATION: 96 % | SYSTOLIC BLOOD PRESSURE: 126 MMHG | TEMPERATURE: 96.6 F | DIASTOLIC BLOOD PRESSURE: 76 MMHG | BODY MASS INDEX: 34.46 KG/M2

## 2023-03-27 DIAGNOSIS — M51.26 LUMBAR DISC HERNIATION: ICD-10-CM

## 2023-03-27 DIAGNOSIS — J02.9 ACUTE PHARYNGITIS, UNSPECIFIED ETIOLOGY: ICD-10-CM

## 2023-03-27 DIAGNOSIS — G89.29 ACUTE EXACERBATION OF CHRONIC LOW BACK PAIN: ICD-10-CM

## 2023-03-27 DIAGNOSIS — M54.50 ACUTE EXACERBATION OF CHRONIC LOW BACK PAIN: ICD-10-CM

## 2023-03-27 DIAGNOSIS — M48.062 SPINAL STENOSIS OF LUMBAR REGION WITH NEUROGENIC CLAUDICATION: ICD-10-CM

## 2023-03-27 DIAGNOSIS — M54.16 LUMBAR RADICULOPATHY: ICD-10-CM

## 2023-03-27 DIAGNOSIS — J01.00 ACUTE NON-RECURRENT MAXILLARY SINUSITIS: Primary | ICD-10-CM

## 2023-03-27 DIAGNOSIS — G89.4 CHRONIC PAIN SYNDROME: Primary | ICD-10-CM

## 2023-03-27 DIAGNOSIS — M54.50 CHRONIC LEFT-SIDED LOW BACK PAIN WITHOUT SCIATICA: ICD-10-CM

## 2023-03-27 DIAGNOSIS — G89.29 CHRONIC LEFT-SIDED LOW BACK PAIN WITHOUT SCIATICA: ICD-10-CM

## 2023-03-27 RX ORDER — METHOCARBAMOL 500 MG/1
TABLET, FILM COATED ORAL
Qty: 90 TABLET | Refills: 1 | Status: SHIPPED | OUTPATIENT
Start: 2023-03-27

## 2023-03-27 RX ORDER — PREGABALIN 100 MG/1
CAPSULE ORAL
Qty: 90 CAPSULE | Refills: 1 | Status: SHIPPED | OUTPATIENT
Start: 2023-03-27 | End: 2023-04-06

## 2023-03-27 RX ORDER — AMOXICILLIN 500 MG
1 CAPSULE ORAL EVERY 24 HOURS
COMMUNITY

## 2023-03-27 RX ORDER — HYDROCODONE BITARTRATE AND ACETAMINOPHEN 5; 325 MG/1; MG/1
TABLET ORAL
Qty: 45 TABLET | Refills: 0 | Status: SHIPPED | OUTPATIENT
Start: 2023-03-27

## 2023-03-27 RX ORDER — AMOXICILLIN AND CLAVULANATE POTASSIUM 875; 125 MG/1; MG/1
1 TABLET, FILM COATED ORAL EVERY 12 HOURS SCHEDULED
Qty: 20 TABLET | Refills: 0 | Status: SHIPPED | OUTPATIENT
Start: 2023-03-27 | End: 2023-04-06

## 2023-03-27 NOTE — PATIENT INSTRUCTIONS
Epidural Steroid Injection   AMBULATORY CARE:   What you need to know about an epidural steroid injection (JUAN ANTONIO):  An JUAN ANTONIO is a procedure to inject steroid medicine into the epidural space  The epidural space is between your spinal cord and vertebrae  Steroids reduce inflammation and fluid buildup in your spine that may be causing pain  You may be given pain medicine along with the steroids  How to prepare for an JUAN ANTONIO:  Your provider will talk to you about how to prepare for your procedure  He or she will tell you what medicines to take or not take on the day of your procedure  You may need to stop taking blood thinners or other medicines several days before your procedure  You may need to adjust any diabetes medicine you take on the day of your procedure  Steroid medicine can increase your blood sugar level  Arrange for someone to drive you home when you are discharged  What will happen during an JUAN ANTONIO:   You will be given medicine to numb the procedure area  You will be awake for the procedure, but you will not feel pain  You may also be given medicine to help you relax  Contrast liquid will be used to help your provider see the area better  Tell the provider if you have ever had an allergic reaction to contrast liquid  Your provider may place the needle into your neck area, middle of your back, or tailbone area  He or she may inject the medicine next to the nerves that are causing your pain  He or she may instead inject the medicine into a larger area of the epidural space  This helps the medicine spread to more nerves  Your provider will use a fluoroscope to help guide the needle to the right place  A fluoroscope is a type of x-ray  After the procedure, a bandage will be placed over the injection site to prevent infection  What will happen after an JUAN ANTONIO:  You will have a bandage over the injection site to prevent infection  Your provider will tell you when you can bathe and any activity guidelines   You will be able to go home  Risks of an JUAN ANTONIO:  You may have temporary or permanent nerve damage or paralysis  You may have bleeding or develop a serious infection, such as meningitis (swelling of the brain coverings)  An abscess may also develop  An abscess is a pus-filled area under the skin  You may need surgery to fix the abscess  You may have a seizure, anxiety, or trouble sleeping  If you are a man, you may have temporary erectile dysfunction (not able to have an erection)  Call your local emergency number (911 in the [address] [facility]) if:   You have a seizure  You have trouble moving your legs  Seek care immediately if:   Blood soaks through your bandage  You have a fever or chills, severe back pain, and the procedure area is sensitive to the touch  You cannot control when you urinate or have a bowel movement  Call your doctor if:   You have weakness or numbness in your legs  Your wound is red, swollen, or draining pus  You have nausea or are vomiting  Your face or neck is red and you feel warm  You have more pain than you had before the procedure  You have swelling in your hands or feet  You have questions or concerns about your condition or care  Care for your wound as directed: You may remove the bandage before you go to bed the day of your procedure  You may take a shower, but do not take a bath for at least 24 hours  Self-care:   Do not drive,  use machines, or do strenuous activity for 24 hours after your procedure or as directed  Continue other treatments  as directed  Steroid injections alone will not control your pain  The injections are meant to be used with other treatments, such as physical therapy  Follow up with your doctor as directed:  Write down your questions so you remember to ask them during your visits  © Copyright Ruven Claude 2022 Information is for End User's use only and may not be sold, redistributed or otherwise used for commercial purposes    The above information is an  only  It is not intended as medical advice for individual conditions or treatments  Talk to your doctor, nurse or pharmacist before following any medical regimen to see if it is safe and effective for you  Opioid Safety   AMBULATORY CARE:   Opioid safety  includes the correct use, storage, and disposal of opioids  Examples of opioid medicines to treat pain include oxycodone, morphine, fentanyl, and codeine  Call your local emergency number (911 in the [address] [facility]), or have someone else call if:   You have a seizure  You cannot be woken  You have trouble staying awake and your breathing is slow or shallow  Your speech is slurred, or you are confused  You are dizzy or stumble when you walk  Call your doctor, or have someone close to you call if:   You are extremely drowsy, or you have trouble staying awake or speaking  You have pale or clammy skin  You have blue fingernails or lips  Your heartbeat is slower than normal     You cannot stop vomiting  You have questions or concerns about your condition or care  Use opioids safely:   Take prescribed opioids exactly as directed  Opioids come with directions based on the kind of opioid and how it is given  Do not take more than the recommended amount, or for longer than needed  Do not give opioids to others or take opioids that belong to someone else  Misuse of opioids can lead to an addiction or overdose  Do not mix opioids with other medicines or alcohol  The combination can cause an overdose, or lead to a coma  Do not drive or operate heavy machinery after you take the opioid  Your provider or pharmacist can tell you how long to wait after a dose before you do these activities  Talk to your healthcare provider if you have any side effects  He or she can help you prevent or relieve side effects  Side effects include nausea, sleepiness, itching, and trouble thinking clearly      Manage constipation:  Constipation is the most common side effect of opioid medicine  Constipation is when you have hard, dry bowel movements, or you go longer than usual between bowel movements  Tell your healthcare provider about all changes in your bowel movements while you are taking opioids  He or she may recommend laxative medicine to help you have a bowel movement  He or she may also change the kind of opioid you are taking, or change when you take it  The following are more ways you can prevent or relieve constipation:  Drink liquids as directed  You may need to drink extra liquids to help soften and move your bowels  Ask how much liquid to drink each day and which liquids are best for you  Eat high-fiber foods  This may help decrease constipation by adding bulk to your bowel movements  High-fiber foods include fruits, vegetables, whole-grain breads and cereals, and beans  Your healthcare provider or dietitian can help you create a high-fiber meal plan  Your provider may also recommend a fiber supplement if you cannot get enough fiber from food  Exercise regularly  Regular physical activity can help stimulate your intestines  Walking is a good exercise to prevent or relieve constipation  Ask which exercises are best for you  Schedule a time each day to have a bowel movement  This may help train your body to have regular bowel movements  Bend forward while you are on the toilet to help move the bowel movement out  Sit on the toilet for at least 10 minutes, even if you do not have a bowel movement  Store opioids safely:   Store opioids where others cannot easily get them  Keep them in a locked cabinet or secure area  Do not  keep them in a purse or other bag you carry with you  A person may be looking for something else and find the opioids  Make sure opioids are stored out of the reach of children  A child can easily overdose on opioids   Opioids may look like candy to a small child     The best way to dispose of opioids: The laws vary by country and area  In the [place], the best way is to return the opioids through a take-back program  This program is offered by the [employer])  The following are options for using the program:  Take the opioids to a DUY collection site  The site is often a law enforcement center  Call your local law enforcement center for scheduled take-back days in your area  You will be given information on where to go if the collection site is in a different location  Take the opioids to an approved pharmacy or hospital   A pharmacy or hospital may be set up as a collection site  You will need to ask if it is a DUY collection site if you were not directed there  A pharmacy or doctor's office may not be able to take back opioids unless it is a DUY site  Use a mail-back system  This means you are given containers to put the opioids into  You will then mail them in the containers  Use a take-back drop box  This is a place to leave the opioids at any time  People and animals will not be able to get into the box  Your local law enforcement agency can tell you where to find a drop box in your area  Other safe ways to dispose of opioids: The medicine may come with disposal instructions  The instructions may vary depending on the brand of medicine you are using  Instructions may come in a Medication Guide, but not every medicine has one  You may instead get instructions from your pharmacy or doctor  Follow instructions carefully  The following are general guidelines to follow:  Find out if you can flush the opioid  Some opioids can be flushed down the toilet or poured into the sink  You will need to contact authorities in your area to see if this is an option for you  The FDA also offers a list of medicines that are safe to flush down the toilet  You can check the list if you cannot get the information for your local area      Ask your waste management company about rules for putting opioids in the trash  The company will be able to give you specific directions  Scratch out personal information on the original medicine label so it cannot be read  Then put it in the trash  Do not label the trash or put any information on it about the opioids  It should look like regular household trash so no one is tempted to look for the opioids  Keep the trash out of the reach of children and animals  Always make sure trash is secure  Talk to officials if you live in a facility  If you live in a nursing home or assisted living center, talk to an official  The person will know the rules for your area  Other ways to manage pain:   Ask your healthcare provider about non-opioid medicines to control pain  Nonprescription medicines include NSAIDs (such as ibuprofen) and acetaminophen  Prescription medicines include muscle relaxers, antidepressants, and steroids  Pain may be managed without any medicines  Some ways to relieve pain include massage, aromatherapy, or meditation  Physical or occupational therapy may also help  For more information:   Drug Enforcement Administration  [address] [place] [address] Lauren William [address]  Phone: 8- [phone] - [phone]  Web Address: [facility]/drug_disposal/    [address] and Drug Administration  [facility] , [address] [facility]  Phone: 6- [phone] - [phone]  Web Address: http://[employer]/  Follow up with your doctor or pain specialist as directed: You may need to have your dose adjusted  Your doctor or pain specialist can also help you find ways to manage pain without opioids  Write down your questions so you remember to ask them during your visits  © Copyright Donlollyae Colla 2022 Information is for End User's use only and may not be sold, redistributed or otherwise used for commercial purposes  The above information is an  only   It is not intended as medical advice for individual conditions or treatments  Talk to your doctor, nurse or pharmacist before following any medical regimen to see if it is safe and effective for you

## 2023-03-27 NOTE — PROGRESS NOTES
Name: Toya Roth      : 1950      MRN: [id number]  Encounter Provider: LUIS ALBERTO Foster  Encounter Date: 3/27/2023   Encounter department: [facility]     1  Acute non-recurrent maxillary sinusitis  Assessment & Plan:  Salt water gargles  Drink plenty of fluids  Mucinex dm to help with cough and congestion  Nasal saline spray  Throw out toothbrush once on antibiotic for 48 hours  Augmentin Twice daily for 10 days, finish entire course even if feeling better    Orders:  -     amoxicillin-clavulanate (AUGMENTIN) 875-125 mg per tablet; Take 1 tablet by mouth every 12 (twelve) hours for 10 days    2  Acute pharyngitis, unspecified etiology  Assessment & Plan:  Salt water gargles  Drink plenty of fluids  Mucinex dm to help with cough and congestion  Nasal saline spray  Throw out toothbrush once on antibiotic for 48 hours  Augmentin Twice daily for 10 days, finish entire course even if feeling better    Orders:  -     amoxicillin-clavulanate (AUGMENTIN) 875-125 mg per tablet; Take 1 tablet by mouth every 12 (twelve) hours for 10 days         Subjective      Started on Thursday with sore throat and hoarseness, was talking at a show so that it was vocal fatigue  Since then fatigue has set in, ear pressure, nasal congestion  Cough is non productive  Has been using chloraseptic spray for his throat and nasal spray  No fevers  No abd pain,n v, d  + headache, more pressure  COVID test negative last night  Was in [address], [place] show         Review of Systems   Constitutional: Positive for fatigue  Negative for fever  HENT: Positive for congestion, ear pain, postnasal drip, rhinorrhea, sinus pressure, sinus pain and sore throat  Negative for ear discharge  Respiratory: Positive for cough  Negative for shortness of breath and wheezing  Cardiovascular: Negative  Gastrointestinal: Negative  Neurological: Positive for headaches     Hematological: Negative  Current Outpatient Medications on File Prior to Visit   Medication Sig   • ALPRAZolam (Xanax) 0 25 mg tablet Take 1 tablet (0 25 mg total) by mouth 3 (three) times a day as needed for anxiety   • aspirin 81 mg chewable tablet every 24 hours   • atorvastatin (LIPITOR) 80 mg tablet every 24 hours   • butalbital-acetaminophen-caffeine (FIORICET,ESGIC) -40 mg per tablet Take 1 tablet by mouth every 4 (four) hours as needed for headaches   • cholecalciferol (VITAMIN D3) 1,000 units tablet Take 1,000 Units by mouth daily   • CHONDROITIN SULFATE A PO Take by mouth in the morning   • Coenzyme Q10 (CoQ-10) 100 MG CAPS every 24 hours   • Diclofenac Sodium (VOLTAREN) 1 % Apply 2 g topically 4 (four) times a day   • doxycycline hyclate (VIBRAMYCIN) 100 mg capsule    • ezetimibe (ZETIA) 10 mg tablet 1 tablet Orally Once a day 90 days   • GLUCOSAMINE-CALCIUM-VIT D PO Take 1,200 mg by mouth 1200mg   • HYDROcodone-acetaminophen (Norco) 5-325 mg per tablet Take 1/2 tablet BID PRN for pain  • Hyoscyamine Sulfate SL 0 125 MG SUBL Place 0 125 mg under the tongue 3 (three) times a day as needed (as needed)   • Ibuprofen 200 MG CAPS 3 (three) times a day   • lisinopril (ZESTRIL) 2 5 mg tablet every 24 hours   • methocarbamol (ROBAXIN) 500 mg tablet Take 1 in AM and afternoon and 2 PO HS    • Omega-3 Fatty Acids (Fish Oil) 1200 MG CAPS every 24 hours   • Omega-3 Fatty Acids (Fish Oil) 1200 MG CAPS 1 capsule every 24 hours   • pantoprazole (PROTONIX) 40 mg tablet ONE TABLET DAILY for GI issue   • pregabalin (LYRICA) 100 mg capsule Take 1 PO TID     • vilazodone (VIIBRYD) 20 mg tablet Take 1 tablet (20 mg total) by mouth daily with breakfast   • [DISCONTINUED] Brilinta 60 MG Take 60 mg by mouth 2 (two) times a day (Patient not taking: No sig reported)   • [DISCONTINUED] Multiple Vitamins-Minerals (MULTI FOR HIM 50+) TABS Take by mouth       Objective     /76   Pulse 57   Temp (!) 96 6 °F (35 9 °C) (Tympanic) " Ht 6' 1\" (1 854 m)   Wt 118 kg (260 lb)   SpO2 96%   BMI 34 30 kg/m²     Physical Exam  Vitals and nursing note reviewed  Constitutional:       Appearance: Normal appearance  He is obese  HENT:      Head: Normocephalic  Right Ear: Tympanic membrane, ear canal and external ear normal       Left Ear: Tympanic membrane, ear canal and external ear normal       Nose: Congestion and rhinorrhea present  Mouth/Throat:      Mouth: Mucous membranes are moist       Pharynx: Oropharynx is clear  Posterior oropharyngeal erythema (PND, no exudate) present  Eyes:      Conjunctiva/sclera: Conjunctivae normal       Pupils: Pupils are equal, round, and reactive to light  Cardiovascular:      Rate and Rhythm: Normal rate and regular rhythm  Heart sounds: Murmur heard  Pulmonary:      Effort: Pulmonary effort is normal  No respiratory distress  Breath sounds: Normal breath sounds  Abdominal:      Palpations: Abdomen is soft  Musculoskeletal:      Right lower leg: No edema  Left lower leg: No edema  Lymphadenopathy:      Cervical: No cervical adenopathy  Neurological:      Mental Status: He is alert and oriented to person, place, and time         LUIS ALBERTO Duron"

## 2023-03-27 NOTE — PATIENT INSTRUCTIONS
Salt water gargles  Drink plenty of fluids  Mucinex dm to help with cough and congestion  Nasal saline spray  Throw out toothbrush once on antibiotic for 48 hours  Augmentin Twice daily for 10 days, finish entire course even if feeling better

## 2023-05-01 ENCOUNTER — HOSPITAL ENCOUNTER (OUTPATIENT)
Dept: RADIOLOGY | Facility: CLINIC | Age: 73
Discharge: HOME | End: 2023-05-01
Admitting: ANESTHESIOLOGY

## 2023-05-01 VITALS
RESPIRATION RATE: 20 BRPM | TEMPERATURE: 97.4 F | HEART RATE: 75 BPM | OXYGEN SATURATION: 96 % | DIASTOLIC BLOOD PRESSURE: 74 MMHG | SYSTOLIC BLOOD PRESSURE: 152 MMHG

## 2023-05-01 DIAGNOSIS — G89.29 ACUTE EXACERBATION OF CHRONIC LOW BACK PAIN: ICD-10-CM

## 2023-05-01 DIAGNOSIS — M54.16 LUMBAR RADICULOPATHY: ICD-10-CM

## 2023-05-01 DIAGNOSIS — M54.50 ACUTE EXACERBATION OF CHRONIC LOW BACK PAIN: ICD-10-CM

## 2023-05-01 DIAGNOSIS — M51.26 LUMBAR DISC HERNIATION: ICD-10-CM

## 2023-05-01 DIAGNOSIS — M48.062 SPINAL STENOSIS OF LUMBAR REGION WITH NEUROGENIC CLAUDICATION: ICD-10-CM

## 2023-05-01 RX ORDER — METHYLPREDNISOLONE ACETATE 80 MG/ML
80 INJECTION, SUSPENSION INTRA-ARTICULAR; INTRALESIONAL; INTRAMUSCULAR; PARENTERAL; SOFT TISSUE ONCE
Status: COMPLETED | OUTPATIENT
Start: 2023-05-01 | End: 2023-05-01

## 2023-05-01 RX ADMIN — METHYLPREDNISOLONE ACETATE 80 MG: 80 INJECTION, SUSPENSION INTRA-ARTICULAR; INTRALESIONAL; INTRAMUSCULAR; SOFT TISSUE at 14:16

## 2023-05-01 RX ADMIN — IOHEXOL 1 ML: 300 INJECTION, SOLUTION INTRAVENOUS at 14:15

## 2023-05-01 NOTE — DISCHARGE INSTRUCTIONS
Epidural Steroid Injection   WHAT YOU NEED TO KNOW:   An epidural steroid injection (JUAN ANTONIO) is a procedure to inject steroid medicine into the epidural space  The epidural space is between your spinal cord and vertebrae  Steroids reduce inflammation and fluid buildup in your spine that may be causing pain  You may be given pain medicine along with the steroids  ACTIVITY  Do not drive or operate machinery today  No strenuous activity today - bending, lifting, etc   You may resume normal activites starting tomorrow - start slowly and as tolerated  You may shower today, but no tub baths or hot tubs  You may have numbness for several hours from the local anesthetic  Please use caution and common sense, especially with weight-bearing activities  CARE OF THE INJECTION SITE  If you have soreness or pain, apply ice to the area today (20 minutes on/20 minutes off)  Starting tomorrow, you may use warm, moist heat or ice if needed  You may have an increase or change in your discomfort for 36-48 hours after your treatment  Apply ice and continue with any pain medication you have been prescribed  Notify the [facility] if you have any of the following: redness, drainage, swelling, headache, stiff neck or fever above 100°F     SPECIAL INSTRUCTIONS  Our office will contact you in approximately 7 days for a progress report  MEDICATIONS  Continue to take all routine medications  Our office may have instructed you to hold some medications  As no general anesthesia was used in today's procedure, you should not experience any side effects related to anesthesia  If you are diabetic, the steroids used in today's injection may temporarily increase your blood sugar levels after the first few days after your injection  Please keep a close eye on your sugars and alert the doctor who manages your diabetes if your sugars are significantly high from your baseline or you are symptomatic       If you have a problem specifically related to your procedure, please call our office at [phone]  Problems not related to your procedure should be directed to your primary care physician

## 2023-05-01 NOTE — H&P
History of Present Illness: The patient is a 71 y o  male who presents with complaints of low back and leg pain      Past Medical History:   Diagnosis Date    Acute medial meniscus tear     Allergic rhinitis     Allergic rhinitis     last assessed 5/8/14    Anxiety     Appendicitis     Arthritis     Benign essential hypertension     last assessed 1/6/14    Biceps tendon tear     Cancer (HCC)     skin    Cervical radiculopathy     and lumbar    Chronic pain disorder     knees and hips     Colon polyps     CPAP (continuous positive airway pressure) dependence     Depression     Depression with anxiety     Dyskinesia of esophagus     Erythema migrans (Lyme disease)     last assessed 10/16/12    Fatigue     GERD (gastroesophageal reflux disease)     Heart murmur 1951    Heel spur, right     Herpes     Herpes zoster     Hyperlipidemia     Hyperplastic colon polyp     last assessed 11/14/14    Joint pain     Kidney stone 1995    Kidney stones     Lyme disease     [facility]     Nephrolithiasis     Panic disorder with agoraphobia     last assessed 8/7/13    Seasonal affective disorder ([facility] )     Shingles 2015    Sleep apnea     Vertigo     Vitamin D deficiency        Past Surgical History:   Procedure Laterality Date    APPENDECTOMY  08/01/2003    BICEPS TENDON REPAIR      CARDIAC OTHER      stents x2    KNEE ARTHROSCOPY      with medial meniscus repair, resolved 01/01/05    KNEE CARTILAGE SURGERY      OTHER SURGICAL HISTORY      distal reinsertion of ruptured biceps tendon    OR ARTHRS KNE SURG W/MENISCECTOMY MED/LAT W/SHVG Right 07/30/2019    Procedure: RIGHT KNEE ARTHROSCOPY, MEDIAL MENISCECTOMY;  Surgeon: Zach May DO;  Location: [address] [place] [address];  Service: Orthopedics    TOOTH EXTRACTION      Middle of October 2019    VASECTOMY           Current Outpatient Medications:     HYDROcodone-acetaminophen (Norco) 5-325 mg per tablet, Take 1/2 tablet TID PRN for pain , Disp: 45 tablet, Rfl: 0    ALPRAZolam (Xanax) 0 25 mg tablet, Take 1 tablet (0 25 mg total) by mouth 3 (three) times a day as needed for anxiety, Disp: 30 tablet, Rfl: 0    aspirin 81 mg chewable tablet, every 24 hours, Disp: , Rfl:     atorvastatin (LIPITOR) 80 mg tablet, every 24 hours, Disp: , Rfl:     butalbital-acetaminophen-caffeine (FIORICET,ESGIC) -40 mg per tablet, Take 1 tablet by mouth every 4 (four) hours as needed for headaches, Disp: 30 tablet, Rfl: 0    cholecalciferol (VITAMIN D3) 1,000 units tablet, Take 1,000 Units by mouth daily, Disp: , Rfl:     CHONDROITIN SULFATE A PO, Take by mouth in the morning, Disp: , Rfl:     Coenzyme Q10 (CoQ-10) 100 MG CAPS, every 24 hours, Disp: , Rfl:     Diclofenac Sodium (VOLTAREN) 1 %, Apply 2 g topically 4 (four) times a day, Disp: 50 g, Rfl: 0    doxycycline hyclate (VIBRAMYCIN) 100 mg capsule, , Disp: , Rfl:     ezetimibe (ZETIA) 10 mg tablet, 1 tablet Orally Once a day 90 days, Disp: , Rfl:     GLUCOSAMINE-CALCIUM-VIT D PO, Take 1,200 mg by mouth 1200mg, Disp: , Rfl:     Hyoscyamine Sulfate SL 0 125 MG SUBL, Place 0 125 mg under the tongue 3 (three) times a day as needed (as needed), Disp: 30 tablet, Rfl: 1    Ibuprofen 200 MG CAPS, 3 (three) times a day, Disp: , Rfl:     lisinopril (ZESTRIL) 2 5 mg tablet, every 24 hours, Disp: , Rfl:     methocarbamol (ROBAXIN) 500 mg tablet, Take 1 in AM and 2 PO HS , Disp: 90 tablet, Rfl: 1    Omega-3 Fatty Acids (Fish Oil) 1200 MG CAPS, every 24 hours, Disp: , Rfl:     Omega-3 Fatty Acids (Fish Oil) 1200 MG CAPS, 1 capsule every 24 hours, Disp: , Rfl:     pantoprazole (PROTONIX) 40 mg tablet, ONE TABLET DAILY for GI issue, Disp: , Rfl:     pregabalin (LYRICA) 75 mg capsule, Take 1 PO TID, Disp: 90 capsule, Rfl: 1    vilazodone (VIIBRYD) 20 mg tablet, Take 1 tablet (20 mg total) by mouth daily with breakfast, Disp: 30 tablet, Rfl: 1    Current Facility-Administered Medications:    iohexol (OMNIPAQUE) 300 mg/mL injection 50 mL, 50 mL, Epidural, Once, Rick Long DO    methylPREDNISolone acetate (DEPO-MEDROL) injection 80 mg, 80 mg, Epidural, Once, Rick Long DO    Allergies   Allergen Reactions    Pseudoephedrine Hives and Dizziness     Reaction Date: 16Apr2011;     Sulfa Antibiotics Dizziness, Hives and Other (See Comments)       Physical Exam:   Vitals:    05/01/23 1403   BP: 147/87   Pulse: 84   Resp: 18   Temp: (!) 97 4 °F (36 3 °C)   SpO2: 93%     General: Awake, Alert, Oriented x 3, Mood and affect appropriate  Respiratory: Respirations even and unlabored  Cardiovascular: Peripheral pulses intact; no edema  Musculoskeletal Exam: Decreased range of motion lumbar spine    ASA Score: III    Patient/Chart Verification  Patient ID Verified: Verbal  ID Band Applied: No  Consents Confirmed: Procedural  H&P( within 30 days) Verified: To be obtained in the Pre-Procedure area  Interval H&P(within 24 hr) Complete (required for Outpatients and Surgery Admit only): To be obtained in the Pre-Procedure area  Allergies Reviewed: Yes  Anticoag/NSAID held?: No  Currently on antibiotics?: No  Pre-op Lab/Test Results Available: N/A    Assessment:   1  Acute exacerbation of chronic low back pain    2  Spinal stenosis of lumbar region with neurogenic claudication    3  Lumbar disc herniation    4   Lumbar radiculopathy        Plan: L5-S1 LESI

## 2023-05-02 ENCOUNTER — VBI (OUTPATIENT)
Dept: ADMINISTRATIVE | Facility: OTHER | Age: 73
End: 2023-05-02

## 2023-05-08 ENCOUNTER — TELEPHONE (OUTPATIENT)
Dept: PAIN MEDICINE | Facility: CLINIC | Age: 73
End: 2023-05-08

## 2023-05-08 NOTE — TELEPHONE ENCOUNTER
Pt reports 40% improvement post inj  Pain level 5/10  Pt aware we will call next week for an update    L5-S1 LESI 5/1 , F/u 5/22

## 2023-05-09 DIAGNOSIS — F32.A DEPRESSION, UNSPECIFIED DEPRESSION TYPE: ICD-10-CM

## 2023-05-09 RX ORDER — VILAZODONE HYDROCHLORIDE 20 MG/1
20 TABLET ORAL
Qty: 30 TABLET | Refills: 1 | Status: CANCELLED | OUTPATIENT
Start: 2023-05-09

## 2023-05-17 DIAGNOSIS — G43.811 OTHER MIGRAINE WITH STATUS MIGRAINOSUS, INTRACTABLE: ICD-10-CM

## 2023-05-17 DIAGNOSIS — F41.9 ANXIETY: ICD-10-CM

## 2023-05-17 NOTE — TELEPHONE ENCOUNTER
Other reason request has been forwarded to provider: please review red x, thank you, Pt req Xanax and Butalbital-acet caff to [facility], Pt 2 for xanax is on file from 2/8/23

## 2023-05-17 NOTE — TELEPHONE ENCOUNTER
Pt requested refill for Xanax and Butalbital-acetaminophen caffeine to the [facility].  Pt 2 for xanax is on file as of 2/8/23

## 2023-05-18 ENCOUNTER — TELEPHONE (OUTPATIENT)
Dept: PAIN MEDICINE | Facility: CLINIC | Age: 73
End: 2023-05-18

## 2023-05-18 RX ORDER — BUTALBITAL, ACETAMINOPHEN AND CAFFEINE 50; 325; 40 MG/1; MG/1; MG/1
1 TABLET ORAL EVERY 4 HOURS PRN
Qty: 30 TABLET | Refills: 0 | Status: SHIPPED | OUTPATIENT
Start: 2023-05-18 | End: 2023-08-23 | Stop reason: SDUPTHER

## 2023-05-18 RX ORDER — ALPRAZOLAM 0.25 MG/1
0.25 TABLET ORAL 3 TIMES DAILY PRN
Qty: 30 TABLET | Refills: 0 | Status: SHIPPED | OUTPATIENT
Start: 2023-05-18 | End: 2023-07-19 | Stop reason: SDUPTHER

## 2023-05-22 ENCOUNTER — OFFICE VISIT (OUTPATIENT)
Dept: PAIN MEDICINE | Facility: CLINIC | Age: 73
End: 2023-05-22

## 2023-05-22 VITALS
BODY MASS INDEX: 33.53 KG/M2 | HEIGHT: 73 IN | HEART RATE: 78 BPM | DIASTOLIC BLOOD PRESSURE: 72 MMHG | TEMPERATURE: 98.1 F | SYSTOLIC BLOOD PRESSURE: 126 MMHG | WEIGHT: 253 LBS

## 2023-05-22 DIAGNOSIS — M54.16 LUMBAR RADICULOPATHY: ICD-10-CM

## 2023-05-22 DIAGNOSIS — G89.29 CHRONIC LEFT-SIDED LOW BACK PAIN WITHOUT SCIATICA: ICD-10-CM

## 2023-05-22 DIAGNOSIS — M48.062 SPINAL STENOSIS OF LUMBAR REGION WITH NEUROGENIC CLAUDICATION: ICD-10-CM

## 2023-05-22 DIAGNOSIS — M54.50 CHRONIC LEFT-SIDED LOW BACK PAIN WITHOUT SCIATICA: ICD-10-CM

## 2023-05-22 DIAGNOSIS — M47.816 LUMBAR SPONDYLOSIS: ICD-10-CM

## 2023-05-22 DIAGNOSIS — G89.4 CHRONIC PAIN SYNDROME: ICD-10-CM

## 2023-05-22 DIAGNOSIS — M51.26 LUMBAR DISC HERNIATION: ICD-10-CM

## 2023-05-22 RX ORDER — METHOCARBAMOL 500 MG/1
TABLET, FILM COATED ORAL
Qty: 90 TABLET | Refills: 1 | Status: SHIPPED | OUTPATIENT
Start: 2023-05-22

## 2023-05-22 RX ORDER — HYDROCODONE BITARTRATE AND ACETAMINOPHEN 5; 325 MG/1; MG/1
TABLET ORAL
Qty: 45 TABLET | Refills: 0 | Status: SHIPPED | OUTPATIENT
Start: 2023-05-22

## 2023-05-22 RX ORDER — PREGABALIN 75 MG/1
CAPSULE ORAL
Qty: 90 CAPSULE | Refills: 1 | Status: SHIPPED | OUTPATIENT
Start: 2023-05-22

## 2023-05-22 NOTE — PROGRESS NOTES
Assessment:  1  Lumbar spondylosis    2  Lumbar radiculopathy    3  Lumbar disc herniation    4  Chronic left-sided low back pain without sciatica    5  Spinal stenosis of lumbar region with neurogenic claudication    6  Chronic pain syndrome        Plan:  While the patient was in the office today, I did have a thorough conversation regarding their chronic pain syndrome, medication management, and treatment plan options  After discussing options, I recommended the patient proceed with a left L4 and L5 transforaminal epidural steroid injection to address the radicular component of his pain pattern along with his dermatomes  I have recommended that on scheduling this until August which would be 3 months from his injection  Refill and continue Lyrica 75 mg 3 times daily methocarbamol 500 mg in the morning and 1000 mg at at bedtime as needed, Norco 5/325 1/2  tablet 3 times daily as needed pain with a quantity of 45 tablets  Patient uses the medication sparingly and without side effects  [place] Prescription Drug Monitoring Program report was reviewed and was appropriate     There are risks associated with opioid medications, including dependence, addiction and tolerance  The patient understands and agrees to use these medications only as prescribed  Potential side effects of the medications include, but are not limited to, constipation, drowsiness, addiction, impaired judgment and risk of fatal overdose if not taken as prescribed  The patient was warned against driving while taking sedation medications  Sharing medications is a felony  At this point in time, the patient is showing no signs of addiction, abuse, diversion or suicidal ideation  Complete risks and benefits including bleeding, infection, tissue reaction, nerve injury and allergic reaction were discussed  The approach was demonstrated using models and literature was provided  Verbal and written consent was obtained      The patient was advised to contact the office should their symptoms worsen in the interim  The patient was agreeable and verbalized an understanding  History of Present Illness: The patient is a 71 y o  male last seen on 5/1/2023 who presents for a follow up office visit in regards to chronic pain  The patient currently reports chronic radicular low back pain that he presently rates a 4 out of 10 on the pain scale describes it as a constant dull, aching and sharp pain  He reports about 50% reduction in the radicular pain he was experiencing in the posterior aspect of the leg following the L5-S1 interlaminar epidural steroid injections on 5/1/23  However, at this point he is reporting pain along the L4 dermatomal distribution so much so that it affects his ability to sleep at night  Current pain medications includes: Lyrica, Norco, Robaxin  The patient reports that this regimen is providing 50% pain relief  The patient is reporting no side effects from this pain medication regimen  I have personally reviewed and/or updated the patient's past medical history, past surgical history, family history, social history, current medications, allergies, and vital signs today  Review of Systems:    Review of Systems   Respiratory: Negative for shortness of breath  Cardiovascular: Negative for chest pain  Gastrointestinal: Negative for constipation, diarrhea, nausea and vomiting  Musculoskeletal: Positive for gait problem  Negative for arthralgias, joint swelling (Joint stiffness) and myalgias  Skin: Negative for rash  Neurological: Positive for weakness  Negative for dizziness and seizures  All other systems reviewed and are negative          Past Medical History:   Diagnosis Date   • Acute medial meniscus tear    • Allergic rhinitis    • Allergic rhinitis     last assessed 5/8/14   • Anxiety    • Appendicitis    • Arthritis    • Benign essential hypertension     last assessed 1/6/14   • Biceps tendon tear • Cancer [facility])     skin   • Cervical radiculopathy     and lumbar   • Chronic pain disorder     knees and hips    • Colon polyps    • CPAP (continuous positive airway pressure) dependence    • Depression    • Depression with anxiety    • Dyskinesia of esophagus    • Erythema migrans (Lyme disease)     last assessed 10/16/12   • Fatigue    • GERD (gastroesophageal reflux disease)    • Heart murmur 1951   • Heel spur, right    • Herpes    • Herpes zoster    • Hyperlipidemia    • Hyperplastic colon polyp     last assessed 14   • Joint pain    • Kidney stone    • Kidney stones    • Lyme disease    • Myocardial infarction [facility])    • Nephrolithiasis    • Panic disorder with agoraphobia     last assessed 13   • [facility]    • Shingles    • Sleep apnea    • Vertigo    • Vitamin D deficiency        Past Surgical History:   Procedure Laterality Date   • APPENDECTOMY  2003   • BICEPS TENDON REPAIR     • CARDIAC OTHER      stents x2   • KNEE ARTHROSCOPY      with medial meniscus repair, resolved 05   • KNEE CARTILAGE SURGERY     • OTHER SURGICAL HISTORY      distal reinsertion of ruptured biceps tendon   • PA ARTHRS KNE SURG W/MENISCECTOMY MED/LAT W/SHVG Right 2019    Procedure: RIGHT KNEE ARTHROSCOPY, MEDIAL MENISCECTOMY;  Surgeon: Medora Alpers, DO;  Location: [address] [place] [address];  Service: Orthopedics   • TOOTH EXTRACTION      Middle of 2019   • VASECTOMY         Family History   Problem Relation Age of Onset   • Diabetes Mother    • Coronary artery disease Father    • Kidney disease Father    • Heart disease Father    • Cancer Father         kidney cancer       Social History     Occupational History   • Not on file   Tobacco Use   • Smoking status: Former     Packs/day:      Types: Cigarettes, Cigars     Start date: 11/15/1967     Quit date: 3/1/1987     Years since quittin 2   • Smokeless tobacco: Never   Vaping Use   • Vaping Use: Never used Substance and Sexual Activity   • Alcohol use:  Yes     Alcohol/week: 1 0 standard drink     Types: 1 Glasses of wine per week   • Drug use: No   • Sexual activity: Not on file         Current Outpatient Medications:   •  ALPRAZolam (Xanax) 0 25 mg tablet, Take 1 tablet (0 25 mg total) by mouth 3 (three) times a day as needed for anxiety, Disp: 30 tablet, Rfl: 0  •  aspirin 81 mg chewable tablet, every 24 hours, Disp: , Rfl:   •  atorvastatin (LIPITOR) 80 mg tablet, every 24 hours, Disp: , Rfl:   •  butalbital-acetaminophen-caffeine (FIORICET,ESGIC) -40 mg per tablet, Take 1 tablet by mouth every 4 (four) hours as needed for headaches, Disp: 30 tablet, Rfl: 0  •  cholecalciferol (VITAMIN D3) 1,000 units tablet, Take 1,000 Units by mouth daily, Disp: , Rfl:   •  CHONDROITIN SULFATE A PO, Take by mouth in the morning, Disp: , Rfl:   •  Coenzyme Q10 (CoQ-10) 100 MG CAPS, every 24 hours, Disp: , Rfl:   •  Diclofenac Sodium (VOLTAREN) 1 %, Apply 2 g topically 4 (four) times a day, Disp: 50 g, Rfl: 0  •  ezetimibe (ZETIA) 10 mg tablet, 1 tablet Orally Once a day 90 days, Disp: , Rfl:   •  GLUCOSAMINE-CALCIUM-VIT D PO, Take 1,200 mg by mouth 1200mg, Disp: , Rfl:   •  HYDROcodone-acetaminophen (Norco) 5-325 mg per tablet, For ongoing therapy Take 1/2 tablet TID PRN for pain , Disp: 45 tablet, Rfl: 0  •  Ibuprofen 200 MG CAPS, 3 (three) times a day, Disp: , Rfl:   •  lisinopril (ZESTRIL) 2 5 mg tablet, every 24 hours, Disp: , Rfl:   •  methocarbamol (ROBAXIN) 500 mg tablet, Take 1 in AM and 2 PO HS , Disp: 90 tablet, Rfl: 1  •  Omega-3 Fatty Acids (Fish Oil) 1200 MG CAPS, every 24 hours, Disp: , Rfl:   •  pantoprazole (PROTONIX) 40 mg tablet, ONE TABLET DAILY for GI issue, Disp: , Rfl:   •  pregabalin (LYRICA) 75 mg capsule, Take 1 PO TID, Disp: 90 capsule, Rfl: 1  •  vilazodone (VIIBRYD) 20 mg tablet, Take 1 tablet (20 mg total) by mouth daily with breakfast, Disp: 30 tablet, Rfl: 1  •  doxycycline hyclate "(VIBRAMYCIN) 100 mg capsule, , Disp: , Rfl:   •  Hyoscyamine Sulfate SL 0 125 MG SUBL, Place 0 125 mg under the tongue 3 (three) times a day as needed (as needed), Disp: 30 tablet, Rfl: 1  •  Omega-3 Fatty Acids (Fish Oil) 1200 MG CAPS, 1 capsule every 24 hours, Disp: , Rfl:     Allergies   Allergen Reactions   • Pseudoephedrine Hives and Dizziness     Reaction Date: 16Apr2011;    • Sulfa Antibiotics Dizziness, Hives and Other (See Comments)       Physical Exam:    /72 (BP Location: Left arm, Patient Position: Sitting, Cuff Size: Large)   Pulse 78   Temp 98 1 °F (36 7 °C)   Ht 6' 1\" (1 854 m)   Wt 115 kg (253 lb)   BMI 33 38 kg/m²     Constitutional:normal, well developed, well nourished, alert, in no distress and non-toxic and no overt pain behavior  Eyes:anicteric  HEENT:grossly intact  Neck:supple, symmetric, trachea midline and no masses   Pulmonary:even and unlabored  Cardiovascular:No edema or pitting edema present  Skin:Normal without rashes or lesions and well hydrated  Psychiatric:Mood and affect appropriate  Neurologic:Cranial Nerves II-XII grossly intact  Musculoskeletal: Gait is somewhat antalgic and slow      Imaging  MRI LUMBAR SPINE WITHOUT CONTRAST     INDICATION: M54 [id number]: Lumbago with sciatica, left side      COMPARISON:  MRI lumbar spine 7/26/2019     TECHNIQUE:  Multiplanar, multisequence imaging of the lumbar spine was performed             IMAGE QUALITY:  Diagnostic     FINDINGS:     VERTEBRAL BODIES:  There are 5 lumbar type vertebral bodies  There is levoscoliosis with apex at L4      No abnormal bone marrow signal or suspicious discrete lesion  Scattered vertebral body hemangiomas      SACRUM:  Unremarkable    No evidence of insufficiency or stress fracture      DISTAL CORD AND CONUS:  Normal size and signal within the distal cord and conus      PARASPINAL SOFT TISSUES:  Paraspinal soft tissues are unremarkable      LOWER THORACIC DISC SPACES:  Unremarkable     LUMBAR DISC " SPACES:     L1-L2:  Small left paracentral/foraminal disc protrusion contacting left L2 nerve root without nerve root compression  Moderate facet arthropathy  No significant canal stenosis  Mild left foraminal narrowing      L2-L3:  Mild disc bulge with new left paracentral and foraminal disc extrusion with 13 mm cephalad migration encroaching both left L2 and L3 nerve roots  Moderate bilateral facet arthropathy  Mild canal narrowing  Right foramina is patent without   significant stenosis      L3-L4:  Disc bulge  Moderate bilateral facet arthropathy  Mild canal stenosis  Mild bilateral foraminal narrowing      L4-L5:  Right eccentric disc bulge  Moderate bilateral facet arthropathy and ligamentum flavum thickening  There is right lateral recess narrowing with encroachment of right L5 nerve root, similar to prior exam   Mild canal stenosis  Mild to moderate   right and mild left foraminal narrowing      L5-S1:  No significant disc bulge  Mild bilateral facet arthropathy  No significant canal stenosis  Moderate left and mild right foraminal narrowing, stable      OTHER FINDINGS:  Right kidney upper pole cyst      IMPRESSION:     1  New left paracentral and foraminal disc extrusion at level L2-3 with 13 mm cephalad migration encroaching both left L2 and L3 nerve roots  Correlate with corresponding levels radiculopathy      2   Stable right lateral recess narrowing at L4-5 with encroachment of right L5 nerve root  Correlate for right L5 radiculopathy      3  No high-grade canal stenosis    Multilevel mild to moderate degree foraminal narrowing as detailed      FL spine and pain procedure    (Results Pending)         Orders Placed This Encounter   Procedures   • FL spine and pain procedure

## 2023-05-26 PROBLEM — J02.9 ACUTE PHARYNGITIS: Status: RESOLVED | Noted: 2023-03-27 | Resolved: 2023-05-26

## 2023-05-26 PROBLEM — J01.00 ACUTE NON-RECURRENT MAXILLARY SINUSITIS: Status: RESOLVED | Noted: 2023-03-27 | Resolved: 2023-05-26

## 2023-06-08 DIAGNOSIS — F32.A DEPRESSION, UNSPECIFIED DEPRESSION TYPE: ICD-10-CM

## 2023-06-08 DIAGNOSIS — R10.9 ABDOMINAL CRAMPING: ICD-10-CM

## 2023-06-08 RX ORDER — VILAZODONE HYDROCHLORIDE 20 MG/1
20 TABLET ORAL
Qty: 30 TABLET | Refills: 1 | Status: SHIPPED | OUTPATIENT
Start: 2023-06-08

## 2023-06-08 NOTE — TELEPHONE ENCOUNTER
Hyoscyamine Sulfate SL 0 125 MG SUBL  vilazodone (VIIBRYD) 20 mg tablet  [facility] [phone]

## 2023-06-09 RX ORDER — HYOSCYAMINE SULFATE 0.12 MG/1
0.12 TABLET SUBLINGUAL 3 TIMES DAILY PRN
Qty: 30 TABLET | Refills: 1 | Status: SHIPPED | OUTPATIENT
Start: 2023-06-09

## 2023-06-09 RX ORDER — VILAZODONE HYDROCHLORIDE 20 MG/1
20 TABLET ORAL
Qty: 30 TABLET | Refills: 1 | OUTPATIENT
Start: 2023-06-09

## 2023-06-16 ENCOUNTER — TELEPHONE (OUTPATIENT)
Dept: PAIN MEDICINE | Facility: MEDICAL CENTER | Age: 73
End: 2023-06-16

## 2023-06-16 NOTE — TELEPHONE ENCOUNTER
Attempted to reach pt, LMOM with CB# and OH.    Last OVS with [facility] 5/22, last script sent 5/22 for 30 day supply.

## 2023-06-16 NOTE — TELEPHONE ENCOUNTER
Pt contacted Call Center requested refill of their medication.        Medication Name: HYDROcodone-acetaminophen (Norco)       Dosage of Med: 5-3.25 MG       Frequency of Med: For ongoing therapy Take 1/2 tablet TID PRN for pain      Remaining Medication: 2 DAYS LEFT       Pharmacy and Location: [facility]- [place], [place]         Pt. Preferred Callback Phone Number: [phone]      Thank you.

## 2023-06-20 DIAGNOSIS — M47.816 LUMBAR SPONDYLOSIS: ICD-10-CM

## 2023-06-20 DIAGNOSIS — M48.062 SPINAL STENOSIS OF LUMBAR REGION WITH NEUROGENIC CLAUDICATION: ICD-10-CM

## 2023-06-20 DIAGNOSIS — M51.26 LUMBAR DISC HERNIATION: ICD-10-CM

## 2023-06-20 DIAGNOSIS — G89.4 CHRONIC PAIN SYNDROME: ICD-10-CM

## 2023-06-20 DIAGNOSIS — M54.16 LUMBAR RADICULOPATHY: ICD-10-CM

## 2023-06-20 DIAGNOSIS — G89.29 CHRONIC LEFT-SIDED LOW BACK PAIN WITHOUT SCIATICA: ICD-10-CM

## 2023-06-20 DIAGNOSIS — M54.50 CHRONIC LEFT-SIDED LOW BACK PAIN WITHOUT SCIATICA: ICD-10-CM

## 2023-06-20 RX ORDER — HYDROCODONE BITARTRATE AND ACETAMINOPHEN 5; 325 MG/1; MG/1
TABLET ORAL
Qty: 45 TABLET | Refills: 0 | Status: SHIPPED | OUTPATIENT
Start: 2023-06-20

## 2023-06-20 NOTE — TELEPHONE ENCOUNTER
S/w pt, confirmed norco 5/325 mg, 1/2 pill 2 - 3 times a day with + relief, no se's. Confirmed fu ov on 9/11. Advised pt, the writer will d/w MG. Anticipate rx will be sent to the pharmacy for pu later today. The writer will cb if there is any question or change in the plan as discussed. Pt verbalized understanding and appreciation.

## 2023-07-14 ENCOUNTER — TELEPHONE (OUTPATIENT)
Dept: FAMILY MEDICINE CLINIC | Facility: CLINIC | Age: 73
End: 2023-07-14

## 2023-07-14 NOTE — TELEPHONE ENCOUNTER
Confirmation Paulina Ormond [id number]  Effective: 07/14/2023     Expires: 10/12/2023  Active  Referred From  [address] [place]  Group JSJ: [id number]  Provider MJ: [id number]  Tax JM: [id number]  [address] [place] [address]  Referred To  [facility]  Specialty: Not Available  Tier 2  Group DBP: [id number]  Provider BE: [id number]  Tax RD: [id number]  This referral is valid at any location for the above group  Patient Info  Patrick Bosworth  [id number]  Male  1950  [address] [id number]  Clinical Information  Place of Service  Office  Service Type  Medical Care  Diagnoses  1 I25.110 - atherosclerotic heart disease of native coronary artery with unstable angina pectoris  2 I25.2 - old myocardial infarction  3 R68.89 - other general symptoms and signs  Procedures  5 78784 - unlisted evaluation and management service  Disclaimer  [facility] and [facility] ([facility]) will pay for only those services covered under the [place] which are specifically noted and requested by the PCP or OBGYN on the referral. If any additional services, testing, or follow-up care are required, the PCP or OBGYN must be contacted prior to the delivery of such additional services for written approval on a separate referral. Non-referred services will not be covered by [facility]. Benefits are underwritten or administered by [employer], a subsidiary of [employer], which are independent licensees of the [employer] and [employer].

## 2023-07-19 DIAGNOSIS — F41.9 ANXIETY: ICD-10-CM

## 2023-07-19 RX ORDER — ALPRAZOLAM 0.25 MG/1
0.25 TABLET ORAL 3 TIMES DAILY PRN
Qty: 30 TABLET | Refills: 0 | Status: SHIPPED | OUTPATIENT
Start: 2023-07-19

## 2023-07-19 NOTE — TELEPHONE ENCOUNTER
ALPRAZolam (Xanax) 0.25 mg tablet Take 1 tablet (0.25 mg total) by mouth 3 (three) times a day as needed for anxiety        EditCancel Reorder       Summary: Take 1 tablet (0.25 mg total) by mouth 3 (three) times a day as needed for anxiety, Starting Thu 5/18/2023, Normal   Dose, Frequency: 0.25 mg, 3 times daily PRN  Dose, Route, Frequency: 0.25 mg, Oral, 3 times daily PRN  Start: 5/18/2023  Ord/Sold: 5/18/2023 (O)  Report  Taking:   Long-term:   Pharmacy: [facility], [address]

## 2023-07-26 ENCOUNTER — TELEPHONE (OUTPATIENT)
Dept: PAIN MEDICINE | Facility: MEDICAL CENTER | Age: 73
End: 2023-07-26

## 2023-07-26 DIAGNOSIS — M48.062 SPINAL STENOSIS OF LUMBAR REGION WITH NEUROGENIC CLAUDICATION: ICD-10-CM

## 2023-07-26 DIAGNOSIS — G89.29 CHRONIC LEFT-SIDED LOW BACK PAIN WITHOUT SCIATICA: ICD-10-CM

## 2023-07-26 DIAGNOSIS — M54.16 LUMBAR RADICULOPATHY: ICD-10-CM

## 2023-07-26 DIAGNOSIS — G89.4 CHRONIC PAIN SYNDROME: ICD-10-CM

## 2023-07-26 DIAGNOSIS — M54.50 CHRONIC LEFT-SIDED LOW BACK PAIN WITHOUT SCIATICA: ICD-10-CM

## 2023-07-26 DIAGNOSIS — M51.26 LUMBAR DISC HERNIATION: ICD-10-CM

## 2023-07-26 DIAGNOSIS — M47.816 LUMBAR SPONDYLOSIS: ICD-10-CM

## 2023-07-26 RX ORDER — HYDROCODONE BITARTRATE AND ACETAMINOPHEN 5; 325 MG/1; MG/1
TABLET ORAL
Qty: 45 TABLET | Refills: 0 | Status: SHIPPED | OUTPATIENT
Start: 2023-07-26

## 2023-07-26 NOTE — TELEPHONE ENCOUNTER
Pt contacted Call Center requested refill of their medication. Medication Name: hydrocodone      Dosage of Med: 5-325      Frequency of Med: 3 daily      Remaining Medication: 0      Pharmacy and Location:     [address] [place] [address], [facility] [address] [phone]      Pt. Preferred Callback Phone Number:       Thank you. PLEASE ADVISE PATIENTS:    REFILL REQUESTS WILL BE PROCESSED WITHIN 24-48 HOURS.

## 2023-07-26 NOTE — TELEPHONE ENCOUNTER
Pt requesting refill of Norco 5-325 mg TID PRN. Pt last seen 5/22, last rx written 6/20 #45. Next appt is 8/7 for a proc and 9/11 for an OV.

## 2023-08-03 ENCOUNTER — TELEPHONE (OUTPATIENT)
Dept: FAMILY MEDICINE CLINIC | Facility: CLINIC | Age: 73
End: 2023-08-03

## 2023-08-03 LAB
ALBUMIN SERPL-MCNC: 4.4 G/DL (ref 3.8–4.8)
ALBUMIN/GLOB SERPL: 2.1 {RATIO} (ref 1.2–2.2)
ALP SERPL-CCNC: 76 IU/L (ref 44–121)
ALT SERPL-CCNC: 20 IU/L (ref 0–44)
AST SERPL-CCNC: 19 IU/L (ref 0–40)
BASOPHILS # BLD AUTO: 0 X10E3/UL (ref 0–0.2)
BASOPHILS NFR BLD AUTO: 0 %
BILIRUB SERPL-MCNC: 0.6 MG/DL (ref 0–1.2)
BUN SERPL-MCNC: 24 MG/DL (ref 8–27)
BUN/CREAT SERPL: 24 (ref 10–24)
CALCIUM SERPL-MCNC: 9.6 MG/DL (ref 8.6–10.2)
CHLORIDE SERPL-SCNC: 104 MMOL/L (ref 96–106)
CHOLEST SERPL-MCNC: 131 MG/DL (ref 100–199)
CHOLEST/HDLC SERPL: 2.4 RATIO (ref 0–5)
CO2 SERPL-SCNC: 21 MMOL/L (ref 20–29)
CREAT SERPL-MCNC: 0.99 MG/DL (ref 0.76–1.27)
EGFR: 81 ML/MIN/1.73
EOSINOPHIL # BLD AUTO: 0.2 X10E3/UL (ref 0–0.4)
EOSINOPHIL NFR BLD AUTO: 4 %
ERYTHROCYTE [DISTWIDTH] IN BLOOD BY AUTOMATED COUNT: 13.2 % (ref 11.6–15.4)
GLOBULIN SER-MCNC: 2.1 G/DL (ref 1.5–4.5)
GLUCOSE SERPL-MCNC: 109 MG/DL (ref 70–99)
HCT VFR BLD AUTO: 44.8 % (ref 37.5–51)
HDLC SERPL-MCNC: 54 MG/DL
HGB BLD-MCNC: 15.3 G/DL (ref 13–17.7)
IMM GRANULOCYTES # BLD: 0 X10E3/UL (ref 0–0.1)
IMM GRANULOCYTES NFR BLD: 0 %
LDLC SERPL CALC-MCNC: 64 MG/DL (ref 0–99)
LYMPHOCYTES # BLD AUTO: 2.3 X10E3/UL (ref 0.7–3.1)
LYMPHOCYTES NFR BLD AUTO: 39 %
MCH RBC QN AUTO: 33.6 PG (ref 26.6–33)
MCHC RBC AUTO-ENTMCNC: 34.2 G/DL (ref 31.5–35.7)
MCV RBC AUTO: 99 FL (ref 79–97)
MONOCYTES # BLD AUTO: 0.6 X10E3/UL (ref 0.1–0.9)
MONOCYTES NFR BLD AUTO: 10 %
NEUTROPHILS # BLD AUTO: 2.7 X10E3/UL (ref 1.4–7)
NEUTROPHILS NFR BLD AUTO: 47 %
PLATELET # BLD AUTO: 160 X10E3/UL (ref 150–450)
POTASSIUM SERPL-SCNC: 4.9 MMOL/L (ref 3.5–5.2)
PROT SERPL-MCNC: 6.5 G/DL (ref 6–8.5)
PSA FREE MFR SERPL: 66 %
PSA FREE SERPL-MCNC: 0.33 NG/ML
PSA SERPL-MCNC: 0.5 NG/ML (ref 0–4)
RBC # BLD AUTO: 4.55 X10E6/UL (ref 4.14–5.8)
SL AMB VLDL CHOLESTEROL CALC: 13 MG/DL (ref 5–40)
SODIUM SERPL-SCNC: 137 MMOL/L (ref 134–144)
TRIGL SERPL-MCNC: 64 MG/DL (ref 0–149)
TSH SERPL DL<=0.005 MIU/L-ACNC: 0.98 UIU/ML (ref 0.45–4.5)
WBC # BLD AUTO: 5.9 X10E3/UL (ref 3.4–10.8)

## 2023-08-03 NOTE — TELEPHONE ENCOUNTER
Called Pt. Spoke to Pt, and Pt advised on results. Patient asked for status of A1C because he didn't get A1C result. I checked, looks like [facility] only [place] partial labs, as A1C ordered by Dr. Gaby Krause is still active. Advised patient will try to call [facility] to see if we can add the A1C ordered by Dr. Gaby Krause.

## 2023-08-03 NOTE — TELEPHONE ENCOUNTER
----- Message from Fred Mendosa DO sent at 8/3/2023 10:41 AM EDT -----  Labs are stable.   Continue current Rx

## 2023-08-04 DIAGNOSIS — F32.A DEPRESSION, UNSPECIFIED DEPRESSION TYPE: ICD-10-CM

## 2023-08-04 RX ORDER — VILAZODONE HYDROCHLORIDE 20 MG/1
20 TABLET ORAL
Qty: 30 TABLET | Refills: 1 | Status: SHIPPED | OUTPATIENT
Start: 2023-08-04

## 2023-08-07 ENCOUNTER — HOSPITAL ENCOUNTER (OUTPATIENT)
Dept: RADIOLOGY | Facility: CLINIC | Age: 73
Discharge: HOME | End: 2023-08-07
Payer: COMMERCIAL

## 2023-08-07 VITALS
RESPIRATION RATE: 20 BRPM | TEMPERATURE: 97.7 F | OXYGEN SATURATION: 94 % | DIASTOLIC BLOOD PRESSURE: 76 MMHG | SYSTOLIC BLOOD PRESSURE: 139 MMHG | HEART RATE: 88 BPM

## 2023-08-07 DIAGNOSIS — M54.16 LUMBAR RADICULOPATHY: ICD-10-CM

## 2023-08-07 PROBLEM — M51.16 INTERVERTEBRAL DISC DISORDER WITH RADICULOPATHY OF LUMBAR REGION: Status: ACTIVE | Noted: 2018-10-23

## 2023-08-07 PROCEDURE — 64484 NJX AA&/STRD TFRM EPI L/S EA: CPT | Performed by: ANESTHESIOLOGY

## 2023-08-07 PROCEDURE — 64483 NJX AA&/STRD TFRM EPI L/S 1: CPT | Performed by: ANESTHESIOLOGY

## 2023-08-07 RX ORDER — PAPAVERINE HCL 150 MG
15 CAPSULE, EXTENDED RELEASE ORAL ONCE
Status: COMPLETED | OUTPATIENT
Start: 2023-08-07 | End: 2023-08-07

## 2023-08-07 RX ADMIN — IOHEXOL 2 ML: 300 INJECTION, SOLUTION INTRAVENOUS at 14:02

## 2023-08-07 RX ADMIN — DEXAMETHASONE SODIUM PHOSPHATE 15 MG: 10 INJECTION, SOLUTION INTRAMUSCULAR; INTRAVENOUS at 14:02

## 2023-08-07 NOTE — DISCHARGE INSTRUCTIONS
Epidural Steroid Injection   WHAT YOU NEED TO KNOW:   An epidural steroid injection (JUAN ANTONIO) is a procedure to inject steroid medicine into the epidural space. The epidural space is between your spinal cord and vertebrae. Steroids reduce inflammation and fluid buildup in your spine that may be causing pain. You may be given pain medicine along with the steroids. ACTIVITY  Do not drive or operate machinery today. No strenuous activity today - bending, lifting, etc.  You may resume normal activites starting tomorrow - start slowly and as tolerated. You may shower today, but no tub baths or hot tubs. You may have numbness for several hours from the local anesthetic. Please use caution and common sense, especially with weight-bearing activities. CARE OF THE INJECTION SITE  If you have soreness or pain, apply ice to the area today (20 minutes on/20 minutes off). Starting tomorrow, you may use warm, moist heat or ice if needed. You may have an increase or change in your discomfort for 36-48 hours after your treatment. Apply ice and continue with any pain medication you have been prescribed. Notify the [facility] if you have any of the following: redness, drainage, swelling, headache, stiff neck or fever above 100°F.    SPECIAL INSTRUCTIONS  Our office will contact you in approximately 7 days for a progress report. MEDICATIONS  Continue to take all routine medications. Our office may have instructed you to hold some medications. As no general anesthesia was used in today's procedure, you should not experience any side effects related to anesthesia. If you are diabetic, the steroids used in today's injection may temporarily increase your blood sugar levels after the first few days after your injection. Please keep a close eye on your sugars and alert the doctor who manages your diabetes if your sugars are significantly high from your baseline or you are symptomatic.      If you have a problem specifically related to your procedure, please call our office at [phone]. Problems not related to your procedure should be directed to your primary care physician.

## 2023-08-07 NOTE — H&P
History of Present Illness: The patient is a 71 y.o. male who presents with complaints of low back and leg pain.     Past Medical History:   Diagnosis Date   • Acute medial meniscus tear    • Allergic rhinitis    • Allergic rhinitis     last assessed 5/8/14   • Anxiety    • Appendicitis    • Arthritis    • Benign essential hypertension     last assessed 1/6/14   • Biceps tendon tear    • Cancer (HCC)     skin   • Cervical radiculopathy     and lumbar   • Chronic pain disorder     knees and hips    • Colon polyps    • CPAP (continuous positive airway pressure) dependence    • Depression    • Depression with anxiety    • Dyskinesia of esophagus    • Erythema migrans (Lyme disease)     last assessed 10/16/12   • Fatigue    • GERD (gastroesophageal reflux disease)    • Heart murmur 1951   • Heel spur, right    • Herpes    • Herpes zoster    • Hyperlipidemia    • Hyperplastic colon polyp     last assessed 11/14/14   • Joint pain    • Kidney stone 1995   • Kidney stones    • Lyme disease    • Myocardial infarction [facility])    • Nephrolithiasis    • Panic disorder with agoraphobia     last assessed 8/7/13   • Seasonal affective disorder ([address])    • Shingles 2015   • Sleep apnea    • Vertigo    • Vitamin D deficiency        Past Surgical History:   Procedure Laterality Date   • APPENDECTOMY  08/01/2003   • BICEPS TENDON REPAIR     • CARDIAC OTHER      stents x2   • KNEE ARTHROSCOPY      with medial meniscus repair, resolved 01/01/05   • KNEE CARTILAGE SURGERY     • OTHER SURGICAL HISTORY      distal reinsertion of ruptured biceps tendon   • [place] ARTHRS KNE SURG W/MENISCECTOMY MED/LAT W/SHVG Right 07/30/2019    Procedure: RIGHT KNEE ARTHROSCOPY, MEDIAL MENISCECTOMY;  Surgeon: Goldie Stephens DO;  Location: [facility];  Service: Orthopedics   • TOOTH EXTRACTION      Middle of October 2019   • VASECTOMY           Current Outpatient Medications:   •  ALPRAZolam (Xanax) 0.25 mg tablet, Take 1 tablet (0.25 mg total) by mouth 3 (three) times a day as needed for anxiety, Disp: 30 tablet, Rfl: 0  •  aspirin 81 mg chewable tablet, every 24 hours, Disp: , Rfl:   •  atorvastatin (LIPITOR) 80 mg tablet, every 24 hours, Disp: , Rfl:   •  butalbital-acetaminophen-caffeine (FIORICET,ESGIC) -40 mg per tablet, Take 1 tablet by mouth every 4 (four) hours as needed for headaches, Disp: 30 tablet, Rfl: 0  •  cholecalciferol (VITAMIN D3) 1,000 units tablet, Take 1,000 Units by mouth daily, Disp: , Rfl:   •  CHONDROITIN SULFATE A PO, Take by mouth in the morning, Disp: , Rfl:   •  Coenzyme Q10 (CoQ-10) 100 MG CAPS, every 24 hours, Disp: , Rfl:   •  Diclofenac Sodium (VOLTAREN) 1 %, Apply 2 g topically 4 (four) times a day, Disp: 50 g, Rfl: 0  •  doxycycline hyclate (VIBRAMYCIN) 100 mg capsule, , Disp: , Rfl:   •  ezetimibe (ZETIA) 10 mg tablet, 1 tablet Orally Once a day 90 days, Disp: , Rfl:   •  GLUCOSAMINE-CALCIUM-VIT D PO, Take 1,200 mg by mouth 1200mg, Disp: , Rfl:   •  HYDROcodone-acetaminophen (Norco) 5-325 mg per tablet, For ongoing therapy Take 1/2 tablet TID PRN for pain for ongoing therapy, Disp: 45 tablet, Rfl: 0  •  Hyoscyamine Sulfate SL 0.125 MG SUBL, Place 0.125 mg under the tongue 3 (three) times a day as needed (as needed), Disp: 30 tablet, Rfl: 1  •  Ibuprofen 200 MG CAPS, 3 (three) times a day, Disp: , Rfl:   •  lisinopril (ZESTRIL) 2.5 mg tablet, every 24 hours, Disp: , Rfl:   •  methocarbamol (ROBAXIN) 500 mg tablet, Take 1 in AM and 2 PO HS., Disp: 90 tablet, Rfl: 1  •  Omega-3 Fatty Acids (Fish Oil) 1200 MG CAPS, every 24 hours, Disp: , Rfl:   •  Omega-3 Fatty Acids (Fish Oil) 1200 MG CAPS, 1 capsule every 24 hours, Disp: , Rfl:   •  pantoprazole (PROTONIX) 40 mg tablet, ONE TABLET DAILY for GI issue, Disp: , Rfl:   •  pregabalin (LYRICA) 75 mg capsule, Take 1 PO TID, Disp: 90 capsule, Rfl: 1  •  vilazodone (VIIBRYD) 20 mg tablet, Take 1 tablet (20 mg total) by mouth daily with breakfast, Disp: 30 tablet, Rfl: 1    Current Facility-Administered Medications:   •  dexamethasone (PF) (DECADRON) injection 15 mg, 15 mg, Epidural, Once, Rick Long DO  •  iohexol (OMNIPAQUE) 300 mg/mL injection 2 mL, 2 mL, Epidural, Once, Rick Long DO    Allergies   Allergen Reactions   • Pseudoephedrine Hives and Dizziness     Reaction Date: 16Apr2011;    • Sulfa Antibiotics Dizziness, Hives and Other (See Comments)       Physical Exam:   Vitals:    08/07/23 1342   BP: 133/75   Pulse: 91   Resp: 20   Temp: 97.7 °F (36.5 °C)   SpO2: 95%     General: Awake, Alert, Oriented x 3, Mood and affect appropriate  Respiratory: Respirations even and unlabored  Cardiovascular: Peripheral pulses intact; no edema  Musculoskeletal Exam: Decreased range of motion lumbar spine    ASA Score: III    Patient/Chart Verification  Patient ID Verified: Verbal  ID Band Applied: No  Consents Confirmed: Procedural  H&P( within 30 days) Verified: To be obtained in the Pre-Procedure area  Interval H&P(within 24 hr) Complete (required for Outpatients and Surgery Admit only): To be obtained in the Pre-Procedure area  Allergies Reviewed: Yes  Anticoag/NSAID held?: No  Currently on antibiotics?: No  Pre-op Lab/Test Results Available: N/A    Assessment:   1.  Lumbar radiculopathy        Plan: Lt L4, L5 TFESI

## 2023-08-14 ENCOUNTER — TELEPHONE (OUTPATIENT)
Dept: PAIN MEDICINE | Facility: CLINIC | Age: 73
End: 2023-08-14

## 2023-08-15 LAB — HBA1C MFR BLD: 6 % (ref 4.8–5.6)

## 2023-08-16 ENCOUNTER — TELEPHONE (OUTPATIENT)
Dept: FAMILY MEDICINE CLINIC | Facility: CLINIC | Age: 73
End: 2023-08-16

## 2023-08-16 NOTE — TELEPHONE ENCOUNTER
Pt reports 10-15% improvement post inj   Pain level 5/10  Pt aware I will call next week for an update

## 2023-08-16 NOTE — TELEPHONE ENCOUNTER
----- Message from Matthew Baptiste DO sent at 8/16/2023  8:07 AM EDT -----  Labs are stable. Blood sugar only slightly elevated.   Continue to watch diet

## 2023-08-20 DIAGNOSIS — M54.16 LUMBAR RADICULOPATHY: ICD-10-CM

## 2023-08-20 DIAGNOSIS — M51.26 LUMBAR DISC HERNIATION: ICD-10-CM

## 2023-08-20 DIAGNOSIS — M47.816 LUMBAR SPONDYLOSIS: ICD-10-CM

## 2023-08-21 NOTE — TELEPHONE ENCOUNTER
Patient reports 15% improvement 2 wk post inj   Pain level 2/10    LT L4 and L5 TFESI 8/7 , F/u 9/11

## 2023-08-22 DIAGNOSIS — F41.9 ANXIETY: ICD-10-CM

## 2023-08-22 DIAGNOSIS — G43.811 OTHER MIGRAINE WITH STATUS MIGRAINOSUS, INTRACTABLE: ICD-10-CM

## 2023-08-22 NOTE — TELEPHONE ENCOUNTER
Irwin, this is Bailee Ferguson. Birth date [birth year]. I'm trying to refill two prescriptions. First one is alprazolam .25 milligrams taken as needed for anxiety. [facility] is the pharmacy [phone]. The other prescription is esgic  fioricet taken for headaches. There was no dosage here, it's just the butalbital  Caffeine again. [address] [place] [address], [phone], my number, [phone]. Thank you.  Diana.

## 2023-08-23 DIAGNOSIS — G43.811 OTHER MIGRAINE WITH STATUS MIGRAINOSUS, INTRACTABLE: ICD-10-CM

## 2023-08-23 RX ORDER — ALPRAZOLAM 0.25 MG/1
0.25 TABLET ORAL 3 TIMES DAILY PRN
Qty: 30 TABLET | Refills: 0 | Status: SHIPPED | OUTPATIENT
Start: 2023-08-23

## 2023-08-23 RX ORDER — BUTALBITAL, ACETAMINOPHEN AND CAFFEINE 50; 325; 40 MG/1; MG/1; MG/1
1 TABLET ORAL EVERY 4 HOURS PRN
Qty: 30 TABLET | Refills: 0 | Status: SHIPPED | OUTPATIENT
Start: 2023-08-23

## 2023-08-24 RX ORDER — PREGABALIN 75 MG/1
CAPSULE ORAL
Qty: 90 CAPSULE | Refills: 1 | OUTPATIENT
Start: 2023-08-24

## 2023-08-28 ENCOUNTER — TELEPHONE (OUTPATIENT)
Dept: FAMILY MEDICINE CLINIC | Facility: CLINIC | Age: 73
End: 2023-08-28

## 2023-08-28 DIAGNOSIS — M54.50 CHRONIC LEFT-SIDED LOW BACK PAIN WITHOUT SCIATICA: ICD-10-CM

## 2023-08-28 DIAGNOSIS — G89.4 CHRONIC PAIN SYNDROME: ICD-10-CM

## 2023-08-28 DIAGNOSIS — G89.29 CHRONIC LEFT-SIDED LOW BACK PAIN WITHOUT SCIATICA: ICD-10-CM

## 2023-08-28 DIAGNOSIS — M54.16 LUMBAR RADICULOPATHY: ICD-10-CM

## 2023-08-28 DIAGNOSIS — M51.26 LUMBAR DISC HERNIATION: ICD-10-CM

## 2023-08-28 DIAGNOSIS — M47.816 LUMBAR SPONDYLOSIS: ICD-10-CM

## 2023-08-28 DIAGNOSIS — M48.062 SPINAL STENOSIS OF LUMBAR REGION WITH NEUROGENIC CLAUDICATION: ICD-10-CM

## 2023-08-28 RX ORDER — HYDROCODONE BITARTRATE AND ACETAMINOPHEN 5; 325 MG/1; MG/1
TABLET ORAL
Qty: 45 TABLET | Refills: 0 | Status: SHIPPED | OUTPATIENT
Start: 2023-08-28

## 2023-08-28 NOTE — TELEPHONE ENCOUNTER
Confirmation Aubrie Puentes [id number]  Effective: 08/28/2023     Expires: 11/26/2023  Active  Referred From  [id number]  Group IAC: [id number]  Provider [place]: [id number]  Tax RT: [id number]  [id number] Nadiya ThomasCorder  [id number], [id number]  Referred To  [facility]OLOGY [facility]  Specialty: Not Available  Tier 2  Group WBL: [id number]  Provider SW: [id number]  Tax VF: [id number]  This referral is valid at any location for the above group  Patient Info  Melissa Griffin  [id number]  Male  1950  [address] [id number]  Clinical Information  Place of Service  Office  Service Type  Medical Care  Diagnoses  5 U80.3 - neoplasm of uncertain behavior of skin  Procedures  4 20267 - unlisted evaluation and management service

## 2023-08-28 NOTE — TELEPHONE ENCOUNTER
Medication: Hydrocodone-Acetaminophen (Norco) 5/325 mg - Take 1/2 tablet TID PRN for pain for ongoing therapy    Quantity: 45     Pharmacy: [address] [place] [address]    Ph: [phone]    PCP/Speciality: Pain Management     Enough for 3 days: No     *I do not have access to [facility] PMED to look up medication*

## 2023-08-28 NOTE — TELEPHONE ENCOUNTER
S/w pt, confirmed norco 5-325 mg. Per pt, he takes 2 - 3, 1/2 tablets per day - more often 3 with + relief, no se's. Per pt, he is out of medication at this time. Advised pt, the writer will d/w Amber and cb if there is any question or issue with the request. Anticipate this rx will be available for pu later today. FU on 9/11 as scheduled. Pt verbalized understanding and appreciation.

## 2023-09-11 ENCOUNTER — APPOINTMENT (OUTPATIENT)
Dept: RADIOLOGY | Facility: CLINIC | Age: 73
End: 2023-09-11
Payer: COMMERCIAL

## 2023-09-11 ENCOUNTER — OFFICE VISIT (OUTPATIENT)
Dept: PAIN MEDICINE | Facility: CLINIC | Age: 73
End: 2023-09-11
Payer: COMMERCIAL

## 2023-09-11 VITALS
BODY MASS INDEX: 35.25 KG/M2 | HEART RATE: 63 BPM | WEIGHT: 266 LBS | TEMPERATURE: 98.4 F | DIASTOLIC BLOOD PRESSURE: 78 MMHG | SYSTOLIC BLOOD PRESSURE: 124 MMHG | HEIGHT: 73 IN

## 2023-09-11 DIAGNOSIS — M70.62 GREATER TROCHANTERIC BURSITIS OF BOTH HIPS: ICD-10-CM

## 2023-09-11 DIAGNOSIS — M48.062 SPINAL STENOSIS OF LUMBAR REGION WITH NEUROGENIC CLAUDICATION: ICD-10-CM

## 2023-09-11 DIAGNOSIS — M70.61 GREATER TROCHANTERIC BURSITIS OF BOTH HIPS: ICD-10-CM

## 2023-09-11 DIAGNOSIS — G89.29 CHRONIC PAIN OF LEFT KNEE: Primary | ICD-10-CM

## 2023-09-11 DIAGNOSIS — M47.816 LUMBAR SPONDYLOSIS: ICD-10-CM

## 2023-09-11 DIAGNOSIS — M54.16 LUMBAR RADICULOPATHY: ICD-10-CM

## 2023-09-11 DIAGNOSIS — M51.26 LUMBAR DISC HERNIATION: ICD-10-CM

## 2023-09-11 DIAGNOSIS — G89.29 CHRONIC PAIN OF LEFT KNEE: ICD-10-CM

## 2023-09-11 DIAGNOSIS — M25.562 CHRONIC PAIN OF LEFT KNEE: Primary | ICD-10-CM

## 2023-09-11 DIAGNOSIS — G89.4 CHRONIC PAIN SYNDROME: ICD-10-CM

## 2023-09-11 DIAGNOSIS — M25.562 CHRONIC PAIN OF LEFT KNEE: ICD-10-CM

## 2023-09-11 PROCEDURE — 99214 OFFICE O/P EST MOD 30 MIN: CPT | Performed by: PHYSICIAN ASSISTANT

## 2023-09-11 PROCEDURE — 73562 X-RAY EXAM OF KNEE 3: CPT

## 2023-09-11 RX ORDER — PREGABALIN 75 MG/1
CAPSULE ORAL
Qty: 90 CAPSULE | Refills: 1 | Status: SHIPPED | OUTPATIENT
Start: 2023-09-11

## 2023-09-11 RX ORDER — HYDROCODONE BITARTRATE AND ACETAMINOPHEN 5; 325 MG/1; MG/1
TABLET ORAL
Qty: 45 TABLET | Refills: 0 | Status: SHIPPED | OUTPATIENT
Start: 2023-09-11

## 2023-09-11 NOTE — PROGRESS NOTES
Assessment:  1. Chronic pain of left knee    2. Lumbar spondylosis    3. Lumbar radiculopathy    4. Lumbar disc herniation    5. Spinal stenosis of lumbar region with neurogenic claudication    6. Chronic pain syndrome    7. Greater trochanteric bursitis of both hips         Plan:  While the patient was in the office today, I did have a thorough conversation regarding their chronic pain syndrome, medication management, and treatment plan options. I believe that we should order an x-ray of the left knee to rule out any issues in the joint given his significant pain. The last transforaminal epidural steroid injection at L4 and 5 on the left did not provide much relief of this particular area. I have refilled the pregabalin and I have advised him to take 75 g in the morning and 150 mg in the evening. Hoping that the higher dose at bedtime helps with his sleep pattern. I have electronically sent a refill for the Norco 5/325 1/2 tablet 3 times daily as needed with a quantity of 45 tablets. Consider greater trochanteric bursa injections in the future. The patient will follow-up in 12 weeks for medication prescription refill and reevaluation. The patient was advised to contact the office should their symptoms worsen in the interim. The patient was agreeable and verbalized an understanding. History of Present Illness: The patient is a 71 y.o. male last seen on 8/7/2023 who presents for a follow up office visit in regards to chronic back pain secondary to lumbar spondylosis, lumbar degenerative disc disease. The patient currently reports chronic low back pain and the left knee pain that he presently rates a 4 out of 10 on the scale and describes both areas as constant and dull, aching and sharp in nature. He underwent a left L4 and L5 transforaminal epidural steroid injection on his last appointment which seems to have helped minimally.   He has quite a bit of left leg pain and upon further conversation appears very localized around the left knee. He is also describing pain in bilateral hips when he lies down on either side at night. The hip pain does not bother him with standing or walking. I have personally reviewed and/or updated the patient's past medical history, past surgical history, family history, social history, current medications, allergies, and vital signs today. Review of Systems:    Review of Systems   Respiratory: Negative for shortness of breath. Cardiovascular: Negative for chest pain. Gastrointestinal: Negative for constipation, diarrhea, nausea and vomiting. Musculoskeletal: Positive for gait problem. Negative for arthralgias, joint swelling and myalgias. Skin: Negative for rash. Neurological: Positive for weakness. Negative for dizziness and seizures. All other systems reviewed and are negative.         Past Medical History:   Diagnosis Date   • Acute medial meniscus tear    • Allergic rhinitis    • Allergic rhinitis     last assessed 5/8/14   • Anxiety    • Appendicitis    • Arthritis    • Benign essential hypertension     last assessed 1/6/14   • Biceps tendon tear    • Cancer ([address])     skin   • Cervical radiculopathy     and lumbar   • Chronic pain disorder     knees and hips    • Colon polyps    • CPAP (continuous positive airway pressure) dependence    • Depression    • Depression with anxiety    • Dyskinesia of esophagus    • Erythema migrans (Lyme disease)     last assessed 10/16/12   • Fatigue    • GERD (gastroesophageal reflux disease)    • Heart murmur 1951   • Heel spur, right    • Herpes    • Herpes zoster    • Hyperlipidemia    • Hyperplastic colon polyp     last assessed 11/14/14   • Joint pain    • Kidney stone 1995   • Kidney stones    • Lyme disease    • Myocardial infarction [facility])    • Nephrolithiasis    • Panic disorder with agoraphobia     last assessed 8/7/13   • Seasonal affective disorder ([address])    • Shingles 2015   • Sleep apnea • Vertigo    • Vitamin D deficiency        Past Surgical History:   Procedure Laterality Date   • APPENDECTOMY  2003   • BICEPS TENDON REPAIR     • CARDIAC OTHER      stents x2   • KNEE ARTHROSCOPY      with medial meniscus repair, resolved 05   • KNEE CARTILAGE SURGERY     • OTHER SURGICAL HISTORY      distal reinsertion of ruptured biceps tendon   • [place] ARTHRS KNE SURG W/MENISCECTOMY MED/LAT W/SHVG Right 2019    Procedure: RIGHT KNEE ARTHROSCOPY, MEDIAL MENISCECTOMY;  Surgeon: Adam Wing DO;  Location: [facility] MAIN OR;  Service: Orthopedics   • TOOTH EXTRACTION      Middle of 2019   • VASECTOMY         Family History   Problem Relation Age of Onset   • Diabetes Mother    • Coronary artery disease Father    • Kidney disease Father    • Heart disease Father    • Cancer Father         kidney cancer       Social History     Occupational History   • Not on file   Tobacco Use   • Smoking status: Former     Packs/day: 1.00     Types: Cigarettes, Cigars     Start date: 11/15/1967     Quit date: 3/1/1987     Years since quittin.5   • Smokeless tobacco: Never   Vaping Use   • Vaping Use: Never used   Substance and Sexual Activity   • Alcohol use:  Yes     Alcohol/week: 1.0 standard drink of alcohol     Types: 1 Glasses of wine per week   • Drug use: No   • Sexual activity: Not on file         Current Outpatient Medications:   •  ALPRAZolam (Xanax) 0.25 mg tablet, Take 1 tablet (0.25 mg total) by mouth 3 (three) times a day as needed for anxiety, Disp: 30 tablet, Rfl: 0  •  aspirin 81 mg chewable tablet, every 24 hours, Disp: , Rfl:   •  atorvastatin (LIPITOR) 80 mg tablet, every 24 hours, Disp: , Rfl:   •  butalbital-acetaminophen-caffeine (FIORICET,ESGIC) -40 mg per tablet, Take 1 tablet by mouth every 4 (four) hours as needed for headaches, Disp: 30 tablet, Rfl: 0  •  cholecalciferol (VITAMIN D3) 1,000 units tablet, Take 1,000 Units by mouth daily, Disp: , Rfl:   •  CHONDROITIN SULFATE A PO, Take by mouth in the morning, Disp: , Rfl:   •  Coenzyme Q10 (CoQ-10) 100 MG CAPS, every 24 hours, Disp: , Rfl:   •  Diclofenac Sodium (VOLTAREN) 1 %, Apply 2 g topically 4 (four) times a day, Disp: 50 g, Rfl: 0  •  ezetimibe (ZETIA) 10 mg tablet, 1 tablet Orally Once a day 90 days, Disp: , Rfl:   •  GLUCOSAMINE-CALCIUM-VIT D PO, Take 1,200 mg by mouth 1200mg, Disp: , Rfl:   •  HYDROcodone-acetaminophen (Norco) 5-325 mg per tablet, For ongoing therapy Take 1/2 tablet TID PRN for pain for ongoing therapy, Disp: 45 tablet, Rfl: 0  •  Hyoscyamine Sulfate SL 0.125 MG SUBL, Place 0.125 mg under the tongue 3 (three) times a day as needed (as needed), Disp: 30 tablet, Rfl: 1  •  Ibuprofen 200 MG CAPS, 3 (three) times a day, Disp: , Rfl:   •  lisinopril (ZESTRIL) 2.5 mg tablet, every 24 hours, Disp: , Rfl:   •  Omega-3 Fatty Acids (Fish Oil) 1200 MG CAPS, every 24 hours, Disp: , Rfl:   •  pantoprazole (PROTONIX) 40 mg tablet, ONE TABLET DAILY for GI issue, Disp: , Rfl:   •  pregabalin (LYRICA) 75 mg capsule, Take 1 PO TID, Disp: 90 capsule, Rfl: 1  •  vilazodone (VIIBRYD) 20 mg tablet, Take 1 tablet (20 mg total) by mouth daily with breakfast, Disp: 30 tablet, Rfl: 1  •  butalbital-acetaminophen-caffeine (FIORICET,ESGIC) -40 mg per tablet, Take 1 tablet by mouth every 4 (four) hours as needed for headaches, Disp: 30 tablet, Rfl: 0  •  Omega-3 Fatty Acids (Fish Oil) 1200 MG CAPS, 1 capsule every 24 hours, Disp: , Rfl:     Allergies   Allergen Reactions   • Pseudoephedrine Hives and Dizziness     Reaction Date: 16Apr2011;    • Sulfa Antibiotics Dizziness, Hives and Other (See Comments)       Physical Exam:    /78 (BP Location: Left arm, Patient Position: Sitting, Cuff Size: Large)   Pulse 63   Temp 98.4 °F (36.9 °C)   Ht 6' 1" (1.854 m)   Wt 121 kg (266 lb)   BMI 35.09 kg/m²     Constitutional:normal, well developed, well nourished, alert, in no distress and non-toxic and no overt pain behavior.   Eyes:anicteric  HEENT:grossly intact  Neck:supple, symmetric, trachea midline and no masses   Pulmonary:even and unlabored  Cardiovascular:No edema or pitting edema present  Skin:Normal without rashes or lesions and well hydrated  Psychiatric:Mood and affect appropriate  Neurologic:Cranial Nerves II-XII grossly intact  Musculoskeletal: Gait is antalgic, tender bilateral lateral hips, left knee hypertrophic on the medial compartment      Imaging  No orders to display         Orders Placed This Encounter   Procedures   • XR knee 3 vw left non injury

## 2023-09-18 ENCOUNTER — TELEPHONE (OUTPATIENT)
Dept: PAIN MEDICINE | Facility: CLINIC | Age: 73
End: 2023-09-18

## 2023-09-18 NOTE — TELEPHONE ENCOUNTER
LMOM to cb. Provided cb number and office hours. NOTE:  Christian Dobbs to leave a detailed message on machine per medical communication consent on file.

## 2023-09-19 DIAGNOSIS — M17.12 PRIMARY OSTEOARTHRITIS OF LEFT KNEE: Primary | ICD-10-CM

## 2023-09-19 NOTE — TELEPHONE ENCOUNTER
S/w pt, advised of above. Advised pt, MG would refer to Dr. Dillan Moss. Pt verbalized understanding and appreciation. Stated that he will fu as discussed.

## 2023-09-26 DIAGNOSIS — F32.A DEPRESSION, UNSPECIFIED DEPRESSION TYPE: ICD-10-CM

## 2023-09-26 RX ORDER — VILAZODONE HYDROCHLORIDE 20 MG/1
20 TABLET ORAL
Qty: 30 TABLET | Refills: 1 | Status: SHIPPED | OUTPATIENT
Start: 2023-09-26

## 2023-10-10 DIAGNOSIS — F41.9 ANXIETY: ICD-10-CM

## 2023-10-11 RX ORDER — ALPRAZOLAM 0.25 MG/1
0.25 TABLET ORAL 3 TIMES DAILY PRN
Qty: 30 TABLET | Refills: 0 | Status: SHIPPED | OUTPATIENT
Start: 2023-10-11

## 2023-10-19 DIAGNOSIS — M54.16 LUMBAR RADICULOPATHY: ICD-10-CM

## 2023-10-19 DIAGNOSIS — M51.26 LUMBAR DISC HERNIATION: ICD-10-CM

## 2023-10-19 DIAGNOSIS — M48.062 SPINAL STENOSIS OF LUMBAR REGION WITH NEUROGENIC CLAUDICATION: ICD-10-CM

## 2023-10-19 DIAGNOSIS — G89.4 CHRONIC PAIN SYNDROME: ICD-10-CM

## 2023-10-19 DIAGNOSIS — M47.816 LUMBAR SPONDYLOSIS: ICD-10-CM

## 2023-10-19 NOTE — TELEPHONE ENCOUNTER
Pt has 3 tablets left. Need refill for Sat. Leaving [facility] Sun 10/22/23. Reason for call:   [x] Refill   [] Prior Auth  [] Other:     Office:   [] PCP/Provider -   [x] Specialty/Provider - [facility] / [facility]    Medication: generic Norco    Dose/Frequency: 5-325mg    Quantity: 45    Pharmacy: [address] [place] [address]     Does the patient have enough for 3 days?    [] Yes   [x] No - see note above

## 2023-10-20 RX ORDER — HYDROCODONE BITARTRATE AND ACETAMINOPHEN 5; 325 MG/1; MG/1
TABLET ORAL
Qty: 45 TABLET | Refills: 0 | Status: SHIPPED | OUTPATIENT
Start: 2023-10-20

## 2023-10-20 RX ORDER — NALOXONE HYDROCHLORIDE 4 MG/.1ML
SPRAY NASAL
Qty: 1 EACH | Refills: 1 | Status: SHIPPED | OUTPATIENT
Start: 2023-10-20 | End: 2024-10-19

## 2023-10-20 NOTE — TELEPHONE ENCOUNTER
Caller: Kim Barriga     Doctor: Dr Becca Kim    Reason for call: Patient returning RN Call     Call back#: [phone]

## 2023-10-20 NOTE — TELEPHONE ENCOUNTER
LMOM to cb. Provided cb number and office hours. NOTE:    HYDROcodone-acetaminophen (Norco) 5-325 mg per tablet [[id number]]    Order Details  Dose, Route, Frequency: As Directed   Dispense Quantity: 45 tablet Refills: 0          Sig: For ongoing therapy Take 1/2 tablet TID PRN for pain for ongoing therapy         Start Date: 09/11/23 End Date: --   Written Date: 09/11/23 Expiration Date: 11/10/23   Earliest Fill Date: 09/11/23         Associated Diagnoses: Lumbar spondylosis [M47.816]; Lumbar radiculopathy [M54.16]; Lumbar disc herniation [M51.26]; Spinal stenosis of lumbar region with neurogenic claudication [M48.062];  Chronic pain syndrome [G89.4]   Original Order: HYDROcodone-acetaminophen (Norco) 5-325 mg per tablet [[id number]]   Providers    Authorizing Provider:  LARS Boston 12/4/23

## 2023-10-20 NOTE — TELEPHONE ENCOUNTER
S/w pt, confirmed norco 1/2 tab 2 - 3 times a day as needed for pain with + relief, no se's. Advised pt, the writer will d/w MG. Anticipate rx will be sent to the pharmacy for pu later today. The writer will cb if there is any question or change in the plan as discussed. Pt verbalized understanding and appreciation.

## 2023-10-27 DIAGNOSIS — G43.811 OTHER MIGRAINE WITH STATUS MIGRAINOSUS, INTRACTABLE: ICD-10-CM

## 2023-10-28 RX ORDER — BUTALBITAL, ACETAMINOPHEN AND CAFFEINE 50; 325; 40 MG/1; MG/1; MG/1
1 TABLET ORAL EVERY 4 HOURS PRN
Qty: 30 TABLET | Refills: 0 | Status: SHIPPED | OUTPATIENT
Start: 2023-10-28

## 2023-11-16 ENCOUNTER — OFFICE VISIT (OUTPATIENT)
Dept: URGENT CARE | Facility: CLINIC | Age: 73
End: 2023-11-16
Payer: COMMERCIAL

## 2023-11-16 ENCOUNTER — TELEPHONE (OUTPATIENT)
Dept: FAMILY MEDICINE CLINIC | Facility: CLINIC | Age: 73
End: 2023-11-16

## 2023-11-16 VITALS
HEART RATE: 66 BPM | RESPIRATION RATE: 18 BRPM | TEMPERATURE: 97.5 F | SYSTOLIC BLOOD PRESSURE: 136 MMHG | OXYGEN SATURATION: 98 % | DIASTOLIC BLOOD PRESSURE: 78 MMHG

## 2023-11-16 DIAGNOSIS — J20.9 ACUTE BRONCHITIS, UNSPECIFIED ORGANISM: Primary | ICD-10-CM

## 2023-11-16 PROCEDURE — 99213 OFFICE O/P EST LOW 20 MIN: CPT

## 2023-11-16 RX ORDER — METHYLPREDNISOLONE 4 MG/1
TABLET ORAL
Qty: 1 EACH | Refills: 0 | Status: SHIPPED | OUTPATIENT
Start: 2023-11-16

## 2023-11-16 RX ORDER — AZITHROMYCIN 250 MG/1
TABLET, FILM COATED ORAL
Qty: 6 TABLET | Refills: 0 | Status: SHIPPED | OUTPATIENT
Start: 2023-11-16 | End: 2023-11-20

## 2023-11-16 NOTE — TELEPHONE ENCOUNTER
Patient is in [facility] and has had a nasty cold x 4 weeks. He tested negative for covid x 2. The cold has now gone into his chest and he is coughing non stop. Waldo is asking if you are able to send him in a zpack or something to help him get rid of this. Rachel Loco    I will add his pharmacy to his profile.

## 2023-11-17 NOTE — PROGRESS NOTES
[facility]        NAME: Mariola Weiner is a 68 y.o. male  : 1950    MRN: [id number]  DATE: 2023  TIME: 7:52 PM    Assessment and Plan   Acute bronchitis, unspecified organism [J20.9]  1. Acute bronchitis, unspecified organism  methylPREDNISolone 4 MG tablet therapy pack    azithromycin (ZITHROMAX) 250 mg tablet            Patient Instructions       Follow up with PCP in 3-5 days. Proceed to  ER if symptoms worsen. Chief Complaint     Chief Complaint   Patient presents with    Cold Like Symptoms     Patient has been sick for the last 4 weeks, the last 10 days has developed a productive cough         History of Present Illness       67 y/o M presents for cold like symptoms x 4 weeks. Pt admits is started as a sore throat, progressed to a head cold, and now spread to the chest. Admits to a productive cough for approx 10 days. No fevers/chills. Using Tylenol Cold and Flu and "cough medicine" PRN. Review of Systems   Review of Systems   Constitutional:  Negative for chills and fever. HENT:  Positive for congestion. Negative for ear pain, postnasal drip, rhinorrhea and sore throat. Respiratory:  Positive for cough.           Current Medications       Current Outpatient Medications:     azithromycin (ZITHROMAX) 250 mg tablet, Take 2 tablets today then 1 tablet daily x 4 days, Disp: 6 tablet, Rfl: 0    methylPREDNISolone 4 MG tablet therapy pack, Use as directed on package, Disp: 1 each, Rfl: 0    ALPRAZolam (Xanax) 0.25 mg tablet, Take 1 tablet (0.25 mg total) by mouth 3 (three) times a day as needed for anxiety, Disp: 30 tablet, Rfl: 0    aspirin 81 mg chewable tablet, every 24 hours, Disp: , Rfl:     atorvastatin (LIPITOR) 80 mg tablet, every 24 hours, Disp: , Rfl:     butalbital-acetaminophen-caffeine (FIORICET,ESGIC) -40 mg per tablet, Take 1 tablet by mouth every 4 (four) hours as needed for headaches, Disp: 30 tablet, Rfl: 0    butalbital-acetaminophen-caffeine (FIORICET,ESGIC) -40 mg per tablet, Take 1 tablet by mouth every 4 (four) hours as needed for headaches, Disp: 30 tablet, Rfl: 0    cholecalciferol (VITAMIN D3) 1,000 units tablet, Take 1,000 Units by mouth daily, Disp: , Rfl:     CHONDROITIN SULFATE A PO, Take by mouth in the morning, Disp: , Rfl:     Coenzyme Q10 (CoQ-10) 100 MG CAPS, every 24 hours, Disp: , Rfl:     Diclofenac Sodium (VOLTAREN) 1 %, Apply 2 g topically 4 (four) times a day, Disp: 50 g, Rfl: 0    ezetimibe (ZETIA) 10 mg tablet, 1 tablet Orally Once a day 90 days, Disp: , Rfl:     GLUCOSAMINE-CALCIUM-VIT D PO, Take 1,200 mg by mouth 1200mg, Disp: , Rfl:     HYDROcodone-acetaminophen (Norco) 5-325 mg per tablet, For ongoing therapy Take 1/2 tablet TID PRN for pain for ongoing therapy, Disp: 45 tablet, Rfl: 0    Hyoscyamine Sulfate SL 0.125 MG SUBL, Place 0.125 mg under the tongue 3 (three) times a day as needed (as needed), Disp: 30 tablet, Rfl: 1    Ibuprofen 200 MG CAPS, 3 (three) times a day, Disp: , Rfl:     lisinopril (ZESTRIL) 2.5 mg tablet, every 24 hours, Disp: , Rfl:     naloxone (NARCAN) 4 mg/0.1 mL nasal spray, Administer 1 spray into a nostril.  If no response after 2-3 minutes, give another dose in the other nostril using a new spray., Disp: 1 each, Rfl: 1    Omega-3 Fatty Acids (Fish Oil) 1200 MG CAPS, every 24 hours, Disp: , Rfl:     Omega-3 Fatty Acids (Fish Oil) 1200 MG CAPS, 1 capsule every 24 hours, Disp: , Rfl:     pantoprazole (PROTONIX) 40 mg tablet, ONE TABLET DAILY for GI issue, Disp: , Rfl:     pregabalin (LYRICA) 75 mg capsule, Take 1 PO TID, Disp: 90 capsule, Rfl: 1    vilazodone (VIIBRYD) 20 mg tablet, Take 1 tablet (20 mg total) by mouth daily with breakfast, Disp: 30 tablet, Rfl: 1    Current Allergies     Allergies as of 11/16/2023 - Reviewed 11/16/2023   Allergen Reaction Noted    Pseudoephedrine Hives and Dizziness 04/21/2012    Sulfa antibiotics Dizziness, Hives, and Other (See Comments) 10/08/2012 The following portions of the patient's history were reviewed and updated as appropriate: allergies, current medications, past family history, past medical history, past social history, past surgical history and problem list.     Past Medical History:   Diagnosis Date    Acute medial meniscus tear     Allergic rhinitis     Allergic rhinitis     last assessed 5/8/14    Anxiety     Appendicitis     Arthritis     Benign essential hypertension     last assessed 1/6/14    Biceps tendon tear     Cancer ([address])     skin    Cervical radiculopathy     and lumbar    Chronic pain disorder     knees and hips     Colon polyps     CPAP (continuous positive airway pressure) dependence     Depression     Depression with anxiety     Dyskinesia of esophagus     Erythema migrans (Lyme disease)     last assessed 10/16/12    Fatigue     GERD (gastroesophageal reflux disease)     Heart murmur 1951    Heel spur, right     Herpes     Herpes zoster     Hyperlipidemia     Hyperplastic colon polyp     last assessed 11/14/14    Joint pain     Kidney stone 1995    Kidney stones     Lyme disease     Myocardial infarction ([address])     Nephrolithiasis     Panic disorder with agoraphobia     last assessed 8/7/13    Seasonal affective disorder ([address])     Shingles 2015    Sleep apnea     Vertigo     Vitamin D deficiency        Past Surgical History:   Procedure Laterality Date    APPENDECTOMY  08/01/2003    BICEPS TENDON REPAIR      CARDIAC OTHER      stents x2    KNEE ARTHROSCOPY      with medial meniscus repair, resolved 01/01/05    KNEE CARTILAGE SURGERY      OTHER SURGICAL HISTORY      distal reinsertion of ruptured biceps tendon    VA ARTHRS KNE SURG W/MENISCECTOMY MED/LAT W/SHVG Right 07/30/2019    Procedure: RIGHT KNEE ARTHROSCOPY, MEDIAL MENISCECTOMY;  Surgeon: Carlota Mao DO;  Location:  MAIN OR;  Service: Orthopedics    TOOTH EXTRACTION      Middle of October 2019    VASECTOMY         Family History   Problem Relation Age of Onset Diabetes Mother     Coronary artery disease Father     Kidney disease Father     Heart disease Father     Cancer Father         kidney cancer         Medications have been verified. Objective   /78   Pulse 66   Temp 97.5 °F (36.4 °C)   Resp 18   SpO2 98%   No LMP for male patient. Physical Exam     Physical Exam  Vitals and nursing note reviewed. Constitutional:       General: He is not in acute distress. Appearance: He is not toxic-appearing. HENT:      Head: Normocephalic and atraumatic. Comments: No maxillary or frontal sinus TTP     Right Ear: Tympanic membrane, ear canal and external ear normal.      Left Ear: Tympanic membrane, ear canal and external ear normal.      Nose: Nose normal.      Mouth/Throat:      Mouth: Mucous membranes are moist.      Pharynx: No oropharyngeal exudate or posterior oropharyngeal erythema. Eyes:      Conjunctiva/sclera: Conjunctivae normal.   Pulmonary:      Effort: Pulmonary effort is normal.      Breath sounds: Normal breath sounds. Musculoskeletal:      Cervical back: No tenderness. Lymphadenopathy:      Cervical: No cervical adenopathy. Neurological:      Mental Status: He is alert.    Psychiatric:         Mood and Affect: Mood normal.         Behavior: Behavior normal.

## 2023-11-20 DIAGNOSIS — M51.26 LUMBAR DISC HERNIATION: ICD-10-CM

## 2023-11-20 DIAGNOSIS — M48.062 SPINAL STENOSIS OF LUMBAR REGION WITH NEUROGENIC CLAUDICATION: ICD-10-CM

## 2023-11-20 DIAGNOSIS — M47.816 LUMBAR SPONDYLOSIS: ICD-10-CM

## 2023-11-20 DIAGNOSIS — G89.4 CHRONIC PAIN SYNDROME: ICD-10-CM

## 2023-11-20 DIAGNOSIS — M54.16 LUMBAR RADICULOPATHY: ICD-10-CM

## 2023-11-20 RX ORDER — HYDROCODONE BITARTRATE AND ACETAMINOPHEN 5; 325 MG/1; MG/1
TABLET ORAL
Qty: 45 TABLET | Refills: 0 | Status: SHIPPED | OUTPATIENT
Start: 2023-11-20

## 2023-11-20 NOTE — TELEPHONE ENCOUNTER
Patient called refill line- he accidentally erased VM message from office, he will attempt to call office staff.

## 2023-11-20 NOTE — TELEPHONE ENCOUNTER
Attempted to contact ptTawnya DE LA PAZ. Provided with cb# and OH.     LP at [facility] 9/11/23 Norco 5/325 1/2 tab tid prn #45

## 2023-11-20 NOTE — TELEPHONE ENCOUNTER
Caller: Patient     Doctor: Rick Martinez    Reason for call: Calling in regards to missed message     Call back#: [phone]

## 2023-11-20 NOTE — TELEPHONE ENCOUNTER
Reason for call:   [x] Refill   [] Prior Auth  [] Other:     Office:   [] PCP/Provider -   [x] Specialty/Provider - [facility]    Medication: Norco    Dose/Frequency: 5-325mg    Quantity: #45    Pharmacy: [facility]    Does the patient have enough for 3 days?    [] Yes   [x] No - Send as HP to POD

## 2023-11-20 NOTE — TELEPHONE ENCOUNTER
S/w pt who is requesting a refill of Norco 5/325. Confirmed pt is taking 1/2 tab tid prn. Per pt, it is helping his pain and he has no se's  Pt is aware OVS required for narcotic refill  Pt has [facility] 12/4/23 with [facility]. Last [facility] 9/11/23 #45 with no refill. Pt is going away this weekend for 8 days.     Please advise

## 2023-12-04 ENCOUNTER — OFFICE VISIT (OUTPATIENT)
Dept: PAIN MEDICINE | Facility: CLINIC | Age: 73
End: 2023-12-04
Payer: COMMERCIAL

## 2023-12-04 VITALS
DIASTOLIC BLOOD PRESSURE: 92 MMHG | BODY MASS INDEX: 35.78 KG/M2 | HEIGHT: 73 IN | WEIGHT: 270 LBS | SYSTOLIC BLOOD PRESSURE: 142 MMHG | TEMPERATURE: 98.1 F

## 2023-12-04 DIAGNOSIS — M51.26 LUMBAR DISC HERNIATION: ICD-10-CM

## 2023-12-04 DIAGNOSIS — G89.4 CHRONIC PAIN SYNDROME: ICD-10-CM

## 2023-12-04 DIAGNOSIS — F41.9 ANXIETY: ICD-10-CM

## 2023-12-04 DIAGNOSIS — M48.062 SPINAL STENOSIS OF LUMBAR REGION WITH NEUROGENIC CLAUDICATION: ICD-10-CM

## 2023-12-04 DIAGNOSIS — G43.811 OTHER MIGRAINE WITH STATUS MIGRAINOSUS, INTRACTABLE: ICD-10-CM

## 2023-12-04 DIAGNOSIS — M54.16 LUMBAR RADICULOPATHY: Primary | ICD-10-CM

## 2023-12-04 DIAGNOSIS — M47.816 LUMBAR SPONDYLOSIS: ICD-10-CM

## 2023-12-04 DIAGNOSIS — M17.12 PRIMARY OSTEOARTHRITIS OF LEFT KNEE: ICD-10-CM

## 2023-12-04 PROCEDURE — 99214 OFFICE O/P EST MOD 30 MIN: CPT | Performed by: PHYSICIAN ASSISTANT

## 2023-12-04 RX ORDER — PREGABALIN 75 MG/1
CAPSULE ORAL
Qty: 90 CAPSULE | Refills: 1 | Status: SHIPPED | OUTPATIENT
Start: 2023-12-04

## 2023-12-04 RX ORDER — HYDROCODONE BITARTRATE AND ACETAMINOPHEN 5; 325 MG/1; MG/1
TABLET ORAL
Qty: 45 TABLET | Refills: 0 | Status: SHIPPED | OUTPATIENT
Start: 2023-12-04

## 2023-12-04 RX ORDER — HYDROCODONE BITARTRATE AND ACETAMINOPHEN 5; 325 MG/1; MG/1
TABLET ORAL
Qty: 45 TABLET | Refills: 0 | Status: SHIPPED | OUTPATIENT
Start: 2023-12-04 | End: 2023-12-04 | Stop reason: SDUPTHER

## 2023-12-04 RX ORDER — METAXALONE 800 MG/1
800 TABLET ORAL 3 TIMES DAILY PRN
Qty: 90 TABLET | Refills: 0 | Status: SHIPPED | OUTPATIENT
Start: 2023-12-04

## 2023-12-04 NOTE — PROGRESS NOTES
Assessment:  1. Lumbar radiculopathy    2. Lumbar spondylosis    3. Lumbar disc herniation    4. Spinal stenosis of lumbar region with neurogenic claudication    5. Primary osteoarthritis of left knee    6. Chronic pain syndrome        Plan:  While the patient was in the office today, I did have a thorough conversation regarding their chronic pain syndrome, medication management, and treatment plan options. At this point in time the patient will proceed with the previously recommended orthopedic consultation regarding the severe tricompartmental osteoarthritis seen on x-ray of his left knee. We discussed weight loss efforts. He is going to attempt to exercise more regularly especially with the bike. He was instructed on how to gradually wean off pregabalin. He is unsure if it is providing any benefit. Of course if his pain begins to increase as he is weaning off then he may certainly resume it. I have provided a refill on the Norco 5/320 5/2 a tablet 3 times daily as needed pain with a quantity of 45 tablets/month. This will have a do not fill date of 12/18/2023. I have also provided a refill of the Skelaxin 800 mg 3 times daily as needed for tightness and spasm. Consider repeating lumbar epidural steroid injections if the pain increases. [place] Prescription Drug Monitoring Program report was reviewed and was appropriate     There are risks associated with opioid medications, including dependence, addiction and tolerance. The patient understands and agrees to use these medications only as prescribed. Potential side effects of the medications include, but are not limited to, constipation, drowsiness, addiction, impaired judgment and risk of fatal overdose if not taken as prescribed. The patient was warned against driving while taking sedation medications. Sharing medications is a felony.  At this point in time, the patient is showing no signs of addiction, abuse, diversion or suicidal ideation. The patient will follow-up in 12 weeks for medication prescription refill and reevaluation. The patient was advised to contact the office should their symptoms worsen in the interim. The patient was agreeable and verbalized an understanding. History of Present Illness: The patient is a 68 y.o. male last seen on 09/11/2023 who presents for a follow up office visit in regards to chronic pain secondary to lumbar spondylosis, lumbar degenerative disc disease with radiculopathy, left knee severe osteoarthritis. The patient currently reports chronic low back pain and chronic left knee pain that he presently rates a 3 out of 10 and describes it as an intermittent dull, aching and sharp pain. Both his back pain and his knee pain are significantly worse with prolonged standing and walking. Last visit we ordered an x-ray of the left knee which did reveal severe tricompartmental osteoarthritis. I placed a referral for him to see Dr. Ángel Higgins and he has to still make that appointment. He is unsure if the Lyrica is providing any benefit and has concerns that it may have caused some weight gain. Current pain medications includes: Norco 5/325 1/2 tablet 3 times daily as needed, Skelaxin 800 mg 3 times daily and Lyrica 75 mg 3 times daily. The patient reports that this regimen is providing 50% pain relief. The patient is reporting no side effects from this pain medication regimen. I have personally reviewed and/or updated the patient's past medical history, past surgical history, family history, social history, current medications, allergies, and vital signs today. Review of Systems:    Review of Systems   Musculoskeletal:  Positive for back pain, gait problem (Difficulty walking) and joint swelling (Joint Stiffness).         Left Knee Pain  Decreased ROM         Past Medical History:   Diagnosis Date   • Acute medial meniscus tear    • Allergic rhinitis    • Allergic rhinitis     last assessed 5/8/14   • Anxiety    • Appendicitis    • Arthritis    • Benign essential hypertension     last assessed 1/6/14   • Biceps tendon tear    • Cancer (HCC)     skin   • Cervical radiculopathy     and lumbar   • Chronic pain disorder     knees and hips    • Colon polyps    • CPAP (continuous positive airway pressure) dependence    • Depression    • Depression with anxiety    • Dyskinesia of esophagus    • Erythema migrans (Lyme disease)     last assessed 10/16/12   • Fatigue    • GERD (gastroesophageal reflux disease)    • Heart murmur 1951   • Heel spur, right    • Herpes    • Herpes zoster    • Hyperlipidemia    • Hyperplastic colon polyp     last assessed 11/14/14   • Joint pain    • Kidney stone 1995   • Kidney stones    • Lyme disease    • Myocardial infarction [facility])    • Nephrolithiasis    • Panic disorder with agoraphobia     last assessed 8/7/13   • Seasonal affective disorder ([address])    • Shingles 2015   • Sleep apnea    • Vertigo    • Vitamin D deficiency        Past Surgical History:   Procedure Laterality Date   • APPENDECTOMY  08/01/2003   • BICEPS TENDON REPAIR     • CARDIAC OTHER      stents x2   • KNEE ARTHROSCOPY      with medial meniscus repair, resolved 01/01/05   • KNEE CARTILAGE SURGERY     • OTHER SURGICAL HISTORY      distal reinsertion of ruptured biceps tendon   • IN ARTHRS KNE SURG W/MENISCECTOMY MED/LAT W/SHVG Right 07/30/2019    Procedure: RIGHT KNEE ARTHROSCOPY, MEDIAL MENISCECTOMY;  Surgeon: Rogelio Griffith DO;  Location: [facility] MAIN OR;  Service: Orthopedics   • TOOTH EXTRACTION      Middle of October 2019   • VASECTOMY         Family History   Problem Relation Age of Onset   • Diabetes Mother    • Coronary artery disease Father    • Kidney disease Father    • Heart disease Father    • Cancer Father         kidney cancer       Social History     Occupational History   • Not on file   Tobacco Use   • Smoking status: Former     Packs/day: 1.00     Types: Cigarettes, Cigars     Start date: 11/15/1967     Quit date: 3/1/1987     Years since quittin.7   • Smokeless tobacco: Never   Vaping Use   • Vaping Use: Never used   Substance and Sexual Activity   • Alcohol use:  Yes     Alcohol/week: 1.0 standard drink of alcohol     Types: 1 Glasses of wine per week   • Drug use: No   • Sexual activity: Not on file         Current Outpatient Medications:   •  ALPRAZolam (Xanax) 0.25 mg tablet, Take 1 tablet (0.25 mg total) by mouth 3 (three) times a day as needed for anxiety, Disp: 30 tablet, Rfl: 0  •  aspirin 81 mg chewable tablet, every 24 hours, Disp: , Rfl:   •  atorvastatin (LIPITOR) 80 mg tablet, every 24 hours, Disp: , Rfl:   •  butalbital-acetaminophen-caffeine (FIORICET,ESGIC) -40 mg per tablet, Take 1 tablet by mouth every 4 (four) hours as needed for headaches, Disp: 30 tablet, Rfl: 0  •  cholecalciferol (VITAMIN D3) 1,000 units tablet, Take 1,000 Units by mouth daily, Disp: , Rfl:   •  CHONDROITIN SULFATE A PO, Take by mouth in the morning, Disp: , Rfl:   •  Coenzyme Q10 (CoQ-10) 100 MG CAPS, every 24 hours, Disp: , Rfl:   •  Diclofenac Sodium (VOLTAREN) 1 %, Apply 2 g topically 4 (four) times a day, Disp: 50 g, Rfl: 0  •  ezetimibe (ZETIA) 10 mg tablet, 1 tablet Orally Once a day 90 days, Disp: , Rfl:   •  GLUCOSAMINE-CALCIUM-VIT D PO, Take 1,200 mg by mouth 1200mg, Disp: , Rfl:   •  HYDROcodone-acetaminophen (Norco) 5-325 mg per tablet, For ongoing therapy Take 1/2 tablet TID PRN for pain DO NOT FILL BEFORE 23, Disp: 45 tablet, Rfl: 0  •  Hyoscyamine Sulfate SL 0.125 MG SUBL, Place 0.125 mg under the tongue 3 (three) times a day as needed (as needed), Disp: 30 tablet, Rfl: 1  •  Ibuprofen 200 MG CAPS, 3 (three) times a day, Disp: , Rfl:   •  lisinopril (ZESTRIL) 2.5 mg tablet, every 24 hours, Disp: , Rfl:   •  metaxalone (SKELAXIN) 800 mg tablet, Take 1 tablet (800 mg total) by mouth 3 (three) times a day as needed for muscle spasms, Disp: 90 tablet, Rfl: 0  •  naloxone (NARCAN) 4 mg/0.1 mL nasal spray, Administer 1 spray into a nostril. If no response after 2-3 minutes, give another dose in the other nostril using a new spray., Disp: 1 each, Rfl: 1  •  Omega-3 Fatty Acids (Fish Oil) 1200 MG CAPS, 1 capsule every 24 hours, Disp: , Rfl:   •  pantoprazole (PROTONIX) 40 mg tablet, ONE TABLET DAILY for GI issue, Disp: , Rfl:   •  pregabalin (LYRICA) 75 mg capsule, Take 1 PO TID, Disp: 90 capsule, Rfl: 1  •  vilazodone (VIIBRYD) 20 mg tablet, Take 1 tablet (20 mg total) by mouth daily with breakfast, Disp: 30 tablet, Rfl: 1  •  butalbital-acetaminophen-caffeine (FIORICET,ESGIC) -40 mg per tablet, Take 1 tablet by mouth every 4 (four) hours as needed for headaches (Patient not taking: Reported on 12/4/2023), Disp: 30 tablet, Rfl: 0  •  methylPREDNISolone 4 MG tablet therapy pack, Use as directed on package (Patient not taking: Reported on 12/4/2023), Disp: 1 each, Rfl: 0  •  Omega-3 Fatty Acids (Fish Oil) 1200 MG CAPS, every 24 hours (Patient not taking: Reported on 12/4/2023), Disp: , Rfl:     Allergies   Allergen Reactions   • Pseudoephedrine Hives and Dizziness     Reaction Date: 16Apr2011;    • Sulfa Antibiotics Dizziness, Hives and Other (See Comments)       Physical Exam:    /92 (BP Location: Left arm, Patient Position: Standing, Cuff Size: Adult)   Temp 98.1 °F (36.7 °C)   Ht 6' 1" (1.854 m)   Wt 122 kg (270 lb)   BMI 35.62 kg/m²     Constitutional:normal, well developed, well nourished, alert, in no distress and non-toxic and no overt pain behavior.  and overweight  Eyes:anicteric  HEENT:grossly intact  Neck:supple, symmetric, trachea midline and no masses   Pulmonary:even and unlabored  Cardiovascular:No edema or pitting edema present  Skin:Normal without rashes or lesions and well hydrated  Psychiatric:Mood and affect appropriate  Neurologic:Cranial Nerves II-XII grossly intact  Musculoskeletal:normal      Imaging  LEFT KNEE     INDICATION:   M25.562: Pain in left knee  G89.29: Other chronic pain. COMPARISON:  None     VIEWS:  XR KNEE 3 VW LEFT NON INJURY        FINDINGS:     There is no acute fracture or dislocation. There is no joint effusion. Severe tricompartmental osteoarthritis is seen, most significant at the medial compartment. 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 No lytic or blastic osseous lesion. There are atherosclerotic calcifications. Soft tissues are otherwise unremarkable. IMPRESSION:     Severe tricompartmental osteoarthritis. Bipartite patella. No orders to display         No orders of the defined types were placed in this encounter.

## 2023-12-05 RX ORDER — BUTALBITAL, ACETAMINOPHEN AND CAFFEINE 50; 325; 40 MG/1; MG/1; MG/1
1 TABLET ORAL EVERY 4 HOURS PRN
Qty: 30 TABLET | Refills: 0 | Status: SHIPPED | OUTPATIENT
Start: 2023-12-05

## 2023-12-05 RX ORDER — ALPRAZOLAM 0.25 MG/1
0.25 TABLET ORAL 3 TIMES DAILY PRN
Qty: 30 TABLET | Refills: 0 | Status: SHIPPED | OUTPATIENT
Start: 2023-12-05

## 2023-12-18 DIAGNOSIS — F32.A DEPRESSION, UNSPECIFIED DEPRESSION TYPE: ICD-10-CM

## 2023-12-19 RX ORDER — VILAZODONE HYDROCHLORIDE 20 MG/1
20 TABLET ORAL
Qty: 30 TABLET | Refills: 0 | Status: SHIPPED | OUTPATIENT
Start: 2023-12-19

## 2023-12-19 NOTE — TELEPHONE ENCOUNTER
Called Pt. Advised due for office visit Annual Physical (last completed 2/14/2022) and to schedule. Patient schedule physical for this Thursday at 6:00 PM

## 2023-12-20 ENCOUNTER — TELEPHONE (OUTPATIENT)
Dept: FAMILY MEDICINE CLINIC | Facility: CLINIC | Age: 73
End: 2023-12-20

## 2023-12-20 NOTE — TELEPHONE ENCOUNTER
Confirmation - Referral [id number]  Effective: 12/14/2023     Expires: 03/13/2024  Active  Referred From  [facility]  Specialty: [facility]  Group NPI: [id number]  Provider ID: [id number]  Tax ID: [id number]  [address] [place] [address]  Referred To  [facility]  Specialty: Multi-Specialty Group  Tier 2  Group NPI: [id number]  Provider ID: [id number]  Tax ID: [id number]  This referral is valid at any location for the above group  Patient Info  KARO CANDELARIA  [id number]  Male  1950  [address] [place] [id number]  Clinical Information  Place of Service  [facility]  Service Type  Medical Care  Diagnosis  1R68.89 - other general symptoms and signs  2R07.89 - other chest pain  Procedures  199499 - unlisted evaluation and management service  Disclaimer  [facility] and its Affiliates ([facility]) will pay for only those services covered under the [facility] Contract which are specifically noted and requested by the PCP or OBGYN on the referral. If any additional services, testing, or follow-up care are required, the PCP or OBGYN must be contacted prior to the delivery of such additional services for written approval on a separate referral. Non-referred services will not be covered by [facility]. Benefits are underwritten or administered by [employer], a subsidiary of [facility], which are independent licensees of the [employer].

## 2023-12-21 ENCOUNTER — OFFICE VISIT (OUTPATIENT)
Dept: FAMILY MEDICINE CLINIC | Facility: CLINIC | Age: 73
End: 2023-12-21
Payer: COMMERCIAL

## 2023-12-21 VITALS
HEIGHT: 73 IN | BODY MASS INDEX: 35.25 KG/M2 | OXYGEN SATURATION: 97 % | DIASTOLIC BLOOD PRESSURE: 90 MMHG | WEIGHT: 266 LBS | TEMPERATURE: 96.5 F | HEART RATE: 79 BPM | SYSTOLIC BLOOD PRESSURE: 120 MMHG

## 2023-12-21 DIAGNOSIS — K21.9 GASTROESOPHAGEAL REFLUX DISEASE WITHOUT ESOPHAGITIS: Primary | ICD-10-CM

## 2023-12-21 DIAGNOSIS — I25.112 CORONARY ARTERY DISEASE INVOLVING NATIVE CORONARY ARTERY OF NATIVE HEART WITH REFRACTORY ANGINA PECTORIS (HCC): ICD-10-CM

## 2023-12-21 DIAGNOSIS — N40.1 BPH WITH URINARY OBSTRUCTION: ICD-10-CM

## 2023-12-21 DIAGNOSIS — Z00.00 WELL ADULT EXAM: ICD-10-CM

## 2023-12-21 DIAGNOSIS — F11.20 CONTINUOUS OPIOID DEPENDENCE (HCC): ICD-10-CM

## 2023-12-21 DIAGNOSIS — N13.8 BPH WITH URINARY OBSTRUCTION: ICD-10-CM

## 2023-12-21 PROCEDURE — 99213 OFFICE O/P EST LOW 20 MIN: CPT | Performed by: FAMILY MEDICINE

## 2023-12-21 PROCEDURE — 99397 PER PM REEVAL EST PAT 65+ YR: CPT | Performed by: FAMILY MEDICINE

## 2023-12-21 RX ORDER — TAMSULOSIN HYDROCHLORIDE 0.4 MG/1
0.4 CAPSULE ORAL
Qty: 90 CAPSULE | Refills: 3 | Status: SHIPPED | OUTPATIENT
Start: 2023-12-21

## 2023-12-21 RX ORDER — FAMOTIDINE 20 MG/1
20 TABLET, FILM COATED ORAL 2 TIMES DAILY
Qty: 30 TABLET | Refills: 2 | Status: SHIPPED | OUTPATIENT
Start: 2023-12-21

## 2023-12-21 NOTE — PROGRESS NOTES
Subjective:   Chief Complaint   Patient presents with    Physical Exam     Physical   In er last week with heart         Patient ID: Nii Gamble is a 73 y.o. male.    Here for well exam.  Was recently in the emergency room at [place].  He had chest pain and it was attributed to reflux.  He has been taking Protonix.  Had a nice response to the GI cocktail given to him there.  This has been a recurrent problem for him.  He is also has a history of coronary disease with a stent placed.  He continues to complain of bilateral knee pain and needs to see Ortho.        The following portions of the patient's history were reviewed and updated as appropriate: allergies, current medications, past family history, past medical history, past social history, past surgical history and problem list.    Review of Systems   Constitutional:  Negative for activity change, appetite change, chills, diaphoresis, fatigue and unexpected weight change.   HENT:  Negative for congestion, ear discharge, ear pain, hearing loss, nosebleeds and rhinorrhea.    Eyes:  Negative for pain, redness, itching and visual disturbance.   Respiratory:  Negative for cough, choking, chest tightness and shortness of breath.    Cardiovascular:  Positive for chest pain. Negative for leg swelling.   Gastrointestinal:  Negative for abdominal pain, blood in stool, constipation, diarrhea and nausea.   Endocrine: Negative for cold intolerance, polydipsia and polyphagia.   Genitourinary:  Negative for dysuria, frequency, hematuria and urgency.   Musculoskeletal:  Positive for arthralgias. Negative for back pain, gait problem, joint swelling, neck pain and neck stiffness.   Skin:  Negative for color change and rash.   Allergic/Immunologic: Negative for environmental allergies and food allergies.   Neurological:  Negative for dizziness, tremors, seizures, speech difficulty, numbness and headaches.   Hematological:  Negative for adenopathy. Does not bruise/bleed  "easily.   Psychiatric/Behavioral:  Negative for behavioral problems, dysphoric mood, hallucinations and self-injury.              Objective:  Vitals:    12/21/23 1828   BP: 120/90   Pulse: 79   Temp: (!) 96.5 °F (35.8 °C)   TempSrc: Tympanic   SpO2: 97%   Weight: 121 kg (266 lb)   Height: 6' 1\" (1.854 m)      Physical Exam  Constitutional:       General: He is not in acute distress.     Appearance: He is well-developed. He is not diaphoretic.   HENT:      Head: Normocephalic and atraumatic.      Right Ear: External ear normal.      Left Ear: External ear normal.      Nose: Nose normal.      Mouth/Throat:      Pharynx: No oropharyngeal exudate.   Eyes:      General: No scleral icterus.        Right eye: No discharge.         Left eye: No discharge.      Conjunctiva/sclera: Conjunctivae normal.      Pupils: Pupils are equal, round, and reactive to light.   Neck:      Thyroid: No thyromegaly.   Cardiovascular:      Rate and Rhythm: Normal rate and regular rhythm.      Heart sounds: Normal heart sounds. No murmur heard.  Pulmonary:      Effort: Pulmonary effort is normal.      Breath sounds: Normal breath sounds. No wheezing or rales.   Abdominal:      General: Bowel sounds are normal.      Palpations: Abdomen is soft. There is no mass.      Tenderness: There is no abdominal tenderness. There is no guarding.   Musculoskeletal:         General: Tenderness present. Normal range of motion.      Cervical back: Normal range of motion and neck supple.   Lymphadenopathy:      Cervical: No cervical adenopathy.   Skin:     General: Skin is warm and dry.   Neurological:      Mental Status: He is alert and oriented to person, place, and time.      Deep Tendon Reflexes: Reflexes are normal and symmetric.   Psychiatric:         Thought Content: Thought content normal.         Judgment: Judgment normal.           Assessment/Plan:    No problem-specific Assessment & Plan notes found for this encounter.       Diagnoses and all orders " for this visit:    Gastroesophageal reflux disease without esophagitis  -     Ambulatory Referral to Gastroenterology; Future  -     famotidine (PEPCID) 20 mg tablet; Take 1 tablet (20 mg total) by mouth 2 (two) times a day    Continuous opioid dependence (HCC)    Well adult exam    Coronary artery disease involving native coronary artery of native heart with refractory angina pectoris (HCC)    BPH with urinary obstruction  -     tamsulosin (FLOMAX) 0.4 mg; Take 1 capsule (0.4 mg total) by mouth daily with dinner        We will add famotidine.  I have asked him to see GI for upper endoscopy.  We will add tamsulosin for his BPH.  He needs to get a fresh set of labs.  I would consider the prescription of semaglutide to him due to the presence of prediabetes and increased risk factors.

## 2024-01-10 DIAGNOSIS — G43.811 OTHER MIGRAINE WITH STATUS MIGRAINOSUS, INTRACTABLE: ICD-10-CM

## 2024-01-10 DIAGNOSIS — F41.9 ANXIETY: ICD-10-CM

## 2024-01-11 RX ORDER — ALPRAZOLAM 0.25 MG/1
0.25 TABLET ORAL 3 TIMES DAILY PRN
Qty: 30 TABLET | Refills: 0 | Status: SHIPPED | OUTPATIENT
Start: 2024-01-11

## 2024-01-11 RX ORDER — BUTALBITAL, ACETAMINOPHEN AND CAFFEINE 50; 325; 40 MG/1; MG/1; MG/1
1 TABLET ORAL EVERY 4 HOURS PRN
Qty: 30 TABLET | Refills: 0 | Status: SHIPPED | OUTPATIENT
Start: 2024-01-11

## 2024-01-16 DIAGNOSIS — G89.4 CHRONIC PAIN SYNDROME: ICD-10-CM

## 2024-01-16 DIAGNOSIS — M47.816 LUMBAR SPONDYLOSIS: ICD-10-CM

## 2024-01-16 DIAGNOSIS — M54.16 LUMBAR RADICULOPATHY: ICD-10-CM

## 2024-01-16 DIAGNOSIS — M51.26 LUMBAR DISC HERNIATION: ICD-10-CM

## 2024-01-16 DIAGNOSIS — M48.062 SPINAL STENOSIS OF LUMBAR REGION WITH NEUROGENIC CLAUDICATION: ICD-10-CM

## 2024-01-19 ENCOUNTER — TELEPHONE (OUTPATIENT)
Dept: FAMILY MEDICINE CLINIC | Facility: CLINIC | Age: 74
End: 2024-01-19

## 2024-01-19 RX ORDER — HYDROCODONE BITARTRATE AND ACETAMINOPHEN 5; 325 MG/1; MG/1
TABLET ORAL
Qty: 45 TABLET | Refills: 0 | Status: SHIPPED | OUTPATIENT
Start: 2024-01-19

## 2024-01-19 NOTE — TELEPHONE ENCOUNTER
S/w pt, stated that he takes his norco 1/2 tab po tid with + relief, no se's. Pt stated that he went to [place] ER for a rib injury on 12/10/23 and was given a rx for percocet 1 tab q 8 hours. Pt stated that he has no medication on hand.     Pt did fu with his pcp re: rib pain and was advised to continue to monitor. Pt stated that the pain is still there - not as severe. + for covid as of yesterday. [place]      Advised pt, the writer will fu with MG and cb to advise. Pt verbalized understanding and appreciation.

## 2024-01-19 NOTE — TELEPHONE ENCOUNTER
Irwin, this is Karo CANDELARIA Birthday 1950. I tested positive for COVID yesterday afternoon. The severe symptoms started yesterday or actually yesterday night and then into the morning and then it's gotten kind of more like flu like. So I just want to know what steps I should take at this point, whether it's just cold medications or whether I should schedule an appointment to see the doctor are what should be done. My number is [phone]. Thank you very much. Diana.  You received a voice mail from KARO CANDELARIA.

## 2024-01-29 ENCOUNTER — TELEPHONE (OUTPATIENT)
Dept: FAMILY MEDICINE CLINIC | Facility: CLINIC | Age: 74
End: 2024-01-29

## 2024-01-29 DIAGNOSIS — I10 PRIMARY HYPERTENSION: ICD-10-CM

## 2024-01-29 DIAGNOSIS — F41.0 PANIC DISORDER: ICD-10-CM

## 2024-01-29 DIAGNOSIS — E78.2 MIXED HYPERLIPIDEMIA: ICD-10-CM

## 2024-01-29 DIAGNOSIS — I25.112 CORONARY ARTERY DISEASE INVOLVING NATIVE CORONARY ARTERY OF NATIVE HEART WITH REFRACTORY ANGINA PECTORIS (HCC): Primary | ICD-10-CM

## 2024-01-29 NOTE — TELEPHONE ENCOUNTER
Patient came in because his cardiologist recommended him to follow up with his primary doctor to get a routine full panel blood work with A1C. Patient uses [facility].

## 2024-01-30 ENCOUNTER — TELEPHONE (OUTPATIENT)
Dept: GASTROENTEROLOGY | Facility: CLINIC | Age: 74
End: 2024-01-30

## 2024-01-30 ENCOUNTER — OFFICE VISIT (OUTPATIENT)
Dept: GASTROENTEROLOGY | Facility: CLINIC | Age: 74
End: 2024-01-30
Payer: COMMERCIAL

## 2024-01-30 VITALS
SYSTOLIC BLOOD PRESSURE: 120 MMHG | HEIGHT: 73 IN | DIASTOLIC BLOOD PRESSURE: 80 MMHG | WEIGHT: 257 LBS | BODY MASS INDEX: 34.06 KG/M2

## 2024-01-30 DIAGNOSIS — Z86.010 HISTORY OF COLON POLYPS: ICD-10-CM

## 2024-01-30 DIAGNOSIS — K21.9 GASTROESOPHAGEAL REFLUX DISEASE WITHOUT ESOPHAGITIS: Primary | ICD-10-CM

## 2024-01-30 DIAGNOSIS — G47.33 OSA ON CPAP: ICD-10-CM

## 2024-01-30 DIAGNOSIS — I21.4 NSTEMI (NON-ST ELEVATED MYOCARDIAL INFARCTION) (HCC): ICD-10-CM

## 2024-01-30 PROBLEM — Z86.0100 HISTORY OF COLON POLYPS: Status: ACTIVE | Noted: 2024-01-30

## 2024-01-30 PROCEDURE — 1159F MED LIST DOCD IN RCRD: CPT | Performed by: INTERNAL MEDICINE

## 2024-01-30 PROCEDURE — 99204 OFFICE O/P NEW MOD 45 MIN: CPT | Performed by: INTERNAL MEDICINE

## 2024-01-30 PROCEDURE — 99214 OFFICE O/P EST MOD 30 MIN: CPT | Performed by: INTERNAL MEDICINE

## 2024-01-30 PROCEDURE — 1160F RVW MEDS BY RX/DR IN RCRD: CPT | Performed by: INTERNAL MEDICINE

## 2024-01-30 NOTE — TELEPHONE ENCOUNTER
Scheduled date of egd (as of today):3-14-24  Physician performing colonoscopy:shin  Location of colonoscopy:[facility]  Instructions reviewed with patient by:ml  Clearances: Pt has aortic satenosis and valve disease(see's Dr Sosa in [place])                       Per Dr Hawkins, he has been cleared and all is a go.

## 2024-01-30 NOTE — PROGRESS NOTES
[facility] - Outpatient Follow-up Note  Nii Gamble 73 y.o. male MRN: [id number]  Encounter: [id number]    ASSESSMENT AND PLAN:      1. Gastroesophageal reflux disease without esophagitis  This is a 73-year-old male referred to us by Dr. Ambrosio for worsening reflux.  Sounds like between his traveling, late night eating, he is having flares of his reflux.  Has been on pantoprazole for a long time and recently added Pepcid at nighttime which she thinks is helping more but not completely.    -Last EGD was over 8 years ago so we will repeat at this time.  Continue Protonix 40 mg in the morning and Pepcid 20 mg at night.  Will repeat the EGD pending this, consider switching the Protonix to something like omeprazole 20 mg twice daily instead.  - EGD; Future    2. JAY on CPAP    3. NSTEMI (non-ST elevated myocardial infarction) (HCC)  Only on aspirin at this point.  No longer on any thinners.    4. History of colon polyps  Colonoscopy up-to-date, recall September 2027      Followup Appointment: 3 months  ______________________________________________________________________    Chief Complaint   Patient presents with   • GERD     HPI: This is a 73-year-old male here today for reflux.  Referred to us by his primary doctor Dr. Ambrosio.  Patient states that he has had reflux for many years.  In the past, reflux well-controlled with the pantoprazole 40 mg daily.  He saw Dr. Dorsey at [facility] previously.  He has seen Dr. Villafuerte @ [place] in past and had an EGD 10 years ago for esophageal dysmotility.  No EGD since.  Denies dysphagia or nausea or vomiting.  He is still working and travels a lot, often having overnight flights requiring late night eating.  He admits he drinks multiple cups of coffee throughout the day likely contributing to his symptoms.  No GI bleeding.  Bowels are otherwise normal.    Historical Information   Past Medical History:   Diagnosis Date   • Acute  medial meniscus tear    • Allergic rhinitis    • Allergic rhinitis     last assessed 5/8/14   • Anxiety    • Appendicitis    • Arthritis    • Benign essential hypertension     last assessed 1/6/14   • Biceps tendon tear    • Cancer (HCC)     skin   • Cervical radiculopathy     and lumbar   • Chronic pain disorder     knees and hips    • Colon polyps    • CPAP (continuous positive airway pressure) dependence    • Depression    • Depression with anxiety    • Dyskinesia of esophagus    • Erythema migrans (Lyme disease)     last assessed 10/16/12   • Fatigue    • GERD (gastroesophageal reflux disease)    • Heart murmur 1951   • Heel spur, right    • Herpes    • Herpes zoster    • Hyperlipidemia    • Hyperplastic colon polyp     last assessed 11/14/14   • Joint pain    • Kidney stone 1995   • Kidney stones    • Lyme disease    • Myocardial infarction (HCC)    • Nephrolithiasis    • Panic disorder with agoraphobia     last assessed 8/7/13   • Seasonal affective disorder (HCC)    • Shingles 2015   • Sleep apnea    • Vertigo    • Vitamin D deficiency      Past Surgical History:   Procedure Laterality Date   • APPENDECTOMY  08/01/2003   • BICEPS TENDON REPAIR     • CARDIAC OTHER      stents x2   • COLONOSCOPY     • KNEE ARTHROSCOPY      with medial meniscus repair, resolved 01/01/05   • KNEE CARTILAGE SURGERY     • OTHER SURGICAL HISTORY      distal reinsertion of ruptured biceps tendon   • [place] ARTHRS KNE SURG W/MENISCECTOMY MED/LAT W/SHVG Right 07/30/2019    Procedure: RIGHT KNEE ARTHROSCOPY, MEDIAL MENISCECTOMY;  Surgeon: Rick Farris DO;  Location: [facility];  Service: Orthopedics   • TOOTH EXTRACTION      Middle of October 2019   • VASECTOMY       Social History     Substance and Sexual Activity   Alcohol Use Yes   • Alcohol/week: 1.0 standard drink of alcohol   • Types: 1 Glasses of wine per week     Social History     Substance and Sexual Activity   Drug Use No     Social History     Tobacco Use   Smoking  "Status Former   • Current packs/day: 0.00   • Average packs/day: 1 pack/day for 19.3 years (19.3 ttl pk-yrs)   • Types: Cigarettes, Cigars   • Start date: 11/15/1967   • Quit date: 3/1/1987   • Years since quittin.9   Smokeless Tobacco Never     Family History   Problem Relation Age of Onset   • Diabetes Mother    • Coronary artery disease Father    • Kidney disease Father    • Heart disease Father    • Cancer Father         kidney cancer   • Colon cancer Neg Hx    • Colon polyps Neg Hx          Current Outpatient Medications:   •  ALPRAZolam (Xanax) 0.25 mg tablet  •  aspirin 81 mg chewable tablet  •  atorvastatin (LIPITOR) 80 mg tablet  •  butalbital-acetaminophen-caffeine (FIORICET,ESGIC) -40 mg per tablet  •  cholecalciferol (VITAMIN D3) 1,000 units tablet  •  CHONDROITIN SULFATE A PO  •  Coenzyme Q10 (CoQ-10) 100 MG CAPS  •  Diclofenac Sodium (VOLTAREN) 1 %  •  ezetimibe (ZETIA) 10 mg tablet  •  famotidine (PEPCID) 20 mg tablet  •  GLUCOSAMINE-CALCIUM-VIT D PO  •  HYDROcodone-acetaminophen (Norco) 5-325 mg per tablet  •  Hyoscyamine Sulfate SL 0.125 MG SUBL  •  Ibuprofen 200 MG CAPS  •  lisinopril (ZESTRIL) 2.5 mg tablet  •  naloxone (NARCAN) 4 mg/0.1 mL nasal spray  •  Omega-3 Fatty Acids (Fish Oil) 1200 MG CAPS  •  pantoprazole (PROTONIX) 40 mg tablet  •  tamsulosin (FLOMAX) 0.4 mg  •  vilazodone (VIIBRYD) 20 mg tablet  Allergies   Allergen Reactions   • Pseudoephedrine Hives and Dizziness     Reaction Date: 2011;    • Sulfa Antibiotics Dizziness, Hives and Other (See Comments)     Reviewed medications and allergies and updated as indicated    PHYSICAL EXAM:    Blood pressure 120/80, height 6' 1\" (1.854 m), weight 117 kg (257 lb). Body mass index is 33.91 kg/m².  General Appearance: NAD, cooperative, alert  Eyes: Anicteric, conjunctiva pink  ENT:  Normocephalic, atraumatic, normal mucosa.    Neck:  Supple, symmetrical, trachea midline  Resp:  Clear to auscultation bilaterally; no rales, " rhonchi or wheezing; respirations unlabored   CV:  S1 S2, Regular rate and rhythm; no murmur, rub, or gallop.  GI:  Soft, non-tender, non-distended; normal bowel sounds; no masses, no organomegaly   Rectal: Deferred  Musculoskeletal: No cyanosis, clubbing or edema. Normal ROM.  Skin:  No jaundice, rashes, or lesions   Heme/Lymph: No palpable cervical lymphadenopathy  Psych: Normal affect, good eye contact  Neuro: No gross deficits, AAOx3    Lab Results:   Lab Results   Component Value Date    WBC 5.9 08/02/2023    HGB 15.3 08/02/2023    HCT 44.8 08/02/2023    MCV 99 (H) 08/02/2023     08/02/2023     Lab Results   Component Value Date     09/11/2017    K 4.9 08/02/2023     08/02/2023    CO2 21 08/02/2023    BUN 24 08/02/2023    CREATININE 0.99 08/02/2023    GLUCOSE 102 (H) 09/11/2017    CALCIUM 9.3 02/16/2021    AST 19 08/02/2023    ALT 20 08/02/2023    ALKPHOS 86 02/16/2021    PROT 7.1 09/11/2017    BILITOT 0.8 09/11/2017    EGFR 81 08/02/2023       Radiology Results:   No results found.    Answers submitted by the patient for this visit:  Abdominal Pain Questionnaire (Submitted on 1/30/2024)  Chief Complaint: Abdominal pain  Chronicity: chronic  Onset: more than 1 year ago  Onset quality: gradual  Frequency: intermittently  Progression since onset: gradually worsening  Pain location: epigastric region  Pain - numeric: 10/10  Pain quality: burning  Radiates to: chest  anorexia: No  arthralgias: Yes  belching: Yes  constipation: No  diarrhea: Yes  dysuria: No  fever: No  flatus: No  frequency: Yes  headaches: Yes  hematochezia: No  hematuria: No  melena: No  myalgias: Yes  nausea: No  weight loss: No  vomiting: No  Aggravated by: eating  Relieved by: nothing  Diagnostic workup: lower endoscopy

## 2024-02-07 DIAGNOSIS — F32.A DEPRESSION, UNSPECIFIED DEPRESSION TYPE: ICD-10-CM

## 2024-02-08 RX ORDER — VILAZODONE HYDROCHLORIDE 20 MG/1
20 TABLET ORAL
Qty: 30 TABLET | Refills: 0 | Status: SHIPPED | OUTPATIENT
Start: 2024-02-08

## 2024-02-13 DIAGNOSIS — F41.9 ANXIETY: ICD-10-CM

## 2024-02-13 RX ORDER — ALPRAZOLAM 0.25 MG/1
0.25 TABLET ORAL 3 TIMES DAILY PRN
Qty: 30 TABLET | Refills: 0 | Status: SHIPPED | OUTPATIENT
Start: 2024-02-13

## 2024-02-20 DIAGNOSIS — G43.811 OTHER MIGRAINE WITH STATUS MIGRAINOSUS, INTRACTABLE: ICD-10-CM

## 2024-02-20 RX ORDER — BUTALBITAL, ACETAMINOPHEN AND CAFFEINE 50; 325; 40 MG/1; MG/1; MG/1
1 TABLET ORAL EVERY 4 HOURS PRN
Qty: 30 TABLET | Refills: 0 | Status: SHIPPED | OUTPATIENT
Start: 2024-02-20

## 2024-02-21 ENCOUNTER — OFFICE VISIT (OUTPATIENT)
Dept: PAIN MEDICINE | Facility: CLINIC | Age: 74
End: 2024-02-21
Payer: COMMERCIAL

## 2024-02-21 VITALS
BODY MASS INDEX: 34.06 KG/M2 | SYSTOLIC BLOOD PRESSURE: 124 MMHG | DIASTOLIC BLOOD PRESSURE: 84 MMHG | TEMPERATURE: 97.9 F | WEIGHT: 257 LBS | HEIGHT: 73 IN | HEART RATE: 78 BPM

## 2024-02-21 DIAGNOSIS — G89.4 CHRONIC PAIN SYNDROME: ICD-10-CM

## 2024-02-21 DIAGNOSIS — M54.16 LUMBAR RADICULOPATHY: ICD-10-CM

## 2024-02-21 DIAGNOSIS — G89.29 CHRONIC PAIN OF RIGHT KNEE: ICD-10-CM

## 2024-02-21 DIAGNOSIS — M17.12 PRIMARY OSTEOARTHRITIS OF LEFT KNEE: ICD-10-CM

## 2024-02-21 DIAGNOSIS — M51.16 LUMBAR DISC DISEASE WITH RADICULOPATHY: ICD-10-CM

## 2024-02-21 DIAGNOSIS — M47.816 LUMBAR SPONDYLOSIS: ICD-10-CM

## 2024-02-21 DIAGNOSIS — M25.561 CHRONIC PAIN OF RIGHT KNEE: ICD-10-CM

## 2024-02-21 DIAGNOSIS — M48.061 SPINAL STENOSIS OF LUMBAR REGION WITHOUT NEUROGENIC CLAUDICATION: Primary | ICD-10-CM

## 2024-02-21 DIAGNOSIS — M51.26 LUMBAR DISC HERNIATION: ICD-10-CM

## 2024-02-21 LAB
ALBUMIN SERPL-MCNC: 4.4 G/DL (ref 3.8–4.8)
ALBUMIN/GLOB SERPL: 1.7 {RATIO} (ref 1.2–2.2)
ALP SERPL-CCNC: 83 IU/L (ref 44–121)
ALT SERPL-CCNC: 25 IU/L (ref 0–44)
AST SERPL-CCNC: 21 IU/L (ref 0–40)
BILIRUB SERPL-MCNC: 0.6 MG/DL (ref 0–1.2)
BUN SERPL-MCNC: 22 MG/DL (ref 8–27)
BUN/CREAT SERPL: 18 (ref 10–24)
CALCIUM SERPL-MCNC: 10.1 MG/DL (ref 8.6–10.2)
CHLORIDE SERPL-SCNC: 102 MMOL/L (ref 96–106)
CHOLEST SERPL-MCNC: 133 MG/DL (ref 100–199)
CHOLEST/HDLC SERPL: 2.5 RATIO (ref 0–5)
CO2 SERPL-SCNC: 20 MMOL/L (ref 20–29)
CREAT SERPL-MCNC: 1.2 MG/DL (ref 0.76–1.27)
EGFR: 64 ML/MIN/1.73
ERYTHROCYTE [DISTWIDTH] IN BLOOD BY AUTOMATED COUNT: 13.1 % (ref 11.6–15.4)
EST. AVERAGE GLUCOSE BLD GHB EST-MCNC: 126 MG/DL
GLOBULIN SER-MCNC: 2.6 G/DL (ref 1.5–4.5)
GLUCOSE SERPL-MCNC: 98 MG/DL (ref 70–99)
HBA1C MFR BLD: 6 % (ref 4.8–5.6)
HCT VFR BLD AUTO: 45.2 % (ref 37.5–51)
HDLC SERPL-MCNC: 54 MG/DL
HGB BLD-MCNC: 15.8 G/DL (ref 13–17.7)
LDLC SERPL CALC-MCNC: 63 MG/DL (ref 0–99)
MCH RBC QN AUTO: 33.8 PG (ref 26.6–33)
MCHC RBC AUTO-ENTMCNC: 35 G/DL (ref 31.5–35.7)
MCV RBC AUTO: 97 FL (ref 79–97)
PLATELET # BLD AUTO: 177 X10E3/UL (ref 150–450)
POTASSIUM SERPL-SCNC: 4.4 MMOL/L (ref 3.5–5.2)
PROT SERPL-MCNC: 7 G/DL (ref 6–8.5)
RBC # BLD AUTO: 4.67 X10E6/UL (ref 4.14–5.8)
SL AMB VLDL CHOLESTEROL CALC: 16 MG/DL (ref 5–40)
SODIUM SERPL-SCNC: 138 MMOL/L (ref 134–144)
TRIGL SERPL-MCNC: 83 MG/DL (ref 0–149)
TSH SERPL DL<=0.005 MIU/L-ACNC: 1.05 UIU/ML (ref 0.45–4.5)
WBC # BLD AUTO: 6.7 X10E3/UL (ref 3.4–10.8)

## 2024-02-21 PROCEDURE — 99214 OFFICE O/P EST MOD 30 MIN: CPT | Performed by: PHYSICIAN ASSISTANT

## 2024-02-21 RX ORDER — HYDROCODONE BITARTRATE AND ACETAMINOPHEN 5; 325 MG/1; MG/1
TABLET ORAL
Qty: 45 TABLET | Refills: 0 | Status: SHIPPED | OUTPATIENT
Start: 2024-02-21

## 2024-02-21 RX ORDER — EMPAGLIFLOZIN 10 MG/1
TABLET, FILM COATED ORAL
COMMUNITY

## 2024-02-21 NOTE — PROGRESS NOTES
Assessment:  1. Spinal stenosis of lumbar region without neurogenic claudication    2. Lumbar spondylosis    3. Lumbar disc disease with radiculopathy    4. Lumbar disc herniation    5. Chronic pain of right knee    6. Primary osteoarthritis of left knee    7. Lumbar radiculopathy    8. Chronic pain syndrome        Plan:  While the patient was in the office today, I did have a thorough conversation regarding their chronic pain syndrome, medication management, and treatment plan options.    The patient remains clinically stable and well-controlled on the current medication regimen without side effects or issues.  I have electronically sent refills for the next 3 months of the Norco 5/325 one half a tablet 3 times daily as needed with a fill date of today 2/21/2024, do not fill date of 3/19/2024 and 4/17/2024.        Consider repeating lumbar injections in the future if indicated.     Continue home exercise program.    He will proceed with orthopedic consultation regarding his left knee osteoarthritis when he is ready to schedule.    [place] Prescription Drug Monitoring Program report was reviewed and was appropriate     There are risks associated with opioid medications, including dependence, addiction and tolerance. The patient understands and agrees to use these medications only as prescribed. Potential side effects of the medications include, but are not limited to, constipation, drowsiness, addiction, impaired judgment and risk of fatal overdose if not taken as prescribed. The patient was warned against driving while taking sedation medications.  Sharing medications is a felony. At this point in time, the patient is showing no signs of addiction, abuse, diversion or suicidal ideation.    The patient will follow-up in 12 weeks for medication prescription refill and reevaluation. The patient was advised to contact the office should their symptoms worsen in the interim. The patient was agreeable and verbalized  an understanding.        History of Present Illness:    The patient is a 73 y.o. male last seen on 12/4/2023 who presents for a follow up office visit in regards to chronic low back pain secondary to lumbar spondylosis and degenerative disc disease with radiculopathy as well as left knee pain secondary to osteoarthritis.  The patient currently reports chronic right-sided low back pain and left-sided knee pain that he presently rates a 2 out of 10 describes these areas as intermittent and dull, aching and sharp in nature.  He actually feels some improvement in terms of the low back since her last visit.  The knee pain is still an issue and he will proceed with orthopedic consultation at some point in the future.  He is working on home exercises with the treadmill.    Current pain medications includes: Norco 5/325 1/2 tablet 3 times daily as needed  ?.  The patient reports that this regimen is providing 75% pain relief.  The patient is reporting no side effects from this pain medication regimen.    I have personally reviewed and/or updated the patient's past medical history, past surgical history, family history, social history, current medications, allergies, and vital signs today.       Review of Systems:    Review of Systems   Respiratory:  Negative for shortness of breath.    Cardiovascular:  Negative for chest pain.   Gastrointestinal:  Negative for constipation, diarrhea, nausea and vomiting.   Musculoskeletal:  Negative for arthralgias, gait problem, joint swelling (Joint stiffness) and myalgias.   Skin:  Negative for rash.   Neurological:  Negative for dizziness, seizures and weakness.   All other systems reviewed and are negative.        Past Medical History:   Diagnosis Date   • Acute medial meniscus tear    • Allergic rhinitis    • Allergic rhinitis     last assessed 5/8/14   • Anxiety    • Appendicitis    • Arthritis    • Benign essential hypertension     last assessed 1/6/14   • Biceps tendon tear    •  Cancer (HCC)     skin   • Cervical radiculopathy     and lumbar   • Chronic pain disorder     knees and hips    • Colon polyps    • CPAP (continuous positive airway pressure) dependence    • Depression    • Depression with anxiety    • Dyskinesia of esophagus    • Erythema migrans (Lyme disease)     last assessed 10/16/12   • Fatigue    • GERD (gastroesophageal reflux disease)    • Heart murmur 1951   • Heel spur, right    • Herpes    • Herpes zoster    • Hyperlipidemia    • Hyperplastic colon polyp     last assessed 11/14/14   • Joint pain    • Kidney stone 1995   • Kidney stones    • Lyme disease    • Myocardial infarction (HCC)    • Nephrolithiasis    • Panic disorder with agoraphobia     last assessed 8/7/13   • Seasonal affective disorder ([facility])    • Shingles 2015   • Sleep apnea    • Vertigo    • Vitamin D deficiency        Past Surgical History:   Procedure Laterality Date   • APPENDECTOMY  08/01/2003   • BICEPS TENDON REPAIR     • CARDIAC OTHER      stents x2   • COLONOSCOPY     • KNEE ARTHROSCOPY      with medial meniscus repair, resolved 01/01/05   • KNEE CARTILAGE SURGERY     • OTHER SURGICAL HISTORY      distal reinsertion of ruptured biceps tendon   • [place] ARTHRS KNE SURG W/MENISCECTOMY MED/LAT W/SHVG Right 07/30/2019    Procedure: RIGHT KNEE ARTHROSCOPY, MEDIAL MENISCECTOMY;  Surgeon: Rick Farris DO;  Location: [facility];  Service: Orthopedics   • TOOTH EXTRACTION      Middle of October 2019   • VASECTOMY         Family History   Problem Relation Age of Onset   • Diabetes Mother    • Coronary artery disease Father    • Kidney disease Father    • Heart disease Father    • Cancer Father         kidney cancer   • Colon cancer Neg Hx    • Colon polyps Neg Hx        Social History     Occupational History   • Not on file   Tobacco Use   • Smoking status: Former     Current packs/day: 0.00     Average packs/day: 1 pack/day for 19.3 years (19.3 ttl pk-yrs)     Types: Cigarettes, Cigars     Start date:  11/15/1967     Quit date: 3/1/1987     Years since quittin.0   • Smokeless tobacco: Never   Vaping Use   • Vaping status: Never Used   Substance and Sexual Activity   • Alcohol use: Yes     Alcohol/week: 1.0 standard drink of alcohol     Types: 1 Glasses of wine per week   • Drug use: No   • Sexual activity: Not on file         Current Outpatient Medications:   •  ALPRAZolam (Xanax) 0.25 mg tablet, Take 1 tablet (0.25 mg total) by mouth 3 (three) times a day as needed for anxiety, Disp: 30 tablet, Rfl: 0  •  aspirin 81 mg chewable tablet, every 24 hours, Disp: , Rfl:   •  atorvastatin (LIPITOR) 80 mg tablet, every 24 hours, Disp: , Rfl:   •  butalbital-acetaminophen-caffeine (FIORICET,ESGIC) -40 mg per tablet, Take 1 tablet by mouth every 4 (four) hours as needed for headaches, Disp: 30 tablet, Rfl: 0  •  cholecalciferol (VITAMIN D3) 1,000 units tablet, Take 1,000 Units by mouth daily, Disp: , Rfl:   •  CHONDROITIN SULFATE A PO, Take by mouth in the morning, Disp: , Rfl:   •  Coenzyme Q10 (CoQ-10) 100 MG CAPS, every 24 hours, Disp: , Rfl:   •  Diclofenac Sodium (VOLTAREN) 1 %, Apply 2 g topically 4 (four) times a day, Disp: 50 g, Rfl: 0  •  ezetimibe (ZETIA) 10 mg tablet, 1 tablet Orally Once a day 90 days, Disp: , Rfl:   •  famotidine (PEPCID) 20 mg tablet, Take 1 tablet (20 mg total) by mouth 2 (two) times a day, Disp: 30 tablet, Rfl: 2  •  GLUCOSAMINE-CALCIUM-VIT D PO, Take 1,200 mg by mouth 1200mg, Disp: , Rfl:   •  HYDROcodone-acetaminophen (Norco) 5-325 mg per tablet, For ongoing therapy Take 1/2 tablet TID PRN for pain, Disp: 45 tablet, Rfl: 0  •  HYDROcodone-acetaminophen (NORCO) 5-325 mg per tablet, 1/2 tab TID prn  pain for ongoing therapy DO NOT FILL BEFORE: 24, Disp: 45 tablet, Rfl: 0  •  HYDROcodone-acetaminophen (NORCO) 5-325 mg per tablet, 1/2 tab TID prn pain for ongoing therapy DO NOT FILL BEFORE: 24, Disp: 45 tablet, Rfl: 0  •  Hyoscyamine Sulfate SL 0.125 MG SUBL, Place  "0.125 mg under the tongue 3 (three) times a day as needed (as needed), Disp: 30 tablet, Rfl: 1  •  Ibuprofen 200 MG CAPS, 3 (three) times a day, Disp: , Rfl:   •  Jardiance 10 MG TABS tablet, 1 tablet Orally Once a day 90 days, Disp: , Rfl:   •  lisinopril (ZESTRIL) 2.5 mg tablet, every 24 hours, Disp: , Rfl:   •  Omega-3 Fatty Acids (Fish Oil) 1200 MG CAPS, 1 capsule every 24 hours, Disp: , Rfl:   •  pantoprazole (PROTONIX) 40 mg tablet, ONE TABLET DAILY for GI issue, Disp: , Rfl:   •  tamsulosin (FLOMAX) 0.4 mg, Take 1 capsule (0.4 mg total) by mouth daily with dinner, Disp: 90 capsule, Rfl: 3  •  vilazodone (VIIBRYD) 20 mg tablet, Take 1 tablet (20 mg total) by mouth daily with breakfast, Disp: 30 tablet, Rfl: 0  •  naloxone (NARCAN) 4 mg/0.1 mL nasal spray, Administer 1 spray into a nostril. If no response after 2-3 minutes, give another dose in the other nostril using a new spray., Disp: 1 each, Rfl: 1    Allergies   Allergen Reactions   • Pseudoephedrine Hives and Dizziness     Reaction Date: 16Apr2011;    • Sulfa Antibiotics Dizziness, Hives and Other (See Comments)       Physical Exam:    /84 (BP Location: Left arm, Patient Position: Standing, Cuff Size: Standard)   Pulse 78   Temp 97.9 °F (36.6 °C)   Ht 6' 1\" (1.854 m)   Wt 117 kg (257 lb)   BMI 33.91 kg/m²     Constitutional:normal, well developed, well nourished, alert, in no distress and non-toxic and no overt pain behavior.  Eyes:anicteric  HEENT:grossly intact  Neck:supple, symmetric, trachea midline and no masses   Pulmonary:even and unlabored  Cardiovascular:No edema or pitting edema present  Skin:Normal without rashes or lesions and well hydrated  Psychiatric:Mood and affect appropriate  Neurologic:Cranial Nerves II-XII grossly intact  Musculoskeletal:normal      Imaging  No orders to display         No orders of the defined types were placed in this encounter.      "

## 2024-02-22 ENCOUNTER — TELEPHONE (OUTPATIENT)
Dept: FAMILY MEDICINE CLINIC | Facility: CLINIC | Age: 74
End: 2024-02-22

## 2024-02-22 NOTE — TELEPHONE ENCOUNTER
Called Pt. Spoke to Pt, and Pt advised on results.     Patient wants results faxed to Amber Snell with [facility]  - [facility]   [phone] faxed completed

## 2024-02-28 ENCOUNTER — TELEPHONE (OUTPATIENT)
Dept: GASTROENTEROLOGY | Facility: AMBULATORY SURGERY CENTER | Age: 74
End: 2024-02-28

## 2024-02-28 NOTE — TELEPHONE ENCOUNTER
Patient returning call. Patient is aware to hold jardiance for 4 days prior to 3/14/2024 EGD procedure.

## 2024-02-29 ENCOUNTER — ANESTHESIA (OUTPATIENT)
Dept: ANESTHESIOLOGY | Facility: AMBULATORY SURGERY CENTER | Age: 74
End: 2024-02-29

## 2024-02-29 ENCOUNTER — ANESTHESIA EVENT (OUTPATIENT)
Dept: ANESTHESIOLOGY | Facility: AMBULATORY SURGERY CENTER | Age: 74
End: 2024-02-29

## 2024-03-06 ENCOUNTER — TELEPHONE (OUTPATIENT)
Dept: GASTROENTEROLOGY | Facility: CLINIC | Age: 74
End: 2024-03-06

## 2024-03-12 ENCOUNTER — TELEPHONE (OUTPATIENT)
Dept: GASTROENTEROLOGY | Facility: AMBULATORY SURGERY CENTER | Age: 74
End: 2024-03-12

## 2024-03-14 ENCOUNTER — ANESTHESIA EVENT (OUTPATIENT)
Dept: GASTROENTEROLOGY | Facility: AMBULATORY SURGERY CENTER | Age: 74
End: 2024-03-14

## 2024-03-14 ENCOUNTER — HOSPITAL ENCOUNTER (OUTPATIENT)
Dept: GASTROENTEROLOGY | Facility: AMBULATORY SURGERY CENTER | Age: 74
Discharge: HOME | End: 2024-03-14
Attending: INTERNAL MEDICINE
Payer: COMMERCIAL

## 2024-03-14 ENCOUNTER — ANESTHESIA (OUTPATIENT)
Dept: GASTROENTEROLOGY | Facility: AMBULATORY SURGERY CENTER | Age: 74
End: 2024-03-14

## 2024-03-14 VITALS
HEIGHT: 73 IN | OXYGEN SATURATION: 97 % | WEIGHT: 257 LBS | RESPIRATION RATE: 21 BRPM | DIASTOLIC BLOOD PRESSURE: 83 MMHG | TEMPERATURE: 98 F | BODY MASS INDEX: 34.06 KG/M2 | HEART RATE: 69 BPM | SYSTOLIC BLOOD PRESSURE: 154 MMHG

## 2024-03-14 DIAGNOSIS — K21.9 GASTROESOPHAGEAL REFLUX DISEASE WITHOUT ESOPHAGITIS: ICD-10-CM

## 2024-03-14 DIAGNOSIS — F32.A DEPRESSION, UNSPECIFIED DEPRESSION TYPE: ICD-10-CM

## 2024-03-14 PROCEDURE — 43239 EGD BIOPSY SINGLE/MULTIPLE: CPT | Performed by: INTERNAL MEDICINE

## 2024-03-14 PROCEDURE — 88305 TISSUE EXAM BY PATHOLOGIST: CPT | Performed by: PATHOLOGY

## 2024-03-14 RX ORDER — PROPOFOL 10 MG/ML
INJECTION, EMULSION INTRAVENOUS AS NEEDED
Status: DISCONTINUED | OUTPATIENT
Start: 2024-03-14 | End: 2024-03-14

## 2024-03-14 RX ORDER — FAMOTIDINE 20 MG/1
20 TABLET, FILM COATED ORAL 2 TIMES DAILY
Qty: 30 TABLET | Refills: 2 | Status: SHIPPED | OUTPATIENT
Start: 2024-03-14

## 2024-03-14 RX ORDER — SODIUM CHLORIDE, SODIUM LACTATE, POTASSIUM CHLORIDE, CALCIUM CHLORIDE 600; 310; 30; 20 MG/100ML; MG/100ML; MG/100ML; MG/100ML
INJECTION, SOLUTION INTRAVENOUS CONTINUOUS PRN
Status: DISCONTINUED | OUTPATIENT
Start: 2024-03-14 | End: 2024-03-14

## 2024-03-14 RX ORDER — SODIUM CHLORIDE, SODIUM LACTATE, POTASSIUM CHLORIDE, CALCIUM CHLORIDE 600; 310; 30; 20 MG/100ML; MG/100ML; MG/100ML; MG/100ML
20 INJECTION, SOLUTION INTRAVENOUS CONTINUOUS
Status: DISCONTINUED | OUTPATIENT
Start: 2024-03-14 | End: 2024-03-18 | Stop reason: HOSPADM

## 2024-03-14 RX ORDER — LIDOCAINE HYDROCHLORIDE 20 MG/ML
INJECTION, SOLUTION EPIDURAL; INFILTRATION; INTRACAUDAL; PERINEURAL AS NEEDED
Status: DISCONTINUED | OUTPATIENT
Start: 2024-03-14 | End: 2024-03-14

## 2024-03-14 RX ORDER — VILAZODONE HYDROCHLORIDE 20 MG/1
20 TABLET ORAL
Qty: 30 TABLET | Refills: 0 | Status: SHIPPED | OUTPATIENT
Start: 2024-03-14

## 2024-03-14 RX ORDER — SODIUM CHLORIDE, SODIUM LACTATE, POTASSIUM CHLORIDE, CALCIUM CHLORIDE 600; 310; 30; 20 MG/100ML; MG/100ML; MG/100ML; MG/100ML
50 INJECTION, SOLUTION INTRAVENOUS CONTINUOUS
Status: DISCONTINUED | OUTPATIENT
Start: 2024-03-14 | End: 2024-03-18 | Stop reason: HOSPADM

## 2024-03-14 RX ADMIN — SODIUM CHLORIDE, SODIUM LACTATE, POTASSIUM CHLORIDE, CALCIUM CHLORIDE: 600; 310; 30; 20 INJECTION, SOLUTION INTRAVENOUS at 13:40

## 2024-03-14 RX ADMIN — PROPOFOL 150 MG: 10 INJECTION, EMULSION INTRAVENOUS at 13:50

## 2024-03-14 RX ADMIN — SODIUM CHLORIDE, SODIUM LACTATE, POTASSIUM CHLORIDE, CALCIUM CHLORIDE 50 ML/HR: 600; 310; 30; 20 INJECTION, SOLUTION INTRAVENOUS at 13:16

## 2024-03-14 RX ADMIN — LIDOCAINE HYDROCHLORIDE 100 MG: 20 INJECTION, SOLUTION EPIDURAL; INFILTRATION; INTRACAUDAL; PERINEURAL at 13:50

## 2024-03-14 NOTE — H&P
History and Physical - [facility]    Nii Gamble 73 y.o. male MRN: [id number]      HPI: Nii Gamble is a 73 y.o. male who presents for gerd.    Allergies   Allergen Reactions    Pseudoephedrine Hives and Dizziness     Reaction Date: 16Apr2011;     Sulfa Antibiotics Dizziness, Hives and Other (See Comments)         REVIEW OF SYSTEMS: Per the HPI, and otherwise unremarkable.    Historical Information     Past Medical History:   Diagnosis Date    Acute medial meniscus tear     Allergic rhinitis     Allergic rhinitis     last assessed 5/8/14    Anxiety     Appendicitis     Arthritis     Benign essential hypertension     last assessed 1/6/14    Biceps tendon tear     Cancer (HCC)     skin    Cervical radiculopathy     and lumbar    Chronic pain disorder     knees and hips     Colon polyps     CPAP (continuous positive airway pressure) dependence     Depression     Depression with anxiety     Dyskinesia of esophagus     Erythema migrans (Lyme disease)     last assessed 10/16/12    Fatigue     GERD (gastroesophageal reflux disease)     Heart murmur 1951    Heel spur, right     Herpes     Herpes zoster     History of benign esophageal tumor     Hyperlipidemia     Hyperplastic colon polyp     last assessed 11/14/14    Joint pain     Kidney stone 1995    Kidney stones     Lyme disease     Myocardial infarction (HCC)     Nephrolithiasis     Panic disorder with agoraphobia     last assessed 8/7/13    Seasonal affective disorder (HCC)     Shingles 2015    Sleep apnea     Vertigo     Vitamin D deficiency      Past Surgical History:   Procedure Laterality Date    APPENDECTOMY  08/01/2003    BICEPS TENDON REPAIR      CARDIAC OTHER      stents x2    COLONOSCOPY      KNEE ARTHROSCOPY      with medial meniscus repair, resolved 01/01/05    KNEE CARTILAGE SURGERY      OTHER SURGICAL HISTORY      distal reinsertion of ruptured biceps tendon    MA ARTHRS KNE SURG W/MENISCECTOMY MED/LAT W/SHVG  Right 2019    Procedure: RIGHT KNEE ARTHROSCOPY, MEDIAL MENISCECTOMY;  Surgeon: Rick Farris DO;  Location:  MAIN OR;  Service: Orthopedics    TOOTH EXTRACTION      Middle of 2019    VASECTOMY       Social History   Social History     Substance and Sexual Activity   Alcohol Use Yes    Alcohol/week: 1.0 standard drink of alcohol    Types: 1 Glasses of wine per week    Comment: an couple times a month a glass of wine     Social History     Substance and Sexual Activity   Drug Use No     Social History     Tobacco Use   Smoking Status Former    Current packs/day: 0.00    Average packs/day: 1 pack/day for 19.3 years (19.3 ttl pk-yrs)    Types: Cigarettes, Cigars    Start date: 11/15/1967    Quit date: 3/1/1987    Years since quittin.0   Smokeless Tobacco Never     Family History   Problem Relation Age of Onset    Diabetes Mother     Coronary artery disease Father     Kidney disease Father     Heart disease Father     Cancer Father         kidney cancer    Colon cancer Neg Hx     Colon polyps Neg Hx        Meds/Allergies       Current Outpatient Medications:     ALPRAZolam (Xanax) 0.25 mg tablet    aspirin 81 mg chewable tablet    atorvastatin (LIPITOR) 80 mg tablet    cholecalciferol (VITAMIN D3) 1,000 units tablet    CHONDROITIN SULFATE A PO    Coenzyme Q10 (CoQ-10) 100 MG CAPS    Diclofenac Sodium (VOLTAREN) 1 %    ezetimibe (ZETIA) 10 mg tablet    famotidine (PEPCID) 20 mg tablet    GLUCOSAMINE-CALCIUM-VIT D PO    HYDROcodone-acetaminophen (Norco) 5-325 mg per tablet    HYDROcodone-acetaminophen (NORCO) 5-325 mg per tablet    lisinopril (ZESTRIL) 2.5 mg tablet    Omega-3 Fatty Acids (Fish Oil) 1200 MG CAPS    pantoprazole (PROTONIX) 40 mg tablet    tamsulosin (FLOMAX) 0.4 mg    vilazodone (VIIBRYD) 20 mg tablet    butalbital-acetaminophen-caffeine (FIORICET,ESGIC) -40 mg per tablet    HYDROcodone-acetaminophen (NORCO) 5-325 mg per tablet    Hyoscyamine Sulfate SL 0.125 MG SUBL     "Ibuprofen 200 MG CAPS    Jardiance 10 MG TABS tablet    naloxone (NARCAN) 4 mg/0.1 mL nasal spray    Current Facility-Administered Medications:     lactated ringers infusion, 50 mL/hr, Intravenous, Continuous, 50 mL/hr at 03/14/24 1316    Facility-Administered Medications Ordered in Other Encounters:     lactated ringers infusion, , Intravenous, Continuous PRN, New Bag at 03/14/24 1340        Objective     /72   Pulse 72   Temp 98 °F (36.7 °C) (Temporal)   Resp 18   Ht 6' 1\" (1.854 m)   Wt 117 kg (257 lb)   SpO2 96%   BMI 33.91 kg/m²       PHYSICAL EXAM    Gen: NAD AAOx3  Head: Normocephalic, Atraumatic  CV: S1S2 RRR no m/r/g  CHEST: Clear b/l no c/r/w  ABD: soft, +BS NT/ND no masses  EXT: no edema      ASSESSMENT/PLAN:  This is a 73 y.o. year old male here for egd, and he is stable and optimized for his procedure.        "

## 2024-03-14 NOTE — ANESTHESIA POSTPROCEDURE EVALUATION
Post-Op Assessment Note    CV Status:  Stable  Pain Score: 0    Pain management: adequate       Mental Status:  Arousable and sleepy   Hydration Status:  Stable   PONV Controlled:  Controlled   Airway Patency:  Patent     Post Op Vitals Reviewed: Yes    No anethesia notable event occurred.    Staff: CRNA               BP   134/74   Temp      Pulse 67   Resp 14   SpO2 99

## 2024-03-14 NOTE — ANESTHESIA PREPROCEDURE EVALUATION
Procedure:  EGD    Relevant Problems   CARDIO   (+) Aortic stenosis   (+) CAD (coronary artery disease)   (+) HLD (hyperlipidemia)   (+) NSTEMI (non-ST elevated myocardial infarction) (HCC)   (+) Primary hypertension      GI/HEPATIC   (+) Gastroesophageal reflux disease      NEURO/PSYCH   (+) Chronic pain syndrome   (+) Continuous opioid dependence (HCC)   (+) Generalized anxiety disorder   (+) Panic disorder      PULMONARY   (+) JAY on CPAP      Neurology/Sleep   (+) PLMD (periodic limb movement disorder)      Surgery/Wound/Pain   (+) S/P coronary artery stent placement      Orthopedic/Musculoskeletal   (+) Spinal stenosis of lumbar region with neurogenic claudication      Other   (+) CPAP (continuous positive airway pressure) dependence    Stent placed 5/2020 after experiencing chest pain    Aortic stenosis not symptomatic - golfs regularly    Physical Exam    Airway    Mallampati score: II  TM Distance: <3 FB  Neck ROM: full     Dental   Comment: None loose, No notable dental hx     Cardiovascular      Pulmonary      Other Findings      ECHO (5/2022)  EF 58%; normal LV; moderate AS (CLEO 1.1cm)      Anesthesia Plan  ASA Score- 3     Anesthesia Type- IV sedation with anesthesia with ASA Monitors.         Additional Monitors:     Airway Plan:     Comment: 09:00 - 1/2 a can of sweta  Last Jardiance >72  hours but less than 96 hours.  Patient not having GA and will have a meal shortly after his procedure - ok to proceed    Patient educated on the possibility for awareness under sedation and of the possibility of airway intervention in the event of an airway or procedural emergency  .       Plan Factors-Exercise tolerance (METS): <4 METS.    Chart reviewed.    Patient summary reviewed.    Patient is not a current smoker.              Induction- intravenous.    Postoperative Plan-     Informed Consent- Anesthetic plan and risks discussed with patient.  I personally reviewed this patient with the CRNA. Discussed and  agreed on the Anesthesia Plan with the CRNA..

## 2024-03-17 ENCOUNTER — OFFICE VISIT (OUTPATIENT)
Dept: URGENT CARE | Facility: CLINIC | Age: 74
End: 2024-03-17
Payer: COMMERCIAL

## 2024-03-17 ENCOUNTER — APPOINTMENT (OUTPATIENT)
Dept: RADIOLOGY | Facility: CLINIC | Age: 74
End: 2024-03-17
Payer: COMMERCIAL

## 2024-03-17 VITALS
BODY MASS INDEX: 34.04 KG/M2 | SYSTOLIC BLOOD PRESSURE: 142 MMHG | HEART RATE: 85 BPM | RESPIRATION RATE: 18 BRPM | OXYGEN SATURATION: 97 % | DIASTOLIC BLOOD PRESSURE: 84 MMHG | WEIGHT: 258 LBS | TEMPERATURE: 97.7 F
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
## 2024-03-17 DIAGNOSIS — R07.89 LEFT-SIDED CHEST WALL PAIN: Primary | ICD-10-CM

## 2024-03-17 DIAGNOSIS — R07.81 RIB PAIN: ICD-10-CM

## 2024-03-17 DIAGNOSIS — M79.652 LEFT THIGH PAIN: ICD-10-CM

## 2024-03-17 PROCEDURE — 96372 THER/PROPH/DIAG INJ SC/IM: CPT | Performed by: PHYSICIAN ASSISTANT

## 2024-03-17 PROCEDURE — 99213 OFFICE O/P EST LOW 20 MIN: CPT | Performed by: PHYSICIAN ASSISTANT

## 2024-03-17 PROCEDURE — 71101 X-RAY EXAM UNILAT RIBS/CHEST: CPT

## 2024-03-17 RX ORDER — KETOROLAC TROMETHAMINE 30 MG/ML
15 INJECTION, SOLUTION INTRAMUSCULAR; INTRAVENOUS ONCE
Status: COMPLETED | OUTPATIENT
Start: 2024-03-17 | End: 2024-03-17

## 2024-03-17 RX ADMIN — KETOROLAC TROMETHAMINE 15 MG: 30 INJECTION, SOLUTION INTRAMUSCULAR; INTRAVENOUS at 16:11

## 2024-03-17 NOTE — PROGRESS NOTES
[facility]    NAME: Nii Gamble is a 73 y.o. male  : 1950    MRN: [id number]  DATE: 2024  TIME: 4:24 PM    Assessment and Plan   Left-sided chest wall pain [R07.89]  1. Left-sided chest wall pain  XR ribs left w pa chest min 3 views    ketorolac (TORADOL) injection 15 mg      2. Fall, initial encounter        3. Left thigh pain          X-ray left ribs: No obvious acute bony changes.  Preliminary results impression reviewed with patient.  Await radiologist final test results.    Nurse administered Toradol injection.    Provider instructed pt to do deep breathing with pillow support against chest couple times per hour.      Patient Instructions     Patient Instructions   Injection of Toradol given to help decrease discomfort.    Recommend lidocaine pain patches to area of pain or you may do warm or cool compresses for comfort as needed.  May continue the [place] per your pain center instructions.      Use pillow to help splint chest wall when coughing or taking deep breath.    Make appointment with your primary care provider for a soon as possible.    If you would develop any severe worsening of chest pain, unusual weakness, shortness of breath proceed immediately to emergency room for further evaluation.      Chief Complaint     Chief Complaint   Patient presents with    Rib Injury     Patient presents with complaints of a fall 7 days ago, states that he fell onto his left side at a hotel. Has pain on the bottom part of his ribs. Patient is on 81mg of aspirin.        History of Present Illness   Nii Gamble presents to the clinic c/o  73-year-old male comes in with worsening left rib pain.  Reports that he fell last  after he tripped on a piece of rebar and fell forward in the parking lot.  Says he landed little bit more onto his left side.  Did not notice any pain initially but by Tuesday started noticing rib pain.  Pain is worse with moving, coughing, deep breath.  Says when he  coughs the pain is 10/10.    He takes Norco 3 times a day typically half tablet but last night took a whole tablet to try and control pain.  Is on [place] by pain center for back condition.    Mild nausea.  No vomiting.  No fever or chills.  No real shortness of breath.  Patient also noted some soreness of his left thigh.  Able to stand without difficulty.  Walking is a little bit sore.    Patient reports that he will be leaving for business trip.  Driving up to [place].  Will be gone for 6 days.  Self-employed.        Review of Systems   Review of Systems   Constitutional:  Positive for activity change and fatigue. Negative for appetite change, chills and fever.   Respiratory:  Negative for cough, chest tightness and shortness of breath.    Cardiovascular:  Positive for chest pain. Negative for palpitations and leg swelling.   Musculoskeletal:  Positive for arthralgias and myalgias.   Skin:  Negative for color change.       Current Medications     Long-Term Medications   Medication Sig Dispense Refill    ALPRAZolam (Xanax) 0.25 mg tablet Take 1 tablet (0.25 mg total) by mouth 3 (three) times a day as needed for anxiety 30 tablet 0    aspirin 81 mg chewable tablet every 24 hours      atorvastatin (LIPITOR) 80 mg tablet every 24 hours      cholecalciferol (VITAMIN D3) 1,000 units tablet Take 1,000 Units by mouth daily      Diclofenac Sodium (VOLTAREN) 1 % Apply 2 g topically 4 (four) times a day 50 g 0    ezetimibe (ZETIA) 10 mg tablet 1 tablet Orally Once a day 90 days      famotidine (PEPCID) 20 mg tablet Take 1 tablet (20 mg total) by mouth 2 (two) times a day 30 tablet 2    Ibuprofen 200 MG CAPS 3 (three) times a day      Jardiance 10 MG TABS tablet 1 tablet Orally Once a day 90 days      lisinopril (ZESTRIL) 2.5 mg tablet every 24 hours      Omega-3 Fatty Acids (Fish Oil) 1200 MG CAPS 1 capsule every 24 hours      pantoprazole (PROTONIX) 40 mg tablet ONE TABLET DAILY for GI issue      tamsulosin (FLOMAX) 0.4  mg Take 1 capsule (0.4 mg total) by mouth daily with dinner 90 capsule 3    vilazodone (VIIBRYD) 20 mg tablet Take 1 tablet (20 mg total) by mouth daily with breakfast 30 tablet 0    [DISCONTINUED] Multiple Vitamins-Minerals (MULTI FOR HIM 50+) TABS Take by mouth         Current Allergies     Allergies as of 03/17/2024 - Reviewed 03/17/2024   Allergen Reaction Noted    Pseudoephedrine Hives and Dizziness 04/21/2012    Sulfa antibiotics Dizziness, Hives, and Other (See Comments) 10/08/2012          The following portions of the patient's history were reviewed and updated as appropriate: allergies, current medications, past family history, past medical history, past social history, past surgical history and problem list.  Past Medical History:   Diagnosis Date    Acute medial meniscus tear     Allergic rhinitis     Allergic rhinitis     last assessed 5/8/14    Anxiety     Appendicitis     Arthritis     Benign essential hypertension     last assessed 1/6/14    Biceps tendon tear     Cancer (HCC)     skin    Cervical radiculopathy     and lumbar    Chronic pain disorder     knees and hips     Colon polyps     CPAP (continuous positive airway pressure) dependence     Depression     Depression with anxiety     Dyskinesia of esophagus     Erythema migrans (Lyme disease)     last assessed 10/16/12    Fatigue     GERD (gastroesophageal reflux disease)     Heart murmur 1951    Heel spur, right     Herpes     Herpes zoster     History of benign esophageal tumor     Hyperlipidemia     Hyperplastic colon polyp     last assessed 11/14/14    Joint pain     Kidney stone 1995    Kidney stones     Lyme disease     Myocardial infarction (HCC)     Nephrolithiasis     Panic disorder with agoraphobia     last assessed 8/7/13    Seasonal affective disorder (HCC)     Shingles 2015    Sleep apnea     Vertigo     Vitamin D deficiency      Past Surgical History:   Procedure Laterality Date    APPENDECTOMY  08/01/2003    BICEPS TENDON  REPAIR      CARDIAC OTHER      stents x2    COLONOSCOPY      KNEE ARTHROSCOPY      with medial meniscus repair, resolved 01/01/05    KNEE CARTILAGE SURGERY      OTHER SURGICAL HISTORY      distal reinsertion of ruptured biceps tendon    OK ARTHRS KNE SURG W/MENISCECTOMY MED/LAT W/SHVG Right 07/30/2019    Procedure: RIGHT KNEE ARTHROSCOPY, MEDIAL MENISCECTOMY;  Surgeon: Rick Farris DO;  Location:  MAIN OR;  Service: Orthopedics    TOOTH EXTRACTION      Middle of October 2019    VASECTOMY       Family History   Problem Relation Age of Onset    Diabetes Mother     Coronary artery disease Father     Kidney disease Father     Heart disease Father     Cancer Father         kidney cancer    Colon cancer Neg Hx     Colon polyps Neg Hx        Objective   /84   Pulse 85   Temp 97.7 °F (36.5 °C) (Tympanic)   Resp 18   Wt 117 kg (258 lb)   SpO2 97%   BMI 34.04 kg/m²   No LMP for male patient.       Physical Exam     Physical Exam  Vitals and nursing note reviewed.   Constitutional:       General: He is not in acute distress.     Appearance: He is not ill-appearing, toxic-appearing or diaphoretic.      Comments: Holding left anterior rib area for comfort   HENT:      Head: Normocephalic and atraumatic.   Cardiovascular:      Rate and Rhythm: Normal rate and regular rhythm.      Heart sounds: Normal heart sounds. No murmur heard.     No friction rub. No gallop.   Pulmonary:      Effort: Pulmonary effort is normal. No respiratory distress.      Breath sounds: Normal breath sounds. No stridor. No wheezing, rhonchi or rales.      Comments: Left anterior chest wall under breast area.  No obvious swelling or bony deformity.  No pain with AP compression.  Localized tenderness to palpation left anterior chest wall.  Chest:      Chest wall: Tenderness present.   Musculoskeletal:         General: Tenderness and signs of injury present. No swelling or deformity.      Cervical back: Normal range of motion and neck supple.  No rigidity or tenderness.      Comments: Thigh with localized tenderness to palpation over quadricep muscles.  No obvious swelling or deformity.  Good internal/external rotation of hip without pain.  Some discomfort with flexion of hip but no extension.  No bony TTP.   Lymphadenopathy:      Cervical: No cervical adenopathy.   Skin:     General: Skin is warm and dry.      Coloration: Skin is not pale.      Findings: No bruising, erythema or rash.      Comments: No obvious contusions or skin disruption left chest wall versus right.  No obvious contusions left thigh or hip versus right.   Neurological:      General: No focal deficit present.      Mental Status: He is alert and oriented to person, place, and time.   Psychiatric:         Mood and Affect: Mood normal.         Behavior: Behavior normal.

## 2024-03-17 NOTE — PATIENT INSTRUCTIONS
Injection of Toradol given to help decrease discomfort.    Recommend lidocaine pain patches to area of pain or you may do warm or cool compresses for comfort as needed.  May continue the [place] per your pain center instructions.      Use pillow to help splint chest wall when coughing or taking deep breath.    Make appointment with your primary care provider for a soon as possible.    If you would develop any severe worsening of chest pain, unusual weakness, shortness of breath proceed immediately to emergency room for further evaluation.

## 2024-03-18 ENCOUNTER — TELEPHONE (OUTPATIENT)
Dept: URGENT CARE | Facility: CLINIC | Age: 74
End: 2024-03-18

## 2024-03-18 PROCEDURE — 88305 TISSUE EXAM BY PATHOLOGIST: CPT | Performed by: PATHOLOGY

## 2024-03-19 ENCOUNTER — TELEPHONE (OUTPATIENT)
Age: 74
End: 2024-03-19

## 2024-03-19 DIAGNOSIS — F41.9 ANXIETY: ICD-10-CM

## 2024-03-19 DIAGNOSIS — M51.26 LUMBAR DISC HERNIATION: ICD-10-CM

## 2024-03-19 DIAGNOSIS — M47.816 LUMBAR SPONDYLOSIS: ICD-10-CM

## 2024-03-19 DIAGNOSIS — G89.4 CHRONIC PAIN SYNDROME: ICD-10-CM

## 2024-03-19 DIAGNOSIS — M54.16 LUMBAR RADICULOPATHY: ICD-10-CM

## 2024-03-19 DIAGNOSIS — G43.811 OTHER MIGRAINE WITH STATUS MIGRAINOSUS, INTRACTABLE: ICD-10-CM

## 2024-03-19 RX ORDER — HYDROCODONE BITARTRATE AND ACETAMINOPHEN 5; 325 MG/1; MG/1
TABLET ORAL
Qty: 45 TABLET | Refills: 0 | Status: SHIPPED | OUTPATIENT
Start: 2024-03-19

## 2024-03-19 NOTE — TELEPHONE ENCOUNTER
Pt is calling back because he needs to leave [facility] for a show. It looks like the pharmacy does not have a script on file for March fill for his Norco - just February and April - please advise if a new script for March fill of Norco 5-325mg can be sent asap to [facility].     Pt call back when done [phone]

## 2024-03-19 NOTE — TELEPHONE ENCOUNTER
Caller: [facility](Nicole)    Doctor: Shawn    Reason for call: Nicole calling about pt prescription of HYDROcodone-acetaminophen (NORCO) 5-325 mg per tablet. Please Advise    Call back#: [phone]

## 2024-03-21 RX ORDER — BUTALBITAL, ACETAMINOPHEN AND CAFFEINE 50; 325; 40 MG/1; MG/1; MG/1
1 TABLET ORAL EVERY 4 HOURS PRN
Qty: 30 TABLET | Refills: 0 | Status: SHIPPED | OUTPATIENT
Start: 2024-03-21

## 2024-03-21 RX ORDER — ALPRAZOLAM 0.25 MG/1
0.25 TABLET ORAL 3 TIMES DAILY PRN
Qty: 30 TABLET | Refills: 0 | Status: SHIPPED | OUTPATIENT
Start: 2024-03-21

## 2024-03-27 ENCOUNTER — CLINICAL SUPPORT (OUTPATIENT)
Dept: FAMILY MEDICINE CLINIC | Facility: CLINIC | Age: 74
End: 2024-03-27
Payer: COMMERCIAL

## 2024-03-27 DIAGNOSIS — Z23 ENCOUNTER FOR IMMUNIZATION: Primary | ICD-10-CM

## 2024-03-27 PROCEDURE — 90715 TDAP VACCINE 7 YRS/> IM: CPT

## 2024-03-27 PROCEDURE — 90471 IMMUNIZATION ADMIN: CPT

## 2024-04-15 DIAGNOSIS — N40.1 BPH WITH URINARY OBSTRUCTION: ICD-10-CM

## 2024-04-15 DIAGNOSIS — F32.A DEPRESSION, UNSPECIFIED DEPRESSION TYPE: ICD-10-CM

## 2024-04-15 DIAGNOSIS — N13.8 BPH WITH URINARY OBSTRUCTION: ICD-10-CM

## 2024-04-15 RX ORDER — VILAZODONE HYDROCHLORIDE 20 MG/1
20 TABLET ORAL
Qty: 30 TABLET | Refills: 0 | Status: SHIPPED | OUTPATIENT
Start: 2024-04-15

## 2024-04-15 RX ORDER — TAMSULOSIN HYDROCHLORIDE 0.4 MG/1
0.4 CAPSULE ORAL
Qty: 90 CAPSULE | Refills: 3 | Status: SHIPPED | OUTPATIENT
Start: 2024-04-15

## 2024-04-15 NOTE — TELEPHONE ENCOUNTER
tamsulosin (FLOMAX) 0.4 mg       vilazodone (VIIBRYD) 20 mg tablet     [facility]   room air

## 2024-04-17 DIAGNOSIS — M25.561 CHRONIC PAIN OF RIGHT KNEE: ICD-10-CM

## 2024-04-17 DIAGNOSIS — F41.9 ANXIETY: ICD-10-CM

## 2024-04-17 DIAGNOSIS — G89.29 CHRONIC PAIN OF RIGHT KNEE: ICD-10-CM

## 2024-04-17 DIAGNOSIS — M54.16 LUMBAR RADICULOPATHY: ICD-10-CM

## 2024-04-17 DIAGNOSIS — M51.26 LUMBAR DISC HERNIATION: ICD-10-CM

## 2024-04-17 DIAGNOSIS — M47.816 LUMBAR SPONDYLOSIS: ICD-10-CM

## 2024-04-17 DIAGNOSIS — G89.4 CHRONIC PAIN SYNDROME: ICD-10-CM

## 2024-04-17 DIAGNOSIS — M17.12 PRIMARY OSTEOARTHRITIS OF LEFT KNEE: ICD-10-CM

## 2024-04-17 DIAGNOSIS — G43.811 OTHER MIGRAINE WITH STATUS MIGRAINOSUS, INTRACTABLE: ICD-10-CM

## 2024-04-17 DIAGNOSIS — M48.061 SPINAL STENOSIS OF LUMBAR REGION WITHOUT NEUROGENIC CLAUDICATION: ICD-10-CM

## 2024-04-17 DIAGNOSIS — M51.16 LUMBAR DISC DISEASE WITH RADICULOPATHY: ICD-10-CM

## 2024-04-17 RX ORDER — ALPRAZOLAM 0.25 MG/1
0.25 TABLET ORAL 3 TIMES DAILY PRN
Qty: 30 TABLET | Refills: 0 | Status: SHIPPED | OUTPATIENT
Start: 2024-04-17

## 2024-04-17 RX ORDER — BUTALBITAL, ACETAMINOPHEN AND CAFFEINE 50; 325; 40 MG/1; MG/1; MG/1
1 TABLET ORAL EVERY 4 HOURS PRN
Qty: 30 TABLET | Refills: 0 | Status: SHIPPED | OUTPATIENT
Start: 2024-04-17

## 2024-04-17 RX ORDER — HYDROCODONE BITARTRATE AND ACETAMINOPHEN 5; 325 MG/1; MG/1
TABLET ORAL
Qty: 45 TABLET | Refills: 0 | Status: SHIPPED | OUTPATIENT
Start: 2024-04-17

## 2024-04-17 NOTE — TELEPHONE ENCOUNTER
Patient called Rx refill line and stated he needs a PA on his  hydrocodone-acetaminophen. Advised I could start a PA but it could take 7-21 days for a determination or he could pay out of pocket for the medication. Patient stated he was going away and needs the medication and will pay out of pocket.

## 2024-04-17 NOTE — TELEPHONE ENCOUNTER
Reason for call: Pt is out completely.  [x] Refill   [] Prior Auth  [] Other:     Office:   [] PCP/Provider -   [x] Specialty/Provider - spine and pain/ migel     Medication: hydrocodone-acetaminophen     Dose/Frequency: 5-325 mg/ Take 1/2 tab 3 times daily PRN     Quantity: 45 tabs     Pharmacy: [facility]     Does the patient have enough for 3 days?   [] Yes   [x] No - Send as HP to POD

## 2024-04-18 RX ORDER — HYDROCODONE BITARTRATE AND ACETAMINOPHEN 5; 325 MG/1; MG/1
TABLET ORAL
Qty: 45 TABLET | Refills: 0 | OUTPATIENT
Start: 2024-04-18

## 2024-04-18 NOTE — TELEPHONE ENCOUNTER
S/w pt, confirmed that he did  the rx at [facility] last night after paying cash for it. Pt stated that he was advised that this rx requires prior auth however, because he was traveling today he could not wait for the prior auth and paid cash. Advised pt, the writer will forward this info to the prior auth team for proactive authorization as the pt will fu with [facility] on 5/15/24 for med refills and fu. Pt verbalized understanding and appreciation.     Per pharmacist  [phone]  ID [id number]    Forwarding to Prior auth dept for proactive prior auth for norco, last filled 4/17    Forwarding to [facility] - please cancel the attached refill request.

## 2024-04-29 ENCOUNTER — OFFICE VISIT (OUTPATIENT)
Dept: GASTROENTEROLOGY | Facility: CLINIC | Age: 74
End: 2024-04-29
Payer: COMMERCIAL

## 2024-04-29 VITALS
WEIGHT: 256 LBS | SYSTOLIC BLOOD PRESSURE: 124 MMHG | HEIGHT: 73 IN | BODY MASS INDEX: 33.93 KG/M2 | DIASTOLIC BLOOD PRESSURE: 82 MMHG

## 2024-04-29 DIAGNOSIS — K21.9 GASTROESOPHAGEAL REFLUX DISEASE WITHOUT ESOPHAGITIS: Primary | ICD-10-CM

## 2024-04-29 DIAGNOSIS — I21.4 NSTEMI (NON-ST ELEVATED MYOCARDIAL INFARCTION) (HCC): ICD-10-CM

## 2024-04-29 DIAGNOSIS — Z86.010 HISTORY OF COLON POLYPS: ICD-10-CM

## 2024-04-29 PROCEDURE — 1160F RVW MEDS BY RX/DR IN RCRD: CPT | Performed by: INTERNAL MEDICINE

## 2024-04-29 PROCEDURE — 99214 OFFICE O/P EST MOD 30 MIN: CPT | Performed by: INTERNAL MEDICINE

## 2024-04-29 PROCEDURE — 1159F MED LIST DOCD IN RCRD: CPT | Performed by: INTERNAL MEDICINE

## 2024-04-29 RX ORDER — FAMOTIDINE 20 MG/1
20 TABLET, FILM COATED ORAL
Qty: 90 TABLET | Refills: 3 | Status: SHIPPED | OUTPATIENT
Start: 2024-04-29

## 2024-04-29 RX ORDER — PANTOPRAZOLE SODIUM 40 MG/1
40 TABLET, DELAYED RELEASE ORAL DAILY
Qty: 90 TABLET | Refills: 3 | Status: SHIPPED | OUTPATIENT
Start: 2024-04-29

## 2024-04-29 NOTE — PROGRESS NOTES
Formerly [facility] - Outpatient Follow-up Note  Nii Gamble 73 y.o. male MRN: [id number]  Encounter: [id number]    ASSESSMENT AND PLAN:      1. Gastroesophageal reflux disease without esophagitis  73-year-old male here today for follow-up.  EGD negative for Denise's.  Symptoms controlled on current regimen.    -GERD lifestyle modifications  - pantoprazole (PROTONIX) 40 mg tablet; Take 1 tablet (40 mg total) by mouth daily  Dispense: 90 tablet; Refill: 3  - famotidine (PEPCID) 20 mg tablet; Take 1 tablet (20 mg total) by mouth daily at bedtime  Dispense: 90 tablet; Refill: 3    2. History of colon polyps  Up-to-date.  Recall September 2027    3. NSTEMI (non-ST elevated myocardial infarction) (HCC)  On aspirin only      Followup Appointment: 1 year  ______________________________________________________________________    Chief Complaint   Patient presents with   • Follow up to EGD     HPI: 73-year-old male here today for follow-up.  Reviewed results of the EGD done last month.  Biopsies negative.  No Denise's noted.  GERD is well-controlled on the current regimen.  Denies any nausea vomiting or dysphagia or GI bleeding.  Weight is stable otherwise.    Historical Information   Past Medical History:   Diagnosis Date   • Acute medial meniscus tear    • Allergic rhinitis    • Allergic rhinitis     last assessed 5/8/14   • Anxiety    • Appendicitis    • Arthritis    • Benign essential hypertension     last assessed 1/6/14   • Biceps tendon tear    • Cancer (HCC)     skin   • Cervical radiculopathy     and lumbar   • Chronic pain disorder     knees and hips    • Colon polyps    • Coronary artery disease    • CPAP (continuous positive airway pressure) dependence    • Depression    • Depression with anxiety    • Dyskinesia of esophagus    • Erythema migrans (Lyme disease)     last assessed 10/16/12   • Fatigue    • GERD (gastroesophageal reflux disease)    • Heart murmur 1951   • Heel  spur, right    • Hernia    • Herpes    • Herpes zoster    • History of benign esophageal tumor    • Hyperlipidemia    • Hyperplastic colon polyp     last assessed 14   • Joint pain    • Kidney stone    • Kidney stones    • Lyme disease    • Myocardial infarction (HCC)    • Nephrolithiasis    • Panic disorder with agoraphobia     last assessed 13   • Seasonal affective disorder (HCC)    • Shingles    • Sleep apnea    • Vertigo    • Vitamin D deficiency      Past Surgical History:   Procedure Laterality Date   • APPENDECTOMY  2003   • BICEPS TENDON REPAIR     • CARDIAC OTHER      stents x2   • COLONOSCOPY     • ENDOSCOPIC ULTRASOUND (UPPER)     • KNEE ARTHROSCOPY      with medial meniscus repair, resolved 05   • KNEE CARTILAGE SURGERY     • OTHER SURGICAL HISTORY      distal reinsertion of ruptured biceps tendon   • VT ARTHRS KNE SURG W/MENISCECTOMY MED/LAT W/SHVG Right 2019    Procedure: RIGHT KNEE ARTHROSCOPY, MEDIAL MENISCECTOMY;  Surgeon: Rick Farris DO;  Location:  MAIN OR;  Service: Orthopedics   • TOOTH EXTRACTION      Middle of 2019   • VASECTOMY       Social History     Substance and Sexual Activity   Alcohol Use Yes   • Alcohol/week: 1.0 standard drink of alcohol   • Types: 1 Glasses of wine per week    Comment: an couple times a month a glass of wine     Social History     Substance and Sexual Activity   Drug Use No     Social History     Tobacco Use   Smoking Status Former   • Current packs/day: 0.00   • Average packs/day: 1 pack/day for 19.3 years (19.3 ttl pk-yrs)   • Types: Cigarettes, Cigars   • Start date: 11/15/1967   • Quit date: 3/1/1987   • Years since quittin.1   Smokeless Tobacco Never     Family History   Problem Relation Age of Onset   • Diabetes Mother    • Coronary artery disease Father    • Kidney disease Father    • Heart disease Father    • Cancer Father         kidney cancer   • Colon cancer Neg Hx    • Colon polyps Neg Hx   "        Current Outpatient Medications:   •  ALPRAZolam (Xanax) 0.25 mg tablet  •  aspirin 81 mg chewable tablet  •  atorvastatin (LIPITOR) 80 mg tablet  •  butalbital-acetaminophen-caffeine (FIORICET,ESGIC) -40 mg per tablet  •  cholecalciferol (VITAMIN D3) 1,000 units tablet  •  CHONDROITIN SULFATE A PO  •  Coenzyme Q10 (CoQ-10) 100 MG CAPS  •  Diclofenac Sodium (VOLTAREN) 1 %  •  ezetimibe (ZETIA) 10 mg tablet  •  famotidine (PEPCID) 20 mg tablet  •  GLUCOSAMINE-CALCIUM-VIT D PO  •  HYDROcodone-acetaminophen (Norco) 5-325 mg per tablet  •  HYDROcodone-acetaminophen (NORCO) 5-325 mg per tablet  •  Hyoscyamine Sulfate SL 0.125 MG SUBL  •  Ibuprofen 200 MG CAPS  •  Jardiance 10 MG TABS tablet  •  lisinopril (ZESTRIL) 2.5 mg tablet  •  Omega-3 Fatty Acids (Fish Oil) 1200 MG CAPS  •  pantoprazole (PROTONIX) 40 mg tablet  •  tamsulosin (FLOMAX) 0.4 mg  •  vilazodone (VIIBRYD) 20 mg tablet  Allergies   Allergen Reactions   • Pseudoephedrine Hives and Dizziness     Reaction Date: 16Apr2011;    • Sulfa Antibiotics Dizziness, Hives and Other (See Comments)     Reviewed medications and allergies and updated as indicated    PHYSICAL EXAM:    Blood pressure 124/82, height 6' 1\" (1.854 m), weight 116 kg (256 lb). Body mass index is 33.78 kg/m².  General Appearance: NAD, cooperative, alert  Eyes: Anicteric, conjunctiva pink  ENT:  Normocephalic, atraumatic, normal mucosa.    Neck:  Supple, symmetrical, trachea midline  Resp:  Clear to auscultation bilaterally; no rales, rhonchi or wheezing; respirations unlabored   CV:  S1 S2, Regular rate and rhythm; no murmur, rub, or gallop.  GI:  Soft, non-tender, non-distended; normal bowel sounds; no masses, no organomegaly   Rectal: Deferred  Musculoskeletal: No cyanosis, clubbing or edema. Normal ROM.  Skin:  No jaundice, rashes, or lesions   Heme/Lymph: No palpable cervical lymphadenopathy  Psych: Normal affect, good eye contact  Neuro: No gross deficits, AAOx3    Lab Results: "   Lab Results   Component Value Date    WBC 6.7 02/20/2024    HGB 15.8 02/20/2024    HCT 45.2 02/20/2024    MCV 97 02/20/2024     02/20/2024     Lab Results   Component Value Date     09/11/2017    K 4.4 02/20/2024     02/20/2024    CO2 20 02/20/2024    BUN 22 02/20/2024    CREATININE 1.20 02/20/2024    GLUCOSE 102 (H) 09/11/2017    CALCIUM 9.3 02/16/2021    AST 21 02/20/2024    ALT 25 02/20/2024    ALKPHOS 86 02/16/2021    PROT 7.1 09/11/2017    BILITOT 0.8 09/11/2017    EGFR 64 02/20/2024       Radiology Results:   No results found.

## 2024-05-13 ENCOUNTER — OFFICE VISIT (OUTPATIENT)
Dept: PAIN MEDICINE | Facility: CLINIC | Age: 74
End: 2024-05-13
Payer: COMMERCIAL

## 2024-05-13 VITALS
DIASTOLIC BLOOD PRESSURE: 78 MMHG | HEART RATE: 78 BPM | HEIGHT: 73 IN | BODY MASS INDEX: 34.33 KG/M2 | SYSTOLIC BLOOD PRESSURE: 122 MMHG | TEMPERATURE: 97.8 F | WEIGHT: 259 LBS

## 2024-05-13 DIAGNOSIS — M17.12 PRIMARY OSTEOARTHRITIS OF LEFT KNEE: ICD-10-CM

## 2024-05-13 DIAGNOSIS — M70.61 GREATER TROCHANTERIC BURSITIS OF RIGHT HIP: ICD-10-CM

## 2024-05-13 DIAGNOSIS — M47.816 LUMBAR SPONDYLOSIS: ICD-10-CM

## 2024-05-13 DIAGNOSIS — G89.4 CHRONIC PAIN SYNDROME: Primary | ICD-10-CM

## 2024-05-13 DIAGNOSIS — G43.811 OTHER MIGRAINE WITH STATUS MIGRAINOSUS, INTRACTABLE: ICD-10-CM

## 2024-05-13 DIAGNOSIS — M51.16 LUMBAR DISC DISEASE WITH RADICULOPATHY: ICD-10-CM

## 2024-05-13 DIAGNOSIS — M51.26 LUMBAR DISC HERNIATION: ICD-10-CM

## 2024-05-13 DIAGNOSIS — M54.16 LUMBAR RADICULOPATHY: ICD-10-CM

## 2024-05-13 DIAGNOSIS — Z79.891 LONG-TERM CURRENT USE OF OPIATE ANALGESIC: ICD-10-CM

## 2024-05-13 DIAGNOSIS — F32.A DEPRESSION, UNSPECIFIED DEPRESSION TYPE: ICD-10-CM

## 2024-05-13 DIAGNOSIS — F11.20 UNCOMPLICATED OPIOID DEPENDENCE (HCC): ICD-10-CM

## 2024-05-13 DIAGNOSIS — F41.9 ANXIETY: ICD-10-CM

## 2024-05-13 DIAGNOSIS — M48.061 SPINAL STENOSIS OF LUMBAR REGION WITHOUT NEUROGENIC CLAUDICATION: ICD-10-CM

## 2024-05-13 PROCEDURE — 99214 OFFICE O/P EST MOD 30 MIN: CPT | Performed by: PHYSICIAN ASSISTANT

## 2024-05-13 RX ORDER — NALOXONE HYDROCHLORIDE 4 MG/.1ML
SPRAY NASAL
Qty: 1 EACH | Refills: 1 | Status: SHIPPED | OUTPATIENT
Start: 2024-05-13 | End: 2025-05-13

## 2024-05-13 RX ORDER — HYDROCODONE BITARTRATE AND ACETAMINOPHEN 5; 325 MG/1; MG/1
TABLET ORAL
Qty: 45 TABLET | Refills: 0 | Status: SHIPPED | OUTPATIENT
Start: 2024-05-13

## 2024-05-13 NOTE — PROGRESS NOTES
Assessment:  1. Lumbar spondylosis    2. Lumbar disc herniation    3. Lumbar disc disease with radiculopathy    4. Spinal stenosis of lumbar region without neurogenic claudication    5. Lumbar radiculopathy    6. Primary osteoarthritis of left knee    7. Greater trochanteric bursitis of right hip    8. Chronic pain syndrome    9. Uncomplicated opioid dependence (HCC)    10. Long-term current use of opiate analgesic        Plan:  While the patient was in the office today, I did have a thorough conversation regarding their chronic pain syndrome, medication management, and treatment plan options.    The patient remains clinically stable moderately controlled on current medication regimen without side effects or issues.  I feel it is reasonable to continue.  On today's visit I have electronically sent 3 months of the Norco 5/325 1/2 a tablet 3 times daily as needed quantity of 45 tablets per 30 days to his pharmacy with the appropriate do not fill dates.    [place] Prescription Drug Monitoring Program report was reviewed and was appropriate     A urine drug screen was collected at today's office visit as part of our medication management protocol. The point of care testing results were appropriate for what was being prescribed. The specimen will be sent for confirmatory testing. The drug screen is medically necessary because the patient is either dependent on opioid medication or is being considered for opioid medication therapy and the results could impact ongoing or future treatment. The drug screen is to evaluate for the presences or absence of prescribed, non-prescribed, and/or illicit drugs/substances.    There are risks associated with opioid medications, including dependence, addiction and tolerance. The patient understands and agrees to use these medications only as prescribed. Potential side effects of the medications include, but are not limited to, constipation, drowsiness, addiction, impaired judgment  and risk of fatal overdose if not taken as prescribed. The patient was warned against driving while taking sedation medications.  Sharing medications is a felony. At this point in time, the patient is showing no signs of addiction, abuse, diversion or suicidal ideation.    Repeat epidural steroid injection if indicated in the future.    Consult with orthopedics regarding his left knee pain secondary to severe osteoarthritis.    he patient will follow-up in 12 weeks for medication prescription refill and reevaluation. The patient was advised to contact the office should their symptoms worsen in the interim. The patient was agreeable and verbalized an understanding.        History of Present Illness:    The patient is a 73 y.o. male last seen on 2/21/2024 who presents for a follow up office visit in regards to chronic low back pain secondary to lumbar spondylosis, lumbar degenerative disc disease, lumbar stenosis with radiculopathy, right hip pain and left knee pain secondary to osteoarthritis.  The patient currently reports low back pain and right hip pain that he presently rates a 3 out of 10 and describes it as an intermittent shooting type of pain that is made worse with prolonged sitting and standing.  He also is struggling with the left knee pain.  X-ray of the left knee revealed severe osteoarthritis.  He still has the referral for the orthopedic surgeon but has not yet gone for the consultation.    Current pain medications includes: Norco 5/325 ?.  The patient reports that this regimen is providing 50% pain relief.  The patient is reporting no side effects from this pain medication regimen.    Pain Contract Signed: 5/13/2024  Last Urine Drug Screen: 5/13/2024    I have personally reviewed and/or updated the patient's past medical history, past surgical history, family history, social history, current medications, allergies, and vital signs today.       Review of Systems:    Review of Systems   Respiratory:   Negative for shortness of breath.    Cardiovascular:  Negative for chest pain.   Gastrointestinal:  Negative for constipation, diarrhea, nausea and vomiting.   Musculoskeletal:  Positive for gait problem. Negative for arthralgias, joint swelling (Joint stiffness) and myalgias.   Skin:  Negative for rash.   Neurological:  Negative for dizziness, seizures and weakness.   All other systems reviewed and are negative.        Past Medical History:   Diagnosis Date   • Acute medial meniscus tear    • Allergic rhinitis    • Allergic rhinitis     last assessed 5/8/14   • Anxiety    • Appendicitis    • Arthritis    • Benign essential hypertension     last assessed 1/6/14   • Biceps tendon tear    • Cancer (HCC)     skin   • Cervical radiculopathy     and lumbar   • Chronic pain disorder     knees and hips    • Colon polyps    • Coronary artery disease    • CPAP (continuous positive airway pressure) dependence    • Depression    • Depression with anxiety    • Dyskinesia of esophagus    • Erythema migrans (Lyme disease)     last assessed 10/16/12   • Fatigue    • GERD (gastroesophageal reflux disease)    • Heart murmur 1951   • Heel spur, right    • Hernia    • Herpes    • Herpes zoster    • History of benign esophageal tumor    • Hyperlipidemia    • Hyperplastic colon polyp     last assessed 11/14/14   • Joint pain    • Kidney stone 1995   • Kidney stones    • Lyme disease    • Myocardial infarction (HCC)    • Nephrolithiasis    • Panic disorder with agoraphobia     last assessed 8/7/13   • Seasonal affective disorder ([facility])    • Shingles 2015   • Sleep apnea    • Vertigo    • Vitamin D deficiency        Past Surgical History:   Procedure Laterality Date   • APPENDECTOMY  08/01/2003   • BICEPS TENDON REPAIR     • CARDIAC OTHER      stents x2   • COLONOSCOPY     • ENDOSCOPIC ULTRASOUND (UPPER)     • KNEE ARTHROSCOPY      with medial meniscus repair, resolved 01/01/05   • KNEE CARTILAGE SURGERY     • OTHER SURGICAL HISTORY       distal reinsertion of ruptured biceps tendon   • CO ARTHRS KNE SURG W/MENISCECTOMY MED/LAT W/SHVG Right 2019    Procedure: RIGHT KNEE ARTHROSCOPY, MEDIAL MENISCECTOMY;  Surgeon: Rick Farris DO;  Location:  MAIN OR;  Service: Orthopedics   • TOOTH EXTRACTION      Middle of 2019   • VASECTOMY         Family History   Problem Relation Age of Onset   • Diabetes Mother    • Coronary artery disease Father    • Kidney disease Father    • Heart disease Father    • Cancer Father         kidney cancer   • Colon cancer Neg Hx    • Colon polyps Neg Hx        Social History     Occupational History   • Not on file   Tobacco Use   • Smoking status: Former     Current packs/day: 0.00     Average packs/day: 1 pack/day for 19.3 years (19.3 ttl pk-yrs)     Types: Cigarettes, Cigars     Start date: 11/15/1967     Quit date: 3/1/1987     Years since quittin.2   • Smokeless tobacco: Never   Vaping Use   • Vaping status: Never Used   Substance and Sexual Activity   • Alcohol use: Yes     Alcohol/week: 1.0 standard drink of alcohol     Types: 1 Glasses of wine per week     Comment: an couple times a month a glass of wine   • Drug use: No   • Sexual activity: Not on file         Current Outpatient Medications:   •  ALPRAZolam (Xanax) 0.25 mg tablet, Take 1 tablet (0.25 mg total) by mouth 3 (three) times a day as needed for anxiety, Disp: 30 tablet, Rfl: 0  •  aspirin 81 mg chewable tablet, every 24 hours, Disp: , Rfl:   •  atorvastatin (LIPITOR) 80 mg tablet, every 24 hours, Disp: , Rfl:   •  butalbital-acetaminophen-caffeine (FIORICET,ESGIC) -40 mg per tablet, Take 1 tablet by mouth every 4 (four) hours as needed for headaches, Disp: 30 tablet, Rfl: 0  •  cholecalciferol (VITAMIN D3) 1,000 units tablet, Take 1,000 Units by mouth daily, Disp: , Rfl:   •  CHONDROITIN SULFATE A PO, Take by mouth in the morning, Disp: , Rfl:   •  Coenzyme Q10 (CoQ-10) 100 MG CAPS, every 24 hours, Disp: , Rfl:   •  Diclofenac  Sodium (VOLTAREN) 1 %, Apply 2 g topically 4 (four) times a day, Disp: 50 g, Rfl: 0  •  ezetimibe (ZETIA) 10 mg tablet, 1 tablet Orally Once a day 90 days, Disp: , Rfl:   •  famotidine (PEPCID) 20 mg tablet, Take 1 tablet (20 mg total) by mouth daily at bedtime, Disp: 90 tablet, Rfl: 3  •  GLUCOSAMINE-CALCIUM-VIT D PO, Take 1,200 mg by mouth 1200mg, Disp: , Rfl:   •  HYDROcodone-acetaminophen (NORCO) 5-325 mg per tablet, 1/2 tab TID prn pain for ongoing therapy, Disp: 45 tablet, Rfl: 0  •  HYDROcodone-acetaminophen (Norco) 5-325 mg per tablet, For ongoing therapy Take 1/2 tablet TID PRN for pain DO NOT FILL BEFORE: 06/13/24, Disp: 45 tablet, Rfl: 0  •  HYDROcodone-acetaminophen (NORCO) 5-325 mg per tablet, Take 1/2 tab TID prn pain for ongoing therapy DO NOT FILL BEFORE: 07/11/24, Disp: 45 tablet, Rfl: 0  •  Hyoscyamine Sulfate SL 0.125 MG SUBL, Place 0.125 mg under the tongue 3 (three) times a day as needed (as needed), Disp: 30 tablet, Rfl: 1  •  Ibuprofen 200 MG CAPS, 3 (three) times a day, Disp: , Rfl:   •  lisinopril (ZESTRIL) 2.5 mg tablet, every 24 hours, Disp: , Rfl:   •  naloxone (NARCAN) 4 mg/0.1 mL nasal spray, Administer 1 spray into a nostril. If no response after 2-3 minutes, give another dose in the other nostril using a new spray., Disp: 1 each, Rfl: 1  •  Omega-3 Fatty Acids (Fish Oil) 1200 MG CAPS, 1 capsule every 24 hours, Disp: , Rfl:   •  pantoprazole (PROTONIX) 40 mg tablet, Take 1 tablet (40 mg total) by mouth daily, Disp: 90 tablet, Rfl: 3  •  tamsulosin (FLOMAX) 0.4 mg, Take 1 capsule (0.4 mg total) by mouth daily with dinner, Disp: 90 capsule, Rfl: 3  •  vilazodone (VIIBRYD) 20 mg tablet, Take 1 tablet (20 mg total) by mouth daily with breakfast, Disp: 30 tablet, Rfl: 0  •  Jardiance 10 MG TABS tablet, 1 tablet Orally Once a day 90 days (Patient not taking: Reported on 5/13/2024), Disp: , Rfl:     Allergies   Allergen Reactions   • Pseudoephedrine Hives and Dizziness     Reaction Date:  "[id number];    • Sulfa Antibiotics Dizziness, Hives and Other (See Comments)       Physical Exam:    /78 (BP Location: Left arm, Patient Position: Sitting, Cuff Size: Large)   Pulse 78   Temp 97.8 °F (36.6 °C)   Ht 6' 1\" (1.854 m)   Wt 117 kg (259 lb)   BMI 34.17 kg/m²     Constitutional:normal, well developed, well nourished, alert, in no distress and non-toxic and no overt pain behavior. and overweight  Eyes:anicteric  HEENT:grossly intact  Neck:supple, symmetric, trachea midline and no masses   Pulmonary:even and unlabored  Cardiovascular:No edema or pitting edema present  Skin:Normal without rashes or lesions and well hydrated  Psychiatric:Mood and affect appropriate  Neurologic:Cranial Nerves II-XII grossly intact  Musculoskeletal: Antalgic gait favoring the right side      Imaging  No orders to display         No orders of the defined types were placed in this encounter.      "

## 2024-05-14 RX ORDER — VILAZODONE HYDROCHLORIDE 20 MG/1
20 TABLET ORAL
Qty: 30 TABLET | Refills: 5 | Status: SHIPPED | OUTPATIENT
Start: 2024-05-14

## 2024-05-15 RX ORDER — ALPRAZOLAM 0.25 MG/1
0.25 TABLET ORAL 3 TIMES DAILY PRN
Qty: 30 TABLET | Refills: 0 | Status: SHIPPED | OUTPATIENT
Start: 2024-05-15

## 2024-05-15 RX ORDER — BUTALBITAL, ACETAMINOPHEN AND CAFFEINE 50; 325; 40 MG/1; MG/1; MG/1
1 TABLET ORAL EVERY 4 HOURS PRN
Qty: 30 TABLET | Refills: 0 | Status: SHIPPED | OUTPATIENT
Start: 2024-05-15

## 2024-05-17 LAB
4OH-XYLAZINE UR QL CFM: NEGATIVE NG/ML
6MAM UR QL CFM: NEGATIVE NG/ML
7AMINOCLONAZEPAM UR QL CFM: NEGATIVE NG/ML
A-OH ALPRAZ UR QL CFM: NORMAL NG/ML
ACCEPTABLE CREAT UR QL: NORMAL MG/DL
ACCEPTIBLE SP GR UR QL: NORMAL
AMPHET UR QL CFM: NEGATIVE NG/ML
BUPRENORPHINE UR QL CFM: NEGATIVE NG/ML
BUTALBITAL UR QL CFM: NORMAL NG/ML
BZE UR QL CFM: NEGATIVE NG/ML
CODEINE UR QL CFM: NEGATIVE NG/ML
EDDP UR QL CFM: NEGATIVE NG/ML
ETHYL GLUCURONIDE UR QL CFM: ABNORMAL NG/ML
ETHYL SULFATE UR QL SCN: ABNORMAL NG/ML
EUTYLONE UR QL: NEGATIVE NG/ML
FENTANYL UR QL CFM: NEGATIVE NG/ML
GLIADIN IGG SER IA-ACNC: NEGATIVE NG/ML
HYDROCODONE UR QL CFM: NEGATIVE NG/ML
HYDROMORPHONE UR QL CFM: NEGATIVE NG/ML
LORAZEPAM UR QL CFM: NEGATIVE NG/ML
ME-PHENIDATE UR QL CFM: NEGATIVE NG/ML
MEPERIDINE UR QL CFM: NEGATIVE NG/ML
METHADONE UR QL CFM: NEGATIVE NG/ML
METHAMPHET UR QL CFM: NEGATIVE NG/ML
MORPHINE UR QL CFM: NEGATIVE NG/ML
NALTREXONE UR QL CFM: NEGATIVE NG/ML
NITRITE UR QL: NORMAL UG/ML
NORBUPRENORPHINE UR QL CFM: NEGATIVE NG/ML
NORDIAZEPAM UR QL CFM: NEGATIVE NG/ML
NORFENTANYL UR QL CFM: NEGATIVE NG/ML
NORHYDROCODONE UR QL CFM: NORMAL NG/ML
NORMEPERIDINE UR QL CFM: NEGATIVE NG/ML
NOROXYCODONE UR QL CFM: NEGATIVE NG/ML
OXAZEPAM UR QL CFM: NEGATIVE NG/ML
OXYCODONE UR QL CFM: NEGATIVE NG/ML
OXYMORPHONE UR QL CFM: NEGATIVE NG/ML
PARA-FLUOROFENTANYL QUANTIFICATION: NORMAL NG/ML
PHENOBARB UR QL CFM: NEGATIVE NG/ML
RESULT ALL_PRESCRIBED MEDS AND SPECIAL INSTRUCTIONS: NORMAL
SECOBARBITAL UR QL CFM: NEGATIVE NG/ML
SL AMB 4-ANPP QUANTIFICATION: NORMAL NG/ML
SL AMB 5F-ADB-M7 METABOLITE QUANTIFICATION: NEGATIVE NG/ML
SL AMB 7-OH-MITRAGYNINE (KRATOM ALKALOID) QUANTIFICATION: NEGATIVE NG/ML
SL AMB AB-FUBINACA-M3 METABOLITE QUANTIFICATION: NEGATIVE NG/ML
SL AMB ACETYL FENTANYL QUANTIFICATION: NORMAL NG/ML
SL AMB ACETYL NORFENTANYL QUANTIFICATION: NORMAL NG/ML
SL AMB ACRYL FENTANYL QUANTIFICATION: NORMAL NG/ML
SL AMB CARFENTANIL QUANTIFICATION: NORMAL NG/ML
SL AMB CTHC (MARIJUANA METABOLITE) QUANTIFICATION: NEGATIVE NG/ML
SL AMB DEXTRORPHAN (DEXTROMETHORPHAN METABOLITE) QUANT: NEGATIVE NG/ML
SL AMB GABAPENTIN QUANTIFICATION: NEGATIVE NG/ML
SL AMB JWH018 METABOLITE QUANTIFICATION: NEGATIVE NG/ML
SL AMB JWH073 METABOLITE QUANTIFICATION: NEGATIVE NG/ML
SL AMB MDMB-FUBINACA-M1 METABOLITE QUANTIFICATION: NEGATIVE NG/ML
SL AMB METHYLONE QUANTIFICATION: NEGATIVE NG/ML
SL AMB N-DESMETHYL-TRAMADOL QUANTIFICATION: NEGATIVE NG/ML
SL AMB PHENTERMINE QUANTIFICATION: NEGATIVE NG/ML
SL AMB PREGABALIN QUANTIFICATION: NEGATIVE NG/ML
SL AMB RCS4 METABOLITE QUANTIFICATION: NEGATIVE NG/ML
SL AMB RITALINIC ACID QUANTIFICATION: NEGATIVE NG/ML
SMOOTH MUSCLE AB TITR SER IF: NEGATIVE NG/ML
SPECIMEN DRAWN SERPL: NEGATIVE NG/ML
SPECIMEN PH ACCEPTABLE UR: NORMAL
TAPENTADOL UR QL CFM: NEGATIVE NG/ML
TEMAZEPAM UR QL CFM: NEGATIVE NG/ML
TRAMADOL UR QL CFM: NEGATIVE NG/ML
URATE/CREAT 24H UR: NEGATIVE NG/ML
XYLAZINE UR QL CFM: NEGATIVE NG/ML

## 2024-05-20 ENCOUNTER — TELEPHONE (OUTPATIENT)
Dept: PAIN MEDICINE | Facility: CLINIC | Age: 74
End: 2024-05-20

## 2024-05-20 NOTE — TELEPHONE ENCOUNTER
----- Message from Amber Escobar PA-C sent at 5/20/2024  8:01 AM EDT -----  Please let patient know his urine drug screen was positive for alcohol.  Will provide a one time warning and if positive again, I will no longer be able to prescribe opioids.

## 2024-06-07 ENCOUNTER — HOSPITAL ENCOUNTER (OUTPATIENT)
Dept: HOSPITAL 99 - RCS | Age: 74
End: 2024-06-07
Payer: COMMERCIAL

## 2024-06-07 DIAGNOSIS — I35.0: Primary | ICD-10-CM

## 2024-06-12 DIAGNOSIS — G43.811 OTHER MIGRAINE WITH STATUS MIGRAINOSUS, INTRACTABLE: ICD-10-CM

## 2024-06-12 DIAGNOSIS — F41.9 ANXIETY: ICD-10-CM

## 2024-06-12 RX ORDER — ALPRAZOLAM 0.25 MG/1
0.25 TABLET ORAL 3 TIMES DAILY PRN
Qty: 30 TABLET | Refills: 0 | Status: SHIPPED | OUTPATIENT
Start: 2024-06-12

## 2024-06-12 RX ORDER — BUTALBITAL, ACETAMINOPHEN AND CAFFEINE 50; 325; 40 MG/1; MG/1; MG/1
1 TABLET ORAL EVERY 4 HOURS PRN
Qty: 30 TABLET | Refills: 0 | Status: SHIPPED | OUTPATIENT
Start: 2024-06-12

## 2024-06-17 DIAGNOSIS — F32.A DEPRESSION, UNSPECIFIED DEPRESSION TYPE: ICD-10-CM

## 2024-06-17 RX ORDER — VILAZODONE HYDROCHLORIDE 20 MG/1
20 TABLET ORAL
Qty: 30 TABLET | Refills: 5 | Status: SHIPPED | OUTPATIENT
Start: 2024-06-17

## 2024-06-25 ENCOUNTER — TELEPHONE (OUTPATIENT)
Dept: FAMILY MEDICINE CLINIC | Facility: CLINIC | Age: 74
End: 2024-06-25

## 2024-06-25 NOTE — TELEPHONE ENCOUNTER
Confirmation - Referral [id number]  Effective: 06/24/2024     Expires: 09/22/2024  Active  Referred From  [facility]  Specialty: [facility]  Group NPI: [id number]  Provider ID: [id number]  Tax ID: [id number]  [address] [place] [address]  Referred To  [facility]  Specialty: Multi-Specialty Group  Tier 2  Group NPI: [id number]  Provider ID: [id number]  Tax ID: [id number]  This referral is valid at any location for the above group  Patient Info  KARO CANDELARIA  [id number]  Male  1950  [address] [place] [id number]  Clinical Information  Place of Service  Office  Service Type  Medical Care  Diagnosis  1I10 - essential (primary) hypertension  Procedures  199499 - unlisted evaluation and management service  Disclaimer  [employer] and its Affiliates ([facility]) will pay for only those services covered under the [facility] Contract which are specifically noted and requested by the PCP or OBGYN on the referral. If any additional services, testing, or follow-up care are required, the PCP or OBGYN must be contacted prior to the delivery of such additional services for written approval on a separate referral. Non-referred services will not be covered by [facility]. Benefits are underwritten or administered by [employer], a subsidiary of [employer], which are independent licensees of the [employer].

## 2024-06-30 DIAGNOSIS — G43.811 OTHER MIGRAINE WITH STATUS MIGRAINOSUS, INTRACTABLE: ICD-10-CM

## 2024-07-01 RX ORDER — BUTALBITAL, ACETAMINOPHEN AND CAFFEINE 50; 325; 40 MG/1; MG/1; MG/1
1 TABLET ORAL EVERY 4 HOURS PRN
Qty: 30 TABLET | Refills: 0 | Status: SHIPPED | OUTPATIENT
Start: 2024-07-01

## 2024-07-07 DIAGNOSIS — F41.9 ANXIETY: ICD-10-CM

## 2024-07-08 RX ORDER — ALPRAZOLAM 0.25 MG/1
0.25 TABLET ORAL 3 TIMES DAILY PRN
Qty: 30 TABLET | Refills: 0 | Status: SHIPPED | OUTPATIENT
Start: 2024-07-08

## 2024-07-09 ENCOUNTER — OFFICE VISIT (OUTPATIENT)
Dept: URGENT CARE | Facility: CLINIC | Age: 74
End: 2024-07-09
Payer: COMMERCIAL

## 2024-07-09 VITALS
HEART RATE: 82 BPM | SYSTOLIC BLOOD PRESSURE: 134 MMHG | DIASTOLIC BLOOD PRESSURE: 84 MMHG | TEMPERATURE: 98 F | RESPIRATION RATE: 16 BRPM | OXYGEN SATURATION: 95 %

## 2024-07-09 DIAGNOSIS — T14.8XXA PUNCTURE WOUND: Primary | ICD-10-CM

## 2024-07-09 PROCEDURE — 99213 OFFICE O/P EST LOW 20 MIN: CPT

## 2024-07-09 RX ORDER — DOXYCYCLINE 100 MG/1
100 CAPSULE ORAL 2 TIMES DAILY
Qty: 14 CAPSULE | Refills: 0 | Status: SHIPPED | OUTPATIENT
Start: 2024-07-09 | End: 2024-07-16

## 2024-07-09 NOTE — PATIENT INSTRUCTIONS
Start antibiotics as prescribed  Keep area clean and dry.    Change dressings daily.    Watch for signs of further infection/worsening symptoms as discussed.  ollow up with PCP in 3-5 days.  Proceed to  ER if symptoms worsen.     Eat yogurt with live and active cultures and/or take a probiotic at least 3 hours before or after antibiotic dose. Monitor stool for diarrhea and/or blood. If this occurs, contact primary care doctor ASAP.      Doxycycline may cause your skin to be more sensitive to sunlight than it is normally. Exposure to sunlight, even for short periods of time, may cause skin rash, itching, redness or other discoloration of the skin, or a severe sunburn. Practice proper precautions including avoiding excessive sunlight exposure, wear skin protection including clothing and sunscreen to avoid reaction.

## 2024-07-09 NOTE — PROGRESS NOTES
[facility]        NAME: Nii Gamble is a 73 y.o. male  : 1950    MRN: [id number]  DATE: 2024  TIME: 12:50 PM    Assessment and Plan   Puncture wound [T14.8XXA]  1. Puncture wound  doxycycline monohydrate (MONODOX) 100 mg capsule            Patient Instructions     Start antibiotics as prescribed  Keep area clean and dry.    Change dressings daily.    Watch for signs of further infection/worsening symptoms as discussed.  Follow up with PCP in 3-5 days.  Proceed to  ER if symptoms worsen.    Doxycycline may cause your skin to be more sensitive to sunlight than it is normally. Exposure to sunlight, even for short periods of time, may cause skin rash, itching, redness or other discoloration of the skin, or a severe sunburn. Practice proper precautions including avoiding excessive sunlight exposure, wear skin protection including clothing and sunscreen to avoid reaction.    If tests are performed, our office will contact you with results only if changes need to made to the care plan discussed with you at the visit. [facility]    Chief Complaint     Chief Complaint   Patient presents with    Puncture Wound     Patient was sharpening chainsaw, and the file punctured his L middle finger yesterday. This AM, patient awoke d/t pain. Patient also with swelling, difficulty bending finger, and joint pain in the finger.          History of Present Illness       Patient is a 74 yo male with no significant PMH presenting in the clinic today for wound.  Patient states that yesterday he was sharpening his chainsaw with a file when the file punctured his left middle finger.  Patient notes decreased ROM, swelling, and pain along the wound. Denies fever, chills, numbness, tingling, purulent drainage, chest pain, and SOB. Admits the use of ice therapy for sx management. Last Tdap noted in 2024.        Review of Systems   Review of Systems   Constitutional:   Negative for chills and fever.   Respiratory:  Negative for shortness of breath.    Cardiovascular:  Negative for chest pain.   Skin:  Positive for wound. Negative for rash.   Neurological:  Negative for numbness.         Current Medications       Current Outpatient Medications:     doxycycline monohydrate (MONODOX) 100 mg capsule, Take 1 capsule (100 mg total) by mouth 2 (two) times a day for 7 days, Disp: 14 capsule, Rfl: 0    ALPRAZolam (Xanax) 0.25 mg tablet, Take 1 tablet (0.25 mg total) by mouth 3 (three) times a day as needed for anxiety, Disp: 30 tablet, Rfl: 0    aspirin 81 mg chewable tablet, every 24 hours, Disp: , Rfl:     atorvastatin (LIPITOR) 80 mg tablet, every 24 hours, Disp: , Rfl:     butalbital-acetaminophen-caffeine (FIORICET,ESGIC) -40 mg per tablet, Take 1 tablet by mouth every 4 (four) hours as needed for headaches, Disp: 30 tablet, Rfl: 0    cholecalciferol (VITAMIN D3) 1,000 units tablet, Take 1,000 Units by mouth daily, Disp: , Rfl:     CHONDROITIN SULFATE A PO, Take by mouth in the morning, Disp: , Rfl:     Coenzyme Q10 (CoQ-10) 100 MG CAPS, every 24 hours, Disp: , Rfl:     Diclofenac Sodium (VOLTAREN) 1 %, Apply 2 g topically 4 (four) times a day, Disp: 50 g, Rfl: 0    ezetimibe (ZETIA) 10 mg tablet, 1 tablet Orally Once a day 90 days, Disp: , Rfl:     famotidine (PEPCID) 20 mg tablet, Take 1 tablet (20 mg total) by mouth daily at bedtime, Disp: 90 tablet, Rfl: 3    GLUCOSAMINE-CALCIUM-VIT D PO, Take 1,200 mg by mouth 1200mg, Disp: , Rfl:     HYDROcodone-acetaminophen (NORCO) 5-325 mg per tablet, 1/2 tab TID prn pain for ongoing therapy, Disp: 45 tablet, Rfl: 0    HYDROcodone-acetaminophen (Norco) 5-325 mg per tablet, For ongoing therapy Take 1/2 tablet TID PRN for pain DO NOT FILL BEFORE: 06/13/24, Disp: 45 tablet, Rfl: 0    HYDROcodone-acetaminophen (NORCO) 5-325 mg per tablet, Take 1/2 tab TID prn pain for ongoing therapy DO NOT FILL BEFORE: 07/11/24, Disp: 45 tablet, Rfl: 0     Hyoscyamine Sulfate SL 0.125 MG SUBL, Place 0.125 mg under the tongue 3 (three) times a day as needed (as needed), Disp: 30 tablet, Rfl: 1    Ibuprofen 200 MG CAPS, 3 (three) times a day, Disp: , Rfl:     Jardiance 10 MG TABS tablet, 1 tablet Orally Once a day 90 days (Patient not taking: Reported on 5/13/2024), Disp: , Rfl:     lisinopril (ZESTRIL) 2.5 mg tablet, every 24 hours, Disp: , Rfl:     naloxone (NARCAN) 4 mg/0.1 mL nasal spray, Administer 1 spray into a nostril. If no response after 2-3 minutes, give another dose in the other nostril using a new spray., Disp: 1 each, Rfl: 1    Omega-3 Fatty Acids (Fish Oil) 1200 MG CAPS, 1 capsule every 24 hours, Disp: , Rfl:     pantoprazole (PROTONIX) 40 mg tablet, Take 1 tablet (40 mg total) by mouth daily, Disp: 90 tablet, Rfl: 3    tamsulosin (FLOMAX) 0.4 mg, Take 1 capsule (0.4 mg total) by mouth daily with dinner, Disp: 90 capsule, Rfl: 3    vilazodone (VIIBRYD) 20 mg tablet, Take 1 tablet (20 mg total) by mouth daily with breakfast, Disp: 30 tablet, Rfl: 5    Current Allergies     Allergies as of 07/09/2024 - Reviewed 07/09/2024   Allergen Reaction Noted    Pseudoephedrine Dizziness and Hives 04/21/2012    Sulfa antibiotics Dizziness, Hives, and Other (See Comments) 10/08/2012            The following portions of the patient's history were reviewed and updated as appropriate: allergies, current medications, past family history, past medical history, past social history, past surgical history and problem list.     Past Medical History:   Diagnosis Date    Acute medial meniscus tear     Allergic rhinitis     Allergic rhinitis     last assessed 5/8/14    Anxiety     Appendicitis     Arthritis     Benign essential hypertension     last assessed 1/6/14    Biceps tendon tear     Cancer (HCC)     skin    Cervical radiculopathy     and lumbar    Chronic pain disorder     knees and hips     Colon polyps     Coronary artery disease     CPAP (continuous positive airway  pressure) dependence     Depression     Depression with anxiety     Dyskinesia of esophagus     Erythema migrans (Lyme disease)     last assessed 10/16/12    Fatigue     GERD (gastroesophageal reflux disease)     Heart murmur 1951    Heel spur, right     Hernia     Herpes     Herpes zoster     History of benign esophageal tumor     Hyperlipidemia     Hyperplastic colon polyp     last assessed 11/14/14    Joint pain     Kidney stone 1995    Kidney stones     Lyme disease     Myocardial infarction (HCC)     Nephrolithiasis     Panic disorder with agoraphobia     last assessed 8/7/13    Seasonal affective disorder (HCC)     Shingles 2015    Sleep apnea     Vertigo     Vitamin D deficiency        Past Surgical History:   Procedure Laterality Date    APPENDECTOMY  08/01/2003    BICEPS TENDON REPAIR      CARDIAC OTHER      stents x2    COLONOSCOPY      ENDOSCOPIC ULTRASOUND (UPPER)      KNEE ARTHROSCOPY      with medial meniscus repair, resolved 01/01/05    KNEE CARTILAGE SURGERY      OTHER SURGICAL HISTORY      distal reinsertion of ruptured biceps tendon    [place] ARTHRS KNE SURG W/MENISCECTOMY MED/LAT W/SHVG Right 07/30/2019    Procedure: RIGHT KNEE ARTHROSCOPY, MEDIAL MENISCECTOMY;  Surgeon: Rick Farris DO;  Location:  MAIN OR;  Service: Orthopedics    TOOTH EXTRACTION      Middle of October 2019    VASECTOMY         Family History   Problem Relation Age of Onset    Diabetes Mother     Coronary artery disease Father     Kidney disease Father     Heart disease Father     Cancer Father         kidney cancer    Colon cancer Neg Hx     Colon polyps Neg Hx          Medications have been verified.        Objective   /84   Pulse 82   Temp 98 °F (36.7 °C)   Resp 16   SpO2 95%        Physical Exam     Physical Exam  Vitals reviewed.   Constitutional:       General: He is not in acute distress.     Appearance: Normal appearance. He is normal weight. He is not ill-appearing.   HENT:      Head: Normocephalic.       Nose: Nose normal. No congestion or rhinorrhea.      Mouth/Throat:      Mouth: Mucous membranes are moist.   Eyes:      General:         Right eye: No discharge.         Left eye: No discharge.      Conjunctiva/sclera: Conjunctivae normal.   Cardiovascular:      Rate and Rhythm: Normal rate and regular rhythm.      Pulses: Normal pulses.      Heart sounds: Normal heart sounds. No murmur heard.     No friction rub. No gallop.   Pulmonary:      Effort: Pulmonary effort is normal.      Breath sounds: Normal breath sounds. No wheezing, rhonchi or rales.   Musculoskeletal:      Cervical back: Normal range of motion and neck supple.   Skin:     General: Skin is warm.      Capillary Refill: Capillary refill takes less than 2 seconds.      Findings: Wound present. No rash.      Comments: Puncture wound noted to the palmar proximal aspect of the left third finger.  No purulent drainage, erythema, or warmth noted.  Mild swelling noted surrounding puncture wound.    Bacitracin ointment applied to puncture wound and dressed with a bandage.   Neurological:      Mental Status: He is alert.   Psychiatric:         Mood and Affect: Mood normal.         Behavior: Behavior normal.

## 2024-07-29 ENCOUNTER — OFFICE VISIT (OUTPATIENT)
Dept: PAIN MEDICINE | Facility: CLINIC | Age: 74
End: 2024-07-29
Payer: COMMERCIAL

## 2024-07-29 VITALS
DIASTOLIC BLOOD PRESSURE: 80 MMHG | BODY MASS INDEX: 33.53 KG/M2 | WEIGHT: 253 LBS | SYSTOLIC BLOOD PRESSURE: 132 MMHG | HEART RATE: 78 BPM | HEIGHT: 73 IN | TEMPERATURE: 98.1 F

## 2024-07-29 DIAGNOSIS — M17.12 PRIMARY OSTEOARTHRITIS OF LEFT KNEE: ICD-10-CM

## 2024-07-29 DIAGNOSIS — M47.816 LUMBAR SPONDYLOSIS: Primary | ICD-10-CM

## 2024-07-29 DIAGNOSIS — F11.20 UNCOMPLICATED OPIOID DEPENDENCE (HCC): ICD-10-CM

## 2024-07-29 DIAGNOSIS — Z79.891 LONG-TERM CURRENT USE OF OPIATE ANALGESIC: ICD-10-CM

## 2024-07-29 DIAGNOSIS — G89.4 CHRONIC PAIN SYNDROME: ICD-10-CM

## 2024-07-29 DIAGNOSIS — M51.16 LUMBAR DISC DISEASE WITH RADICULOPATHY: ICD-10-CM

## 2024-07-29 DIAGNOSIS — M70.61 GREATER TROCHANTERIC BURSITIS OF RIGHT HIP: ICD-10-CM

## 2024-07-29 DIAGNOSIS — M48.061 SPINAL STENOSIS OF LUMBAR REGION WITHOUT NEUROGENIC CLAUDICATION: ICD-10-CM

## 2024-07-29 DIAGNOSIS — M25.551 RIGHT HIP PAIN: ICD-10-CM

## 2024-07-29 PROCEDURE — 1160F RVW MEDS BY RX/DR IN RCRD: CPT | Performed by: PHYSICIAN ASSISTANT

## 2024-07-29 PROCEDURE — 1159F MED LIST DOCD IN RCRD: CPT | Performed by: PHYSICIAN ASSISTANT

## 2024-07-29 PROCEDURE — 99214 OFFICE O/P EST MOD 30 MIN: CPT | Performed by: PHYSICIAN ASSISTANT

## 2024-07-29 RX ORDER — HYDROCODONE BITARTRATE AND ACETAMINOPHEN 5; 325 MG/1; MG/1
TABLET ORAL
Qty: 45 TABLET | Refills: 0 | Status: SHIPPED | OUTPATIENT
Start: 2024-07-29

## 2024-07-29 NOTE — PROGRESS NOTES
Assessment:  1. Lumbar spondylosis    2. Spinal stenosis of lumbar region without neurogenic claudication    3. Lumbar disc disease with radiculopathy    4. Greater trochanteric bursitis of right hip    5. Right hip pain    6. Primary osteoarthritis of left knee    7. Chronic pain syndrome    8. Long-term current use of opiate analgesic    9. Uncomplicated opioid dependence (HCC)        Plan:  While the patient was in the office today, I did have a thorough conversation regarding their chronic pain syndrome, medication management, and treatment plan options.    I have recommended the patient proceed with an x-ray of the right hip given his increasing pain issues in this particular region.    Patient remains clinically stable on the current medication regimen of Norco 5/325 1/2 tablet 3 times daily as needed.  I have electronically sent refills to his pharmacy with fill dates of 8/9/2024, 9/7/2024 and 10/5/2024.    [place] Prescription Drug Monitoring Program report was reviewed and was appropriate     There are risks associated with opioid medications, including dependence, addiction and tolerance. The patient understands and agrees to use these medications only as prescribed. Potential side effects of the medications include, but are not limited to, constipation, drowsiness, addiction, impaired judgment and risk of fatal overdose if not taken as prescribed. The patient was warned against driving while taking sedation medications.  Sharing medications is a felony. At this point in time, the patient is showing no signs of addiction, abuse, diversion or suicidal ideation.    The patient will follow-up in 12 weeks for medication prescription refill and reevaluation. The patient was advised to contact the office should their symptoms worsen in the interim. The patient was agreeable and verbalized an understanding.        History of Present Illness:    The patient is a 73 y.o. male last seen on 5/13/2024 who  presents for a follow up office visit in regards to chronic low back pain secondary to lumbar spondylosis as well as chronic left knee pain and right hip pain secondary to osteoarthritis.  The patient currently reports back pain and right hip pain that he presently rates a 5 out of 10 and describes it as an intermittent dull, aching and sharp pain.  He states that since his last visit with us his right hip pain has worsened.  Patient states that it affects him most when he is driving in the car and he states after an hour of driving he has to get out and walk around.  He also has pain in his hip when he is trying to sleep at night depending on how he is laying down.    Current pain medications includes: Norco 5/325 1/2 tablet 3 times daily as needed.  The patient reports that this regimen is providing 50% pain relief.  The patient is reporting no side effects from this pain medication regimen.    Pain Contract Signed: 5/13/2024  Last Urine Drug Screen: 5/13/2024    I have personally reviewed and/or updated the patient's past medical history, past surgical history, family history, social history, current medications, allergies, and vital signs today.       Review of Systems:    Review of Systems   Respiratory:  Negative for shortness of breath.    Cardiovascular:  Negative for chest pain.   Gastrointestinal:  Negative for constipation, diarrhea, nausea and vomiting.   Musculoskeletal:  Positive for gait problem. Negative for arthralgias, joint swelling (Joint stiffness) and myalgias.   Skin:  Negative for rash.   Neurological:  Negative for dizziness, seizures and weakness.   All other systems reviewed and are negative.        Past Medical History:   Diagnosis Date   • Acute medial meniscus tear    • Allergic rhinitis    • Allergic rhinitis     last assessed 5/8/14   • Anxiety    • Appendicitis    • Arthritis    • Benign essential hypertension     last assessed 1/6/14   • Biceps tendon tear    • Cancer (HCC)     skin    • Cervical radiculopathy     and lumbar   • Chronic pain disorder     knees and hips    • Colon polyps    • Coronary artery disease    • CPAP (continuous positive airway pressure) dependence    • Depression    • Depression with anxiety    • Dyskinesia of esophagus    • Erythema migrans (Lyme disease)     last assessed 10/16/12   • Fatigue    • GERD (gastroesophageal reflux disease)    • Heart murmur 1951   • Heel spur, right    • Hernia    • Herpes    • Herpes zoster    • History of benign esophageal tumor    • Hyperlipidemia    • Hyperplastic colon polyp     last assessed 11/14/14   • Joint pain    • Kidney stone 1995   • Kidney stones    • Lyme disease    • Myocardial infarction (HCC)    • Nephrolithiasis    • Panic disorder with agoraphobia     last assessed 8/7/13   • Seasonal affective disorder (HCC)    • Shingles 2015   • Sleep apnea    • Vertigo    • Vitamin D deficiency        Past Surgical History:   Procedure Laterality Date   • APPENDECTOMY  08/01/2003   • BICEPS TENDON REPAIR     • CARDIAC OTHER      stents x2   • COLONOSCOPY     • ENDOSCOPIC ULTRASOUND (UPPER)     • KNEE ARTHROSCOPY      with medial meniscus repair, resolved 01/01/05   • KNEE CARTILAGE SURGERY     • OTHER SURGICAL HISTORY      distal reinsertion of ruptured biceps tendon   • [place] ARTHRS KNE SURG W/MENISCECTOMY MED/LAT W/SHVG Right 07/30/2019    Procedure: RIGHT KNEE ARTHROSCOPY, MEDIAL MENISCECTOMY;  Surgeon: Rick Farris DO;  Location: [facility];  Service: Orthopedics   • TOOTH EXTRACTION      Middle of October 2019   • VASECTOMY         Family History   Problem Relation Age of Onset   • Diabetes Mother    • Coronary artery disease Father    • Kidney disease Father    • Heart disease Father    • Cancer Father         kidney cancer   • Colon cancer Neg Hx    • Colon polyps Neg Hx        Social History     Occupational History   • Not on file   Tobacco Use   • Smoking status: Former     Current packs/day: 0.00     Average  packs/day: 1 pack/day for 19.3 years (19.3 ttl pk-yrs)     Types: Cigarettes, Cigars     Start date: 11/15/1967     Quit date: 3/1/1987     Years since quittin.4   • Smokeless tobacco: Never   Vaping Use   • Vaping status: Never Used   Substance and Sexual Activity   • Alcohol use: Yes     Alcohol/week: 1.0 standard drink of alcohol     Types: 1 Glasses of wine per week     Comment: an couple times a month a glass of wine   • Drug use: No   • Sexual activity: Not on file         Current Outpatient Medications:   •  ALPRAZolam (Xanax) 0.25 mg tablet, Take 1 tablet (0.25 mg total) by mouth 3 (three) times a day as needed for anxiety, Disp: 30 tablet, Rfl: 0  •  aspirin 81 mg chewable tablet, every 24 hours, Disp: , Rfl:   •  atorvastatin (LIPITOR) 80 mg tablet, every 24 hours, Disp: , Rfl:   •  butalbital-acetaminophen-caffeine (FIORICET,ESGIC) -40 mg per tablet, Take 1 tablet by mouth every 4 (four) hours as needed for headaches, Disp: 30 tablet, Rfl: 0  •  cholecalciferol (VITAMIN D3) 1,000 units tablet, Take 1,000 Units by mouth daily, Disp: , Rfl:   •  CHONDROITIN SULFATE A PO, Take by mouth in the morning, Disp: , Rfl:   •  Coenzyme Q10 (CoQ-10) 100 MG CAPS, every 24 hours, Disp: , Rfl:   •  Diclofenac Sodium (VOLTAREN) 1 %, Apply 2 g topically 4 (four) times a day, Disp: 50 g, Rfl: 0  •  ezetimibe (ZETIA) 10 mg tablet, 1 tablet Orally Once a day 90 days, Disp: , Rfl:   •  famotidine (PEPCID) 20 mg tablet, Take 1 tablet (20 mg total) by mouth daily at bedtime, Disp: 90 tablet, Rfl: 3  •  GLUCOSAMINE-CALCIUM-VIT D PO, Take 1,200 mg by mouth 1200mg, Disp: , Rfl:   •  HYDROcodone-acetaminophen (NORCO) 5-325 mg per tablet, 1/2 tab TID prn pain for ongoing therapy DO NOT FILL BEFORE: 24, Disp: 45 tablet, Rfl: 0  •  HYDROcodone-acetaminophen (NORCO) 5-325 mg per tablet, Take 1/2 tab TID prn pain for ongoing therapy DO NOT FILL BEFORE: 24, Disp: 45 tablet, Rfl: 0  •  HYDROcodone-acetaminophen  "(Norco) 5-325 mg per tablet, For ongoing therapy Take 1/2 tablet TID PRN for pain DO NOT FILL BEFORE: 10/05/24, Disp: 45 tablet, Rfl: 0  •  Hyoscyamine Sulfate SL 0.125 MG SUBL, Place 0.125 mg under the tongue 3 (three) times a day as needed (as needed), Disp: 30 tablet, Rfl: 1  •  Ibuprofen 200 MG CAPS, 3 (three) times a day, Disp: , Rfl:   •  lisinopril (ZESTRIL) 2.5 mg tablet, every 24 hours, Disp: , Rfl:   •  Omega-3 Fatty Acids (Fish Oil) 1200 MG CAPS, 1 capsule every 24 hours, Disp: , Rfl:   •  pantoprazole (PROTONIX) 40 mg tablet, Take 1 tablet (40 mg total) by mouth daily, Disp: 90 tablet, Rfl: 3  •  tamsulosin (FLOMAX) 0.4 mg, Take 1 capsule (0.4 mg total) by mouth daily with dinner, Disp: 90 capsule, Rfl: 3  •  vilazodone (VIIBRYD) 20 mg tablet, Take 1 tablet (20 mg total) by mouth daily with breakfast, Disp: 30 tablet, Rfl: 5  •  Jardiance 10 MG TABS tablet, 1 tablet Orally Once a day 90 days (Patient not taking: Reported on 5/13/2024), Disp: , Rfl:   •  naloxone (NARCAN) 4 mg/0.1 mL nasal spray, Administer 1 spray into a nostril. If no response after 2-3 minutes, give another dose in the other nostril using a new spray., Disp: 1 each, Rfl: 1    Allergies   Allergen Reactions   • Pseudoephedrine Dizziness and Hives     Reaction Date: 16Apr2011;   • Sulfa Antibiotics Dizziness, Hives and Other (See Comments)       Physical Exam:    /80 (BP Location: Left arm, Patient Position: Sitting, Cuff Size: Standard)   Pulse 78   Temp 98.1 °F (36.7 °C)   Ht 6' 1\" (1.854 m)   Wt 115 kg (253 lb)   BMI 33.38 kg/m²     Constitutional:normal, well developed, well nourished, alert, in no distress and non-toxic and no overt pain behavior. and overweight  Eyes:anicteric  HEENT:grossly intact  Neck:supple, symmetric, trachea midline and no masses   Pulmonary:even and unlabored  Cardiovascular:No edema or pitting edema present  Skin:Normal without rashes or lesions and well hydrated  Psychiatric:Mood and affect " appropriate  Neurologic:Cranial Nerves II-XII grossly intact  Musculoskeletal: Stable gait without the use of assistive devices      Imaging  XR hip/pelv 2-3 vws right if performed    (Results Pending)         Orders Placed This Encounter   Procedures   • XR hip/pelv 2-3 vws right if performed

## 2024-08-02 DIAGNOSIS — G43.811 OTHER MIGRAINE WITH STATUS MIGRAINOSUS, INTRACTABLE: ICD-10-CM

## 2024-08-02 DIAGNOSIS — F41.9 ANXIETY: ICD-10-CM

## 2024-08-02 RX ORDER — BUTALBITAL, ACETAMINOPHEN AND CAFFEINE 50; 325; 40 MG/1; MG/1; MG/1
1 TABLET ORAL EVERY 4 HOURS PRN
Qty: 30 TABLET | Refills: 0 | Status: SHIPPED | OUTPATIENT
Start: 2024-08-02

## 2024-08-02 RX ORDER — ALPRAZOLAM 0.25 MG/1
0.25 TABLET ORAL 3 TIMES DAILY PRN
Qty: 30 TABLET | Refills: 0 | Status: SHIPPED | OUTPATIENT
Start: 2024-08-02

## 2024-08-11 NOTE — TELEPHONE ENCOUNTER
Patient is aware that he has a possible 7 mm renal calculi. He states he has a history of stones. I recommended that he follow up with his PCP

## 2024-08-31 DIAGNOSIS — G43.811 OTHER MIGRAINE WITH STATUS MIGRAINOSUS, INTRACTABLE: ICD-10-CM

## 2024-08-31 DIAGNOSIS — F41.9 ANXIETY: ICD-10-CM

## 2024-09-03 ENCOUNTER — APPOINTMENT (OUTPATIENT)
Dept: RADIOLOGY | Facility: CLINIC | Age: 74
End: 2024-09-03
Payer: COMMERCIAL

## 2024-09-03 DIAGNOSIS — M25.551 RIGHT HIP PAIN: ICD-10-CM

## 2024-09-03 PROCEDURE — 73502 X-RAY EXAM HIP UNI 2-3 VIEWS: CPT

## 2024-09-03 RX ORDER — ALPRAZOLAM 0.25 MG
0.25 TABLET ORAL 3 TIMES DAILY PRN
Qty: 30 TABLET | Refills: 0 | Status: SHIPPED | OUTPATIENT
Start: 2024-09-03

## 2024-09-03 RX ORDER — BUTALBITAL, ACETAMINOPHEN AND CAFFEINE 50; 325; 40 MG/1; MG/1; MG/1
1 TABLET ORAL EVERY 4 HOURS PRN
Qty: 30 TABLET | Refills: 0 | Status: SHIPPED | OUTPATIENT
Start: 2024-09-03

## 2024-09-04 ENCOUNTER — TELEPHONE (OUTPATIENT)
Dept: PAIN MEDICINE | Facility: CLINIC | Age: 74
End: 2024-09-04

## 2024-09-04 NOTE — TELEPHONE ENCOUNTER
----- Message from Amber Escobar PA-C sent at 9/4/2024  1:53 PM EDT -----  Please let patient know his right hip xray shows mild arthritis.  We can offer a right hip joint injection if patient is interested.

## 2024-09-05 NOTE — TELEPHONE ENCOUNTER
Caller: pt    Doctor: migel    Reason for call: I read the results and the pt would like to get the injection.    Call back#: [phone]

## 2024-09-09 ENCOUNTER — TELEPHONE (OUTPATIENT)
Age: 74
End: 2024-09-09

## 2024-09-09 DIAGNOSIS — M16.11 PRIMARY OSTEOARTHRITIS OF RIGHT HIP: Primary | ICD-10-CM

## 2024-09-09 NOTE — TELEPHONE ENCOUNTER
Patient is requesting an insurance referral for the following specialty:      Test Name / Order Name: Dermatology Scan Annual    DX Code:      Date Of Service: 9/9/24 @ 1:30    Location/Facility Name/Address/Phone #:          [place]          [facility]         [phone]  Location / Facility NPI:            Best Phone # To Reach The Patient:         [phone]

## 2024-09-09 NOTE — TELEPHONE ENCOUNTER
Patient called  back to follow up on this request, warm transfer to office clinical staff for further assistance

## 2024-09-09 NOTE — TELEPHONE ENCOUNTER
Confirmation - Referral [id number]  Effective: 09/09/2024     Expires: 12/08/2024  Active  Referred From  [facility]  Specialty: Continuing Care skilled nursing Center  Group NPI: [id number]  Provider ID: [id number]  Tax ID: [id number]  [address] [place] [address]  Referred To  [facility]  Specialty: Dermatology  Tier 2  Group NPI: [id number]  Provider ID: [id number]  Tax ID: [id number]  This referral is valid at any location for the above group  Patient Info  KARO CANDELARIA  [id number]  Male  1950  [facility] [place] [address]  Clinical Information  Place of Service  Office  Service Type  Medical Care  Diagnosis  1D48.5 - neoplasm of uncertain behavior of skin  Procedures  199499 - unlisted evaluation and management service

## 2024-09-23 DIAGNOSIS — F41.9 ANXIETY: ICD-10-CM

## 2024-09-24 RX ORDER — ALPRAZOLAM 0.25 MG
0.25 TABLET ORAL 3 TIMES DAILY PRN
Qty: 30 TABLET | Refills: 0 | Status: SHIPPED | OUTPATIENT
Start: 2024-09-30

## 2024-09-27 DIAGNOSIS — G43.811 OTHER MIGRAINE WITH STATUS MIGRAINOSUS, INTRACTABLE: ICD-10-CM

## 2024-09-30 ENCOUNTER — HOSPITAL ENCOUNTER (OUTPATIENT)
Dept: RADIOLOGY | Facility: CLINIC | Age: 74
Discharge: HOME | End: 2024-09-30
Admitting: ANESTHESIOLOGY
Payer: COMMERCIAL

## 2024-09-30 VITALS
RESPIRATION RATE: 20 BRPM | DIASTOLIC BLOOD PRESSURE: 84 MMHG | OXYGEN SATURATION: 95 % | HEART RATE: 71 BPM | TEMPERATURE: 97.8 F | SYSTOLIC BLOOD PRESSURE: 155 MMHG

## 2024-09-30 DIAGNOSIS — M16.11 PRIMARY OSTEOARTHRITIS OF RIGHT HIP: ICD-10-CM

## 2024-09-30 PROCEDURE — 77002 NEEDLE LOCALIZATION BY XRAY: CPT | Performed by: ANESTHESIOLOGY

## 2024-09-30 PROCEDURE — 20610 DRAIN/INJ JOINT/BURSA W/O US: CPT | Performed by: ANESTHESIOLOGY

## 2024-09-30 RX ORDER — ROPIVACAINE HYDROCHLORIDE 2 MG/ML
2 INJECTION, SOLUTION EPIDURAL; INFILTRATION; PERINEURAL ONCE
Status: COMPLETED | OUTPATIENT
Start: 2024-09-30 | End: 2024-09-30

## 2024-09-30 RX ORDER — METHYLPREDNISOLONE ACETATE 80 MG/ML
40 INJECTION, SUSPENSION INTRA-ARTICULAR; INTRALESIONAL; INTRAMUSCULAR; PARENTERAL; SOFT TISSUE ONCE
Status: COMPLETED | OUTPATIENT
Start: 2024-09-30 | End: 2024-09-30

## 2024-09-30 RX ORDER — BUTALBITAL, ACETAMINOPHEN AND CAFFEINE 50; 325; 40 MG/1; MG/1; MG/1
1 TABLET ORAL EVERY 4 HOURS PRN
Qty: 30 TABLET | Refills: 0 | Status: SHIPPED | OUTPATIENT
Start: 2024-09-30

## 2024-09-30 RX ADMIN — METHYLPREDNISOLONE ACETATE 40 MG: 80 INJECTION, SUSPENSION INTRA-ARTICULAR; INTRALESIONAL; INTRAMUSCULAR; SOFT TISSUE at 15:16

## 2024-09-30 RX ADMIN — ROPIVACAINE HYDROCHLORIDE 2 ML: 2 INJECTION EPIDURAL; INFILTRATION; PERINEURAL at 15:16

## 2024-09-30 RX ADMIN — IOHEXOL 1 ML: 300 INJECTION, SOLUTION INTRAVENOUS at 15:16

## 2024-09-30 NOTE — H&P
History of Present Illness: The patient is a 73 y.o. male who presents with complaints of pain.    Past Medical History:   Diagnosis Date    Acute medial meniscus tear     Allergic rhinitis     Allergic rhinitis     last assessed 5/8/14    Anxiety     Appendicitis     Arthritis     Benign essential hypertension     last assessed 1/6/14    Biceps tendon tear     Cancer (HCC)     skin    Cervical radiculopathy     and lumbar    Chronic pain disorder     knees and hips     Colon polyps     Coronary artery disease     CPAP (continuous positive airway pressure) dependence     Depression     Depression with anxiety     Dyskinesia of esophagus     Erythema migrans (Lyme disease)     last assessed 10/16/12    Fatigue     GERD (gastroesophageal reflux disease)     Heart murmur 1951    Heel spur, right     Hernia     Herpes     Herpes zoster     History of benign esophageal tumor     Hyperlipidemia     Hyperplastic colon polyp     last assessed 11/14/14    Joint pain     Kidney stone 1995    Kidney stones     Lyme disease     Myocardial infarction (HCC)     Nephrolithiasis     Panic disorder with agoraphobia     last assessed 8/7/13    Seasonal affective disorder (HCC)     Shingles 2015    Sleep apnea     Vertigo     Vitamin D deficiency        Past Surgical History:   Procedure Laterality Date    APPENDECTOMY  08/01/2003    BICEPS TENDON REPAIR      CARDIAC OTHER      stents x2    COLONOSCOPY      ENDOSCOPIC ULTRASOUND (UPPER)      KNEE ARTHROSCOPY      with medial meniscus repair, resolved 01/01/05    KNEE CARTILAGE SURGERY      OTHER SURGICAL HISTORY      distal reinsertion of ruptured biceps tendon    MI ARTHRS KNE SURG W/MENISCECTOMY MED/LAT W/SHVG Right 07/30/2019    Procedure: RIGHT KNEE ARTHROSCOPY, MEDIAL MENISCECTOMY;  Surgeon: Rick Farris DO;  Location:  MAIN OR;  Service: Orthopedics    TOOTH EXTRACTION      Middle of October 2019    VASECTOMY           Current Outpatient Medications:     ALPRAZolam  (Xanax) 0.25 mg tablet, Take 1 tablet (0.25 mg total) by mouth 3 (three) times a day as needed for anxiety Do not start before September 30, 2024., Disp: 30 tablet, Rfl: 0    aspirin 81 mg chewable tablet, every 24 hours, Disp: , Rfl:     atorvastatin (LIPITOR) 80 mg tablet, every 24 hours, Disp: , Rfl:     butalbital-acetaminophen-caffeine (FIORICET,ESGIC) -40 mg per tablet, Take 1 tablet by mouth every 4 (four) hours as needed for headaches, Disp: 30 tablet, Rfl: 0    cholecalciferol (VITAMIN D3) 1,000 units tablet, Take 1,000 Units by mouth daily, Disp: , Rfl:     CHONDROITIN SULFATE A PO, Take by mouth in the morning, Disp: , Rfl:     Coenzyme Q10 (CoQ-10) 100 MG CAPS, every 24 hours, Disp: , Rfl:     Diclofenac Sodium (VOLTAREN) 1 %, Apply 2 g topically 4 (four) times a day, Disp: 50 g, Rfl: 0    ezetimibe (ZETIA) 10 mg tablet, 1 tablet Orally Once a day 90 days, Disp: , Rfl:     famotidine (PEPCID) 20 mg tablet, Take 1 tablet (20 mg total) by mouth daily at bedtime, Disp: 90 tablet, Rfl: 3    GLUCOSAMINE-CALCIUM-VIT D PO, Take 1,200 mg by mouth 1200mg, Disp: , Rfl:     HYDROcodone-acetaminophen (NORCO) 5-325 mg per tablet, 1/2 tab TID prn pain for ongoing therapy DO NOT FILL BEFORE: 08/09/24, Disp: 45 tablet, Rfl: 0    HYDROcodone-acetaminophen (NORCO) 5-325 mg per tablet, Take 1/2 tab TID prn pain for ongoing therapy DO NOT FILL BEFORE: 09/07/24, Disp: 45 tablet, Rfl: 0    HYDROcodone-acetaminophen (Norco) 5-325 mg per tablet, For ongoing therapy Take 1/2 tablet TID PRN for pain DO NOT FILL BEFORE: 10/05/24, Disp: 45 tablet, Rfl: 0    Hyoscyamine Sulfate SL 0.125 MG SUBL, Place 0.125 mg under the tongue 3 (three) times a day as needed (as needed), Disp: 30 tablet, Rfl: 1    Ibuprofen 200 MG CAPS, 3 (three) times a day, Disp: , Rfl:     Jardiance 10 MG TABS tablet, 1 tablet Orally Once a day 90 days (Patient not taking: Reported on 5/13/2024), Disp: , Rfl:     lisinopril (ZESTRIL) 2.5 mg tablet, every 24  hours, Disp: , Rfl:     naloxone (NARCAN) 4 mg/0.1 mL nasal spray, Administer 1 spray into a nostril. If no response after 2-3 minutes, give another dose in the other nostril using a new spray., Disp: 1 each, Rfl: 1    Omega-3 Fatty Acids (Fish Oil) 1200 MG CAPS, 1 capsule every 24 hours, Disp: , Rfl:     pantoprazole (PROTONIX) 40 mg tablet, Take 1 tablet (40 mg total) by mouth daily, Disp: 90 tablet, Rfl: 3    tamsulosin (FLOMAX) 0.4 mg, Take 1 capsule (0.4 mg total) by mouth daily with dinner, Disp: 90 capsule, Rfl: 3    vilazodone (VIIBRYD) 20 mg tablet, Take 1 tablet (20 mg total) by mouth daily with breakfast, Disp: 30 tablet, Rfl: 5    Current Facility-Administered Medications:     iohexol (OMNIPAQUE) 300 mg/mL injection 1 mL, 1 mL, Intra-articular, Once, Rick Long DO    methylPREDNISolone acetate (DEPO-MEDROL) injection 40 mg, 40 mg, Intra-articular, Once, Rick Long DO    ropivacaine (NAROPIN) injection 2 mL, 2 mL, Intra-articular, Once, Rick Long DO    Allergies   Allergen Reactions    Pseudoephedrine Dizziness and Hives     Reaction Date: 16Apr2011;    Sulfa Antibiotics Dizziness, Hives and Other (See Comments)       Physical Exam:   Vitals:    09/30/24 1507   BP: 143/78   Pulse: 86   Resp: 20   Temp: 97.8 °F (36.6 °C)   SpO2: 95%     General: Awake, Alert, Oriented x 3, Mood and affect appropriate  Respiratory: Respirations even and unlabored  Cardiovascular: Peripheral pulses intact; no edema  Musculoskeletal Exam: Decreased range of motion right hip    ASA Score: III    Patient/Chart Verification  Patient ID Verified: Verbal  ID Band Applied: No  Consents Confirmed: Procedural, To be obtained in the Pre-Procedure area  Interval H&P(within 24 hr) Complete (required for Outpatients and Surgery Admit only): To be obtained in the Procedural area  Allergies Reviewed: Yes  Anticoag/NSAID held?: NA  Currently on antibiotics?: No    Assessment:   1. Primary osteoarthritis of right hip        Plan: RT hip  joint

## 2024-09-30 NOTE — DISCHARGE INSTRUCTIONS

## 2024-10-14 ENCOUNTER — TELEPHONE (OUTPATIENT)
Dept: PAIN MEDICINE | Facility: CLINIC | Age: 74
End: 2024-10-14

## 2024-10-16 NOTE — TELEPHONE ENCOUNTER
Pt reports he is not sure if he has improvement post inj  He said he is sleeping better  Pain level 9/10 (driving)  He said he will be going on a long drive today and he will know whether its improved or not

## 2024-10-21 ENCOUNTER — HOSPITAL ENCOUNTER (OUTPATIENT)
Dept: HOSPITAL 99 - RAD | Age: 74
End: 2024-10-21
Payer: COMMERCIAL

## 2024-10-21 DIAGNOSIS — I65.23: Primary | ICD-10-CM

## 2024-10-24 ENCOUNTER — TELEPHONE (OUTPATIENT)
Dept: FAMILY MEDICINE CLINIC | Facility: CLINIC | Age: 74
End: 2024-10-24

## 2024-10-24 NOTE — TELEPHONE ENCOUNTER
Confirmation - Referral [id number]  Effective: 10/24/2024     Expires: 01/22/2025  Active  Referred From  [facility]  Specialty: [facility]  Group NPI: [id number]  Provider ID: [id number]  Tax ID: [id number]  [address] [place] [address]  Referred To  [facility]  Specialty: Multi-Specialty Group  Tier 2  Group NPI: [id number]  Provider ID: [id number]  Tax ID: [id number]  This referral is valid at any location for the above group  Patient Info  KARO CANDELARIA  [id number]  Male  1950  [facility] [place] [address]  Clinical Information  Place of Service  Office  Service Type  Medical Care  Diagnosis  1I10 - essential (primary) hypertension  Procedures  199499 - unlisted evaluation and management service  
suprapubic region

## 2024-10-25 ENCOUNTER — OFFICE VISIT (OUTPATIENT)
Dept: PAIN MEDICINE | Facility: CLINIC | Age: 74
End: 2024-10-25
Payer: COMMERCIAL

## 2024-10-25 VITALS
BODY MASS INDEX: 33.4 KG/M2 | HEIGHT: 73 IN | SYSTOLIC BLOOD PRESSURE: 128 MMHG | DIASTOLIC BLOOD PRESSURE: 72 MMHG | WEIGHT: 252 LBS | TEMPERATURE: 98 F | HEART RATE: 58 BPM

## 2024-10-25 DIAGNOSIS — M48.061 SPINAL STENOSIS OF LUMBAR REGION WITHOUT NEUROGENIC CLAUDICATION: ICD-10-CM

## 2024-10-25 DIAGNOSIS — Z79.891 LONG-TERM CURRENT USE OF OPIATE ANALGESIC: ICD-10-CM

## 2024-10-25 DIAGNOSIS — G89.4 CHRONIC PAIN SYNDROME: ICD-10-CM

## 2024-10-25 DIAGNOSIS — M51.16 LUMBAR DISC DISEASE WITH RADICULOPATHY: ICD-10-CM

## 2024-10-25 DIAGNOSIS — F11.20 UNCOMPLICATED OPIOID DEPENDENCE (HCC): ICD-10-CM

## 2024-10-25 DIAGNOSIS — M47.816 LUMBAR SPONDYLOSIS: ICD-10-CM

## 2024-10-25 DIAGNOSIS — G57.01 PIRIFORMIS SYNDROME OF RIGHT SIDE: Primary | ICD-10-CM

## 2024-10-25 DIAGNOSIS — M17.12 PRIMARY OSTEOARTHRITIS OF LEFT KNEE: ICD-10-CM

## 2024-10-25 PROCEDURE — 99214 OFFICE O/P EST MOD 30 MIN: CPT | Performed by: PHYSICIAN ASSISTANT

## 2024-10-25 RX ORDER — HYDROCODONE BITARTRATE AND ACETAMINOPHEN 5; 325 MG/1; MG/1
1 TABLET ORAL 3 TIMES DAILY PRN
Qty: 60 TABLET | Refills: 0 | Status: SHIPPED | OUTPATIENT
Start: 2024-10-25

## 2024-10-25 NOTE — PROGRESS NOTES
Assessment:  1. Piriformis syndrome of right side    2. Lumbar spondylosis    3. Spinal stenosis of lumbar region without neurogenic claudication    4. Lumbar disc disease with radiculopathy    5. Primary osteoarthritis of left knee    6. Chronic pain syndrome    7. Uncomplicated opioid dependence (HCC)    8. Long-term current use of opiate analgesic        Plan:  While the patient was in the office today, I did have a thorough conversation regarding their chronic pain syndrome, medication management, and treatment plan options.    After discussing options, we will slightly modify the medication, allowing him to take the Norco 5/325 twice daily to 3 times daily as needed with a quantity of 60 tablets per 30 days.  On today's visit I have electronically sent 3 months of opioid prescriptions to his pharmacy with the appropriate fill dates.    [place] Prescription Drug Monitoring Program report was reviewed and was appropriate     There are risks associated with opioid medications, including dependence, addiction and tolerance. The patient understands and agrees to use these medications only as prescribed. Potential side effects of the medications include, but are not limited to, constipation, drowsiness, addiction, impaired judgment and risk of fatal overdose if not taken as prescribed. The patient was warned against driving while taking sedation medications.  Sharing medications is a felony. At this point in time, the patient is showing no signs of addiction, abuse, diversion or suicidal ideation.    He is a candidate for a right piriformis injection.  Patient will call if he decides to proceed.  In the meantime try applying topical lidocaine patches to the buttock region.      The patient will follow-up in 12 weeks for medication prescription refill and reevaluation. The patient was advised to contact the office should their symptoms worsen in the interim. The patient was agreeable and verbalized an  "understanding.        History of Present Illness:    The patient is a 73 y.o. male last seen on 9/30/2024 who presents for a follow up office visit in regards to chronic low back pain secondary to lumbar spondylosis, chronic right hip pain secondary to osteoarthritis and chronic knee pain secondary to osteoarthritis.  The patient currently reports seems to have gotten worse since I last saw him.  His current pain is 3 out of 10 and described as an intermittent dull, aching and sharp pain that does not radiate.  He has the most amount of pain when sitting.  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  He underwent a right intra-articular hip injection on 9/30/2024 which seemed to alleviate the \"ball-and-socket\" type of pain that he was experiencing that bother him mostly when he was sleeping.    Current pain medications includes: Percocet .  The patient reports that this regimen is providing 50% pain relief.  The patient is reporting no side effects from this pain medication regimen.    Pain Contract Signed: 5/13/2024  Last Urine Drug Screen: 5/13/2024    I have personally reviewed and/or updated the patient's past medical history, past surgical history, family history, social history, current medications, allergies, and vital signs today.       Review of Systems:    Review of Systems   Respiratory:  Negative for shortness of breath.    Cardiovascular:  Negative for chest pain.   Gastrointestinal:  Negative for constipation, diarrhea, nausea and vomiting.   Musculoskeletal:  Positive for gait problem. Negative for arthralgias, joint swelling (Joint stiffness) and myalgias.   Skin:  Negative for rash.   Neurological:  Negative for dizziness, seizures and weakness.   All other systems reviewed and are negative.        Past Medical History:   Diagnosis Date    Acute medial meniscus tear     Allergic rhinitis     Allergic rhinitis  "    last assessed 5/8/14    Anxiety     Appendicitis     Arthritis     Benign essential hypertension     last assessed 1/6/14    Biceps tendon tear     Cancer (HCC)     skin    Cervical radiculopathy     and lumbar    Chronic pain disorder     knees and hips     Colon polyps     Coronary artery disease     CPAP (continuous positive airway pressure) dependence     Depression     Depression with anxiety     Dyskinesia of esophagus     Erythema migrans (Lyme disease)     last assessed 10/16/12    Fatigue     GERD (gastroesophageal reflux disease)     Heart murmur 1951    Heel spur, right     Hernia     Herpes     Herpes zoster     History of benign esophageal tumor     Hyperlipidemia     Hyperplastic colon polyp     last assessed 11/14/14    Joint pain     Kidney stone 1995    Kidney stones     Lyme disease     Myocardial infarction (HCC)     Nephrolithiasis     Panic disorder with agoraphobia     last assessed 8/7/13    Seasonal affective disorder (HCC)     Shingles 2015    Sleep apnea     Vertigo     Vitamin D deficiency        Past Surgical History:   Procedure Laterality Date    APPENDECTOMY  08/01/2003    BICEPS TENDON REPAIR      CARDIAC OTHER      stents x2    COLONOSCOPY      ENDOSCOPIC ULTRASOUND (UPPER)      KNEE ARTHROSCOPY      with medial meniscus repair, resolved 01/01/05    KNEE CARTILAGE SURGERY      OTHER SURGICAL HISTORY      distal reinsertion of ruptured biceps tendon    RI ARTHRS KNE SURG W/MENISCECTOMY MED/LAT W/SHVG Right 07/30/2019    Procedure: RIGHT KNEE ARTHROSCOPY, MEDIAL MENISCECTOMY;  Surgeon: Rick Farris DO;  Location:  MAIN OR;  Service: Orthopedics    TOOTH EXTRACTION      Middle of October 2019    VASECTOMY         Family History   Problem Relation Age of Onset    Diabetes Mother     Coronary artery disease Father     Kidney disease Father     Heart disease Father     Cancer Father         kidney cancer    Colon cancer Neg Hx     Colon polyps Neg Hx        Social History      Occupational History    Not on file   Tobacco Use    Smoking status: Former     Current packs/day: 0.00     Average packs/day: 1 pack/day for 19.3 years (19.3 ttl pk-yrs)     Types: Cigarettes, Cigars     Start date: 11/15/1967     Quit date: 3/1/1987     Years since quittin.6    Smokeless tobacco: Never   Vaping Use    Vaping status: Never Used   Substance and Sexual Activity    Alcohol use: Yes     Alcohol/week: 1.0 standard drink of alcohol     Types: 1 Glasses of wine per week     Comment: an couple times a month a glass of wine    Drug use: No    Sexual activity: Not on file         Current Outpatient Medications:     ALPRAZolam (Xanax) 0.25 mg tablet, Take 1 tablet (0.25 mg total) by mouth 3 (three) times a day as needed for anxiety Do not start before 2024., Disp: 30 tablet, Rfl: 0    aspirin 81 mg chewable tablet, every 24 hours, Disp: , Rfl:     atorvastatin (LIPITOR) 80 mg tablet, every 24 hours, Disp: , Rfl:     butalbital-acetaminophen-caffeine (FIORICET,ESGIC) -40 mg per tablet, Take 1 tablet by mouth every 4 (four) hours as needed for headaches, Disp: 30 tablet, Rfl: 0    cholecalciferol (VITAMIN D3) 1,000 units tablet, Take 1,000 Units by mouth daily, Disp: , Rfl:     CHONDROITIN SULFATE A PO, Take by mouth in the morning, Disp: , Rfl:     Coenzyme Q10 (CoQ-10) 100 MG CAPS, every 24 hours, Disp: , Rfl:     ezetimibe (ZETIA) 10 mg tablet, 1 tablet Orally Once a day 90 days, Disp: , Rfl:     famotidine (PEPCID) 20 mg tablet, Take 1 tablet (20 mg total) by mouth daily at bedtime, Disp: 90 tablet, Rfl: 3    GLUCOSAMINE-CALCIUM-VIT D PO, Take 1,200 mg by mouth 1200mg, Disp: , Rfl:     HYDROcodone-acetaminophen (NORCO) 5-325 mg per tablet, Take 1 tablet by mouth 3 (three) times a day as needed for pain for ongoing therapy DO NOT FILL BEFORE: 24 Max Daily Amount: 3 tablets, Disp: 60 tablet, Rfl: 0    HYDROcodone-acetaminophen (NORCO) 5-325 mg per tablet, Take 1 tablet by  "mouth 3 (three) times a day as needed for pain for ongoing therapy DO NOT FILL BEFORE: 12/02/24 Max Daily Amount: 3 tablets, Disp: 60 tablet, Rfl: 0    HYDROcodone-acetaminophen (Norco) 5-325 mg per tablet, Take 1 tablet by mouth 3 (three) times a day as needed for pain For ongoing therapy DO NOT FILL BEFORE: 12/30/24 Max Daily Amount: 3 tablets, Disp: 60 tablet, Rfl: 0    Hyoscyamine Sulfate SL 0.125 MG SUBL, Place 0.125 mg under the tongue 3 (three) times a day as needed (as needed), Disp: 30 tablet, Rfl: 1    Ibuprofen 200 MG CAPS, 3 (three) times a day, Disp: , Rfl:     lisinopril (ZESTRIL) 2.5 mg tablet, every 24 hours, Disp: , Rfl:     Omega-3 Fatty Acids (Fish Oil) 1200 MG CAPS, 1 capsule every 24 hours, Disp: , Rfl:     pantoprazole (PROTONIX) 40 mg tablet, Take 1 tablet (40 mg total) by mouth daily, Disp: 90 tablet, Rfl: 3    tamsulosin (FLOMAX) 0.4 mg, Take 1 capsule (0.4 mg total) by mouth daily with dinner, Disp: 90 capsule, Rfl: 3    vilazodone (VIIBRYD) 20 mg tablet, Take 1 tablet (20 mg total) by mouth daily with breakfast, Disp: 30 tablet, Rfl: 5    Diclofenac Sodium (VOLTAREN) 1 %, Apply 2 g topically 4 (four) times a day, Disp: 50 g, Rfl: 0    Jardiance 10 MG TABS tablet, 1 tablet Orally Once a day 90 days (Patient not taking: Reported on 5/13/2024), Disp: , Rfl:     naloxone (NARCAN) 4 mg/0.1 mL nasal spray, Administer 1 spray into a nostril. If no response after 2-3 minutes, give another dose in the other nostril using a new spray., Disp: 1 each, Rfl: 1    Allergies   Allergen Reactions    Pseudoephedrine Dizziness and Hives     Reaction Date: 16Apr2011;    Sulfa Antibiotics Dizziness, Hives and Other (See Comments)       Physical Exam:    /72 (BP Location: Left arm, Patient Position: Sitting, Cuff Size: Standard)   Pulse 58   Temp 98 °F (36.7 °C)   Ht 6' 1\" (1.854 m)   Wt 114 kg (252 lb)   BMI 33.25 kg/m²     Constitutional:normal, well developed, well nourished, alert, in no distress " and non-toxic and no overt pain behavior. and overweight  Eyes:anicteric  HEENT:grossly intact  Neck:supple, symmetric, trachea midline and no masses   Pulmonary:even and unlabored  Cardiovascular:No edema or pitting edema present  Skin:Normal without rashes or lesions and well hydrated  Psychiatric:Mood and affect appropriate  Neurologic:Cranial Nerves II-XII grossly intact  Musculoskeletal: Gait is stable without the use of assistive devices, tender right piriformis/sciatic notch      Imaging  No orders to display         No orders of the defined types were placed in this encounter.

## 2024-11-04 DIAGNOSIS — G43.811 OTHER MIGRAINE WITH STATUS MIGRAINOSUS, INTRACTABLE: ICD-10-CM

## 2024-11-04 DIAGNOSIS — F41.9 ANXIETY: ICD-10-CM

## 2024-11-04 NOTE — TELEPHONE ENCOUNTER
Medication: butalbital-acetaminophen-caffeine (FIORICET,ESGIC) -40 mg per tablet     Dose/Frequency: Take 1 tablet by mouth every 4 (four) hours as needed for headaches     Quantity: 30 tablet     Pharmacy: [facility]- [address] [phone]     Office:   [x] PCP/Provider - Jenny Valenzuela, DO   [] Speciality/Provider -     Does the patient have enough for 3 days?   [] Yes   [x] No - Send as HP to POD    Pt is going on vacation 11/6 and needs to have refilled to take along.        Medication: ALPRAZolam (Xanax) 0.25 mg tablet     Dose/Frequency: Take 1 tablet (0.25 mg total) by mouth 3 (three) times a day as needed for anxiety     Quantity:  30 tablet     Pharmacy: [facility]- [address] [phone]     Office:   [x] PCP/Provider - Jenny Valenzuela, DO   [] Speciality/Provider -     Does the patient have enough for 3 days?   [] Yes   [] No - Send as HP to POD    Pt is going on vacation 11/6 and needs to have refilled to take along.

## 2024-11-05 RX ORDER — BUTALBITAL, ACETAMINOPHEN AND CAFFEINE 50; 325; 40 MG/1; MG/1; MG/1
1 TABLET ORAL EVERY 4 HOURS PRN
Qty: 30 TABLET | Refills: 0 | Status: SHIPPED | OUTPATIENT
Start: 2024-11-05

## 2024-11-05 RX ORDER — ALPRAZOLAM 0.25 MG/1
0.25 TABLET ORAL 3 TIMES DAILY PRN
Qty: 30 TABLET | Refills: 0 | Status: SHIPPED | OUTPATIENT
Start: 2024-11-05

## 2024-11-29 DIAGNOSIS — G43.811 OTHER MIGRAINE WITH STATUS MIGRAINOSUS, INTRACTABLE: ICD-10-CM

## 2024-11-29 RX ORDER — BUTALBITAL, ACETAMINOPHEN AND CAFFEINE 50; 325; 40 MG/1; MG/1; MG/1
1 TABLET ORAL EVERY 4 HOURS PRN
Qty: 30 TABLET | Refills: 0 | Status: SHIPPED | OUTPATIENT
Start: 2024-11-29

## 2024-12-07 DIAGNOSIS — F41.9 ANXIETY: ICD-10-CM

## 2024-12-09 DIAGNOSIS — F32.A DEPRESSION, UNSPECIFIED DEPRESSION TYPE: ICD-10-CM

## 2024-12-10 RX ORDER — VILAZODONE HYDROCHLORIDE 20 MG/1
20 TABLET ORAL
Qty: 30 TABLET | Refills: 0 | Status: SHIPPED | OUTPATIENT
Start: 2024-12-10

## 2024-12-11 RX ORDER — ALPRAZOLAM 0.25 MG/1
0.25 TABLET ORAL 3 TIMES DAILY PRN
Qty: 30 TABLET | Refills: 0 | Status: SHIPPED | OUTPATIENT
Start: 2024-12-11

## 2024-12-13 ENCOUNTER — HOSPITAL ENCOUNTER (OUTPATIENT)
Dept: HOSPITAL 99 - RCS | Age: 74
End: 2024-12-13
Payer: COMMERCIAL

## 2024-12-13 DIAGNOSIS — I35.0: Primary | ICD-10-CM

## 2024-12-18 ENCOUNTER — OFFICE VISIT (OUTPATIENT)
Dept: URGENT CARE | Facility: CLINIC | Age: 74
End: 2024-12-18
Payer: COMMERCIAL

## 2024-12-18 VITALS
DIASTOLIC BLOOD PRESSURE: 88 MMHG | OXYGEN SATURATION: 97 % | SYSTOLIC BLOOD PRESSURE: 142 MMHG | RESPIRATION RATE: 18 BRPM | TEMPERATURE: 97.4 F | HEART RATE: 78 BPM

## 2024-12-18 DIAGNOSIS — B34.9 VIRAL ILLNESS: Primary | ICD-10-CM

## 2024-12-18 PROCEDURE — 99213 OFFICE O/P EST LOW 20 MIN: CPT | Performed by: PHYSICIAN ASSISTANT

## 2024-12-18 NOTE — PROGRESS NOTES
[facility]      NAME: Nii Gamble is a 74 y.o. male  : 1950    MRN: [id number]  DATE: 2024  TIME: 4:34 PM    Assessment and Plan   Viral illness [B34.9]  1. Viral illness            Patient Instructions   Infection appears viral. Suspect flu, past tamilflu timeframe. Recommend symptomatic treatment.  Can take ibuprofen or tylenol as needed for pain or fever.  Over the counter cough and cold medications to help with symptoms.  Use salt water gargles for sore throat and throat lozenges.  Cough drops as needed.  Wash hands frequently to prevent the spread of infection.  Symptoms may persist for 10-14 days.  Risks and benefits discussed. Patient understands and agrees with the plan.      If tests have been performed at [facility], our office will contact you with results if changes need to be made to the care plan discussed with you at the visit.  You can review your full results on [facility]'s MyChart.     Follow up with PCP in 3-5 days.      If any of the following occur, please report to your nearest ED for evaluation or call 911.   Difficultly breathing or shortness of breath  Chest pain  Acutely worsening symptoms.   To present to the ER if symptoms worsen.  Chief Complaint     Chief Complaint   Patient presents with    Generalized Body Aches     Patient has had body aches, fatigue starting last week along with diarrhea. Tested himself for covid last week and was negative. Symptoms have continued into this week, diarrhea has stopped as of today.          History of Present Illness   Nii Gamble presents to the clinic c/o    Generalized Body Aches  The current episode started in the past 7 days. The problem occurs constantly. The problem is unchanged. Associated symptoms include congestion, headaches, fatigue, diarrhea and muscle aches. Pertinent negatives include no ear discharge, ear pain, eye discharge, eye itching, eye pain, eye redness, photophobia, sore throat, fever, chest pain,  coughing, shortness of breath, wheezing, abdominal pain or rash.       Review of Systems   Review of Systems   Constitutional:  Positive for fatigue. Negative for chills, diaphoresis and fever.   HENT:  Positive for congestion. Negative for ear discharge, ear pain, facial swelling and sore throat.    Eyes:  Negative for photophobia, pain, discharge, redness, itching and visual disturbance.   Respiratory:  Negative for apnea, cough, chest tightness, shortness of breath and wheezing.    Cardiovascular:  Negative for chest pain and palpitations.   Gastrointestinal:  Positive for diarrhea. Negative for abdominal pain.   Musculoskeletal:  Positive for myalgias.   Skin:  Negative for color change, rash and wound.   Neurological:  Positive for headaches. Negative for dizziness.   Hematological:  Negative for adenopathy.         Current Medications     Long-Term Medications   Medication Sig Dispense Refill    ALPRAZolam (Xanax) 0.25 mg tablet Take 1 tablet (0.25 mg total) by mouth 3 (three) times a day as needed for anxiety 30 tablet 0    aspirin 81 mg chewable tablet every 24 hours      atorvastatin (LIPITOR) 80 mg tablet every 24 hours      cholecalciferol (VITAMIN D3) 1,000 units tablet Take 1,000 Units by mouth daily      Diclofenac Sodium (VOLTAREN) 1 % Apply 2 g topically 4 (four) times a day 50 g 0    ezetimibe (ZETIA) 10 mg tablet 1 tablet Orally Once a day 90 days      famotidine (PEPCID) 20 mg tablet Take 1 tablet (20 mg total) by mouth daily at bedtime 90 tablet 3    Ibuprofen 200 MG CAPS 3 (three) times a day      Jardiance 10 MG TABS tablet 1 tablet Orally Once a day 90 days (Patient not taking: Reported on 5/13/2024)      lisinopril (ZESTRIL) 2.5 mg tablet every 24 hours      naloxone (NARCAN) 4 mg/0.1 mL nasal spray Administer 1 spray into a nostril. If no response after 2-3 minutes, give another dose in the other nostril using a new spray. 1 each 1    Omega-3 Fatty Acids (Fish Oil) 1200 MG CAPS 1 capsule  every 24 hours      pantoprazole (PROTONIX) 40 mg tablet Take 1 tablet (40 mg total) by mouth daily 90 tablet 3    tamsulosin (FLOMAX) 0.4 mg Take 1 capsule (0.4 mg total) by mouth daily with dinner 90 capsule 3    vilazodone (VIIBRYD) 20 mg tablet Take 1 tablet (20 mg total) by mouth daily with breakfast 30 tablet 0    [DISCONTINUED] Multiple Vitamins-Minerals (MULTI FOR HIM 50+) TABS Take by mouth         Current Allergies     Allergies as of 12/18/2024 - Reviewed 10/25/2024   Allergen Reaction Noted    Pseudoephedrine Dizziness and Hives 04/21/2012    Sulfa antibiotics Dizziness, Hives, and Other (See Comments) 10/08/2012            The following portions of the patient's history were reviewed and updated as appropriate: allergies, current medications, past family history, past medical history, past social history, past surgical history and problem list.  Past Medical History:   Diagnosis Date    Acute medial meniscus tear     Allergic rhinitis     Allergic rhinitis     last assessed 5/8/14    Anxiety     Appendicitis     Arthritis     Benign essential hypertension     last assessed 1/6/14    Biceps tendon tear     Cancer (HCC)     skin    Cervical radiculopathy     and lumbar    Chronic pain disorder     knees and hips     Colon polyps     Coronary artery disease     CPAP (continuous positive airway pressure) dependence     Depression     Depression with anxiety     Dyskinesia of esophagus     Erythema migrans (Lyme disease)     last assessed 10/16/12    Fatigue     GERD (gastroesophageal reflux disease)     Heart murmur 1951    Heel spur, right     Hernia     Herpes     Herpes zoster     History of benign esophageal tumor     Hyperlipidemia     Hyperplastic colon polyp     last assessed 11/14/14    Joint pain     Kidney stone 1995    Kidney stones     Lyme disease     Myocardial infarction (HCC)     Nephrolithiasis     Panic disorder with agoraphobia     last assessed 8/7/13    Seasonal affective disorder  ([facility])     Shingles 2015    Sleep apnea     Vertigo     Vitamin D deficiency      Past Surgical History:   Procedure Laterality Date    APPENDECTOMY  2003    BICEPS TENDON REPAIR      CARDIAC OTHER      stents x2    COLONOSCOPY      ENDOSCOPIC ULTRASOUND (UPPER)      KNEE ARTHROSCOPY      with medial meniscus repair, resolved 05    KNEE CARTILAGE SURGERY      OTHER SURGICAL HISTORY      distal reinsertion of ruptured biceps tendon    KS ARTHRS KNE SURG W/MENISCECTOMY MED/LAT W/SHVG Right 2019    Procedure: RIGHT KNEE ARTHROSCOPY, MEDIAL MENISCECTOMY;  Surgeon: Rick Farris DO;  Location:  MAIN OR;  Service: Orthopedics    TOOTH EXTRACTION      Middle of 2019    VASECTOMY       Social History     Socioeconomic History    Marital status: /Civil Union     Spouse name: Not on file    Number of children: Not on file    Years of education: Not on file    Highest education level: Not on file   Occupational History    Not on file   Tobacco Use    Smoking status: Former     Current packs/day: 0.00     Average packs/day: 1 pack/day for 19.3 years (19.3 ttl pk-yrs)     Types: Cigarettes, Cigars     Start date: 11/15/1967     Quit date: 3/1/1987     Years since quittin.8    Smokeless tobacco: Never   Vaping Use    Vaping status: Never Used   Substance and Sexual Activity    Alcohol use: Yes     Alcohol/week: 1.0 standard drink of alcohol     Types: 1 Glasses of wine per week     Comment: an couple times a month a glass of wine    Drug use: No    Sexual activity: Not on file   Other Topics Concern    Not on file   Social History Narrative    Not on file     Social Drivers of Health     Financial Resource Strain: Not on file   Food Insecurity: Not on file   Transportation Needs: Not on file   Physical Activity: Not on file   Stress: Not on file   Social Connections: Not on file   Intimate Partner Violence: Not on file   Housing Stability: Not on file       Objective   /88   Pulse  78   Temp (!) 97.4 °F (36.3 °C)   Resp 18   SpO2 97%      Physical Exam     Physical Exam  Vitals and nursing note reviewed.   Constitutional:       General: He is not in acute distress.     Appearance: He is well-developed. He is not diaphoretic.   HENT:      Head: Normocephalic and atraumatic.      Right Ear: Tympanic membrane and external ear normal.      Left Ear: Tympanic membrane and external ear normal.      Nose: Nose normal.      Mouth/Throat:      Mouth: Mucous membranes are moist.      Pharynx: No oropharyngeal exudate or posterior oropharyngeal erythema.   Eyes:      General: No scleral icterus.        Right eye: No discharge.         Left eye: No discharge.      Conjunctiva/sclera: Conjunctivae normal.   Cardiovascular:      Rate and Rhythm: Normal rate and regular rhythm.      Heart sounds: Normal heart sounds. No murmur heard.     No friction rub. No gallop.   Pulmonary:      Effort: Pulmonary effort is normal. No respiratory distress.      Breath sounds: Normal breath sounds. No decreased breath sounds, wheezing, rhonchi or rales.   Skin:     General: Skin is warm and dry.      Coloration: Skin is not pale.      Findings: No erythema or rash.   Neurological:      Mental Status: He is alert and oriented to person, place, and time.   Psychiatric:         Behavior: Behavior normal.         Thought Content: Thought content normal.         Judgment: Judgment normal.         Liliana Hay PA-C

## 2024-12-19 ENCOUNTER — OFFICE VISIT (OUTPATIENT)
Dept: FAMILY MEDICINE CLINIC | Facility: CLINIC | Age: 74
End: 2024-12-19
Payer: COMMERCIAL

## 2024-12-19 VITALS
BODY MASS INDEX: 33.86 KG/M2 | HEIGHT: 72 IN | OXYGEN SATURATION: 100 % | DIASTOLIC BLOOD PRESSURE: 72 MMHG | WEIGHT: 250 LBS | TEMPERATURE: 97.5 F | SYSTOLIC BLOOD PRESSURE: 118 MMHG | HEART RATE: 88 BPM

## 2024-12-19 DIAGNOSIS — Z23 NEED FOR COVID-19 VACCINE: ICD-10-CM

## 2024-12-19 DIAGNOSIS — F33.8 SEASONAL AFFECTIVE DISORDER (HCC): ICD-10-CM

## 2024-12-19 DIAGNOSIS — M48.061 SPINAL STENOSIS OF LUMBAR REGION, UNSPECIFIED WHETHER NEUROGENIC CLAUDICATION PRESENT: ICD-10-CM

## 2024-12-19 DIAGNOSIS — G43.811 OTHER MIGRAINE WITH STATUS MIGRAINOSUS, INTRACTABLE: ICD-10-CM

## 2024-12-19 DIAGNOSIS — Z23 ENCOUNTER FOR IMMUNIZATION: ICD-10-CM

## 2024-12-19 DIAGNOSIS — I35.0 SEVERE AORTIC STENOSIS BY PRIOR ECHOCARDIOGRAM: Primary | ICD-10-CM

## 2024-12-19 PROCEDURE — 99213 OFFICE O/P EST LOW 20 MIN: CPT

## 2024-12-19 NOTE — PROGRESS NOTES
Name: Nii Gamble      : 1950      MRN: [id number]  Encounter Provider: Jenny Valenzuela DO  Encounter Date: 2024   Encounter department: [facility]  :  Assessment & Plan  Severe aortic stenosis by prior echocardiogram  -echo : :Severe aortic stenosis with trace regurgitation, maximum peak/mean [id number]/43 mmHg, aortic valve area 0.8-1.0 cm2. Dimensionless index is 0.27  -follows with cardiology at [facility], in process of finding a cardiac surgeon     Plan:  Will place stat referral to cardiovascular surgery   Reviewed ED precautions for symptomatic aortic stenosis: chest pain, shortness of breath, syncope/presyncope.     Orders:    Ambulatory Referral to Cardiovascular Surgery; Future    Spinal stenosis of lumbar region, unspecified whether neurogenic claudication present  -stable, follows with pain management  -can try physical therapy   Orders:    Ambulatory Referral to Physical Therapy; Future    Other migraine with status migrainosus, intractable  -controlled on fioricet        Encounter for immunization    Orders:    influenza vaccine, high-dose, PF 0.5 mL (Fluzone High Dose)    Need for COVID-19 vaccine    Orders:    COVID-19 Pfizer mRNA vaccine 12 yr and older (Comirnaty pre-filled syringe)    Seasonal affective disorder (HCC)  -stable                History of Present Illness     Seen at urgent care yesterday for myalgias and URI symptoms. Diagnosed with viral illness and counselled on supportive care measures. States feels viral URI symptoms are gradually improving with supportive care measures.    Also following with cardiology at [facility] for CAD. Recently had an scho on  showing severe aortic stenosis. Is in the process of looking for a cardiovascular surgeon for a TAVR procedure. States he does feel fatigued frequently, which he was told may be due to the aortic stenosis. He denies chest pain, shortness of breath,  lightheadedness/dizziness, syncope or presyncope.       Review of Systems   Constitutional:  Positive for fatigue. Negative for chills and fever.   HENT:  Negative for ear pain and sore throat.    Eyes:  Negative for pain and visual disturbance.   Respiratory:  Negative for cough and shortness of breath.    Cardiovascular:  Negative for chest pain and palpitations.   Gastrointestinal:  Negative for abdominal pain and vomiting.   Genitourinary:  Negative for dysuria and hematuria.   Musculoskeletal:  Positive for back pain. Negative for arthralgias.   Skin:  Negative for color change and rash.   Neurological:  Negative for seizures and syncope.   All other systems reviewed and are negative.      Objective   /72 (BP Location: Left arm, Patient Position: Sitting, Cuff Size: Large)   Pulse 88   Temp 97.5 °F (36.4 °C) (Tympanic)   Ht 6' (1.829 m)   Wt 113 kg (250 lb)   SpO2 100%   BMI 33.91 kg/m²      Physical Exam  Constitutional:       General: He is not in acute distress.     Appearance: Normal appearance. He is not ill-appearing or toxic-appearing.   HENT:      Head: Normocephalic and atraumatic.      Right Ear: External ear normal.      Left Ear: External ear normal.      Nose: Nose normal.   Eyes:      Conjunctiva/sclera: Conjunctivae normal.   Cardiovascular:      Rate and Rhythm: Normal rate and regular rhythm.      Heart sounds: Murmur (systolic, RUSB) heard.   Pulmonary:      Effort: No respiratory distress.      Breath sounds: Normal breath sounds. No wheezing, rhonchi or rales.   Skin:     General: Skin is warm and dry.   Neurological:      General: No focal deficit present.      Mental Status: He is alert and oriented to person, place, and time.   Psychiatric:         Mood and Affect: Mood normal.         Behavior: Behavior normal.

## 2024-12-30 DIAGNOSIS — G43.811 OTHER MIGRAINE WITH STATUS MIGRAINOSUS, INTRACTABLE: ICD-10-CM

## 2024-12-30 NOTE — TELEPHONE ENCOUNTER
Reason for call:   [x] Refill   [] Prior Auth  [] Other:     Office:   [x] PCP/Provider -   [] Specialty/Provider -     Medication: butalbital-acetaminophen-caffeine (FIORICET,ESGIC) -40 mg per tablet     Dose/Frequency: Take 1 tablet by mouth every 4 (four) hours as needed for headaches     Quantity: 30    Pharmacy: [facility]- [address] [phone]    Does the patient have enough for 3 days?   [] Yes   [x] No - Send as HP to POD

## 2024-12-31 ENCOUNTER — TELEPHONE (OUTPATIENT)
Age: 74
End: 2024-12-31

## 2024-12-31 RX ORDER — BUTALBITAL, ACETAMINOPHEN AND CAFFEINE 50; 325; 40 MG/1; MG/1; MG/1
1 TABLET ORAL EVERY 4 HOURS PRN
Qty: 30 TABLET | Refills: 0 | Status: SHIPPED | OUTPATIENT
Start: 2024-12-31

## 2025-01-08 ENCOUNTER — TELEPHONE (OUTPATIENT)
Age: 75
End: 2025-01-08

## 2025-01-08 NOTE — TELEPHONE ENCOUNTER
Michelle from the [facility] asked that the Referral #: [id number], created 12/19/24 be put in the peers system for Cardiology:    NPI: [id number]    [address] [facility] [facility]  [place] [address]

## 2025-01-08 NOTE — TELEPHONE ENCOUNTER
Confirmation - Referral [id number]  Effective: 01/08/2025     Expires: 04/08/2025  Active  Referred From  [facility]  Specialty: [facility]  Group NPI: [id number]  Provider ID: [id number]  Tax ID: [id number]  [address] [place] [address]  Referred To  [facility]  Specialty: Multi-Specialty Group  Group NPI: [id number]  Provider ID: [id number]  Tax ID: [id number]  This referral is valid at any location for the above group  Patient Info  KARO CANDELARIA  [id number]  Male  1950  [facility] [place] [address]  Clinical Information  Place of Service  Office  Service Type  Medical Care  Diagnosis  1I35.0 - nonrheumatic aortic (valve) stenosis  Procedures  199499 - unlisted evaluation and management service

## 2025-01-17 ENCOUNTER — OFFICE VISIT (OUTPATIENT)
Dept: PAIN MEDICINE | Facility: CLINIC | Age: 75
End: 2025-01-17
Payer: COMMERCIAL

## 2025-01-17 VITALS — HEIGHT: 72 IN | TEMPERATURE: 97.7 F | HEART RATE: 86 BPM | WEIGHT: 250 LBS | BODY MASS INDEX: 33.86 KG/M2

## 2025-01-17 DIAGNOSIS — M17.12 PRIMARY OSTEOARTHRITIS OF LEFT KNEE: ICD-10-CM

## 2025-01-17 DIAGNOSIS — M51.16 LUMBAR DISC DISEASE WITH RADICULOPATHY: ICD-10-CM

## 2025-01-17 DIAGNOSIS — F11.20 UNCOMPLICATED OPIOID DEPENDENCE (HCC): ICD-10-CM

## 2025-01-17 DIAGNOSIS — Z79.899 ENCOUNTER FOR LONG-TERM (CURRENT) DRUG USE: ICD-10-CM

## 2025-01-17 DIAGNOSIS — Z79.891 LONG-TERM CURRENT USE OF OPIATE ANALGESIC: ICD-10-CM

## 2025-01-17 DIAGNOSIS — G57.01 PIRIFORMIS SYNDROME OF RIGHT SIDE: ICD-10-CM

## 2025-01-17 DIAGNOSIS — M48.061 SPINAL STENOSIS OF LUMBAR REGION WITHOUT NEUROGENIC CLAUDICATION: ICD-10-CM

## 2025-01-17 DIAGNOSIS — M53.3 SACROILIAC JOINT PAIN: ICD-10-CM

## 2025-01-17 DIAGNOSIS — M79.18 MYOFASCIAL PAIN SYNDROME: Primary | ICD-10-CM

## 2025-01-17 DIAGNOSIS — G89.4 CHRONIC PAIN SYNDROME: ICD-10-CM

## 2025-01-17 DIAGNOSIS — M47.816 LUMBAR SPONDYLOSIS: ICD-10-CM

## 2025-01-17 PROCEDURE — 99214 OFFICE O/P EST MOD 30 MIN: CPT | Performed by: PHYSICIAN ASSISTANT

## 2025-01-17 RX ORDER — HYDROCODONE BITARTRATE AND ACETAMINOPHEN 5; 325 MG/1; MG/1
1 TABLET ORAL 3 TIMES DAILY PRN
Qty: 60 TABLET | Refills: 0 | Status: SHIPPED | OUTPATIENT
Start: 2025-01-17

## 2025-01-17 NOTE — PROGRESS NOTES
Assessment:  1. Myofascial pain syndrome    2. Lumbar spondylosis    3. Piriformis syndrome of right side    4. Spinal stenosis of lumbar region without neurogenic claudication    5. Lumbar disc disease with radiculopathy    6. Sacroiliac joint pain    7. Primary osteoarthritis of left knee    8. Chronic pain syndrome    9. Uncomplicated opioid dependence (HCC)    10. Long-term current use of opiate analgesic    11. Encounter for long-term (current) drug use        Plan:  While the patient was in the office today, I did have a thorough conversation regarding their chronic pain syndrome, medication management, and treatment plan options.    Patient will be scheduled for a right piriformis injection to address the localized piriformis/sciatic type pain he is experiencing.  I informed the patient that he should contact his cardiologist to check with the timing of an injection in between the cardiac cath procedure.  Patient verbalized understanding.    The patient remains clinically stable and moderately controlled on the current medication regimen of Norco 5/325 3 times daily as needed for moderate to severe pain.  Patient is taking the medication as prescribed without side effects and more than 50% relief.    [place] Prescription Drug Monitoring Program report was reviewed and was appropriate     A urine drug screen was collected at today's office visit as part of our medication management protocol. The point of care testing results were appropriate for what was being prescribed. The specimen will be sent for confirmatory testing. The drug screen is medically necessary because the patient is either dependent on opioid medication or is being considered for opioid medication therapy and the results could impact ongoing or future treatment. The drug screen is to evaluate for the presences or absence of prescribed, non-prescribed, and/or illicit drugs/substances.    There are risks associated with opioid medications,  including dependence, addiction and tolerance. The patient understands and agrees to use these medications only as prescribed. Potential side effects of the medications include, but are not limited to, constipation, drowsiness, addiction, impaired judgment and risk of fatal overdose if not taken as prescribed. The patient was warned against driving while taking sedation medications.  Sharing medications is a felony. At this point in time, the patient is showing no signs of addiction, abuse, diversion or suicidal ideation.    The patient will follow-up in 12 weeks for medication prescription refill and reevaluation. The patient was advised to contact the office should their symptoms worsen in the interim. The patient was agreeable and verbalized an understanding.        History of Present Illness:    The patient is a 74 y.o. male last seen on 10/25/2024 who presents for a follow up office visit in regards to chronic pain secondary to lumbar spondylosis, sacroiliitis, piriformis syndrome, right hip pain secondary to osteoarthritis as well as chronic left knee pain secondary to osteoarthritis..  The patient currently reports left-sided low back pain, right-sided buttock and hip pain and left knee pain that he rates a 2 out of 10.  He describes these pains as occasional and sharp, throbbing and shooting in nature.  His pain does not interfere with his daily living activities.  He reports that since his last visit with me, his right sided buttock pain has become significantly worse.  He has this pain when he is sitting down driving in his car which he does for hours throughout the day due to his job.  Patient has to get out of the car frequently to walk around and stretch.  We had discussed piriformis injection in the past.  He would like to schedule this.  He is scheduled for cardiac cath next month and will verify with his cardiologist to see if there is a certain timeframe that he needs to wait in between a steroid  injection and the cardiac procedure.    Pain Contract Signed: 05/13/2024  Last Urine Drug Screen: 01/17/2025  Last dose of Norco: 01/17/2025 @ 6am   Last Narcan prescribed 05/13/2024  EVANS'S:01/17/2025  SOAPP : 01/17/2025      I have personally reviewed and/or updated the patient's past medical history, past surgical history, family history, social history, current medications, allergies, and vital signs today.       Review of Systems:    Review of Systems      Past Medical History:   Diagnosis Date   • Acute medial meniscus tear    • Allergic rhinitis    • Allergic rhinitis     last assessed 5/8/14   • Anxiety    • Appendicitis    • Arthritis    • Benign essential hypertension     last assessed 1/6/14   • Biceps tendon tear    • Cancer (HCC)     skin   • Cervical radiculopathy     and lumbar   • Chronic pain disorder     knees and hips    • Colon polyps    • Coronary artery disease    • CPAP (continuous positive airway pressure) dependence    • Depression    • Depression with anxiety    • Dyskinesia of esophagus    • Erythema migrans (Lyme disease)     last assessed 10/16/12   • Fatigue    • GERD (gastroesophageal reflux disease)    • Heart disease    • Heart murmur 1951   • Heel spur, right    • Hernia    • Herpes    • Herpes zoster    • History of benign esophageal tumor    • Hyperlipidemia    • Hyperplastic colon polyp     last assessed 11/14/14   • Joint pain    • Kidney stone 1995   • Kidney stones    • Lyme disease    • Myocardial infarction (HCC)    • Nephrolithiasis    • Panic disorder with agoraphobia     last assessed 8/7/13   • Seasonal affective disorder (HCC)    • Shingles 2015   • Sleep apnea    • Vertigo    • Vitamin D deficiency        Past Surgical History:   Procedure Laterality Date   • APPENDECTOMY  08/01/2003   • BICEPS TENDON REPAIR     • CARDIAC OTHER      stents x2   • COLONOSCOPY     • ENDOSCOPIC ULTRASOUND (UPPER)     • FRACTURE SURGERY     • KNEE ARTHROSCOPY      with medial  meniscus repair, resolved 05   • KNEE CARTILAGE SURGERY     • ORTHOPEDIC SURGERY     • OTHER SURGICAL HISTORY      distal reinsertion of ruptured biceps tendon   • MN ARTHRS KNE SURG W/MENISCECTOMY MED/LAT W/SHVG Right 2019    Procedure: RIGHT KNEE ARTHROSCOPY, MEDIAL MENISCECTOMY;  Surgeon: Rick Farris DO;  Location: [facility];  Service: Orthopedics   • TOOTH EXTRACTION      Middle of 2019   • VASECTOMY         Family History   Problem Relation Age of Onset   • Diabetes Mother    • Coronary artery disease Father    • Kidney disease Father    • Heart disease Father    • Cancer Father         kidney cancer   • Colon cancer Neg Hx    • Colon polyps Neg Hx        Social History     Occupational History   • Not on file   Tobacco Use   • Smoking status: Former     Current packs/day: 0.00     Average packs/day: 1 pack/day for 19.3 years (19.3 ttl pk-yrs)     Types: Cigarettes, Cigars     Start date: 11/15/1967     Quit date: 3/1/1987     Years since quittin.9   • Smokeless tobacco: Never   Vaping Use   • Vaping status: Never Used   Substance and Sexual Activity   • Alcohol use: Yes     Alcohol/week: 1.0 standard drink of alcohol     Types: 1 Glasses of wine per week     Comment: an couple times a month a glass of wine   • Drug use: No   • Sexual activity: Yes     Partners: Female         Current Outpatient Medications:   •  ALPRAZolam (Xanax) 0.25 mg tablet, Take 1 tablet (0.25 mg total) by mouth 3 (three) times a day as needed for anxiety, Disp: 30 tablet, Rfl: 0  •  aspirin 81 mg chewable tablet, every 24 hours, Disp: , Rfl:   •  atorvastatin (LIPITOR) 80 mg tablet, every 24 hours, Disp: , Rfl:   •  butalbital-acetaminophen-caffeine (FIORICET,ESGIC) -40 mg per tablet, Take 1 tablet by mouth every 4 (four) hours as needed for headaches, Disp: 30 tablet, Rfl: 0  •  cholecalciferol (VITAMIN D3) 1,000 units tablet, Take 1,000 Units by mouth daily, Disp: , Rfl:   •  CHONDROITIN SULFATE A  PO, Take by mouth in the morning, Disp: , Rfl:   •  Coenzyme Q10 (CoQ-10) 100 MG CAPS, every 24 hours, Disp: , Rfl:   •  Diclofenac Sodium (VOLTAREN) 1 %, Apply 2 g topically 4 (four) times a day, Disp: 50 g, Rfl: 0  •  ezetimibe (ZETIA) 10 mg tablet, 1 tablet Orally Once a day 90 days, Disp: , Rfl:   •  famotidine (PEPCID) 20 mg tablet, Take 1 tablet (20 mg total) by mouth daily at bedtime, Disp: 90 tablet, Rfl: 3  •  GLUCOSAMINE-CALCIUM-VIT D PO, Take 1,200 mg by mouth 1200mg, Disp: , Rfl:   •  HYDROcodone-acetaminophen (Norco) 5-325 mg per tablet, Take 1 tablet by mouth 3 (three) times a day as needed for pain For ongoing therapy DO NOT FILL BEFORE: 02/05/25 Max Daily Amount: 3 tablets, Disp: 60 tablet, Rfl: 0  •  HYDROcodone-acetaminophen (NORCO) 5-325 mg per tablet, Take 1 tablet by mouth 3 (three) times a day as needed for pain for ongoing therapy DO NOT FILL BEFORE: 03/06/25 Max Daily Amount: 3 tablets, Disp: 60 tablet, Rfl: 0  •  HYDROcodone-acetaminophen (NORCO) 5-325 mg per tablet, Take 1 tablet by mouth 3 (three) times a day as needed for pain for ongoing therapy DO NOT FILL BEFORE: 04/04/25 Max Daily Amount: 3 tablets, Disp: 60 tablet, Rfl: 0  •  Hyoscyamine Sulfate SL 0.125 MG SUBL, Place 0.125 mg under the tongue 3 (three) times a day as needed (as needed), Disp: 30 tablet, Rfl: 1  •  Ibuprofen 200 MG CAPS, 3 (three) times a day, Disp: , Rfl:   •  lisinopril (ZESTRIL) 2.5 mg tablet, every 24 hours, Disp: , Rfl:   •  Omega-3 Fatty Acids (Fish Oil) 1200 MG CAPS, 1 capsule every 24 hours, Disp: , Rfl:   •  pantoprazole (PROTONIX) 40 mg tablet, Take 1 tablet (40 mg total) by mouth daily, Disp: 90 tablet, Rfl: 3  •  tamsulosin (FLOMAX) 0.4 mg, Take 1 capsule (0.4 mg total) by mouth daily with dinner, Disp: 90 capsule, Rfl: 3  •  vilazodone (VIIBRYD) 20 mg tablet, Take 1 tablet (20 mg total) by mouth daily with breakfast, Disp: 30 tablet, Rfl: 0  •  Jardiance 10 MG TABS tablet, 1 tablet Orally Once a day 90  days (Patient not taking: Reported on 5/13/2024), Disp: , Rfl:   •  naloxone (NARCAN) 4 mg/0.1 mL nasal spray, Administer 1 spray into a nostril. If no response after 2-3 minutes, give another dose in the other nostril using a new spray. (Patient not taking: Reported on 1/17/2025), Disp: 1 each, Rfl: 1    Allergies   Allergen Reactions   • Pseudoephedrine Dizziness and Hives     Reaction Date: 16Apr2011;   • Sulfa Antibiotics Dizziness, Hives and Other (See Comments)       Physical Exam:    Pulse 86   Temp 97.7 °F (36.5 °C)   Ht 6' (1.829 m) Comment: Verbal  Wt 113 kg (250 lb)   BMI 33.91 kg/m²     Constitutional:normal, well developed, well nourished, alert, in no distress and non-toxic and no overt pain behavior. and overweight  Eyes:anicteric  HEENT:grossly intact  Neck:supple, symmetric, trachea midline and no masses   Pulmonary:even and unlabored  Cardiovascular:No edema or pitting edema present  Skin:Normal without rashes or lesions and well hydrated  Psychiatric:Mood and affect appropriate  Neurologic:Cranial Nerves II-XII grossly intact  Musculoskeletal: Gait is slightly antalgic without the use of assistive devices tender right sciatic notch      Imaging  FL spine and pain procedure    (Results Pending)         Orders Placed This Encounter   Procedures   • FL spine and pain procedure   • [facility] All Prescribed Meds and Special Instructions   • Amphetamines, Methamphetamines   • Butalbital   • Phenobarbital   • Secobarbital   • Alprazolam   • Clonazepam   • Diazepam   • Lorazepam   • Gabapentin   • Pregabalin   • Cocaine   • Heroin   • Buprenorphine   • Levorphanol   • Meperidine   • Naltrexone   • Fentanyl   • Methadone   • Oxycodone   • Tapentadol   • THC   • Tramadol   • Codeine, Hydrocodone, Hydropmorphone, Morphine   • Bath Salts   • Ethyl Glucuronide/Ethyl Sulfate   • Kratom   • Spice   • Methylphenidate   • Phentermine   • Validity Oxidant   • Validity Creatinine   • Validity pH   • Validity  Specific   • Xylazine Definitive Test

## 2025-01-20 ENCOUNTER — IMMUNIZATIONS (OUTPATIENT)
Dept: FAMILY MEDICINE CLINIC | Facility: CLINIC | Age: 75
End: 2025-01-20
Payer: COMMERCIAL

## 2025-01-20 DIAGNOSIS — Z23 ENCOUNTER FOR IMMUNIZATION: Primary | ICD-10-CM

## 2025-01-20 PROCEDURE — 90662 IIV NO PRSV INCREASED AG IM: CPT

## 2025-01-20 PROCEDURE — G0008 ADMIN INFLUENZA VIRUS VAC: HCPCS

## 2025-01-21 DIAGNOSIS — F41.9 ANXIETY: ICD-10-CM

## 2025-01-21 DIAGNOSIS — G43.811 OTHER MIGRAINE WITH STATUS MIGRAINOSUS, INTRACTABLE: ICD-10-CM

## 2025-01-21 RX ORDER — BUTALBITAL, ACETAMINOPHEN AND CAFFEINE 50; 325; 40 MG/1; MG/1; MG/1
1 TABLET ORAL EVERY 4 HOURS PRN
Qty: 30 TABLET | Refills: 0 | Status: SHIPPED | OUTPATIENT
Start: 2025-01-21

## 2025-01-21 RX ORDER — ALPRAZOLAM 0.25 MG/1
0.25 TABLET ORAL 3 TIMES DAILY PRN
Qty: 30 TABLET | Refills: 0 | Status: SHIPPED | OUTPATIENT
Start: 2025-01-21

## 2025-01-21 NOTE — TELEPHONE ENCOUNTER
Reason for call:   [x] Refill   [] Prior Auth  [] Other:     Office:   [x] PCP/Provider - Jenny Valenzuela DO   [] Specialty/Provider -     Medication: ALPRAZolam 0.25 mg / 1 tab tid / 30 tabs                      (FIORICET,ESGIC) -40 mg / 1 tab every 4 hrs prn / 30 tabs        Pharmacy: [facility]- [place] - [address] [place] [address]      Does the patient have enough for 3 days?   [] Yes   [x] No - Send as HP to POD

## 2025-01-24 LAB
4OH-XYLAZINE UR QL CFM: NEGATIVE NG/ML
6MAM UR QL CFM: NEGATIVE NG/ML
7AMINOCLONAZEPAM UR QL CFM: NEGATIVE NG/ML
A-OH ALPRAZ UR QL CFM: NORMAL NG/ML
ACCEPTABLE CREAT UR QL: NORMAL MG/DL
ACCEPTIBLE SP GR UR QL: NORMAL
AMPHET UR QL CFM: NEGATIVE NG/ML
BUPRENORPHINE UR QL CFM: NEGATIVE NG/ML
BUTALBITAL UR QL CFM: NORMAL NG/ML
BZE UR QL CFM: NEGATIVE NG/ML
CODEINE UR QL CFM: NEGATIVE NG/ML
EDDP UR QL CFM: NEGATIVE NG/ML
ETHYL GLUCURONIDE UR QL CFM: NEGATIVE NG/ML
ETHYL SULFATE UR QL SCN: NEGATIVE NG/ML
EUTYLONE UR QL: NEGATIVE NG/ML
FENTANYL UR QL CFM: NEGATIVE NG/ML
GLIADIN IGG SER IA-ACNC: NEGATIVE NG/ML
HYDROCODONE UR QL CFM: NORMAL NG/ML
HYDROMORPHONE UR QL CFM: NORMAL NG/ML
LORAZEPAM UR QL CFM: NEGATIVE NG/ML
ME-PHENIDATE UR QL CFM: NEGATIVE NG/ML
MEPERIDINE UR QL CFM: NEGATIVE NG/ML
METHADONE UR QL CFM: NEGATIVE NG/ML
METHAMPHET UR QL CFM: NEGATIVE NG/ML
MORPHINE UR QL CFM: NEGATIVE NG/ML
NALTREXONE UR QL CFM: NEGATIVE NG/ML
NITRITE UR QL: NORMAL UG/ML
NORBUPRENORPHINE UR QL CFM: NEGATIVE NG/ML
NORDIAZEPAM UR QL CFM: NEGATIVE NG/ML
NORFENTANYL UR QL CFM: NEGATIVE NG/ML
NORHYDROCODONE UR QL CFM: NORMAL NG/ML
NORMEPERIDINE UR QL CFM: NEGATIVE NG/ML
NOROXYCODONE UR QL CFM: NEGATIVE NG/ML
OXAZEPAM UR QL CFM: NEGATIVE NG/ML
OXYCODONE UR QL CFM: NEGATIVE NG/ML
OXYMORPHONE UR QL CFM: NEGATIVE NG/ML
PARA-FLUOROFENTANYL QUANTIFICATION: NORMAL NG/ML
PHENOBARB UR QL CFM: NEGATIVE NG/ML
RESULT ALL_PRESCRIBED MEDS AND SPECIAL INSTRUCTIONS: NORMAL
SECOBARBITAL UR QL CFM: NEGATIVE NG/ML
SL AMB 5F-ADB-M7 METABOLITE QUANTIFICATION: NEGATIVE NG/ML
SL AMB 7-OH-MITRAGYNINE (KRATOM ALKALOID) QUANTIFICATION: NEGATIVE NG/ML
SL AMB AB-FUBINACA-M3 METABOLITE QUANTIFICATION: NEGATIVE NG/ML
SL AMB ACETYL FENTANYL QUANTIFICATION: NORMAL NG/ML
SL AMB ACETYL NORFENTANYL QUANTIFICATION: NORMAL NG/ML
SL AMB ACRYL FENTANYL QUANTIFICATION: NORMAL NG/ML
SL AMB CARFENTANIL QUANTIFICATION: NORMAL NG/ML
SL AMB CTHC (MARIJUANA METABOLITE) QUANTIFICATION: NEGATIVE NG/ML
SL AMB DEXTRORPHAN (DEXTROMETHORPHAN METABOLITE) QUANT: ABNORMAL NG/ML
SL AMB GABAPENTIN QUANTIFICATION: NEGATIVE NG/ML
SL AMB JWH018 METABOLITE QUANTIFICATION: NEGATIVE NG/ML
SL AMB JWH073 METABOLITE QUANTIFICATION: NEGATIVE NG/ML
SL AMB MDMB-FUBINACA-M1 METABOLITE QUANTIFICATION: NEGATIVE NG/ML
SL AMB METHYLONE QUANTIFICATION: NEGATIVE NG/ML
SL AMB N-DESMETHYL-TRAMADOL QUANTIFICATION: NEGATIVE NG/ML
SL AMB PHENTERMINE QUANTIFICATION: NEGATIVE NG/ML
SL AMB PREGABALIN QUANTIFICATION: NEGATIVE NG/ML
SL AMB RCS4 METABOLITE QUANTIFICATION: NEGATIVE NG/ML
SL AMB RITALINIC ACID QUANTIFICATION: NEGATIVE NG/ML
SMOOTH MUSCLE AB TITR SER IF: NEGATIVE NG/ML
SPECIMEN DRAWN SERPL: NEGATIVE NG/ML
SPECIMEN PH ACCEPTABLE UR: NORMAL
TAPENTADOL UR QL CFM: NEGATIVE NG/ML
TEMAZEPAM UR QL CFM: NEGATIVE NG/ML
TRAMADOL UR QL CFM: NEGATIVE NG/ML
URATE/CREAT 24H UR: NEGATIVE NG/ML
XYLAZINE UR QL CFM: NEGATIVE NG/ML

## 2025-02-04 ENCOUNTER — HOSPITAL ENCOUNTER (OUTPATIENT)
Dept: CT IMAGING | Facility: HOSPITAL | Age: 75
Discharge: HOME | End: 2025-02-04
Payer: COMMERCIAL

## 2025-02-04 ENCOUNTER — TELEPHONE (OUTPATIENT)
Age: 75
End: 2025-02-04

## 2025-02-04 DIAGNOSIS — F32.A DEPRESSION, UNSPECIFIED DEPRESSION TYPE: ICD-10-CM

## 2025-02-04 DIAGNOSIS — I35.0 AORTIC VALVE STENOSIS, ETIOLOGY OF CARDIAC VALVE DISEASE UNSPECIFIED: ICD-10-CM

## 2025-02-04 DIAGNOSIS — N28.89 RENAL MASS: Primary | ICD-10-CM

## 2025-02-04 PROCEDURE — 75572 CT HRT W/3D IMAGE: CPT

## 2025-02-04 PROCEDURE — 74174 CTA ABD&PLVS W/CONTRAST: CPT

## 2025-02-04 RX ORDER — VILAZODONE HYDROCHLORIDE 20 MG/1
20 TABLET ORAL
Qty: 30 TABLET | Refills: 0 | Status: SHIPPED | OUTPATIENT
Start: 2025-02-04

## 2025-02-04 RX ADMIN — IOHEXOL 100 ML: 350 INJECTION, SOLUTION INTRAVENOUS at 09:42

## 2025-02-04 NOTE — TELEPHONE ENCOUNTER
Pt called in with CT findings stated he needs urology referral and additional ct orders as recommended in finding . Pt can't proceeded with heart surgery because of this accidental discovery. Please follow up.

## 2025-02-05 NOTE — TELEPHONE ENCOUNTER
Patient stopped in to let us know that he will be going to [facility] and seeing Tae Rome MD. Advised patient will fax referral to his office. Faxed to [phone] and received confirmation fax went through.

## 2025-02-07 ENCOUNTER — TELEPHONE (OUTPATIENT)
Dept: PAIN MEDICINE | Facility: CLINIC | Age: 75
End: 2025-02-07

## 2025-02-07 NOTE — TELEPHONE ENCOUNTER
Caller: Nii    Doctor: Dr. Escobar    Reason for call: calling to cancel procedure for 2/17 he needs to take care of there stuff he will call back when he is ready to reschedule procedure     Thank you     Call back#: [phone]

## 2025-02-08 ENCOUNTER — HOSPITAL ENCOUNTER (OUTPATIENT)
Dept: CT IMAGING | Facility: HOSPITAL | Age: 75
Discharge: HOME | End: 2025-02-08
Payer: COMMERCIAL

## 2025-02-08 DIAGNOSIS — N28.89 RENAL MASS: ICD-10-CM

## 2025-02-08 PROCEDURE — 74178 CT ABD&PLV WO CNTR FLWD CNTR: CPT

## 2025-02-08 RX ADMIN — IOHEXOL 100 ML: 350 INJECTION, SOLUTION INTRAVENOUS at 15:39

## 2025-02-10 ENCOUNTER — TELEPHONE (OUTPATIENT)
Dept: FAMILY MEDICINE CLINIC | Facility: CLINIC | Age: 75
End: 2025-02-10

## 2025-02-10 ENCOUNTER — RESULTS FOLLOW-UP (OUTPATIENT)
Dept: FAMILY MEDICINE CLINIC | Facility: CLINIC | Age: 75
End: 2025-02-10

## 2025-02-10 NOTE — TELEPHONE ENCOUNTER
Called to review results of CT renal study. Patient is already aware of results and has informed his urologist at [facility]. He will sent his urologist a copy of the CT report and follow up in the office later this week.     He also has an appointment with a surgeon at [facility] later this month, will need referral which I informed him I will place.     States his dad has renal cancer, so there may be a genetic component. Patient will reach out with NPI number of surgeon for referral and if he has any further questions.

## 2025-02-13 ENCOUNTER — TELEPHONE (OUTPATIENT)
Age: 75
End: 2025-02-13

## 2025-02-13 NOTE — TELEPHONE ENCOUNTER
Confirmation - Referral [id number]  Effective: 02/13/2025     Expires: 05/14/2025  Active  Referred From  [facility]  Specialty: [facility]  Group NPI: [id number]  Provider ID: [id number]  Tax ID: [id number]  [address] [place] [address]  Referred To  [facility]  Specialty: Urology  Group NPI: [id number]  Provider ID: [id number]  Tax ID: [id number]  This referral is valid at any location for the above group  Patient Info  KARO CANDELARIA  [id number]  Male  1950  [address] [place] [address]  Clinical Information  Place of Service  Office  Service Type  Medical Care  Diagnosis  1N28.89 - other specified disorders of kidney and ureter  Procedures  199499 - unlisted evaluation and management service

## 2025-02-13 NOTE — TELEPHONE ENCOUNTER
Patient is requesting an insurance referral for the following specialty: Urology     Test Name / Order Name:     DX Code: N28.89     Date Of Service:  2/13/25    Location/Facility Name/Address/Phone #:  [facility]   [address], [place]  fax 3 [phone]    Location / Facility NPI: [id number]    Best Phone # To Reach The Patient:        Requesting referral to be done today.

## 2025-02-26 DIAGNOSIS — G43.811 OTHER MIGRAINE WITH STATUS MIGRAINOSUS, INTRACTABLE: ICD-10-CM

## 2025-02-26 DIAGNOSIS — F41.9 ANXIETY: ICD-10-CM

## 2025-02-27 RX ORDER — ALPRAZOLAM 0.25 MG
0.25 TABLET ORAL 3 TIMES DAILY PRN
Qty: 30 TABLET | Refills: 0 | Status: SHIPPED | OUTPATIENT
Start: 2025-02-27

## 2025-02-27 RX ORDER — BUTALBITAL, ACETAMINOPHEN AND CAFFEINE 50; 325; 40 MG/1; MG/1; MG/1
1 TABLET ORAL EVERY 4 HOURS PRN
Qty: 30 TABLET | Refills: 0 | Status: SHIPPED | OUTPATIENT
Start: 2025-02-27

## 2025-03-09 DIAGNOSIS — F32.A DEPRESSION, UNSPECIFIED DEPRESSION TYPE: ICD-10-CM

## 2025-03-10 RX ORDER — VILAZODONE HYDROCHLORIDE 20 MG/1
20 TABLET ORAL
Qty: 30 TABLET | Refills: 5 | Status: SHIPPED | OUTPATIENT
Start: 2025-03-10

## 2025-04-10 ENCOUNTER — TELEPHONE (OUTPATIENT)
Age: 75
End: 2025-04-10

## 2025-04-10 NOTE — TELEPHONE ENCOUNTER
Caller: Nii MCCARTHY    Doctor: Dr. Amber Morales     Reason for call: Pt has to reschedule his appt . Will than interfere with his script for   HYDROcodone-acetaminophen (Norco) 5-325 mg      Call back#: [phone]

## 2025-04-10 NOTE — TELEPHONE ENCOUNTER
S/w pt, pt has script at pharmacy with DNF date of 4/4, pt has not filled yet.  Pt has OVS for 4/28.  Pt was advised to call pharmacy to fill script. Pt was appreciative of call.    MMG can you review script, I do see it says script expires 3/18/25, I am unsure if that will cause an issue with pt filling 4/4 script, TY.

## 2025-04-11 ENCOUNTER — TELEPHONE (OUTPATIENT)
Age: 75
End: 2025-04-11

## 2025-04-11 NOTE — TELEPHONE ENCOUNTER
Location/Facility Name/Address/Phone Number: [address], [place] [address]  Tel: [phone]    Date of Service: April 22, 2025     NPI: [id number]    CPT Code: 38907 -  -     Diagnosis code: Z95.2    Best phone number to reach the patient: [phone]

## 2025-04-11 NOTE — TELEPHONE ENCOUNTER
S/w [facility], confirmed the hydrocodone rx with dnf 4/4/25 is sitting in the bin for the pt to pu. Advised pharmacist, the writer will make the pt aware.

## 2025-04-28 ENCOUNTER — OFFICE VISIT (OUTPATIENT)
Dept: PAIN MEDICINE | Facility: CLINIC | Age: 75
End: 2025-04-28
Payer: COMMERCIAL

## 2025-04-28 VITALS
BODY MASS INDEX: 33.32 KG/M2 | TEMPERATURE: 98.2 F | HEIGHT: 72 IN | OXYGEN SATURATION: 98 % | HEART RATE: 79 BPM | WEIGHT: 246 LBS

## 2025-04-28 DIAGNOSIS — M54.9 MID BACK PAIN: ICD-10-CM

## 2025-04-28 DIAGNOSIS — M51.16 LUMBAR DISC DISEASE WITH RADICULOPATHY: ICD-10-CM

## 2025-04-28 DIAGNOSIS — M47.816 LUMBAR SPONDYLOSIS: ICD-10-CM

## 2025-04-28 DIAGNOSIS — Z79.891 LONG-TERM CURRENT USE OF OPIATE ANALGESIC: ICD-10-CM

## 2025-04-28 DIAGNOSIS — F11.20 UNCOMPLICATED OPIOID DEPENDENCE (HCC): ICD-10-CM

## 2025-04-28 DIAGNOSIS — M17.12 PRIMARY OSTEOARTHRITIS OF LEFT KNEE: ICD-10-CM

## 2025-04-28 DIAGNOSIS — G89.4 CHRONIC PAIN SYNDROME: ICD-10-CM

## 2025-04-28 DIAGNOSIS — M48.02 FORAMINAL STENOSIS OF CERVICAL REGION: ICD-10-CM

## 2025-04-28 DIAGNOSIS — M47.812 CERVICAL SPONDYLOSIS: Primary | ICD-10-CM

## 2025-04-28 DIAGNOSIS — M48.061 SPINAL STENOSIS OF LUMBAR REGION WITHOUT NEUROGENIC CLAUDICATION: ICD-10-CM

## 2025-04-28 PROCEDURE — 99214 OFFICE O/P EST MOD 30 MIN: CPT | Performed by: PHYSICIAN ASSISTANT

## 2025-04-28 RX ORDER — HYDROCODONE BITARTRATE AND ACETAMINOPHEN 5; 325 MG/1; MG/1
1 TABLET ORAL 3 TIMES DAILY PRN
Qty: 60 TABLET | Refills: 0 | Status: SHIPPED | OUTPATIENT
Start: 2025-04-28

## 2025-04-28 RX ORDER — MULTIVITAMIN
1 TABLET ORAL DAILY
COMMUNITY

## 2025-04-28 RX ORDER — MIDODRINE HYDROCHLORIDE 2.5 MG/1
2.5 TABLET ORAL 2 TIMES DAILY
COMMUNITY
Start: 2025-04-16

## 2025-04-28 RX ORDER — AMIODARONE HYDROCHLORIDE 200 MG/1
200 TABLET ORAL DAILY
COMMUNITY
Start: 2025-03-20

## 2025-04-28 NOTE — PROGRESS NOTES
Assessment:  1. Cervical spondylosis    2. Foraminal stenosis of cervical region    3. Lumbar spondylosis    4. Spinal stenosis of lumbar region without neurogenic claudication    5. Lumbar disc disease with radiculopathy    6. Mid back pain    7. Primary osteoarthritis of left knee    8. Chronic pain syndrome    9. Uncomplicated opioid dependence (HCC)    10. Long-term current use of opiate analgesic        Plan:  While the patient was in the office today, I did have a thorough conversation regarding their chronic pain syndrome, medication management, and treatment plan options.    The patient remains clinically stable moderate on the current medication regimen of Norco 5/325 twice daily as needed pain.  3 months of medication has been electronically sent to his pharmacy with the appropriate fill dates.    [place] Prescription Drug Monitoring Program report was reviewed and was appropriate     There are risks associated with opioid medications, including dependence, addiction and tolerance. The patient understands and agrees to use these medications only as prescribed. Potential side effects of the medications include, but are not limited to, constipation, drowsiness, addiction, impaired judgment and risk of fatal overdose if not taken as prescribed. The patient was warned against driving while taking sedation medications.  Sharing medications is a felony. At this point in time, the patient is showing no signs of addiction, abuse, diversion or suicidal ideation.    I have placed orders for an MRI of the cervical and thoracic spine for further evaluation of his pain in this area.      Consider right piriformis injection in the future.    The patient will follow-up in 12 weeks for medication prescription refill and reevaluation. The patient was advised to contact the office should their symptoms worsen in the interim. The patient was agreeable and verbalized an understanding.        History of Present Illness:     The patient is a 74 y.o. male last seen on 1/17/2025 who presents for a follow up office visit in regards to chronic low back pain secondary to lumbar spondylosis and chronic knee pain secondary to osteoarthritis.   The patient currently reports low back pain, mid back pain and left knee pain that he rates a 2 out of 10 and describes as an intermittent dull and aching pain.  His pain does not interfere with his daily living activities.  He continues participate in home exercise program as tolerated.  Patient remains clinically stable and moderately controlled on the current medication regimen of Norco twice daily as needed.  Since we last saw the patient he underwent aortic valve replacement in March.  Patient states that when he woke from surgery he had a significant exacerbation of lower cervical and upper thoracic pain.  Patient has a history of cervical pain and MRI done in 2019 revealed severe bilateral foraminal stenosis at the C6-7 level.  He states that he would like to have the recommended piriformis injection done however he is undergoing removal of a renal mass in the near future.  Once his health is in a more stable condition, we can discuss injection therapy.    Pain Contract Signed: 5/13/2024  Last Urine Drug Screen: 1/17/2025    I have personally reviewed and/or updated the patient's past medical history, past surgical history, family history, social history, current medications, allergies, and vital signs today.       Review of Systems:    Review of Systems   Respiratory:  Negative for shortness of breath.    Cardiovascular:  Negative for chest pain.   Gastrointestinal:  Negative for constipation, diarrhea, nausea and vomiting.   Musculoskeletal:  Positive for arthralgias. Negative for gait problem, joint swelling and myalgias.   Skin:  Negative for rash.   Neurological:  Negative for dizziness, seizures and weakness.   Psychiatric/Behavioral:  Negative for dysphoric mood.    All other systems  reviewed and are negative.        Past Medical History:   Diagnosis Date   • Acute medial meniscus tear    • Allergic rhinitis    • Allergic rhinitis     last assessed 5/8/14   • Anxiety    • Appendicitis    • Arthritis    • Benign essential hypertension     last assessed 1/6/14   • Biceps tendon tear    • Cancer (HCC)     skin   • Cervical radiculopathy     and lumbar   • Chronic pain disorder     knees and hips    • Colon polyps    • Coronary artery disease    • CPAP (continuous positive airway pressure) dependence    • Depression    • Depression with anxiety    • Dyskinesia of esophagus    • Erythema migrans (Lyme disease)     last assessed 10/16/12   • Fatigue    • GERD (gastroesophageal reflux disease)    • Heart disease    • Heart murmur 1951   • Heel spur, right    • Hernia    • Herpes    • Herpes zoster    • History of benign esophageal tumor    • Hyperlipidemia    • Hyperplastic colon polyp     last assessed 11/14/14   • Joint pain    • Kidney stone 1995   • Kidney stones    • Lyme disease    • Myocardial infarction ([facility])    • Nephrolithiasis    • Panic disorder with agoraphobia     last assessed 8/7/13   • Seasonal affective disorder (HCC)    • Shingles 2015   • Sleep apnea    • Vertigo    • Vitamin D deficiency        Past Surgical History:   Procedure Laterality Date   • APPENDECTOMY  08/01/2003   • BICEPS TENDON REPAIR     • CARDIAC OTHER      stents x2   • COLONOSCOPY     • ENDOSCOPIC ULTRASOUND (UPPER)     • FRACTURE SURGERY     • KNEE ARTHROSCOPY      with medial meniscus repair, resolved 01/01/05   • KNEE CARTILAGE SURGERY     • ORTHOPEDIC SURGERY     • OTHER SURGICAL HISTORY      distal reinsertion of ruptured biceps tendon   • [place] ARTHRS KNE SURG W/MENISCECTOMY MED/LAT W/SHVG Right 07/30/2019    Procedure: RIGHT KNEE ARTHROSCOPY, MEDIAL MENISCECTOMY;  Surgeon: Rick Farris DO;  Location: [facility];  Service: Orthopedics   • TOOTH EXTRACTION      Middle of October 2019   • VASECTOMY          Family History   Problem Relation Age of Onset   • Diabetes Mother    • Coronary artery disease Father    • Kidney disease Father    • Heart disease Father    • Cancer Father         kidney cancer   • Colon cancer Neg Hx    • Colon polyps Neg Hx        Social History     Occupational History   • Not on file   Tobacco Use   • Smoking status: Former     Current packs/day: 0.00     Average packs/day: 1 pack/day for 19.3 years (19.3 ttl pk-yrs)     Types: Cigarettes, Cigars     Start date: 11/15/1967     Quit date: 3/1/1987     Years since quittin.1   • Smokeless tobacco: Never   Vaping Use   • Vaping status: Never Used   Substance and Sexual Activity   • Alcohol use: Yes     Alcohol/week: 1.0 standard drink of alcohol     Types: 1 Glasses of wine per week     Comment: an couple times a month a glass of wine   • Drug use: No   • Sexual activity: Yes     Partners: Female         Current Outpatient Medications:   •  ALPRAZolam (Xanax) 0.25 mg tablet, Take 1 tablet (0.25 mg total) by mouth 3 (three) times a day as needed for anxiety, Disp: 30 tablet, Rfl: 0  •  amiodarone 200 mg tablet, Take 200 mg by mouth daily, Disp: , Rfl:   •  aspirin 81 mg chewable tablet, every 24 hours, Disp: , Rfl:   •  atorvastatin (LIPITOR) 80 mg tablet, every 24 hours, Disp: , Rfl:   •  butalbital-acetaminophen-caffeine (FIORICET,ESGIC) -40 mg per tablet, Take 1 tablet by mouth every 4 (four) hours as needed for headaches, Disp: 30 tablet, Rfl: 0  •  Diclofenac Sodium (VOLTAREN) 1 %, Apply 2 g topically 4 (four) times a day, Disp: 50 g, Rfl: 0  •  ezetimibe (ZETIA) 10 mg tablet, 1 tablet Orally Once a day 90 days, Disp: , Rfl:   •  GLUCOSAMINE-CALCIUM-VIT D PO, Take 1,200 mg by mouth 1200mg, Disp: , Rfl:   •  HYDROcodone-acetaminophen (Norco) 5-325 mg per tablet, Take 1 tablet by mouth 3 (three) times a day as needed for pain For ongoing therapy DO NOT FILL BEFORE: 25 Max Daily Amount: 3 tablets, Disp: 60 tablet, Rfl:  0  •  HYDROcodone-acetaminophen (NORCO) 5-325 mg per tablet, Take 1 tablet by mouth 3 (three) times a day as needed for pain for ongoing therapy DO NOT FILL BEFORE: 06/06/25 Max Daily Amount: 3 tablets, Disp: 60 tablet, Rfl: 0  •  HYDROcodone-acetaminophen (NORCO) 5-325 mg per tablet, Take 1 tablet by mouth 3 (three) times a day as needed for pain for ongoing therapy DO NOT FILL BEFORE: 07/03/25 Max Daily Amount: 3 tablets, Disp: 60 tablet, Rfl: 0  •  Hyoscyamine Sulfate SL 0.125 MG SUBL, Place 0.125 mg under the tongue 3 (three) times a day as needed (as needed), Disp: 30 tablet, Rfl: 1  •  Ibuprofen 200 MG CAPS, 3 (three) times a day, Disp: , Rfl:   •  lisinopril (ZESTRIL) 2.5 mg tablet, every 24 hours, Disp: , Rfl:   •  midodrine (PROAMATINE) 2.5 mg tablet, Take 2.5 mg by mouth 2 (two) times a day, Disp: , Rfl:   •  Multiple Vitamin (multivitamin) tablet, Take 1 tablet by mouth daily, Disp: , Rfl:   •  Omega-3 Fatty Acids (Fish Oil) 1200 MG CAPS, 1 capsule every 24 hours, Disp: , Rfl:   •  tamsulosin (FLOMAX) 0.4 mg, Take 1 capsule (0.4 mg total) by mouth daily with dinner, Disp: 90 capsule, Rfl: 3  •  vilazodone (VIIBRYD) 20 mg tablet, Take 1 tablet (20 mg total) by mouth daily with breakfast, Disp: 30 tablet, Rfl: 5  •  cholecalciferol (VITAMIN D3) 1,000 units tablet, Take 1,000 Units by mouth daily (Patient not taking: Reported on 4/28/2025), Disp: , Rfl:   •  CHONDROITIN SULFATE A PO, Take by mouth in the morning (Patient not taking: Reported on 4/28/2025), Disp: , Rfl:   •  Coenzyme Q10 (CoQ-10) 100 MG CAPS, every 24 hours (Patient not taking: Reported on 4/28/2025), Disp: , Rfl:   •  famotidine (PEPCID) 20 mg tablet, Take 1 tablet (20 mg total) by mouth daily at bedtime (Patient not taking: Reported on 4/28/2025), Disp: 90 tablet, Rfl: 3  •  Jardiance 10 MG TABS tablet, 1 tablet Orally Once a day 90 days (Patient not taking: Reported on 5/13/2024), Disp: , Rfl:   •  naloxone (NARCAN) 4 mg/0.1 mL nasal  spray, Administer 1 spray into a nostril. If no response after 2-3 minutes, give another dose in the other nostril using a new spray. (Patient not taking: Reported on 1/17/2025), Disp: 1 each, Rfl: 1  •  pantoprazole (PROTONIX) 40 mg tablet, Take 1 tablet (40 mg total) by mouth daily (Patient not taking: Reported on 4/28/2025), Disp: 90 tablet, Rfl: 3    Allergies   Allergen Reactions   • Pseudoephedrine Dizziness and Hives     Reaction Date: 16Apr2011;   • Sulfa Antibiotics Dizziness, Hives and Other (See Comments)       Physical Exam:    Pulse 79   Temp 98.2 °F (36.8 °C)   Ht 6' (1.829 m)   Wt 112 kg (246 lb)   SpO2 98%   BMI 33.36 kg/m²     Constitutional:normal, well developed, well nourished, alert, in no distress and non-toxic and no overt pain behavior. and overweight  Eyes:anicteric  HEENT:grossly intact  Neck:supple, symmetric, trachea midline and no masses   Pulmonary:even and unlabored  Cardiovascular:No edema or pitting edema present  Skin:Normal without rashes or lesions and well hydrated  Psychiatric:Mood and affect appropriate  Neurologic:Cranial Nerves II-XII grossly intact  Musculoskeletal: Gait is slow but stable, forward leaning, limited range of motion of the cervical spine, tenderness to palpation along the lower cervical and upper thoracic facet joints.      Imaging  MRI cervical spine wo contrast    (Results Pending)   MRI thoracic spine wo contrast    (Results Pending)         Orders Placed This Encounter   Procedures   • MRI cervical spine wo contrast   • MRI thoracic spine wo contrast

## 2025-04-30 ENCOUNTER — HOSPITAL ENCOUNTER (OUTPATIENT)
Dept: HOSPITAL 99 - CRHB | Age: 75
Discharge: HOME | End: 2025-04-30
Payer: COMMERCIAL

## 2025-04-30 DIAGNOSIS — Z95.2: Primary | ICD-10-CM

## 2025-05-06 DIAGNOSIS — K21.9 GASTROESOPHAGEAL REFLUX DISEASE WITHOUT ESOPHAGITIS: ICD-10-CM

## 2025-05-06 DIAGNOSIS — N13.8 BPH WITH URINARY OBSTRUCTION: ICD-10-CM

## 2025-05-06 DIAGNOSIS — N40.1 BPH WITH URINARY OBSTRUCTION: ICD-10-CM

## 2025-05-07 RX ORDER — TAMSULOSIN HYDROCHLORIDE 0.4 MG/1
0.4 CAPSULE ORAL
Qty: 90 CAPSULE | Refills: 1 | Status: SHIPPED | OUTPATIENT
Start: 2025-05-07

## 2025-05-07 RX ORDER — PANTOPRAZOLE SODIUM 40 MG/1
40 TABLET, DELAYED RELEASE ORAL DAILY
Qty: 90 TABLET | Refills: 0 | Status: SHIPPED | OUTPATIENT
Start: 2025-05-07

## 2025-05-13 DIAGNOSIS — F32.A DEPRESSION, UNSPECIFIED DEPRESSION TYPE: ICD-10-CM

## 2025-05-14 RX ORDER — VILAZODONE HYDROCHLORIDE 20 MG/1
20 TABLET ORAL
Qty: 30 TABLET | Refills: 0 | OUTPATIENT
Start: 2025-05-14

## 2025-05-14 NOTE — TELEPHONE ENCOUNTER
Patient called requesting refill for vilazodone. Patient made aware medication was refilled on 03/10/25 for 30 with 5 refills to [place] pharmacy. Patient instructed to contact the pharmacy and speak with someone directly to obtain refills of medication. Patient advised to call back for refill if their pharmacy is unable to assist them. Patient verbalized understanding.

## 2025-05-21 ENCOUNTER — HOSPITAL ENCOUNTER (OUTPATIENT)
Dept: MRI IMAGING | Facility: HOSPITAL | Age: 75
Discharge: HOME | End: 2025-05-21
Attending: PHYSICIAN ASSISTANT
Payer: COMMERCIAL

## 2025-05-21 DIAGNOSIS — M54.9 MID BACK PAIN: ICD-10-CM

## 2025-05-21 DIAGNOSIS — M48.02 FORAMINAL STENOSIS OF CERVICAL REGION: ICD-10-CM

## 2025-05-21 DIAGNOSIS — M47.812 CERVICAL SPONDYLOSIS: ICD-10-CM

## 2025-05-21 PROCEDURE — 72141 MRI NECK SPINE W/O DYE: CPT

## 2025-05-21 PROCEDURE — 72146 MRI CHEST SPINE W/O DYE: CPT

## 2025-05-22 ENCOUNTER — RESULTS FOLLOW-UP (OUTPATIENT)
Dept: PAIN MEDICINE | Facility: CLINIC | Age: 75
End: 2025-05-22

## 2025-05-22 NOTE — TELEPHONE ENCOUNTER
Result Note  Please let patient know that his cervical spine MRI reveals severe arthritic changes and spinal stenosis that have worsened since his last study.  I can discuss injection therapy at his next appointment if he is interested.  MRI cervical spine wo contrast----- Message from Amber Escobar PA-C sent at 5/22/2025  2:04 PM EDT -----  Please let patient know that his thoracic spine MRI shows mild arthritis.  ----- Message -----  From: Interface, Radiology Results In  Sent: 5/22/2025   1:53 PM EDT  To: Amber Escobar PA-C

## 2025-05-22 NOTE — TELEPHONE ENCOUNTER
S/w pt and advised of thoracic and cervial MRI results. Pt verbalized understanding and appreciative of call.

## 2025-05-26 DIAGNOSIS — G43.811 OTHER MIGRAINE WITH STATUS MIGRAINOSUS, INTRACTABLE: ICD-10-CM

## 2025-05-27 RX ORDER — BUTALBITAL, ACETAMINOPHEN AND CAFFEINE 50; 325; 40 MG/1; MG/1; MG/1
1 TABLET ORAL EVERY 4 HOURS PRN
Qty: 30 TABLET | Refills: 0 | Status: SHIPPED | OUTPATIENT
Start: 2025-05-27

## 2025-05-30 ENCOUNTER — HOSPITAL ENCOUNTER (OUTPATIENT)
Dept: HOSPITAL 99 - CRHB | Age: 75
Discharge: HOME | End: 2025-05-30
Payer: COMMERCIAL

## 2025-05-30 DIAGNOSIS — Z95.2: Primary | ICD-10-CM

## 2025-06-05 ENCOUNTER — TELEPHONE (OUTPATIENT)
Age: 75
End: 2025-06-05

## 2025-06-05 NOTE — TELEPHONE ENCOUNTER
Patient is requesting an insurance referral for the following specialty:      Test Name / Order Name: 13863    DX Code: n28.89    Date Of Service: 6/10/25    Location/Facility Name/Address/Phone #:   [facility]  [facility] [address] [address]  Location / Facility NPI:  [id number]

## 2025-06-06 ENCOUNTER — HOSPITAL ENCOUNTER (EMERGENCY)
Dept: HOSPITAL 99 - EMR | Age: 75
Discharge: HOME | End: 2025-06-06
Payer: COMMERCIAL

## 2025-06-06 VITALS — SYSTOLIC BLOOD PRESSURE: 103 MMHG | DIASTOLIC BLOOD PRESSURE: 66 MMHG

## 2025-06-06 VITALS — DIASTOLIC BLOOD PRESSURE: 81 MMHG | SYSTOLIC BLOOD PRESSURE: 136 MMHG

## 2025-06-06 DIAGNOSIS — Z83.3: ICD-10-CM

## 2025-06-06 DIAGNOSIS — Z82.49: ICD-10-CM

## 2025-06-06 DIAGNOSIS — R09.1: Primary | ICD-10-CM

## 2025-06-06 DIAGNOSIS — I10: ICD-10-CM

## 2025-06-06 DIAGNOSIS — E78.00: ICD-10-CM

## 2025-06-06 DIAGNOSIS — K21.9: ICD-10-CM

## 2025-06-06 DIAGNOSIS — I25.10: ICD-10-CM

## 2025-06-06 DIAGNOSIS — R07.89: ICD-10-CM

## 2025-06-06 DIAGNOSIS — Z87.891: ICD-10-CM

## 2025-06-06 DIAGNOSIS — Z95.2: ICD-10-CM

## 2025-06-06 DIAGNOSIS — I35.0: ICD-10-CM

## 2025-06-06 DIAGNOSIS — Z95.5: ICD-10-CM

## 2025-06-06 LAB
ALBUMIN SERPL-MCNC: 4.1 G/DL (ref 3.5–5)
ALP SERPL-CCNC: 73 U/L (ref 38–126)
ALT SERPL-CCNC: 15 U/L (ref 0–50)
AST SERPL-CCNC: 18 U/L (ref 17–59)
BASOPHILS # BLD AUTO: 0 10^3/UL (ref 0–0.2)
BASOPHILS NFR BLD AUTO: 0.1 % (ref 0–2)
BILIRUB SERPL-MCNC: 0.5 MG/DL (ref 0.2–1.3)
BUN SERPL-MCNC: 23 MG/DL (ref 9–20)
CALCIUM SERPL-MCNC: 9.9 MG/DL (ref 8.4–10.2)
CHLORIDE SERPL-SCNC: 111 MMOL/L (ref 98–107)
CO2 SERPL-SCNC: 23 MMOL/L (ref 22–30)
COMMENT: (no result)
CREAT SERPL-MCNC: 1.1 MG/DL (ref 0.7–1.3)
EGFR: > 60
EOSINOPHIL # BLD AUTO: 0.1 10^3/UL (ref 0–0.7)
EOSINOPHIL NFR BLD AUTO: 1.8 % (ref 0–6)
ERYTHROCYTE [DISTWIDTH] IN BLOOD BY AUTOMATED COUNT: 13.6 % (ref 11.5–14.5)
ESTIMATED CREATININE CLEARANCE: (no result) ML/MIN
GLUCOSE SERPL-MCNC: 152 MG/DL (ref 70–99)
HCT VFR BLD AUTO: 38.4 % (ref 39–52)
HGB BLD-MCNC: 13 G/DL (ref 13–18)
IMM GRANULOCYTES # BLD AUTO: 0 10^3/UL (ref 0–0.05)
IMM GRANULOCYTES NFR BLD AUTO: 0.3 % (ref 0–0.5)
LYMPHOCYTES # BLD AUTO: 1.1 10^3/UL (ref 1.2–3.4)
LYMPHOCYTES NFR BLD AUTO: 13.9 % (ref 20.5–51.1)
MCH RBC QN AUTO: 31.7 PG (ref 27–31)
MCHC RBC AUTO-ENTMCNC: 33.9 G/DL (ref 33–37)
MCV RBC AUTO: 93.7 FL (ref 80–94)
MONOCYTES # BLD AUTO: 0.8 10^3/UL (ref 0.1–0.6)
MONOCYTES NFR BLD AUTO: 11.1 % (ref 1.7–9.3)
NEUTROPHILS # BLD AUTO: 5.5 10^3/UL (ref 1.4–6.5)
NEUTROPHILS NFR BLD AUTO: 72.8 % (ref 42.2–75.2)
NRBC BLD AUTO-RTO: 0 %
NT-PROBNP SERPL-MCNC: 246 PG/ML
PLATELET # BLD AUTO: 169 10^3/UL (ref 130–400)
PMV BLD AUTO: 9.7 FL (ref 7.4–10.4)
POTASSIUM SERPL-SCNC: 4.1 MMOL/L (ref 3.5–5.1)
PROT SERPL-MCNC: 6.7 G/DL (ref 6.3–8.2)
RBC # BLD AUTO: 4.1 10^6/UL (ref 4.7–6.1)
SODIUM SERPL-SCNC: 141 MMOL/L (ref 135–145)
TROPONIN I SERPL-MCNC: < 0.012 NG/ML
TROPONIN I SERPL-MCNC: < 0.012 NG/ML
WBC # BLD AUTO: 7.6 10^3/UL (ref 4.8–10.8)

## 2025-06-06 PROCEDURE — 99284 EMERGENCY DEPT VISIT MOD MDM: CPT

## 2025-06-09 ENCOUNTER — OFFICE VISIT (OUTPATIENT)
Dept: FAMILY MEDICINE CLINIC | Facility: CLINIC | Age: 75
End: 2025-06-09
Payer: COMMERCIAL

## 2025-06-09 VITALS
WEIGHT: 247 LBS | SYSTOLIC BLOOD PRESSURE: 108 MMHG | OXYGEN SATURATION: 98 % | HEIGHT: 72 IN | TEMPERATURE: 97.5 F | DIASTOLIC BLOOD PRESSURE: 60 MMHG | BODY MASS INDEX: 33.46 KG/M2 | HEART RATE: 75 BPM

## 2025-06-09 DIAGNOSIS — I48.0 PAROXYSMAL ATRIAL FIBRILLATION (HCC): ICD-10-CM

## 2025-06-09 DIAGNOSIS — R09.1 PLEURISY: Primary | ICD-10-CM

## 2025-06-09 DIAGNOSIS — H61.22 IMPACTED CERUMEN OF LEFT EAR: ICD-10-CM

## 2025-06-09 PROCEDURE — 99213 OFFICE O/P EST LOW 20 MIN: CPT

## 2025-06-09 RX ORDER — DICLOFENAC SODIUM 75 MG/1
75 TABLET, DELAYED RELEASE ORAL 2 TIMES DAILY
Qty: 10 TABLET | Refills: 0 | Status: SHIPPED | OUTPATIENT
Start: 2025-06-09

## 2025-06-09 NOTE — ASSESSMENT & PLAN NOTE
-following with cardiology, managed on amiodarone 200mg QD. Called cardiology office per patient request, they will try to schedule him for appointment tomorrow, otherwise his appointment on 6/23 is their soonest available appointment.

## 2025-06-09 NOTE — PROGRESS NOTES
Name: Nii Gamble      : 1950      MRN: [id number]  Encounter Provider: Jenny Valenzuela DO  Encounter Date: 2025   Encounter department: [facility]    Assessment & Plan  Pleurisy  -prolong course of voltaren for an additional 5 days, can take tylenol with but avoid other NSAIDs  -return to ED precautions for worsening chest pain, shortness of breath  Orders:    diclofenac (VOLTAREN) 75 mg EC tablet; Take 1 tablet (75 mg total) by mouth 2 (two) times a day    Paroxysmal atrial fibrillation (HCC)  -following with cardiology, managed on amiodarone 200mg QD. Called cardiology office per patient request, they will try to schedule him for appointment tomorrow, otherwise his appointment on  is their soonest available appointment.        Impacted cerumen of left ear    Orders:    carbamide peroxide (DEBROX) 6.5 % otic solution; Administer 5 drops into both ears 2 (two) times a day         History of Present Illness     Patient presents for ED follow up visit. Was seen in ED at [facility] on  for chest pain, diagnosed with pleursy based on imaging done, cardiac workup negative for MI. Prescribed 4- days of voltaren, 75mg BID, states it helps with the pain until it wears off.     Patient is concerned that the pleursy was caused by the amiodarone which he is taking for Afib after valve replacement surgery. He is following with cardiology, and has an upcoming appointment on  to discuss discontinuing the amiodarone but would like to be seen sooner, was told if his PCP calls the cardiology office they may be able to get him in sooner.    Will be seeing the kidney surgeon at [place] in September to discuss surgical excision of his renal mass.        Review of Systems   Constitutional:  Negative for chills and fever.   HENT:  Negative for ear pain and sore throat.    Eyes:  Negative for pain and visual disturbance.   Respiratory:  Negative for cough and shortness of breath.     Cardiovascular:  Positive for chest pain. Negative for palpitations.   Gastrointestinal:  Negative for abdominal pain and vomiting.   Genitourinary:  Negative for dysuria and hematuria.   Musculoskeletal:  Negative for arthralgias and back pain.   Skin:  Negative for color change and rash.   Neurological:  Negative for seizures and syncope.   All other systems reviewed and are negative.    Past Medical History[1]  Past Surgical History[2]  Family History[3]  Social History[4]  Medications[5]  Allergies   Allergen Reactions    Pseudoephedrine Dizziness and Hives     Reaction Date: 16Apr2011;    Sulfa Antibiotics Dizziness, Hives and Other (See Comments)     Immunization History   Administered Date(s) Administered    COVID-19 PFIZER VACCINE 0.3 ML IM 03/25/2021, 04/16/2021, 10/30/2021    INFLUENZA 10/19/2015, 11/07/2016, 10/11/2017, 11/02/2022, 12/15/2023    Influenza Quadrivalent, 6-35 Months IM 10/19/2015, 11/07/2016    Influenza Split High Dose Preservative Free IM 10/11/2017, 01/20/2025    Influenza, high dose seasonal 0.7 mL 10/23/2018, 09/24/2019, 11/06/2020, 02/14/2022, 11/02/2022    Influenza, seasonal, injectable 10/12/2013, 11/14/2014    Pneumococcal Conjugate 13-Valent 10/19/2015    Pneumococcal Polysaccharide PPV23 11/07/2016    Tdap 01/20/2014, 03/27/2024    Zoster Vaccine Recombinant 09/15/2020, 11/18/2020     Objective   /60 (BP Location: Right arm, Patient Position: Sitting, Cuff Size: Large)   Pulse 75   Temp 97.5 °F (36.4 °C) (Tympanic)   Ht 6' (1.829 m)   Wt 112 kg (247 lb)   SpO2 98%   BMI 33.50 kg/m²     Physical Exam  Constitutional:       General: He is not in acute distress.     Appearance: Normal appearance. He is not ill-appearing or toxic-appearing.   HENT:      Head: Normocephalic and atraumatic.      Right Ear: External ear normal. There is no impacted cerumen.      Left Ear: External ear normal. There is impacted cerumen.      Nose: Nose normal.     Eyes:       Conjunctiva/sclera: Conjunctivae normal.       Cardiovascular:      Rate and Rhythm: Normal rate and regular rhythm.      Heart sounds: Normal heart sounds. No murmur heard.  Pulmonary:      Effort: No respiratory distress.      Breath sounds: Normal breath sounds. No wheezing, rhonchi or rales.     Skin:     General: Skin is warm and dry.     Neurological:      General: No focal deficit present.      Mental Status: He is alert and oriented to person, place, and time.     Psychiatric:         Mood and Affect: Mood normal.         Behavior: Behavior normal.              [1]   Past Medical History:  Diagnosis Date    Acute medial meniscus tear     Allergic rhinitis     Allergic rhinitis     last assessed 5/8/14    Anxiety     Appendicitis     Arthritis     Benign essential hypertension     last assessed 1/6/14    Biceps tendon tear     Cancer (HCC)     skin    Cervical radiculopathy     and lumbar    Chronic pain disorder     knees and hips     Colon polyps     Coronary artery disease     CPAP (continuous positive airway pressure) dependence     Depression     Depression with anxiety     Dyskinesia of esophagus     Erythema migrans (Lyme disease)     last assessed 10/16/12    Fatigue     GERD (gastroesophageal reflux disease)     Heart disease     Heart murmur 1951    Heel spur, right     Hernia     Herpes     Herpes zoster     History of benign esophageal tumor     Hyperlipidemia     Hyperplastic colon polyp     last assessed 11/14/14    Joint pain     Kidney stone 1995    Kidney stones     Lyme disease     Myocardial infarction (HCC)     Nephrolithiasis     Panic disorder with agoraphobia     last assessed 8/7/13    Seasonal affective disorder (HCC)     Shingles 2015    Sleep apnea     Vertigo     Vitamin D deficiency    [2]   Past Surgical History:  Procedure Laterality Date    APPENDECTOMY  08/01/2003    BICEPS TENDON REPAIR      CARDIAC OTHER      stents x2    COLONOSCOPY      ENDOSCOPIC ULTRASOUND (UPPER)       FRACTURE SURGERY      KNEE ARTHROSCOPY      with medial meniscus repair, resolved 05    KNEE CARTILAGE SURGERY      ORTHOPEDIC SURGERY      OTHER SURGICAL HISTORY      distal reinsertion of ruptured biceps tendon    [place] ARTHRS KNE SURG W/MENISCECTOMY MED/LAT W/SHVG Right 2019    Procedure: RIGHT KNEE ARTHROSCOPY, MEDIAL MENISCECTOMY;  Surgeon: Rick Farris DO;  Location:  MAIN OR;  Service: Orthopedics    TOOTH EXTRACTION      Middle of 2019    VASECTOMY     [3]   Family History  Problem Relation Name Age of Onset    Diabetes Mother Maribell Gamble     Coronary artery disease Father Amado Gamble     Kidney disease Father Amado Gamble     Heart disease Father Amado Gamble     Cancer Father Amado Gamble         kidney cancer    Colon cancer Neg Hx      Colon polyps Neg Hx     [4]   Social History  Tobacco Use    Smoking status: Former     Current packs/day: 0.00     Average packs/day: 1 pack/day for 19.3 years (19.3 ttl pk-yrs)     Types: Cigarettes, Cigars     Start date: 11/15/1967     Quit date: 3/1/1987     Years since quittin.3    Smokeless tobacco: Never   Vaping Use    Vaping status: Never Used   Substance and Sexual Activity    Alcohol use: Yes     Alcohol/week: 1.0 standard drink of alcohol     Types: 1 Glasses of wine per week     Comment: an couple times a month a glass of wine    Drug use: No    Sexual activity: Yes     Partners: Female   [5]   Current Outpatient Medications on File Prior to Visit   Medication Sig    ALPRAZolam (Xanax) 0.25 mg tablet Take 1 tablet (0.25 mg total) by mouth 3 (three) times a day as needed for anxiety    amiodarone 200 mg tablet Take 200 mg by mouth in the morning.    aspirin 81 mg chewable tablet every 24 hours    atorvastatin (LIPITOR) 80 mg tablet every 24 hours    butalbital-acetaminophen-caffeine (FIORICET,ESGIC) -40 mg per tablet Take 1 tablet by mouth every 4 (four) hours as needed for headaches    Diclofenac  Sodium (VOLTAREN) 1 % Apply 2 g topically 4 (four) times a day    ezetimibe (ZETIA) 10 mg tablet     HYDROcodone-acetaminophen (Norco) 5-325 mg per tablet Take 1 tablet by mouth 3 (three) times a day as needed for pain For ongoing therapy DO NOT FILL BEFORE: 05/08/25 Max Daily Amount: 3 tablets    midodrine (PROAMATINE) 2.5 mg tablet Take 2.5 mg by mouth 3 (three) times a day before meals    pantoprazole (PROTONIX) 40 mg tablet Take 1 tablet (40 mg total) by mouth daily    tamsulosin (FLOMAX) 0.4 mg Take 1 capsule (0.4 mg total) by mouth daily with dinner    vilazodone (VIIBRYD) 20 mg tablet Take 1 tablet (20 mg total) by mouth daily with breakfast    [DISCONTINUED] HYDROcodone-acetaminophen (NORCO) 5-325 mg per tablet Take 1 tablet by mouth 3 (three) times a day as needed for pain for ongoing therapy DO NOT FILL BEFORE: 06/06/25 Max Daily Amount: 3 tablets    [DISCONTINUED] HYDROcodone-acetaminophen (NORCO) 5-325 mg per tablet Take 1 tablet by mouth 3 (three) times a day as needed for pain for ongoing therapy DO NOT FILL BEFORE: 07/03/25 Max Daily Amount: 3 tablets    [DISCONTINUED] cholecalciferol (VITAMIN D3) 1,000 units tablet Take 1,000 Units by mouth daily (Patient not taking: Reported on 4/28/2025)    [DISCONTINUED] CHONDROITIN SULFATE A PO Take by mouth in the morning (Patient not taking: Reported on 4/28/2025)    [DISCONTINUED] Coenzyme Q10 (CoQ-10) 100 MG CAPS every 24 hours (Patient not taking: Reported on 4/28/2025)    [DISCONTINUED] famotidine (PEPCID) 20 mg tablet Take 1 tablet (20 mg total) by mouth daily at bedtime (Patient not taking: Reported on 4/28/2025)    [DISCONTINUED] GLUCOSAMINE-CALCIUM-VIT D PO Take 1,200 mg by mouth 1200mg (Patient not taking: Reported on 6/9/2025)    [DISCONTINUED] Hyoscyamine Sulfate SL 0.125 MG SUBL Place 0.125 mg under the tongue 3 (three) times a day as needed (as needed) (Patient not taking: Reported on 6/9/2025)    [DISCONTINUED] Ibuprofen 200 MG CAPS 3 (three)  times a day (Patient not taking: Reported on 6/9/2025)    [DISCONTINUED] Jardiance 10 MG TABS tablet 1 tablet Orally Once a day 90 days (Patient not taking: Reported on 5/13/2024)    [DISCONTINUED] lisinopril (ZESTRIL) 2.5 mg tablet every 24 hours (Patient not taking: Reported on 6/9/2025)    [DISCONTINUED] Multiple Vitamin (multivitamin) tablet Take 1 tablet by mouth daily (Patient not taking: Reported on 6/9/2025)    [DISCONTINUED] Multiple Vitamins-Minerals (MULTI FOR HIM 50+) TABS Take by mouth (Patient not taking: Reported on 4/28/2025)    [DISCONTINUED] naloxone (NARCAN) 4 mg/0.1 mL nasal spray Administer 1 spray into a nostril. If no response after 2-3 minutes, give another dose in the other nostril using a new spray. (Patient not taking: Reported on 1/17/2025)    [DISCONTINUED] Omega-3 Fatty Acids (Fish Oil) 1200 MG CAPS 1 capsule every 24 hours (Patient not taking: Reported on 6/9/2025)

## 2025-06-13 DIAGNOSIS — F41.9 ANXIETY: ICD-10-CM

## 2025-06-13 RX ORDER — ALPRAZOLAM 0.25 MG
0.25 TABLET ORAL 3 TIMES DAILY PRN
Qty: 30 TABLET | Refills: 0 | Status: SHIPPED | OUTPATIENT
Start: 2025-06-13

## 2025-06-30 ENCOUNTER — HOSPITAL ENCOUNTER (OUTPATIENT)
Dept: HOSPITAL 99 - CRHB | Age: 75
Discharge: HOME | End: 2025-06-30
Payer: COMMERCIAL

## 2025-06-30 DIAGNOSIS — Z95.2: Primary | ICD-10-CM

## 2025-07-07 ENCOUNTER — HOSPITAL ENCOUNTER (OUTPATIENT)
Dept: HOSPITAL 99 - CRHB | Age: 75
Discharge: HOME | End: 2025-07-07
Payer: COMMERCIAL

## 2025-07-07 DIAGNOSIS — I48.91: ICD-10-CM

## 2025-07-07 DIAGNOSIS — Z95.3: Primary | ICD-10-CM

## 2025-07-15 DIAGNOSIS — G43.811 OTHER MIGRAINE WITH STATUS MIGRAINOSUS, INTRACTABLE: ICD-10-CM

## 2025-07-15 RX ORDER — BUTALBITAL, ACETAMINOPHEN AND CAFFEINE 50; 325; 40 MG/1; MG/1; MG/1
1 TABLET ORAL EVERY 4 HOURS PRN
Qty: 30 TABLET | Refills: 0 | Status: SHIPPED | OUTPATIENT
Start: 2025-07-15

## 2025-07-18 DIAGNOSIS — F41.9 ANXIETY: ICD-10-CM

## 2025-07-18 RX ORDER — ALPRAZOLAM 0.25 MG
0.25 TABLET ORAL 3 TIMES DAILY PRN
Qty: 30 TABLET | Refills: 0 | Status: SHIPPED | OUTPATIENT
Start: 2025-07-18

## 2025-07-19 ENCOUNTER — HOSPITAL ENCOUNTER (EMERGENCY)
Dept: HOSPITAL 99 - EMR | Age: 75
LOS: 1 days | Discharge: HOME | End: 2025-07-20
Payer: COMMERCIAL

## 2025-07-19 VITALS — DIASTOLIC BLOOD PRESSURE: 88 MMHG | SYSTOLIC BLOOD PRESSURE: 130 MMHG

## 2025-07-19 VITALS — SYSTOLIC BLOOD PRESSURE: 144 MMHG | DIASTOLIC BLOOD PRESSURE: 91 MMHG

## 2025-07-19 VITALS — SYSTOLIC BLOOD PRESSURE: 150 MMHG | DIASTOLIC BLOOD PRESSURE: 89 MMHG

## 2025-07-19 VITALS — BODY MASS INDEX: 33 KG/M2

## 2025-07-19 DIAGNOSIS — R53.1: ICD-10-CM

## 2025-07-19 DIAGNOSIS — R53.83: ICD-10-CM

## 2025-07-19 DIAGNOSIS — R51.9: Primary | ICD-10-CM

## 2025-07-19 DIAGNOSIS — Z79.82: ICD-10-CM

## 2025-07-19 DIAGNOSIS — Z87.442: ICD-10-CM

## 2025-07-19 DIAGNOSIS — Z85.528: ICD-10-CM

## 2025-07-19 DIAGNOSIS — Z87.891: ICD-10-CM

## 2025-07-19 DIAGNOSIS — Z88.8: ICD-10-CM

## 2025-07-19 DIAGNOSIS — E78.00: ICD-10-CM

## 2025-07-19 DIAGNOSIS — Z88.2: ICD-10-CM

## 2025-07-19 DIAGNOSIS — I35.0: ICD-10-CM

## 2025-07-19 DIAGNOSIS — K21.9: ICD-10-CM

## 2025-07-19 DIAGNOSIS — I25.10: ICD-10-CM

## 2025-07-19 DIAGNOSIS — I10: ICD-10-CM

## 2025-07-19 LAB
ALBUMIN SERPL-MCNC: 4.3 G/DL (ref 3.5–5)
ALP SERPL-CCNC: 65 U/L (ref 38–126)
ALT SERPL-CCNC: 21 U/L (ref 0–50)
AST SERPL-CCNC: 22 U/L (ref 17–59)
BASOPHILS # BLD AUTO: 0 10^3/UL (ref 0–0.2)
BASOPHILS NFR BLD AUTO: 0.4 % (ref 0–2)
BILIRUB SERPL-MCNC: 0.5 MG/DL (ref 0.2–1.3)
BUN SERPL-MCNC: 36 MG/DL (ref 9–20)
CALCIUM SERPL-MCNC: 9.6 MG/DL (ref 8.4–10.2)
CHLORIDE SERPL-SCNC: 105 MMOL/L (ref 98–107)
CO2 SERPL-SCNC: 27 MMOL/L (ref 22–30)
COMMENT: (no result)
CREAT SERPL-MCNC: 1.3 MG/DL (ref 0.7–1.3)
EGFR: 58.01
EOSINOPHIL # BLD AUTO: 0.3 10^3/UL (ref 0–0.7)
EOSINOPHIL NFR BLD AUTO: 3.9 % (ref 0–6)
ERYTHROCYTE [DISTWIDTH] IN BLOOD BY AUTOMATED COUNT: 15 % (ref 11.5–14.5)
ESTIMATED CREATININE CLEARANCE: (no result) ML/MIN
GLUCOSE SERPL-MCNC: 92 MG/DL (ref 70–99)
HCT VFR BLD AUTO: 38.4 % (ref 39–52)
HGB BLD-MCNC: 12.9 G/DL (ref 13–18)
IMM GRANULOCYTES # BLD AUTO: 0 10^3/UL (ref 0–0.05)
IMM GRANULOCYTES NFR BLD AUTO: 0.1 % (ref 0–0.5)
LYMPHOCYTES # BLD AUTO: 2.5 10^3/UL (ref 1.2–3.4)
LYMPHOCYTES NFR BLD AUTO: 32.9 % (ref 20.5–51.1)
MCH RBC QN AUTO: 31.3 PG (ref 27–31)
MCHC RBC AUTO-ENTMCNC: 33.6 G/DL (ref 33–37)
MCV RBC AUTO: 93.2 FL (ref 80–94)
MONOCYTES # BLD AUTO: 0.7 10^3/UL (ref 0.1–0.6)
MONOCYTES NFR BLD AUTO: 9.6 % (ref 1.7–9.3)
NEUTROPHILS # BLD AUTO: 4 10^3/UL (ref 1.4–6.5)
NEUTROPHILS NFR BLD AUTO: 53.1 % (ref 42.2–75.2)
NRBC BLD AUTO-RTO: 0 %
PLATELET # BLD AUTO: 157 10^3/UL (ref 130–400)
PMV BLD AUTO: 10.1 FL (ref 7.4–10.4)
POTASSIUM SERPL-SCNC: 4.5 MMOL/L (ref 3.5–5.1)
PROT SERPL-MCNC: 7.1 G/DL (ref 6.3–8.2)
RBC # BLD AUTO: 4.12 10^6/UL (ref 4.7–6.1)
SODIUM SERPL-SCNC: 136 MMOL/L (ref 135–145)
WBC # BLD AUTO: 7.6 10^3/UL (ref 4.8–10.8)

## 2025-07-19 PROCEDURE — 96374 THER/PROPH/DIAG INJ IV PUSH: CPT

## 2025-07-19 PROCEDURE — 96375 TX/PRO/DX INJ NEW DRUG ADDON: CPT

## 2025-07-19 PROCEDURE — 99285 EMERGENCY DEPT VISIT HI MDM: CPT

## 2025-07-20 VITALS — SYSTOLIC BLOOD PRESSURE: 139 MMHG | DIASTOLIC BLOOD PRESSURE: 91 MMHG

## 2025-07-20 VITALS — SYSTOLIC BLOOD PRESSURE: 143 MMHG | DIASTOLIC BLOOD PRESSURE: 84 MMHG

## 2025-07-20 VITALS — DIASTOLIC BLOOD PRESSURE: 84 MMHG | SYSTOLIC BLOOD PRESSURE: 143 MMHG

## 2025-07-20 LAB
MAGNESIUM SERPL-MCNC: 2.1 MG/DL (ref 1.6–2.3)
NT-PROBNP SERPL-MCNC: 177 PG/ML
TROPONIN I SERPL-MCNC: < 0.012 NG/ML
TSH SERPL DL<=0.05 MIU/L-ACNC: 1.22 UIU/ML (ref 0.47–4.68)

## 2025-07-20 RX ADMIN — FAMOTIDINE 20 MG: 10 INJECTION INTRAVENOUS at 00:13

## 2025-07-20 RX ADMIN — METOCLOPRAMIDE HYDROCHLORIDE 10 MG: 5 INJECTION INTRAMUSCULAR; INTRAVENOUS at 00:13

## 2025-07-20 RX ADMIN — ACETAMINOPHEN 1000 MG: 500 TABLET ORAL at 00:57

## 2025-07-21 ENCOUNTER — OFFICE VISIT (OUTPATIENT)
Dept: PAIN MEDICINE | Facility: CLINIC | Age: 75
End: 2025-07-21
Payer: COMMERCIAL

## 2025-07-21 VITALS
WEIGHT: 252 LBS | OXYGEN SATURATION: 97 % | TEMPERATURE: 98.7 F | HEART RATE: 82 BPM | HEIGHT: 72 IN | BODY MASS INDEX: 34.13 KG/M2

## 2025-07-21 DIAGNOSIS — G89.4 CHRONIC PAIN SYNDROME: ICD-10-CM

## 2025-07-21 DIAGNOSIS — M47.812 CERVICAL SPONDYLOSIS: ICD-10-CM

## 2025-07-21 DIAGNOSIS — M47.816 LUMBAR SPONDYLOSIS: ICD-10-CM

## 2025-07-21 DIAGNOSIS — G57.01 PIRIFORMIS SYNDROME OF RIGHT SIDE: ICD-10-CM

## 2025-07-21 DIAGNOSIS — M54.12 CERVICAL RADICULOPATHY: Primary | ICD-10-CM

## 2025-07-21 PROCEDURE — 99214 OFFICE O/P EST MOD 30 MIN: CPT | Performed by: PHYSICIAN ASSISTANT

## 2025-07-21 RX ORDER — HYDROCODONE BITARTRATE AND ACETAMINOPHEN 5; 325 MG/1; MG/1
1 TABLET ORAL 3 TIMES DAILY PRN
Qty: 60 TABLET | Refills: 0 | Status: SHIPPED | OUTPATIENT
Start: 2025-07-21

## 2025-07-21 RX ORDER — HYDROCODONE BITARTRATE AND ACETAMINOPHEN 5; 325 MG/1; MG/1
1 TABLET ORAL 3 TIMES DAILY PRN
Qty: 90 TABLET | Refills: 0 | Status: SHIPPED | OUTPATIENT
Start: 2025-07-21

## 2025-07-21 NOTE — PROGRESS NOTES
Name: Nii Gamble      : 1950      MRN: [id number]  Encounter Provider: Amber Escobar PA-C  Encounter Date: 2025   Encounter department: [facility]  :  Assessment & Plan  Cervical radiculopathy  While the patient was in the office today, I did have a thorough conversation regarding their chronic pain syndrome, medication management, and treatment plan options.    Discussed the role of a cervical epidural steroid injection for consideration if his radicular symptoms increase.    In the meantime, he may continue the current medication regimen of Norco 5/325 3 times daily as needed.  On today's visit I have electronically sent the next 3 months of refills to his pharmacy with the appropriate fill dates.    Follow-up is planned in 3 months or sooner as warranted. The patient was advised to contact the office should their symptoms worsen in the interim.         Cervical spondylosis         Piriformis syndrome of right side         Lumbar spondylosis    Orders:  •  HYDROcodone-acetaminophen (Norco) 5-325 mg per tablet; Take 1 tablet by mouth 3 (three) times a day as needed for pain For ongoing therapy DO NOT FILL BEFORE: 25 Max Daily Amount: 3 tablets  •  HYDROcodone-acetaminophen (NORCO) 5-325 mg per tablet; Take 1 tablet by mouth 3 (three) times a day as needed for pain For ongoing therapy DO NOT FILL BEFORE: 25 Max Daily Amount: 3 tablets  •  HYDROcodone-acetaminophen (NORCO) 5-325 mg per tablet; Take 1 tablet by mouth 3 (three) times a day as needed for pain For ongoing therapy DO NOT FILL BEFORE: 25 Max Daily Amount: 3 tablets    Chronic pain syndrome    Orders:  •  HYDROcodone-acetaminophen (Norco) 5-325 mg per tablet; Take 1 tablet by mouth 3 (three) times a day as needed for pain For ongoing therapy DO NOT FILL BEFORE: 25 Max Daily Amount: 3 tablets  •  HYDROcodone-acetaminophen (NORCO) 5-325 mg per tablet; Take 1 tablet by mouth 3 (three)  times a day as needed for pain For ongoing therapy DO NOT FILL BEFORE: 08/30/25 Max Daily Amount: 3 tablets  •  HYDROcodone-acetaminophen (NORCO) 5-325 mg per tablet; Take 1 tablet by mouth 3 (three) times a day as needed for pain For ongoing therapy DO NOT FILL BEFORE: 09/28/25 Max Daily Amount: 3 tablets        There are risks associated with opioid medications, including dependence, addiction and tolerance. The patient understands and agrees to use these medications only as prescribed. Potential side effects of the medications include, but are not limited to, constipation, drowsiness, addiction, impaired judgment and risk of fatal overdose if not taken as prescribed. The patient was warned against driving while taking sedation medications.  Sharing medications is a felony. At this point in time, the patient is showing no signs of addiction, abuse, diversion or suicidal ideation.  [place] Prescription Drug Monitoring Program report was reviewed and was appropriate      My impressions and treatment recommendations were discussed in detail with the patient who verbalized understanding and had no further questions.  Discharge instructions were provided. I personally saw and examined the patient and I agree with the above discussed plan of care.    History of Present Illness     Nii Gamble is a 74 y.o. male who presents for a follow up office visit in regards to Back Pain and Knee Pain. The patient’s current symptoms include neck pain secondary to cervical spondylosis and spinal stenosis with radiation into the right upper extremity, increased left low back pain and increased right sided buttock pain secondary to piriformis syndrome.  His current pain level is a 3 out of 10.  Patient is interested in a repeat therapeutic right piriformis injection however he states he will need to call to schedule this as he has other appointments to work around.  He is also aware he is a candidate for cervical epidural steroid  injection in the future.  Currently he is managing his pain with Norco 5/325 3 times daily as needed.  Patient is scheduled for removal of a renal mass.  He has recovered from cardiac valve replacement and is done with therapy.    Opioid contract date 11/5/2024    Review of Systems   Respiratory:  Negative for shortness of breath.    Cardiovascular:  Negative for chest pain.   Gastrointestinal:  Negative for constipation, diarrhea, nausea and vomiting.   Musculoskeletal:  Positive for arthralgias. Negative for gait problem, joint swelling and myalgias.   Skin:  Negative for rash.   Neurological:  Negative for dizziness, seizures and weakness.   Psychiatric/Behavioral:  Negative for dysphoric mood.    All other systems reviewed and are negative.   11/5/2024    Medical History Reviewed by provider this encounter:  Tobacco  Allergies  Meds  Problems  Med Hx  Surg Hx  Fam Hx     .    Objective   Pulse 82   Temp 98.7 °F (37.1 °C)   Ht 6' (1.829 m)   Wt 114 kg (252 lb)   SpO2 97%   BMI 34.18 kg/m²      Pain Score:   3  Physical Exam  Constitutional: normal, well developed, well nourished, alert, in no distress and non-toxic and no overt pain behavior.  Eyes: anicteric  HEENT: grossly intact  Neck: supple, symmetric, trachea midline and no masses   Pulmonary: even and unlabored  Cardiovascular: No edema or pitting edema present  Skin: Normal without rashes or lesions and well hydrated  Psychiatric: Mood and affect appropriate  Neurologic: Cranial Nerves II-XII grossly intact  Musculoskeletal: Tender right piriformis.  Gait is slow but stable.

## 2025-07-21 NOTE — ASSESSMENT & PLAN NOTE
While the patient was in the office today, I did have a thorough conversation regarding their chronic pain syndrome, medication management, and treatment plan options.    Discussed the role of a cervical epidural steroid injection for consideration if his radicular symptoms increase.    In the meantime, he may continue the current medication regimen of Norco 5/325 3 times daily as needed.  On today's visit I have electronically sent the next 3 months of refills to his pharmacy with the appropriate fill dates.    Follow-up is planned in 3 months or sooner as warranted. The patient was advised to contact the office should their symptoms worsen in the interim.

## 2025-07-21 NOTE — ASSESSMENT & PLAN NOTE
Orders:  •  HYDROcodone-acetaminophen (Norco) 5-325 mg per tablet; Take 1 tablet by mouth 3 (three) times a day as needed for pain For ongoing therapy DO NOT FILL BEFORE: 08/01/25 Max Daily Amount: 3 tablets  •  HYDROcodone-acetaminophen (NORCO) 5-325 mg per tablet; Take 1 tablet by mouth 3 (three) times a day as needed for pain For ongoing therapy DO NOT FILL BEFORE: 08/30/25 Max Daily Amount: 3 tablets  •  HYDROcodone-acetaminophen (NORCO) 5-325 mg per tablet; Take 1 tablet by mouth 3 (three) times a day as needed for pain For ongoing therapy DO NOT FILL BEFORE: 09/28/25 Max Daily Amount: 3 tablets

## 2025-07-25 ENCOUNTER — OFFICE VISIT (OUTPATIENT)
Dept: FAMILY MEDICINE CLINIC | Facility: CLINIC | Age: 75
End: 2025-07-25
Payer: COMMERCIAL

## 2025-07-25 VITALS
SYSTOLIC BLOOD PRESSURE: 124 MMHG | HEART RATE: 78 BPM | TEMPERATURE: 97.9 F | DIASTOLIC BLOOD PRESSURE: 82 MMHG | BODY MASS INDEX: 34.45 KG/M2 | WEIGHT: 254 LBS | OXYGEN SATURATION: 98 %

## 2025-07-25 DIAGNOSIS — Z79.2 NEED FOR ANTIBIOTIC PROPHYLAXIS FOR DENTAL PROCEDURE: ICD-10-CM

## 2025-07-25 DIAGNOSIS — A69.20 LYME DISEASE: Primary | ICD-10-CM

## 2025-07-25 PROCEDURE — 99213 OFFICE O/P EST LOW 20 MIN: CPT

## 2025-07-25 RX ORDER — DOXYCYCLINE HYCLATE 100 MG
100 TABLET ORAL 2 TIMES DAILY
Qty: 36 TABLET | Refills: 0 | Status: SHIPPED | OUTPATIENT
Start: 2025-07-25 | End: 2025-08-12

## 2025-07-25 RX ORDER — AMOXICILLIN 500 MG/1
2000 CAPSULE ORAL ONCE
Qty: 4 CAPSULE | Refills: 0 | Status: SHIPPED | OUTPATIENT
Start: 2025-07-25 | End: 2025-07-25

## 2025-07-25 RX ORDER — DOXYCYCLINE 50 MG/1
50 TABLET ORAL 2 TIMES DAILY
COMMUNITY

## 2025-07-25 NOTE — ASSESSMENT & PLAN NOTE
-blood collected in ED at [facility] on 7/19 resulted positive for acute infection.     Plan:  Given joint pain, severity of fatigue, will prolong course from 10 to 28 days of doxycycline BID   Orders:    doxycycline hyclate (VIBRA-TABS) 100 mg tablet; Take 1 tablet (100 mg total) by mouth 2 (two) times a day for 18 days

## 2025-07-25 NOTE — PROGRESS NOTES
Name: Nii Gamble      : 1950      MRN: [id number]  Encounter Provider: Jenny Valenzuela DO  Encounter Date: 2025   Encounter department: [facility]    Assessment & Plan  Lyme disease  -blood collected in ED at [facility] on  resulted positive for acute infection.     Plan:  Given joint pain, severity of fatigue, will prolong course from 10 to 28 days of doxycycline BID   Orders:    doxycycline hyclate (VIBRA-TABS) 100 mg tablet; Take 1 tablet (100 mg total) by mouth 2 (two) times a day for 18 days    Need for antibiotic prophylaxis for dental procedure  -s/p aortic valve replacement in 2025, needs prophylactic abx for dental work planned for August     Plan:  Amoxicillin 2g half an hour to an hour prior to procedure  Orders:    amoxicillin (AMOXIL) 500 mg capsule; Take 4 capsules (2,000 mg total) by mouth 1 (one) time for 1 dose Take 2g (4 tablets total) 30 minutes to an hour prior to dental procedure         History of Present Illness     Seen in ED at [facility] on  for 2 weeks of headache, fatigue. In ED workup including CT Head, EKG, and labwork was WNL. Patient was given IV pepcid, reglan, and PO tylenol and  discharged to home.     Was tested for lyme disease in ED, which later resulted positive. Started on a 10-day course of doxycycline BID. He started this yesterday. Still feeling very fatigued, most tired he has ever been in his life, per patient.     Patient follows with pain management for cervical stenosis, last seen on . Is managed with norco TID prn.     His cardiologist recently discontinued his midodrine and amiodarone.       Review of Systems   Constitutional:  Positive for fatigue. Negative for chills and fever.   HENT:  Negative for ear pain and sore throat.    Eyes:  Negative for pain and visual disturbance.   Respiratory:  Negative for cough and shortness of breath.    Cardiovascular:  Negative for chest pain and palpitations.    Gastrointestinal:  Negative for abdominal pain and vomiting.   Genitourinary:  Negative for dysuria and hematuria.   Musculoskeletal:  Positive for arthralgias and myalgias. Negative for back pain.   Skin:  Negative for color change and rash.   Neurological:  Negative for seizures and syncope.   All other systems reviewed and are negative.    Past Medical History[1]  Past Surgical History[2]  Family History[3]  Social History[4]  Medications[5]  Allergies   Allergen Reactions    Pseudoephedrine Dizziness and Hives     Reaction Date: 16Apr2011;    Sulfa Antibiotics Dizziness, Hives and Other (See Comments)     Immunization History   Administered Date(s) Administered    COVID-19 PFIZER VACCINE 0.3 ML IM 03/25/2021, 04/16/2021, 10/30/2021    INFLUENZA 10/19/2015, 11/07/2016, 10/11/2017, 11/02/2022, 12/15/2023    Influenza Quadrivalent, 6-35 Months IM 10/19/2015, 11/07/2016    Influenza Split High Dose Preservative Free IM 10/11/2017, 01/20/2025    Influenza, high dose seasonal 0.7 mL 10/23/2018, 09/24/2019, 11/06/2020, 02/14/2022, 11/02/2022    Influenza, seasonal, injectable 10/12/2013, 11/14/2014    Pneumococcal Conjugate 13-Valent 10/19/2015    Pneumococcal Polysaccharide PPV23 11/07/2016    Tdap 01/20/2014, 03/27/2024    Zoster Vaccine Recombinant 09/15/2020, 11/18/2020     Objective   /82 (BP Location: Left arm, Patient Position: Sitting, Cuff Size: Standard)   Pulse 78   Temp 97.9 °F (36.6 °C) (Tympanic)   Wt 115 kg (254 lb)   SpO2 98%   BMI 34.45 kg/m²     Physical Exam  Constitutional:       General: He is not in acute distress.     Appearance: Normal appearance. He is not ill-appearing or toxic-appearing.   HENT:      Head: Normocephalic and atraumatic.      Right Ear: External ear normal.      Left Ear: External ear normal.      Nose: Nose normal.     Eyes:      Conjunctiva/sclera: Conjunctivae normal.       Cardiovascular:      Rate and Rhythm: Normal rate and regular rhythm.      Heart sounds:  Normal heart sounds. No murmur heard.  Pulmonary:      Effort: No respiratory distress.      Breath sounds: Normal breath sounds. No wheezing, rhonchi or rales.     Skin:     General: Skin is warm and dry.     Neurological:      General: No focal deficit present.      Mental Status: He is alert and oriented to person, place, and time.     Psychiatric:         Mood and Affect: Mood normal.         Behavior: Behavior normal.                [1]   Past Medical History:  Diagnosis Date    Acute medial meniscus tear     Allergic rhinitis     Allergic rhinitis     last assessed 5/8/14    Anxiety     Appendicitis     Arthritis     Benign essential hypertension     last assessed 1/6/14    Biceps tendon tear     Cancer (HCC)     skin    Cervical radiculopathy     and lumbar    Chronic pain disorder     knees and hips     Colon polyps     Coronary artery disease     CPAP (continuous positive airway pressure) dependence     Depression     Depression with anxiety     Dyskinesia of esophagus     Erythema migrans (Lyme disease)     last assessed 10/16/12    Fatigue     GERD (gastroesophageal reflux disease)     Heart disease     Heart murmur 1950    Heel spur, right     Hernia     Herpes     Herpes zoster     History of benign esophageal tumor     Hyperlipidemia     Hyperplastic colon polyp     last assessed 11/14/14    Joint pain     Kidney stone 1995    Kidney stones     Lyme disease     Myocardial infarction (HCC)     Nephrolithiasis     Panic disorder with agoraphobia     last assessed 8/7/13    Seasonal affective disorder (HCC)     Shingles 2015    Sleep apnea     Vertigo     Vitamin D deficiency    [2]   Past Surgical History:  Procedure Laterality Date    AORTIC VALVE REPLACEMENT  03/2025    APPENDECTOMY  08/01/2003    BICEPS TENDON REPAIR      CARDIAC OTHER      stents x2    COLONOSCOPY      ENDOSCOPIC ULTRASOUND (UPPER)      FRACTURE SURGERY      KNEE ARTHROSCOPY      with medial meniscus repair, resolved 01/01/05     KNEE CARTILAGE SURGERY      ORTHOPEDIC SURGERY      OTHER SURGICAL HISTORY      distal reinsertion of ruptured biceps tendon    VA ARTHRS KNE SURG W/MENISCECTOMY MED/LAT W/SHVG Right 2019    Procedure: RIGHT KNEE ARTHROSCOPY, MEDIAL MENISCECTOMY;  Surgeon: Rick Farris DO;  Location:  MAIN OR;  Service: Orthopedics    TOOTH EXTRACTION      Middle of 2019    VASECTOMY     [3]   Family History  Problem Relation Name Age of Onset    Diabetes Mother Maribell Gamble     Coronary artery disease Father Amado Gamble     Kidney disease Father Amado Gamble     Heart disease Father Amado Gamble     Cancer Father Amado Gamble         kidney cancer    Colon cancer Neg Hx      Colon polyps Neg Hx     [4]   Social History  Tobacco Use    Smoking status: Former     Current packs/day: 0.00     Average packs/day: 1 pack/day for 19.3 years (19.3 ttl pk-yrs)     Types: Cigarettes, Cigars     Start date: 11/15/1967     Quit date: 3/1/1987     Years since quittin.4    Smokeless tobacco: Never   Vaping Use    Vaping status: Never Used   Substance and Sexual Activity    Alcohol use: Yes     Alcohol/week: 1.0 standard drink of alcohol     Types: 1 Glasses of wine per week     Comment: an couple times a month a glass of wine    Drug use: No    Sexual activity: Yes     Partners: Female   [5]   Current Outpatient Medications on File Prior to Visit   Medication Sig    ALPRAZolam (Xanax) 0.25 mg tablet Take 1 tablet (0.25 mg total) by mouth 3 (three) times a day as needed for anxiety    aspirin 81 mg chewable tablet every 24 hours    atorvastatin (LIPITOR) 80 mg tablet every 24 hours    butalbital-acetaminophen-caffeine (FIORICET,ESGIC) -40 mg per tablet Take 1 tablet by mouth every 4 (four) hours as needed for headaches    carbamide peroxide (DEBROX) 6.5 % otic solution Administer 5 drops into both ears 2 (two) times a day    diclofenac (VOLTAREN) 75 mg EC tablet Take 1 tablet (75 mg total) by  mouth 2 (two) times a day    Diclofenac Sodium (VOLTAREN) 1 % Apply 2 g topically 4 (four) times a day    doxycycline (ADOXA) 50 MG tablet Take 50 mg by mouth 2 (two) times a day 10 day course    ezetimibe (ZETIA) 10 mg tablet     HYDROcodone-acetaminophen (Norco) 5-325 mg per tablet Take 1 tablet by mouth 3 (three) times a day as needed for pain For ongoing therapy DO NOT FILL BEFORE: 08/01/25 Max Daily Amount: 3 tablets    HYDROcodone-acetaminophen (NORCO) 5-325 mg per tablet Take 1 tablet by mouth 3 (three) times a day as needed for pain For ongoing therapy DO NOT FILL BEFORE: 08/30/25 Max Daily Amount: 3 tablets    HYDROcodone-acetaminophen (NORCO) 5-325 mg per tablet Take 1 tablet by mouth 3 (three) times a day as needed for pain For ongoing therapy DO NOT FILL BEFORE: 09/28/25 Max Daily Amount: 3 tablets    pantoprazole (PROTONIX) 40 mg tablet Take 1 tablet (40 mg total) by mouth daily    tamsulosin (FLOMAX) 0.4 mg Take 1 capsule (0.4 mg total) by mouth daily with dinner    vilazodone (VIIBRYD) 20 mg tablet Take 1 tablet (20 mg total) by mouth daily with breakfast    [DISCONTINUED] amiodarone 200 mg tablet Take 200 mg by mouth in the morning. (Patient not taking: Reported on 7/25/2025)    [DISCONTINUED] midodrine (PROAMATINE) 2.5 mg tablet Take 2.5 mg by mouth 3 (three) times a day before meals (Patient not taking: Reported on 7/25/2025)    [DISCONTINUED] Multiple Vitamins-Minerals (MULTI FOR HIM 50+) TABS Take by mouth (Patient not taking: Reported on 4/28/2025)

## 2025-07-31 ENCOUNTER — NURSE TRIAGE (OUTPATIENT)
Age: 75
End: 2025-07-31

## 2025-07-31 DIAGNOSIS — R11.0 NAUSEA: Primary | ICD-10-CM

## 2025-07-31 DIAGNOSIS — R51.9 NONINTRACTABLE HEADACHE, UNSPECIFIED CHRONICITY PATTERN, UNSPECIFIED HEADACHE TYPE: ICD-10-CM

## 2025-07-31 RX ORDER — ONDANSETRON 4 MG/1
4 TABLET, FILM COATED ORAL EVERY 8 HOURS PRN
Qty: 20 TABLET | Refills: 0 | Status: SHIPPED | OUTPATIENT
Start: 2025-07-31

## 2025-08-04 ENCOUNTER — OFFICE VISIT (OUTPATIENT)
Dept: FAMILY MEDICINE CLINIC | Facility: CLINIC | Age: 75
End: 2025-08-04
Payer: COMMERCIAL

## 2025-08-04 VITALS
SYSTOLIC BLOOD PRESSURE: 118 MMHG | DIASTOLIC BLOOD PRESSURE: 72 MMHG | TEMPERATURE: 96.1 F | BODY MASS INDEX: 34.67 KG/M2 | WEIGHT: 256 LBS | HEART RATE: 96 BPM | HEIGHT: 72 IN | OXYGEN SATURATION: 96 %

## 2025-08-04 DIAGNOSIS — G43.811 OTHER MIGRAINE WITH STATUS MIGRAINOSUS, INTRACTABLE: ICD-10-CM

## 2025-08-04 PROCEDURE — 99213 OFFICE O/P EST LOW 20 MIN: CPT

## 2025-08-04 RX ORDER — BUTALBITAL, ACETAMINOPHEN AND CAFFEINE 50; 325; 40 MG/1; MG/1; MG/1
1 TABLET ORAL EVERY 4 HOURS PRN
Qty: 30 TABLET | Refills: 0 | Status: SHIPPED | OUTPATIENT
Start: 2025-08-04

## 2025-08-09 ENCOUNTER — OFFICE VISIT (OUTPATIENT)
Dept: URGENT CARE | Facility: CLINIC | Age: 75
End: 2025-08-09
Payer: COMMERCIAL

## 2025-08-09 VITALS
HEART RATE: 72 BPM | TEMPERATURE: 96.3 F | RESPIRATION RATE: 18 BRPM | SYSTOLIC BLOOD PRESSURE: 138 MMHG | OXYGEN SATURATION: 98 % | DIASTOLIC BLOOD PRESSURE: 74 MMHG

## 2025-08-09 DIAGNOSIS — B34.9 VIRAL INFECTION: Primary | ICD-10-CM

## 2025-08-09 PROCEDURE — 99213 OFFICE O/P EST LOW 20 MIN: CPT

## 2025-08-09 RX ORDER — BENZONATATE 200 MG/1
200 CAPSULE ORAL 3 TIMES DAILY PRN
Qty: 20 CAPSULE | Refills: 0 | Status: SHIPPED | OUTPATIENT
Start: 2025-08-09

## 2025-08-13 ENCOUNTER — APPOINTMENT (OUTPATIENT)
Dept: LAB | Facility: HOSPITAL | Age: 75
End: 2025-08-13
Payer: COMMERCIAL

## 2025-08-13 ENCOUNTER — OFFICE VISIT (OUTPATIENT)
Dept: FAMILY MEDICINE CLINIC | Facility: CLINIC | Age: 75
End: 2025-08-13
Payer: COMMERCIAL

## 2025-08-13 VITALS
WEIGHT: 252 LBS | BODY MASS INDEX: 34.18 KG/M2 | TEMPERATURE: 97 F | HEART RATE: 68 BPM | SYSTOLIC BLOOD PRESSURE: 122 MMHG | OXYGEN SATURATION: 97 % | DIASTOLIC BLOOD PRESSURE: 64 MMHG

## 2025-08-13 DIAGNOSIS — G43.811 OTHER MIGRAINE WITH STATUS MIGRAINOSUS, INTRACTABLE: ICD-10-CM

## 2025-08-13 DIAGNOSIS — R53.83 OTHER FATIGUE: Primary | ICD-10-CM

## 2025-08-13 DIAGNOSIS — R53.83 OTHER FATIGUE: ICD-10-CM

## 2025-08-13 DIAGNOSIS — R51.9 CHRONIC INTRACTABLE HEADACHE, UNSPECIFIED HEADACHE TYPE: ICD-10-CM

## 2025-08-13 DIAGNOSIS — G89.29 CHRONIC INTRACTABLE HEADACHE, UNSPECIFIED HEADACHE TYPE: ICD-10-CM

## 2025-08-13 LAB
BASOPHILS # BLD AUTO: 0.03 THOUSANDS/ÂΜL (ref 0–0.1)
BASOPHILS NFR BLD AUTO: 1 % (ref 0–1)
EOSINOPHIL # BLD AUTO: 0.19 THOUSAND/ÂΜL (ref 0–0.61)
EOSINOPHIL NFR BLD AUTO: 3 % (ref 0–6)
ERYTHROCYTE [DISTWIDTH] IN BLOOD BY AUTOMATED COUNT: 15.7 % (ref 11.6–15.1)
HCT VFR BLD AUTO: 43.6 % (ref 36.5–49.3)
HGB BLD-MCNC: 14.4 G/DL (ref 12–17)
IMM GRANULOCYTES # BLD AUTO: 0.01 THOUSAND/UL (ref 0–0.2)
IMM GRANULOCYTES NFR BLD AUTO: 0 % (ref 0–2)
LYMPHOCYTES # BLD AUTO: 1.88 THOUSANDS/ÂΜL (ref 0.6–4.47)
LYMPHOCYTES NFR BLD AUTO: 31 % (ref 14–44)
MCH RBC QN AUTO: 31.2 PG (ref 26.8–34.3)
MCHC RBC AUTO-ENTMCNC: 33 G/DL (ref 31.4–37.4)
MCV RBC AUTO: 94 FL (ref 82–98)
MONOCYTES # BLD AUTO: 0.68 THOUSAND/ÂΜL (ref 0.17–1.22)
MONOCYTES NFR BLD AUTO: 11 % (ref 4–12)
NEUTROPHILS # BLD AUTO: 3.28 THOUSANDS/ÂΜL (ref 1.85–7.62)
NEUTS SEG NFR BLD AUTO: 54 % (ref 43–75)
NRBC BLD AUTO-RTO: 0 /100 WBCS
PLATELET # BLD AUTO: 165 THOUSANDS/UL (ref 149–390)
PMV BLD AUTO: 9.5 FL (ref 8.9–12.7)
RBC # BLD AUTO: 4.62 MILLION/UL (ref 3.88–5.62)
WBC # BLD AUTO: 6.07 THOUSAND/UL (ref 4.31–10.16)

## 2025-08-13 PROCEDURE — 87478 BORRELIA MIYAMOTOI AMP PRB: CPT

## 2025-08-13 PROCEDURE — 36415 COLL VENOUS BLD VENIPUNCTURE: CPT

## 2025-08-13 PROCEDURE — 85025 COMPLETE CBC W/AUTO DIFF WBC: CPT

## 2025-08-13 PROCEDURE — 87801 DETECT AGNT MULT DNA AMPLI: CPT

## 2025-08-13 PROCEDURE — 87484 EHRLICHA CHAFFEENSIS AMP PRB: CPT

## 2025-08-13 PROCEDURE — 87468 ANAPLSMA PHGCYTOPHLM AMP PRB: CPT

## 2025-08-13 PROCEDURE — 87207 SMEAR SPECIAL STAIN: CPT

## 2025-08-13 PROCEDURE — 87469 BABESIA MICROTI AMP PRB: CPT

## 2025-08-13 PROCEDURE — 99213 OFFICE O/P EST LOW 20 MIN: CPT

## 2025-08-14 LAB
BLD SMEAR FINDING POSITIVE INTERP: NO
PARASITE BLD: NO
PARASITE BLD: NO
PARASITE INFECTED RBC BLD-NFR: 0 %

## 2025-08-14 PROCEDURE — 87207 SMEAR SPECIAL STAIN: CPT | Performed by: PATHOLOGY

## 2025-08-14 PROCEDURE — 85060 BLOOD SMEAR INTERPRETATION: CPT | Performed by: PATHOLOGY

## 2025-08-16 LAB
A PHAGOCYTOPH DNA BLD QL NAA+PROBE: NOT DETECTED
B MICROTI DNA BLD QL NAA+PROBE: NOT DETECTED
B MIYAMOTOI FLAB SPEC QL NAA+PROBE: NOT DETECTED
BORRELIA DNA BLD QL NAA+PROBE: NOT DETECTED
COMMENT: NORMAL
E CHAFFEENSIS DNA BLD QL NAA+PROBE: NOT DETECTED

## 2025-08-18 ENCOUNTER — HOSPITAL ENCOUNTER (OUTPATIENT)
Dept: CT IMAGING | Facility: HOSPITAL | Age: 75
Discharge: HOME | End: 2025-08-18
Attending: UROLOGY
Payer: COMMERCIAL

## 2025-08-18 DIAGNOSIS — N28.89 OTHER SPECIFIED DISORDERS OF KIDNEY AND URETER: ICD-10-CM

## 2025-08-18 DIAGNOSIS — G43.811 OTHER MIGRAINE WITH STATUS MIGRAINOSUS, INTRACTABLE: ICD-10-CM

## 2025-08-18 PROCEDURE — 74178 CT ABD&PLV WO CNTR FLWD CNTR: CPT

## 2025-08-18 PROCEDURE — 71260 CT THORAX DX C+: CPT

## 2025-08-18 RX ADMIN — IOHEXOL 100 ML: 350 INJECTION, SOLUTION INTRAVENOUS at 16:05

## 2025-08-19 RX ORDER — BUTALBITAL, ACETAMINOPHEN AND CAFFEINE 50; 325; 40 MG/1; MG/1; MG/1
1 TABLET ORAL EVERY 4 HOURS PRN
Qty: 30 TABLET | Refills: 0 | Status: SHIPPED | OUTPATIENT
Start: 2025-08-19

## 2025-08-20 ENCOUNTER — TELEPHONE (OUTPATIENT)
Age: 75
End: 2025-08-20

## 2025-08-21 DIAGNOSIS — F41.9 ANXIETY: ICD-10-CM

## 2025-08-22 RX ORDER — ALPRAZOLAM 0.25 MG
0.25 TABLET ORAL 3 TIMES DAILY PRN
Qty: 30 TABLET | Refills: 0 | Status: SHIPPED | OUTPATIENT
Start: 2025-08-22

## (undated) DEVICE — PUDDLEVAC FLOOR SUCTION DEVICE: Brand: PUDDLEVAC

## (undated) DEVICE — SCD SEQUENTIAL COMPRESSION COMFORT SLEEVE MEDIUM KNEE LENGTH: Brand: KENDALL SCD

## (undated) DEVICE — DRAPE SHEET THREE QUARTER

## (undated) DEVICE — STIRRUP STRAP ADULT DISP

## (undated) DEVICE — SYRINGE 30ML LL

## (undated) DEVICE — WEBRIL COTTON STERILE 6IN

## (undated) DEVICE — SURGICAL CLIPPER BLADE GENERAL USE

## (undated) DEVICE — STERILE POLYISOPRENE POWDER-FREE SURGICAL GLOVES: Brand: PROTEXIS

## (undated) DEVICE — OCCLUSIVE GAUZE STRIP,3% BISMUTH TRIBROMOPHENATE IN PETROLATUM BLEND: Brand: XEROFORM

## (undated) DEVICE — ABDOMINAL PAD: Brand: DERMACEA

## (undated) DEVICE — BLADE SHAVER DISSECTOR  4MM 13CM CRV COOLCUT

## (undated) DEVICE — TIBURON EXTREMITY DRAPE WITH ARMBOARD COVERS: Brand: CONVERTORS

## (undated) DEVICE — 4-PORT MANIFOLD: Brand: NEPTUNE 2

## (undated) DEVICE — SYRINGE 3ML LL

## (undated) DEVICE — BETHLEHEM UNIVERSAL  ARTHRO PK: Brand: CARDINAL HEALTH

## (undated) DEVICE — NEEDLE FILTER 5 MICR 19G X 1.5IN

## (undated) DEVICE — INTENDED FOR TISSUE SEPARATION, AND OTHER PROCEDURES THAT REQUIRE A SHARP SURGICAL BLADE TO PUNCTURE OR CUT.: Brand: BARD-PARKER ® SAFETYLOCK CARBON RIB-BACK BLADES

## (undated) DEVICE — 3M™ IOBAN™ 2 ANTIMICROBIAL INCISE DRAPE 6650EZ: Brand: IOBAN™ 2

## (undated) DEVICE — 3M™ STERI-DRAPE™ U-DRAPE 1015: Brand: STERI-DRAPE™

## (undated) DEVICE — 3M™ STERI-STRIP™ REINFORCED ADHESIVE SKIN CLOSURES, R1547, 1/2 IN X 4 IN (12 MM X 100 MM), 6 STRIPS/ENVELOPE: Brand: 3M™ STERI-STRIP™

## (undated) DEVICE — GAUZE SPONGES,16 PLY: Brand: CURITY

## (undated) DEVICE — CUFF TOURNIQUET DISP SZ30

## (undated) DEVICE — NEEDLE BLUNT 18 G X 1 1/2IN

## (undated) DEVICE — STOCKINETTE,IMPERVIOUS,12X48,STERILE: Brand: MEDLINE

## (undated) DEVICE — STERILE POLYISOPRENE POWDER-FREE SURGICAL GLOVES WITH EMOLLIENT COATING: Brand: PROTEXIS

## (undated) DEVICE — ACE WRAP 6 IN STERILE

## (undated) DEVICE — TUBING ARTHROSCOPIC WAVE  MAIN PUMP

## (undated) DEVICE — CHLORAPREP HI-LITE 26ML ORANGE

## (undated) DEVICE — TUBING SUCTION 5MM X 12 FT

## (undated) DEVICE — SUT MONOCRYL 4-0 PS-2 27 IN Y426H

## (undated) DEVICE — LIGHT GLOVE GREEN

## (undated) DEVICE — COBAN 4 IN STERILE

## (undated) DEVICE — CAST PADDING 6 IN SYNTHETIC STRL